# Patient Record
Sex: MALE | Race: WHITE | Employment: OTHER | URBAN - METROPOLITAN AREA
[De-identification: names, ages, dates, MRNs, and addresses within clinical notes are randomized per-mention and may not be internally consistent; named-entity substitution may affect disease eponyms.]

---

## 2017-01-06 RX ORDER — SUMATRIPTAN 100 MG/1
TABLET, FILM COATED ORAL
Qty: 12 TAB | Refills: 1 | Status: SHIPPED | OUTPATIENT
Start: 2017-01-06 | End: 2017-03-05 | Stop reason: SDUPTHER

## 2017-01-09 RX ORDER — FLUTICASONE PROPIONATE 50 MCG
SPRAY, SUSPENSION (ML) NASAL
Qty: 1 BOTTLE | Refills: 3 | Status: SHIPPED | OUTPATIENT
Start: 2017-01-09 | End: 2017-06-27 | Stop reason: ALTCHOICE

## 2017-01-09 RX ORDER — TAMSULOSIN HYDROCHLORIDE 0.4 MG/1
CAPSULE ORAL
Qty: 90 CAP | Refills: 2 | Status: SHIPPED | OUTPATIENT
Start: 2017-01-09 | End: 2017-10-10 | Stop reason: SDUPTHER

## 2017-01-12 NOTE — TELEPHONE ENCOUNTER
From: Mehdi Guy  To: Xiao Grady MD  Sent: 1/11/2017 7:26 PM EST  Subject: Medication Renewal Request    Original authorizing provider: MD Mehdi Beckman would like a refill of the following medications:  fentaNYL ([address]) 25 mcg/hr PATCH Xiao Grady MD]    Preferred [facility]    Comment:

## 2017-01-13 RX ORDER — FENTANYL 25 UG/1
1 PATCH TRANSDERMAL
Qty: 10 PATCH | Refills: 0 | Status: SHIPPED | OUTPATIENT
Start: 2017-01-15 | End: 2017-02-09 | Stop reason: SDUPTHER

## 2017-01-27 RX ORDER — HYDROCODONE BITARTRATE AND ACETAMINOPHEN 7.5; 325 MG/1; MG/1
1 TABLET ORAL
Qty: 150 TAB | Refills: 0 | Status: SHIPPED | OUTPATIENT
Start: 2017-01-27 | End: 2017-02-23 | Stop reason: SDUPTHER

## 2017-01-27 NOTE — TELEPHONE ENCOUNTER
From: Maryann Guillen  To: Jamie Pickard MD  Sent: 1/26/2017 6:55 PM EST  Subject: Medication Renewal Request    Original authorizing provider: MD Maryann Pickering would like a refill of the following medications:  HYDROcodone-acetaminophen (NORCO) 7.5-325 mg per tablet Jamie Pickard MD]    [facility]    Comment:

## 2017-02-10 RX ORDER — FENTANYL 25 UG/1
1 PATCH TRANSDERMAL
Qty: 10 PATCH | Refills: 0 | Status: SHIPPED | OUTPATIENT
Start: 2017-02-10 | End: 2017-03-09 | Stop reason: SDUPTHER

## 2017-02-10 RX ORDER — FENTANYL 25 UG/1
1 PATCH TRANSDERMAL
Qty: 10 PATCH | Refills: 0 | Status: SHIPPED | OUTPATIENT
Start: 2017-02-10 | End: 2017-02-10 | Stop reason: SDUPTHER

## 2017-02-10 NOTE — TELEPHONE ENCOUNTER
From: Emily Christian  To: Ashia Ferrari MD  Sent: 2/9/2017 7:03 PM EST  Subject: Medication Renewal Request    Original authorizing provider: MD Emily Hernandez would like a refill of the following medications:  fentaNYL (Liane Walter) 25 mcg/hr PATCH Ashia Ferrari MD]    [facility]    Comment:

## 2017-02-11 RX ORDER — PANTOPRAZOLE SODIUM 40 MG/1
TABLET, DELAYED RELEASE ORAL
Qty: 90 TAB | Refills: 0 | Status: SHIPPED | OUTPATIENT
Start: 2017-02-11 | End: 2017-06-29 | Stop reason: ALTCHOICE

## 2017-02-22 RX ORDER — CEFUROXIME AXETIL 500 MG/1
500 TABLET ORAL 2 TIMES DAILY
Qty: 10 TAB | Refills: 0 | Status: SHIPPED | OUTPATIENT
Start: 2017-02-22 | End: 2017-02-27

## 2017-02-24 RX ORDER — HYDROCODONE BITARTRATE AND ACETAMINOPHEN 7.5; 325 MG/1; MG/1
1 TABLET ORAL
Qty: 150 TAB | Refills: 0 | Status: SHIPPED | OUTPATIENT
Start: 2017-02-24 | End: 2017-03-26 | Stop reason: SDUPTHER

## 2017-02-24 NOTE — TELEPHONE ENCOUNTER
From: Prince Lockwood  To: Maxx Ochoa MD  Sent: 2/23/2017 7:57 PM EST  Subject: Medication Renewal Request    Original authorizing provider: MD Prince Guerrero would like a refill of the following medications:  HYDROcodone-acetaminophen (NORCO) 7.5-325 mg per tablet Maxx Ochoa MD]    [facility]    Comment:

## 2017-03-06 RX ORDER — SUMATRIPTAN 100 MG/1
TABLET, FILM COATED ORAL
Qty: 12 TAB | Refills: 1 | Status: SHIPPED | OUTPATIENT
Start: 2017-03-06 | End: 2017-05-05 | Stop reason: SDUPTHER

## 2017-03-09 RX ORDER — FENTANYL 25 UG/1
1 PATCH TRANSDERMAL
Qty: 10 PATCH | Refills: 0 | Status: SHIPPED | OUTPATIENT
Start: 2017-03-09 | End: 2017-04-07 | Stop reason: SDUPTHER

## 2017-03-09 NOTE — TELEPHONE ENCOUNTER
From: Gregorio Cherry  To: Erika Harvey MD  Sent: 3/9/2017 5:29 AM EST  Subject: Medication Renewal Request    Original authorizing provider: MD Gregorio Gloria would like a refill of the following medications:  fentaNYL ([address]) 25 mcg/hr PATCH Erika Harvey MD]    [facility]    Comment:

## 2017-03-10 ENCOUNTER — TELEPHONE (OUTPATIENT)
Dept: INTERNAL MEDICINE CLINIC | Age: 67
End: 2017-03-10

## 2017-03-10 NOTE — TELEPHONE ENCOUNTER
Prior Auth request received from Pharmacy for Imitrex. Paperwork completed and faxed to insurance. Approval received. Patient contacted and pharmacy faxed insurance decision.

## 2017-03-14 RX ORDER — GUANFACINE HYDROCHLORIDE 1 MG/1
TABLET ORAL
Qty: 30 TAB | Refills: 5 | Status: SHIPPED | OUTPATIENT
Start: 2017-03-14 | End: 2017-09-29 | Stop reason: SDUPTHER

## 2017-03-27 RX ORDER — HYDROCODONE BITARTRATE AND ACETAMINOPHEN 7.5; 325 MG/1; MG/1
1 TABLET ORAL
Qty: 150 TAB | Refills: 0 | Status: SHIPPED | OUTPATIENT
Start: 2017-03-27 | End: 2017-04-20 | Stop reason: SDUPTHER

## 2017-03-27 NOTE — TELEPHONE ENCOUNTER
From: Mary Cordero  To: Munir Brown MD  Sent: 3/26/2017 9:30 PM EDT  Subject: Medication Renewal Request    Original authorizing provider: MD Mary Cardenas would like a refill of the following medications:  HYDROcodone-acetaminophen (NORCO) 7.5-325 mg per tablet Munir Brown MD]    Preferred pharmacy: [facility]    Comment:

## 2017-03-30 RX ORDER — HYDROCHLOROTHIAZIDE 25 MG/1
TABLET ORAL
Qty: 30 TAB | Refills: 3 | Status: SHIPPED | OUTPATIENT
Start: 2017-03-30 | End: 2017-06-27 | Stop reason: ALTCHOICE

## 2017-04-07 ENCOUNTER — HOSPITAL ENCOUNTER (OUTPATIENT)
Dept: LAB | Age: 67
Discharge: HOME | End: 2017-04-07
Payer: MEDICARE

## 2017-04-07 ENCOUNTER — OFFICE VISIT (OUTPATIENT)
Dept: INTERNAL MEDICINE CLINIC | Age: 67
End: 2017-04-07

## 2017-04-07 VITALS
SYSTOLIC BLOOD PRESSURE: 130 MMHG | HEART RATE: 79 BPM | HEIGHT: 76 IN | WEIGHT: 245 LBS | OXYGEN SATURATION: 98 % | TEMPERATURE: 98.4 F | RESPIRATION RATE: 16 BRPM | BODY MASS INDEX: 29.83 KG/M2 | DIASTOLIC BLOOD PRESSURE: 78 MMHG

## 2017-04-07 DIAGNOSIS — I10 ESSENTIAL HYPERTENSION: ICD-10-CM

## 2017-04-07 DIAGNOSIS — C92.11 CML IN REMISSION (HCC): ICD-10-CM

## 2017-04-07 DIAGNOSIS — Z12.5 PROSTATE CANCER SCREENING: ICD-10-CM

## 2017-04-07 DIAGNOSIS — M54.2 CERVICALGIA: ICD-10-CM

## 2017-04-07 DIAGNOSIS — I10 ESSENTIAL HYPERTENSION: Primary | ICD-10-CM

## 2017-04-07 LAB
ALBUMIN SERPL BCP-MCNC: 4.2 G/DL (ref 3.4–5)
ALBUMIN/GLOB SERPL: 1.2 {RATIO} (ref 0.8–1.7)
ALP SERPL-CCNC: 78 U/L (ref 45–117)
ALT SERPL-CCNC: 19 U/L (ref 16–61)
ANION GAP BLD CALC-SCNC: 6 MMOL/L (ref 3–18)
AST SERPL W P-5'-P-CCNC: 12 U/L (ref 15–37)
BILIRUB SERPL-MCNC: 0.9 MG/DL (ref 0.2–1)
BUN SERPL-MCNC: 17 MG/DL (ref 7–18)
BUN/CREAT SERPL: 15 (ref 12–20)
CALCIUM SERPL-MCNC: 8.7 MG/DL (ref 8.5–10.1)
CHLORIDE SERPL-SCNC: 109 MMOL/L (ref 100–108)
CO2 SERPL-SCNC: 25 MMOL/L (ref 21–32)
CREAT SERPL-MCNC: 1.12 MG/DL (ref 0.6–1.3)
GLOBULIN SER CALC-MCNC: 3.4 G/DL (ref 2–4)
GLUCOSE SERPL-MCNC: 81 MG/DL (ref 74–99)
POTASSIUM SERPL-SCNC: 4.3 MMOL/L (ref 3.5–5.5)
PROT SERPL-MCNC: 7.6 G/DL (ref 6.4–8.2)
PSA SERPL-MCNC: 2.8 NG/ML (ref 0–4)
SODIUM SERPL-SCNC: 140 MMOL/L (ref 136–145)

## 2017-04-07 PROCEDURE — 84153 ASSAY OF PSA TOTAL: CPT | Performed by: INTERNAL MEDICINE

## 2017-04-07 PROCEDURE — 80053 COMPREHEN METABOLIC PANEL: CPT | Performed by: INTERNAL MEDICINE

## 2017-04-07 RX ORDER — FENTANYL 25 UG/1
1 PATCH TRANSDERMAL
Qty: 10 PATCH | Refills: 0 | Status: SHIPPED | OUTPATIENT
Start: 2017-04-07 | End: 2017-05-08 | Stop reason: SDUPTHER

## 2017-04-07 NOTE — PROGRESS NOTES
1. Have you been to the ER, urgent care clinic since your last visit? Hospitalized since your last visit? No    2. Have you seen or consulted any other health care providers outside of the [address] [place] [address] since your last visit? Include any pap smears or colon screening.  No

## 2017-04-07 NOTE — PROGRESS NOTES
The patient presents to the office today with the chief complaint of hypetension    HPI    The patient remains on medications for hypertension. He is tolerating the medications well. The patient persists with chronic pain in his neck. The pain is in fair control on medications. 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 Review of Systems   Respiratory: Negative for shortness of breath. Cardiovascular: Negative for chest pain and leg swelling. Musculoskeletal: Positive for neck pain. Allergies   Allergen Reactions    Levofloxacin Nausea Only     Severe nausea and dizziness    Sulfa (Sulfonamide Antibiotics) Unknown (comments)     Nausea, aching all over       Current Outpatient Prescriptions   Medication Sig Dispense Refill    tiZANidine (ZANAFLEX) 4 mg capsule TAKE 1 CAPSULE BY MOUTH 3 TIMES DAILY 90 Cap 5    fentaNYL (DURAGESIC) 25 mcg/hr PATCH 1 Patch by TransDERmal route every seventy-two (72) hours. Max Daily Amount: 1 Patch. 10 Patch 0    hydroCHLOROthiazide (HYDRODIURIL) 25 mg tablet TAKE 1 TABLET BY MOUTH ONCE DAILY 30 Tab 3    HYDROcodone-acetaminophen (NORCO) 7.5-325 mg per tablet Take 1 Tab by mouth every four (4) hours as needed for Pain. Max Daily Amount: 6 Tabs. 150 Tab 0    guanFACINE (TENEX) 1 mg tablet TAKE 1 TABLET BY MOUTH EVERY DAY 30 Tab 5    SUMAtriptan (IMITREX) 100 mg tablet TAKE 1 TABLET BY MOUTH AT ONSET OF HEADACHE, MAY REPEAT 2 HOURS LATER. (MAX 2 PILLS IN 24 HOURS) 12 Tab 1    pantoprazole (PROTONIX) 40 mg tablet TAKE 1 TABLET BY MOUTH EVERY DAY 90 Tab 0    fluticasone (FLONASE) 50 mcg/actuation nasal spray SQUIRT 2 SPRAYS UPWARDS INTO EACH NOSTRIL DAILY 1 Bottle 3    tamsulosin (FLOMAX) 0.4 mg capsule TAKE 1 CAPSULE BY MOUTH ONCE DAILY 90 Cap 2    amLODIPine-Olmesartan 5-40 mg tab TAKE 1 TABLET BY MOUTH ONCE DAILY.  30 Tab 3    DULoxetine (CYMBALTA) 30 mg capsule TAKE 1 CAPSULE BY MOUTH DAILY WITH 60 MG CAPSULE FOR A TOTAL OF 90 MG DAILY 30 Cap 6    labetalol (NORMODYNE) 300 mg tablet TAKE 1 TABLET BY MOUTH 2 TIMES DAILY 60 Tab 3    topiramate (TOPAMAX) 50 mg tablet TAKE 1 TABLET BY MOUTH TWICE A DAY TO PREVENT MIGRAINE 60 Tab 5    DULoxetine (CYMBALTA) 60 mg capsule TAKE ONE CAPSULE BY MOUTH EVERY DAY 90 Cap 3    carisoprodol (SOMA) 350 mg tablet take 1 tablet by mouth three times a day  0    meclizine (ANTIVERT) 25 mg tablet TAKE 1 TABLET BY MOUTH 3 TIMES DAILY AS NEEDED FOR DIZZINESS 60 Tab 0    carvedilol (COREG) 12.5 mg tablet 1 TABLET TWICE PER  Tab 3    promethazine (PHENERGAN) 25 mg tablet Take 25 mg by mouth every six (6) hours as needed for Nausea.  montelukast (SINGULAIR) 10 mg tablet TAKE 1 TABLET BY MOUTH DAILY 90 Tab 3    Nilotinib (TASIGNA) 150 mg cap Take 300 mg by mouth two (2) times a day. Indications: Leukemia         Past Medical History:   Diagnosis Date    Abdominal pain, unspecified site     Acute reaction to stress     Allergic rhinitis due to pollen     Arthritis     Back injury     BPH (benign prostatic hypertrophy)     Calculus of ureter     Cancer (HCC)     history of Leukemia, in remission    Carotid duplex 06/17/2015    Mild < 50% bilateral ICA stenosis.  Dizziness and giddiness     Esophageal reflux     Essential hypertension     GERD (gastroesophageal reflux disease)     Headache     History of echocardiogram 08/13/2015    EF 55-60%. No WMA. Mild-mod LVH. Gr 1 DDfx. No evidence of intracardiac shunt.   Mild AI.      Hx: UTI (urinary tract infection)     Hypertension     Hypogonadism in male     Kidney stones     Migraine     Neck injury     Polycythemia ([address]     Polycythemia, secondary     Sciatica     Unspecified retinal detachment     Unspecified sleep apnea     resolved since gastric bypass    Vision decreased     Weight loss        Past Surgical History:   Procedure Laterality Date    ABDOMEN SURGERY PROC UNLISTED  2012    Hernia    HX CATARACT REMOVAL Left     HX CERVICAL FUSION  4/4/2016    Cervical Spine Fusion    HX CHOLECYSTECTOMY      HX GASTRIC BYPASS  2006    HX KNEE ARTHROSCOPY      both knees (right knee twice, left once)    HX ORTHOPAEDIC  1981, 1995    lumbar laminectomy    HX OTHER SURGICAL      Two back surgeries    HX RETINAL DETACHMENT REPAIR Left        Social History     Social History    Marital status:      Spouse name: N/A    Number of children: N/A    Years of education: N/A     Occupational History    Not on file. Social History Main Topics    Smoking status: Former Smoker     Quit date: 2/22/1981    Smokeless tobacco: Never Used    Alcohol use No    Drug use: No    Sexual activity: Yes     Partners: Female     Other Topics Concern    Not on file     Social History Narrative       Patient does not have an advanced directive on file    Visit Vitals    /78 (BP 1 Location: Right arm, BP Patient Position: Sitting)    Pulse 79    Temp 98.4 °F (36.9 °C) (Tympanic)    Resp 16    Ht 6' 4\" (1.93 m)    Wt 245 lb (111.1 kg)    SpO2 98%    BMI 29.82 kg/m2       Physical Exam   No Cervical Lymphadenopathy  No Supraclavicular Lymphadenopathy  Thyroid is Normal  Lungs are clear to ausculation and percussion  Heart:  S1 S2 are normal, No gallops, No mummers  No Carotid Bruits  Abdomen:  Normal Bowel Sounds. No tenderness. No masses. No Hepatomegaly or Splenomegly  LE:  Strong Pedal Pulses. No Edema  Neck with limited range of motion    BMI:  I have reviewed/discussed the above normal BMI with the patient. I have recommended the following interventions: dietary management education, guidance, and counseling . [facility] Outpatient Visit on 04/07/2017   Component Date Value Ref Range Status    Prostate Specific Ag 04/07/2017 2.8  0.0 - 4.0 ng/mL Final    Sodium 04/07/2017 140  136 - 145 mmol/L Final    Potassium 04/07/2017 4.3  3.5 - 5.5 mmol/L Final    Chloride 04/07/2017 109* 100 - 108 mmol/L Final    CO2 04/07/2017 25  21 - 32 mmol/L Final    Anion gap 04/07/2017 6  3.0 - 18 mmol/L Final    Glucose 04/07/2017 81  74 - 99 mg/dL Final    BUN 04/07/2017 17  7.0 - 18 MG/DL Final    Creatinine 04/07/2017 1.12  0.6 - 1.3 MG/DL Final    BUN/Creatinine ratio 04/07/2017 15  12 - 20   Final    GFR est AA 04/07/2017 >60  >60 ml/min/1.73m2 Final    GFR est non-AA 04/07/2017 >60  >60 ml/min/1.73m2 Final    Comment: (NOTE)  Estimated GFR is calculated using the Modification of Diet in Renal   Disease (MDRD) Study equation, reported for both  Americans   (GFRAA) and non- Americans (GFRNA), and normalized to 1.73m2   body surface area. The physician must decide which value applies to   the patient. The MDRD study equation should only be used in   individuals age 25 or older. It has not been validated for the   following: pregnant women, patients with serious comorbid conditions,   or on certain medications, or persons with extremes of body size,   muscle mass, or nutritional status.  Calcium 04/07/2017 8.7  8.5 - 10.1 MG/DL Final    Bilirubin, total 04/07/2017 0.9  0.2 - 1.0 MG/DL Final    ALT (SGPT) 04/07/2017 19  16 - 61 U/L Final    AST (SGOT) 04/07/2017 12* 15 - 37 U/L Final    Alk.  phosphatase 04/07/2017 78  45 - 117 U/L Final    Protein, total 04/07/2017 7.6  6.4 - 8.2 g/dL Final    Albumin 04/07/2017 4.2  3.4 - 5.0 g/dL Final    Globulin 04/07/2017 3.4  2.0 - 4.0 g/dL Final    A-G Ratio 04/07/2017 1.2  0.8 - 1.7   Final       .  Results for orders placed or performed during the hospital encounter of 04/07/17   PSA SCREENING (SCREENING)   Result Value Ref Range    Prostate Specific Ag 2.8 0.0 - 4.0 ng/mL   METABOLIC PANEL, COMPREHENSIVE   Result Value Ref Range    Sodium 140 136 - 145 mmol/L    Potassium 4.3 3.5 - 5.5 mmol/L    Chloride 109 (H) 100 - 108 mmol/L    CO2 25 21 - 32 mmol/L    Anion gap 6 3.0 - 18 mmol/L    Glucose 81 74 - 99 mg/dL    BUN 17 7.0 - 18 MG/DL    Creatinine 1.12 0.6 - 1.3 MG/DL    BUN/Creatinine ratio 15 12 - 20      GFR est AA >60 >60 ml/min/1.73m2    GFR est non-AA >60 >60 ml/min/1.73m2    Calcium 8.7 8.5 - 10.1 MG/DL    Bilirubin, total 0.9 0.2 - 1.0 MG/DL    ALT (SGPT) 19 16 - 61 U/L    AST (SGOT) 12 (L) 15 - 37 U/L    Alk. phosphatase 78 45 - 117 U/L    Protein, total 7.6 6.4 - 8.2 g/dL    Albumin 4.2 3.4 - 5.0 g/dL    Globulin 3.4 2.0 - 4.0 g/dL    A-G Ratio 1.2 0.8 - 1.7         Assessment / Plan      ICD-10-CM ICD-9-CM    1. Essential hypertension I10 401.9 PSA SCREENING (SCREENING)      METABOLIC PANEL, COMPREHENSIVE      MS COLLECTION VENOUS BLOOD,VENIPUNCTURE   2. Prostate cancer screening Z12.5 V76.44 PSA SCREENING (SCREENING)      METABOLIC PANEL, COMPREHENSIVE      MS COLLECTION VENOUS BLOOD,VENIPUNCTURE   3. CML in remission ([facility] [address] C92.11 205.11    4. Cervicalgia M54.2 723.1       Labs  he was advised to continue his maintenance medications    The Prescription Monitoring Program registry was checked by me prior to the issuing of this prescription for a controlled substance      Follow-up Disposition:  Return in about 5 months (around 9/7/2017). I asked Markos Diamond if he has any questions and I answered the questions.   Markos Diamond states that he understands the treatment plan and agrees with the treatment plan

## 2017-04-07 NOTE — PATIENT INSTRUCTIONS
Health Maintenance Due   Topic Date Due    Hepatitis C Test  1950    Stool testing for trace blood  06/22/1999    Glaucoma Screening   06/22/2014    Pneumococcal Vaccine (2 of 2 - PPSV23) 06/18/2016

## 2017-04-13 RX ORDER — TIZANIDINE HYDROCHLORIDE 4 MG/1
CAPSULE, GELATIN COATED ORAL
Qty: 90 CAP | Refills: 5 | Status: SHIPPED | OUTPATIENT
Start: 2017-04-13 | End: 2017-06-27 | Stop reason: ALTCHOICE

## 2017-04-21 RX ORDER — HYDROCODONE BITARTRATE AND ACETAMINOPHEN 7.5; 325 MG/1; MG/1
TABLET ORAL
Qty: 150 TAB | Refills: 0 | Status: SHIPPED | OUTPATIENT
Start: 2017-04-21 | End: 2017-05-23 | Stop reason: SDUPTHER

## 2017-04-21 NOTE — TELEPHONE ENCOUNTER
From: Sharmin Ferrer  To: Abby Montemayor MD  Sent: 4/20/2017 7:30 PM EDT  Subject: Medication Renewal Request    Original authorizing provider: MD Sharmin Dawson would like a refill of the following medications:  HYDROcodone-acetaminophen (NORCO) 7.5-325 mg per tablet Abby Montemayor MD]    Preferred pharmacy: [facility]    Comment:

## 2017-05-05 RX ORDER — SUMATRIPTAN 100 MG/1
TABLET, FILM COATED ORAL
Qty: 12 TAB | Refills: 1 | Status: SHIPPED | OUTPATIENT
Start: 2017-05-05 | End: 2017-07-03 | Stop reason: SDUPTHER

## 2017-05-08 RX ORDER — FENTANYL 25 UG/1
1 PATCH TRANSDERMAL
Qty: 10 PATCH | Refills: 0 | Status: SHIPPED | OUTPATIENT
Start: 2017-05-08 | End: 2017-06-07 | Stop reason: SDUPTHER

## 2017-05-23 RX ORDER — HYDROCODONE BITARTRATE AND ACETAMINOPHEN 7.5; 325 MG/1; MG/1
TABLET ORAL
Qty: 150 TAB | Refills: 0 | Status: SHIPPED | OUTPATIENT
Start: 2017-05-25 | End: 2017-06-22 | Stop reason: SDUPTHER

## 2017-05-24 ENCOUNTER — HOSPITAL ENCOUNTER (OUTPATIENT)
Dept: LAB | Age: 67
Discharge: HOME | End: 2017-05-24
Payer: MEDICARE

## 2017-05-24 DIAGNOSIS — Z02.83 ENCOUNTER FOR DRUG SCREENING: ICD-10-CM

## 2017-05-24 PROCEDURE — 80307 DRUG TEST PRSMV CHEM ANLYZR: CPT | Performed by: INTERNAL MEDICINE

## 2017-06-01 LAB
DRUGS UR: NORMAL
PDF, 451356: NORMAL

## 2017-06-08 RX ORDER — FENTANYL 25 UG/1
1 PATCH TRANSDERMAL
Qty: 10 PATCH | Refills: 0 | Status: SHIPPED | OUTPATIENT
Start: 2017-06-08 | End: 2017-07-07 | Stop reason: SDUPTHER

## 2017-06-14 RX ORDER — QUININE SULFATE 324 MG/1
324 CAPSULE ORAL
Qty: 30 CAP | Refills: 0 | Status: CANCELLED | OUTPATIENT
Start: 2017-06-14 | End: 2017-07-14

## 2017-06-14 NOTE — TELEPHONE ENCOUNTER
Patient called stating that his muscle cramps in his legs are getting worse and are not only at night now.  Patient requests the keke Abarca had suggested

## 2017-06-15 RX ORDER — MONTELUKAST SODIUM 10 MG/1
TABLET ORAL
Qty: 90 TAB | Refills: 3 | Status: SHIPPED | OUTPATIENT
Start: 2017-06-15 | End: 2018-06-28 | Stop reason: SDUPTHER

## 2017-06-22 RX ORDER — HYDROCODONE BITARTRATE AND ACETAMINOPHEN 7.5; 325 MG/1; MG/1
TABLET ORAL
Qty: 150 TAB | Refills: 0 | Status: SHIPPED | OUTPATIENT
Start: 2017-06-22 | End: 2017-07-20 | Stop reason: SDUPTHER

## 2017-06-22 NOTE — TELEPHONE ENCOUNTER
From: Norma Juares  To: Amena Day MD  Sent: 6/22/2017 7:20 AM EDT  Subject: Medication Renewal Request    Original authorizing provider: MD Norma Perdomo would like a refill of the following medications:  HYDROcodone-acetaminophen (NORCO) 7.5-325 mg per tablet Amena Day MD]    Preferred pharmacy: [facility]    Comment:

## 2017-06-27 ENCOUNTER — HOSPITAL ENCOUNTER (OUTPATIENT)
Dept: LAB | Age: 67
Discharge: HOME | End: 2017-06-27
Payer: MEDICARE

## 2017-06-27 ENCOUNTER — OFFICE VISIT (OUTPATIENT)
Dept: INTERNAL MEDICINE CLINIC | Age: 67
End: 2017-06-27

## 2017-06-27 VITALS
SYSTOLIC BLOOD PRESSURE: 110 MMHG | HEIGHT: 76 IN | TEMPERATURE: 98.3 F | HEART RATE: 74 BPM | WEIGHT: 245 LBS | DIASTOLIC BLOOD PRESSURE: 74 MMHG | BODY MASS INDEX: 29.83 KG/M2 | OXYGEN SATURATION: 98 %

## 2017-06-27 DIAGNOSIS — C92.11 CML IN REMISSION (HCC): Primary | ICD-10-CM

## 2017-06-27 DIAGNOSIS — Z00.00 ROUTINE GENERAL MEDICAL EXAMINATION AT A HEALTH CARE FACILITY: ICD-10-CM

## 2017-06-27 DIAGNOSIS — Z13.31 SCREENING FOR DEPRESSION: ICD-10-CM

## 2017-06-27 DIAGNOSIS — M48.9 CERVICAL SPINE DISEASE: ICD-10-CM

## 2017-06-27 DIAGNOSIS — R25.2 CRAMPS, MUSCLE, GENERAL: ICD-10-CM

## 2017-06-27 DIAGNOSIS — E29.1 HYPOGONADISM IN MALE: ICD-10-CM

## 2017-06-27 DIAGNOSIS — C92.11 CML IN REMISSION (HCC): ICD-10-CM

## 2017-06-27 DIAGNOSIS — Z13.39 SCREENING FOR ALCOHOLISM: ICD-10-CM

## 2017-06-27 DIAGNOSIS — Z23 ENCOUNTER FOR IMMUNIZATION: ICD-10-CM

## 2017-06-27 LAB
ALBUMIN SERPL BCP-MCNC: 3.7 G/DL (ref 3.4–5)
ALBUMIN/GLOB SERPL: 1.2 {RATIO} (ref 0.8–1.7)
ALP SERPL-CCNC: 90 U/L (ref 45–117)
ALT SERPL-CCNC: 17 U/L (ref 16–61)
ANION GAP BLD CALC-SCNC: 7 MMOL/L (ref 3–18)
AST SERPL W P-5'-P-CCNC: 12 U/L (ref 15–37)
BASOPHILS # BLD AUTO: 0.1 K/UL (ref 0–0.06)
BASOPHILS # BLD: 1 % (ref 0–2)
BILIRUB SERPL-MCNC: 0.4 MG/DL (ref 0.2–1)
BUN SERPL-MCNC: 29 MG/DL (ref 7–18)
BUN/CREAT SERPL: 24 (ref 12–20)
CALCIUM SERPL-MCNC: 8.4 MG/DL (ref 8.5–10.1)
CHLORIDE SERPL-SCNC: 110 MMOL/L (ref 100–108)
CO2 SERPL-SCNC: 22 MMOL/L (ref 21–32)
CREAT SERPL-MCNC: 1.22 MG/DL (ref 0.6–1.3)
DIFFERENTIAL METHOD BLD: ABNORMAL
EOSINOPHIL # BLD: 0.1 K/UL (ref 0–0.4)
EOSINOPHIL NFR BLD: 2 % (ref 0–5)
ERYTHROCYTE [DISTWIDTH] IN BLOOD BY AUTOMATED COUNT: 16.8 % (ref 11.6–14.5)
GLOBULIN SER CALC-MCNC: 3.1 G/DL (ref 2–4)
GLUCOSE SERPL-MCNC: 89 MG/DL (ref 74–99)
HCT VFR BLD AUTO: 39 % (ref 36–48)
HGB BLD-MCNC: 13 G/DL (ref 13–16)
LYMPHOCYTES # BLD AUTO: 46 % (ref 21–52)
LYMPHOCYTES # BLD: 3.5 K/UL (ref 0.9–3.6)
MCH RBC QN AUTO: 27.1 PG (ref 24–34)
MCHC RBC AUTO-ENTMCNC: 33.3 G/DL (ref 31–37)
MCV RBC AUTO: 81.4 FL (ref 74–97)
MONOCYTES # BLD: 0.5 K/UL (ref 0.05–1.2)
MONOCYTES NFR BLD AUTO: 7 % (ref 3–10)
NEUTS SEG # BLD: 3.3 K/UL (ref 1.8–8)
NEUTS SEG NFR BLD AUTO: 44 % (ref 40–73)
PLATELET # BLD AUTO: 340 K/UL (ref 135–420)
PMV BLD AUTO: 11.4 FL (ref 9.2–11.8)
POTASSIUM SERPL-SCNC: 4.7 MMOL/L (ref 3.5–5.5)
PROT SERPL-MCNC: 6.8 G/DL (ref 6.4–8.2)
RBC # BLD AUTO: 4.79 M/UL (ref 4.7–5.5)
SODIUM SERPL-SCNC: 139 MMOL/L (ref 136–145)
WBC # BLD AUTO: 7.5 K/UL (ref 4.6–13.2)

## 2017-06-27 PROCEDURE — 84403 ASSAY OF TOTAL TESTOSTERONE: CPT | Performed by: INTERNAL MEDICINE

## 2017-06-27 PROCEDURE — 80053 COMPREHEN METABOLIC PANEL: CPT | Performed by: INTERNAL MEDICINE

## 2017-06-27 PROCEDURE — 85025 COMPLETE CBC W/AUTO DIFF WBC: CPT | Performed by: INTERNAL MEDICINE

## 2017-06-27 NOTE — MR AVS SNAPSHOT
Visit Information Date & Time Provider Department Dept. Phone Encounter #  
 6/27/2017  9:30 AM Andrzej Hines MD [facility] [place] [phone] [id number] Your Appointments 9/26/2017  8:15 AM  
Follow Up with Andrzej Hines MD  
[facility]) Appt Note: 3 month [address], [address] [place] [facility] [address]  
  
   
 [address], [address] [place] [address] Upcoming Health Maintenance Date Due Hepatitis C Screening 1950 FOBT Q 1 YEAR AGE 50-75 6/22/1999 GLAUCOMA SCREENING Q2Y 6/22/2014 INFLUENZA AGE 9 TO ADULT 8/1/2017 MEDICARE YEARLY EXAM 6/28/2018 DTaP/Tdap/Td series (2 - Td) 6/3/2024 Allergies as of 6/27/2017  Review Complete On: 6/27/2017 By: Andrzej Hines MD  
  
 Severity Noted Reaction Type Reactions Levofloxacin High   Nausea Only Severe nausea and dizziness Sulfa (Sulfonamide Antibiotics)  03/26/2014    Unknown (comments) Nausea, aching all over Current Immunizations  Reviewed on 6/26/2017 Name Date Influenza High Dose Vaccine PF 10/18/2016  9:42 AM  
 Influenza Vaccine (Quad) PF 10/1/2015 Pneumococcal Conjugate (PCV-13) 10/1/2015 Pneumococcal Polysaccharide (PPSV-23) 6/27/2017 11:34 AM  
 Pneumococcal Vaccine (Unspecified Type) 6/18/2011 Td 6/2/2006 Tdap 6/3/2014 Zoster Vaccine, Live 12/9/2013 Not reviewed this visit You Were Diagnosed With   
  
 Codes Comments CML in remission ([facility] [address]    -  Primary ICD-10-CM: C92.11 ICD-9-CM: 205.11 Cervical spine disease     ICD-10-CM: M48.9 ICD-9-CM: 724.9 Cramps, muscle, general     ICD-10-CM: R25.2 ICD-9-CM: 729.82 Hypogonadism in male     ICD-10-CM: E29.1 ICD-9-CM: 257.2 Routine general medical examination at a health care facility     ICD-10-CM: Z00.00 ICD-9-CM: V70.0  Screening for alcoholism     ICD-10-CM: Z13.89 
 ICD-9-CM: V79.1 Screening for depression     ICD-10-CM: Z13.89 ICD-9-CM: V79.0 Encounter for immunization     ICD-10-CM: F87 ICD-9-CM: V03.89 Vitals BP Pulse Temp Height(growth percentile) 110/74 (BP 1 Location: Left arm, BP Patient Position: Sitting) 74 98.3 °F (36.8 °C) (Tympanic) 6' 4\" (1.93 m) Weight(growth percentile) SpO2 BMI Smoking Status 245 lb (111.1 kg) 98% 29.82 kg/m2 Former Smoker BMI and BSA Data Body Mass Index Body Surface Area  
 29.82 kg/m 2 2.44 m 2 Preferred Pharmacy Pharmacy Name Phone [id number] [place] [id number] [facility] [place] [facility] [phone] Your Updated Medication List  
  
   
This list is accurate as of: 6/27/17 11:34 AM.  Always use your most recent med list. amLODIPine-Olmesartan 5-40 mg Tab TAKE 1 TABLET BY MOUTH ONCE DAILY. carvedilol 12.5 mg tablet Commonly known as:  COREG  
1 TABLET TWICE PER DAY * DULoxetine 60 mg capsule Commonly known as:  CYMBALTA TAKE ONE CAPSULE BY MOUTH EVERY DAY  
  
 * DULoxetine 30 mg capsule Commonly known as:  CYMBALTA TAKE 1 CAPSULE BY MOUTH DAILY WITH 60 MG CAPSULE FOR A TOTAL OF 90 MG DAILY  
  
 fentaNYL 25 mcg/hr PATCH Commonly known as:  DURAGESIC  
1 Patch by TransDERmal route every seventy-two (72) hours. Max Daily Amount: 1 Patch.  
  
 guanFACINE IR 1 mg IR tablet Commonly known as:  TENEX  
TAKE 1 TABLET BY MOUTH EVERY DAY  
  
 HYDROcodone-acetaminophen 7.5-325 mg per tablet Commonly known as:  Flori Cruise Take 1 tablet by mouth every four hours as needed for pain. Max daily amount 5 tabs. montelukast 10 mg tablet Commonly known as:  SINGULAIR  
TAKE 1 TABLET BY MOUTH EVERY DAY  
  
 pantoprazole 40 mg tablet Commonly known as:  PROTONIX  
TAKE 1 TABLET BY MOUTH EVERY DAY  
  
 SUMAtriptan 100 mg tablet Commonly known as:  IMITREX  
TAKE 1 TABLET BY MOUTH AT ONSET OF HEADACHE, MAY REPEAT 2 HOURS LATER. (MAX 2 PILLS IN 24 HOURS)  
  
 tamsulosin 0.4 mg capsule Commonly known as:  FLOMAX TAKE 1 CAPSULE BY MOUTH ONCE DAILY TASIGNA 150 mg Cap Generic drug:  Nilotinib Take 300 mg by mouth two (2) times a day. Indications: Leukemia  
  
 topiramate 50 mg tablet Commonly known as:  TOPAMAX TAKE 1 TABLET BY MOUTH TWICE A DAY TO PREVENT MIGRAINE  
  
 * Notice: This list has 2 medication(s) that are the same as other medications prescribed for you. Read the directions carefully, and ask your doctor or other care provider to review them with you. We Performed the Following ADMIN PNEUMOCOCCAL VACCINE [ HCP] [facility] [[id number] [facility]] PNEUMOCOCCAL POLYSACCHARIDE VACCINE, 23-VALENT, ADULT OR IMMUNOSUPPRESSED PT DOSE, [31881 CPT(R)] Patient Instructions Medicare Wellness Visit, Male The best way to improve and maintain good health is to have a healthy lifestyle by eating a well-balanced diet, exercising regularly, limiting alcohol and stopping smoking. Regular visits with your physician or non-physician health care provider also support your good health. Preventive screening tests can find health problems before they become diseases or illnesses. Preventive services such as immunizations prevent serious infections. All people over age 72 should have a Pneumovax and a Prevnar-13 shot to prevent potentially life threatening infections with the pneumococcus bacteria, a common cause of pneumonia. These are once in a lifetime unless you and your provider decide differently. Next due: Pneumovax All people over 65 should have a yearly influenza vaccine or \"flu\" shot. This does not prevent infection with cold viruses but has been proven to prevent hospitalization and death from influenza. Next due: Fall Although Medicare part B \"regular Medicare\" currently only covers tetanus vaccination in the context of an injury, a tetanus vaccine (Tdap or Td) is recommended every 10 years. Tdap is generally given once in a lifetime for older adults. Next due: Td in 2024 A shingles vaccine is recommended once in a lifetime after age 61. The Shingles vaccine is also not covered by Medicare part B. Next due: Completed Note, however, that both the Shingles vaccine and Tdap/Td are generally covered by secondary carriers. Please check your coverage and out of pocket expenses. Your pharmacy benefits may cover these vaccines so please check with your pharmacist.  Also consider contacting your local health department because it may stock these vaccines for a reasonable charge. We currently have documentation of the following immunization history for you: 
Immunization History Administered Date(s) Administered  Influenza High Dose Vaccine PF 10/18/2016  Influenza Vaccine (Quad) PF 10/01/2015  Pneumococcal Conjugate (PCV-13) 10/01/2015  Pneumococcal Vaccine (Unspecified Type) 06/18/2011  Td 06/02/2006  Tdap 06/03/2014  Zoster Vaccine, Live 12/09/2013 Screening for infection with Hepatitis C is recommended for anyone born between [birth year] through [address]. The table at the bottom of this document indicates the status of this and other screening services. Screening for diabetes mellitus with a blood sugar test (glucose) should be done at least every 3 years until age 79. You and your health care provider may decide whether to continue screening after age 79. If you have diabetes, this monitoring will be done more frequently (usually every 3-6 months) and will include A1C testing. The most recent blood glucose we have on file for you is:  
Lab Results Component Value Date/Time Glucose 81 04/07/2017 02:52 PM  
 Glucose (POC) 124 06/13/2016 03:39 PM  
 
 
Glaucoma is a disease of the eye due to increased ocular pressure that can lead to blindness.  People with risk factors for glaucoma ( race, diabetes, family history) should be screened at least every 2 years by an eye professional. This may be covered annually if indicated as determined by you and your doctor. Last done: 2016  Next due: 2017 per Dr. Kathryn Story Cardiovascular screening tests that check for elevated lipids or cholesterol (fatty part of blood) which can lead to heart disease and strokes should be done every 4-6 years through age 79. You and your health care provider may decide whether to continue screening after age 79. The most recent lipid panel we have on file for you is:  
Lab Results Component Value Date/Time Cholesterol, total 98 06/17/2011 03:20 AM  
 HDL Cholesterol 21 06/17/2011 03:20 AM  
 LDL, calculated 44.6 06/17/2011 03:20 AM  
 VLDL, calculated 32.4 06/17/2011 03:20 AM  
 Triglyceride 162 06/17/2011 03:20 AM  
 CHOL/HDL Ratio 4.7 06/17/2011 03:20 AM  
 
Next due: per Dr. Nader Gómez Colorectal cancer screening that evaluates for blood or polyps in your colon for people with average risk should be done yearly as a stool test, every five years as a flexible sigmoidoscope or every 10 years as a colonoscopy up to age 76. You and your health care provider may decide whether to continue screening after age 76. Last done: done 2011 per patient (every 5 years) Next due: please schedule Men up to age 76 may elect to screen for prostate cancer with a blood test called a PSA at certain intervals, depending on their personal and family history. This decision is between the patient and his provider. The most recent PSA values we have on file for you are: 
Lab Results Component Value Date/Time  
 Prostate Specific Ag 2.8 04/07/2017 02:52 PM  
 
 
People who are between age 54and [de-identified]years of age and have smoked the equivalent of 1 pack per day for 30 years or more may benefit from screening for lung cancer with a yearly low dose CT scan until they have been non smokers for 15 years. If you have questions regarding hugo please ask your health care provider Your Medicare Wellness Exam is recommended annually. Introducing [facility]! Dear Maria Ines Polanco: Thank you for requesting a [employer] account. Our records indicate that you already have an active Bo[employer] account. You can access your account anytime at [email or web address] Did you know that you can access your hospital and ER discharge instructions at any time in Bo[employer]ah? You can also review all of your test results from your hospital stay or ER visit. Additional Information If you have questions, please visit the Frequently Asked Questions section of the [employer] website at [email or web address] Remember, Bo[employer]ah is NOT to be used for urgent needs. For medical emergencies, dial 911. Now available from your iPhone and Android! Please provide this summary of care documentation to your next provider. Your primary care clinician is listed as Nuria Milan. If you have any questions after today's visit, please call [phone].

## 2017-06-27 NOTE — PROGRESS NOTES
1. Have you been to the ER, urgent care clinic since your last visit? Hospitalized since your last visit? No    2. Have you seen or consulted any other health care providers outside of the [address] [place] [address] since your last visit? Include any pap smears or colon screening.  No

## 2017-06-27 NOTE — PROGRESS NOTES
The patient presents to the office today with the chief complaint of leg cramps    HPI    Veronika Brandon RTC today to discuss his {general pain dx:092531} that is affecting his {body parts:06795}. Significant changes since last visit: none, muscle cramps. He is  able to do his normal daily activities. He reports the following adverse side effects: none. Least pain over the last week has been 2/10. Worst pain over the last week has been 8/10. Aberrant behaviors: None. Urine Drug Screen: {URINE DRUG SCREEN:99021}. Pain agreement on file: {PAIN MANAGEMENT CONTRACT:09845}.  reviewed: {yes/ no default yes:19425::\"yes\"}. Pill count is consistent with his prescription: {yes/ no default yes:19425::\"yes\"}  Concomitant use of a benzodiazepine: {yes/ no default no:24030::\"no\"}    Opioid Risk Tool Reviewed{plan; sports med:20286::\"yes\":1}     {JUSTIFICATION FOR HIGH DOSE:76354}    {NALOXONE PRESCRIPTION REQUIRED:79528::\"Naloxone prescription is warranted. It has been provided or is already on file. \"}         ROS    Allergies   Allergen Reactions    Levofloxacin Nausea Only     Severe nausea and dizziness    Sulfa (Sulfonamide Antibiotics) Unknown (comments)     Nausea, aching all over       Current Outpatient Prescriptions   Medication Sig Dispense Refill    HYDROcodone-acetaminophen (NORCO) 7.5-325 mg per tablet Take 1 tablet by mouth every four hours as needed for pain. Max daily amount 5 tabs. 150 Tab 0    montelukast (SINGULAIR) 10 mg tablet TAKE 1 TABLET BY MOUTH EVERY DAY 90 Tab 3    fentaNYL (DURAGESIC) 25 mcg/hr PATCH 1 Patch by TransDERmal route every seventy-two (72) hours. Max Daily Amount: 1 Patch. 10 Patch 0    SUMAtriptan (IMITREX) 100 mg tablet TAKE 1 TABLET BY MOUTH AT ONSET OF HEADACHE, MAY REPEAT 2 HOURS LATER. (MAX 2 PILLS IN 24 HOURS) 12 Tab 1    tiZANidine (ZANAFLEX) 4 mg capsule TAKE 1 CAPSULE BY MOUTH 3 TIMES DAILY 90 Cap 5    hydroCHLOROthiazide (HYDRODIURIL) 25 mg tablet TAKE 1 TABLET BY MOUTH ONCE DAILY 30 Tab 3    guanFACINE (TENEX) 1 mg tablet TAKE 1 TABLET BY MOUTH EVERY DAY 30 Tab 5    pantoprazole (PROTONIX) 40 mg tablet TAKE 1 TABLET BY MOUTH EVERY DAY 90 Tab 0    fluticasone (FLONASE) 50 mcg/actuation nasal spray SQUIRT 2 SPRAYS UPWARDS INTO EACH NOSTRIL DAILY 1 Bottle 3    tamsulosin (FLOMAX) 0.4 mg capsule TAKE 1 CAPSULE BY MOUTH ONCE DAILY 90 Cap 2    amLODIPine-Olmesartan 5-40 mg tab TAKE 1 TABLET BY MOUTH ONCE DAILY. 30 Tab 3    DULoxetine (CYMBALTA) 30 mg capsule TAKE 1 CAPSULE BY MOUTH DAILY WITH 60 MG CAPSULE FOR A TOTAL OF 90 MG DAILY 30 Cap 6    labetalol (NORMODYNE) 300 mg tablet TAKE 1 TABLET BY MOUTH 2 TIMES DAILY 60 Tab 3    topiramate (TOPAMAX) 50 mg tablet TAKE 1 TABLET BY MOUTH TWICE A DAY TO PREVENT MIGRAINE 60 Tab 5    DULoxetine (CYMBALTA) 60 mg capsule TAKE ONE CAPSULE BY MOUTH EVERY DAY 90 Cap 3    carisoprodol (SOMA) 350 mg tablet take 1 tablet by mouth three times a day  0    meclizine (ANTIVERT) 25 mg tablet TAKE 1 TABLET BY MOUTH 3 TIMES DAILY AS NEEDED FOR DIZZINESS 60 Tab 0    carvedilol (COREG) 12.5 mg tablet 1 TABLET TWICE PER  Tab 3    promethazine (PHENERGAN) 25 mg tablet Take 25 mg by mouth every six (6) hours as needed for Nausea.  Nilotinib (TASIGNA) 150 mg cap Take 300 mg by mouth two (2) times a day. Indications: Leukemia         Past Medical History:   Diagnosis Date    Abdominal pain, unspecified site     Acute reaction to stress     Allergic rhinitis due to pollen     Arthritis     Back injury     BPH (benign prostatic hypertrophy)     Calculus of ureter     Cancer (HCC)     history of Leukemia, in remission    Carotid duplex 06/17/2015    Mild < 50% bilateral ICA stenosis.  Dizziness and giddiness     Esophageal reflux     Essential hypertension     GERD (gastroesophageal reflux disease)     Headache     History of echocardiogram 08/13/2015    EF 55-60%. No WMA. Mild-mod LVH.   Gr 1 DDfx. No evidence of intracardiac shunt. Mild AI.      Hx: UTI (urinary tract infection)     Hypertension     Hypogonadism in male     Kidney stones     Migraine     Neck injury     Polycythemia ([address]     Polycythemia, secondary     Sciatica     Unspecified retinal detachment     Unspecified sleep apnea     resolved since gastric bypass    Vision decreased     Weight loss        Past Surgical History:   Procedure Laterality Date    ABDOMEN SURGERY PROC UNLISTED  2012    Hernia    HX CATARACT REMOVAL Left     HX CERVICAL FUSION  4/4/2016    Cervical Spine Fusion    HX CHOLECYSTECTOMY      HX GASTRIC BYPASS  2006    HX KNEE ARTHROSCOPY      both knees (right knee twice, left once)    HX ORTHOPAEDIC  1981, 1995    lumbar laminectomy    HX OTHER SURGICAL      Two back surgeries    HX RETINAL DETACHMENT REPAIR Left        Social History     Social History    Marital status:      Spouse name: N/A    Number of children: N/A    Years of education: N/A     Occupational History    Not on file. Social History Main Topics    Smoking status: Former Smoker     Quit date: 2/22/1981    Smokeless tobacco: Never Used    Alcohol use No    Drug use: No    Sexual activity: Yes     Partners: Female     Other Topics Concern    Not on file     Social History Narrative       Patient {DOES/DOES NOT/WILD CARD (D):80742} have an advanced directive on file    There were no vitals taken for this visit.     Physical Exam    BMI:  [facility] Outpatient Visit on 05/24/2017   Component Date Value Ref Range Status    Summary 05/24/2017 FINAL   Final    Comment: (NOTE)  ====================================================================  TO[facility] COMP DRUG ANALYSIS,UR  ====================================================================  Test                             Result       Flag       Units  Drug Present   Oxazepam                       65                      ng/mg creat    Oxazepam may be administered as a scheduled prescription    medication; it is also an expected metabolite of other    benzodiazepine drugs, including diazepam, chlordiazepoxide,    prazepam, clorazepate, halazepam, and temazepam.   Hydrocodone                    847                     ng/mg creat   Dihydrocodeine                 35                      ng/mg creat   Norhydrocodone                 754                     ng/mg creat    Sources of hydrocodone include scheduled prescription    medications. Dihydrocodeine and norhydrocodone are expected    metabolites of hydrocodone. Dihydrocodeine is also available as a    scheduled prescription medication. Topi                           ramate                     PRESENT   Cyclobenzaprine                PRESENT   Tizanidine                     PRESENT   Duloxetine                     PRESENT   Acetaminophen                  PRESENT   Diphenhydramine                PRESENT   Promethazine                   PRESENT  ====================================================================  Test                      Result    Flag   Units      Ref Range   Creatinine              167              mg/dL      >=20  ====================================================================  Declared Medications:  Medication list was not provided.  ====================================================================  For clinical consultation, please call [phone].  ====================================================================      Note 05/24/2017 .    Final    Comment: (NOTE)  Performed At: [facility]  [address] [place], [place] [id number]  Royce NU:[id number]     Hospital Outpatient Visit on 04/07/2017   Component Date Value Ref Range Status    Prostate Specific Ag 04/07/2017 2.8  0.0 - 4.0 ng/mL Final    Sodium 04/07/2017 140  136 - 145 mmol/L Final    Potassium 04/07/2017 4.3  3.5 - 5.5 mmol/L Final    Chloride 04/07/2017 109* 100 - 108 mmol/L Final    CO2 04/07/2017 25  21 - 32 mmol/L Final    Anion gap 04/07/2017 6  3.0 - 18 mmol/L Final    Glucose 04/07/2017 81  74 - 99 mg/dL Final    BUN 04/07/2017 17  7.0 - 18 MG/DL Final    Creatinine 04/07/2017 1.12  0.6 - 1.3 MG/DL Final    BUN/Creatinine ratio 04/07/2017 15  12 - 20   Final    GFR est AA 04/07/2017 >60  >60 ml/min/1.73m2 Final    GFR est non-AA 04/07/2017 >60  >60 ml/min/1.73m2 Final    Comment: (NOTE)  Estimated GFR is calculated using the Modification of Diet in Renal   Disease (MDRD) Study equation, reported for both  Americans   (GFRAA) and non- Americans (GFRNA), and normalized to 1.73m2   body surface area. The physician must decide which value applies to   the patient. The MDRD study equation should only be used in   individuals age 25 or older. It has not been validated for the   following: pregnant women, patients with serious comorbid conditions,   or on certain medications, or persons with extremes of body size,   muscle mass, or nutritional status.  Calcium 04/07/2017 8.7  8.5 - 10.1 MG/DL Final    Bilirubin, total 04/07/2017 0.9  0.2 - 1.0 MG/DL Final    ALT (SGPT) 04/07/2017 19  16 - 61 U/L Final    AST (SGOT) 04/07/2017 12* 15 - 37 U/L Final    Alk. phosphatase 04/07/2017 78  45 - 117 U/L Final    Protein, total 04/07/2017 7.6  6.4 - 8.2 g/dL Final    Albumin 04/07/2017 4.2  3.4 - 5.0 g/dL Final    Globulin 04/07/2017 3.4  2.0 - 4.0 g/dL Final    A-G Ratio 04/07/2017 1.2  0.8 - 1.7   Final       .No results found for any visits on 06/27/17.     Assessment / Plan    {Assessment and Plan Chronic Disease:03499}    Follow-up Disposition: Not on File    ***

## 2017-06-27 NOTE — PATIENT INSTRUCTIONS
Medicare Wellness Visit, Male    The best way to improve and maintain good health is to have a healthy lifestyle by eating a well-balanced diet, exercising regularly, limiting alcohol and stopping smoking. Regular visits with your physician or non-physician health care provider also support your good health. Preventive screening tests can find health problems before they become diseases or illnesses. Preventive services such as immunizations prevent serious infections. All people over age 72 should have a Pneumovax and a Prevnar-13 shot to prevent potentially life threatening infections with the pneumococcus bacteria, a common cause of pneumonia. These are once in a lifetime unless you and your provider decide differently. Next due: Pneumovax    All people over 65 should have a yearly influenza vaccine or \"flu\" shot. This does not prevent infection with cold viruses but has been proven to prevent hospitalization and death from influenza. Next due: Fall    Although Medicare part B \"regular Medicare\" currently only covers tetanus vaccination in the context of an injury, a tetanus vaccine (Tdap or Td) is recommended every 10 years. Tdap is generally given once in a lifetime for older adults. Next due: Td in 2024    A shingles vaccine is recommended once in a lifetime after age 61. The Shingles vaccine is also not covered by Medicare part B. Next due: Completed    Note, however, that both the Shingles vaccine and Tdap/Td are generally covered by secondary carriers. Please check your coverage and out of pocket expenses. Your pharmacy benefits may cover these vaccines so please check with your pharmacist.  Also consider contacting your local health department because it may stock these vaccines for a reasonable charge.     We currently have documentation of the following immunization history for you:  Immunization History   Administered Date(s) Administered    Influenza High Dose Vaccine  10/18/2016    Influenza Vaccine (Quad) PF 10/01/2015    Pneumococcal Conjugate (PCV-13) 10/01/2015    Pneumococcal Vaccine (Unspecified Type) 06/18/2011    Td 06/02/2006    Tdap 06/03/2014    Zoster Vaccine, Live 12/09/2013       Screening for infection with Hepatitis C is recommended for anyone born between [birth year] through [address]. The table at the bottom of this document indicates the status of this and other screening services. Screening for diabetes mellitus with a blood sugar test (glucose) should be done at least every 3 years until age 79. You and your health care provider may decide whether to continue screening after age 79. If you have diabetes, this monitoring will be done more frequently (usually every 3-6 months) and will include A1C testing. The most recent blood glucose we have on file for you is:   Lab Results   Component Value Date/Time    Glucose 81 04/07/2017 02:52 PM    Glucose (POC) 124 06/13/2016 03:39 PM       Glaucoma is a disease of the eye due to increased ocular pressure that can lead to blindness. People with risk factors for glaucoma ( race, diabetes, family history) should be screened at least every 2 years by an eye professional. This may be covered annually if indicated as determined by you and your doctor. Last done: 2016  Next due: 2017 per Dr. Matt Valentine    Cardiovascular screening tests that check for elevated lipids or cholesterol (fatty part of blood) which can lead to heart disease and strokes should be done every 4-6 years through age 79. You and your health care provider may decide whether to continue screening after age 79.  The most recent lipid panel we have on file for you is:   Lab Results   Component Value Date/Time    Cholesterol, total 98 06/17/2011 03:20 AM    HDL Cholesterol 21 06/17/2011 03:20 AM    LDL, calculated 44.6 06/17/2011 03:20 AM    VLDL, calculated 32.4 06/17/2011 03:20 AM    Triglyceride 162 06/17/2011 03:20 AM    CHOL/HDL Ratio 4.7 06/17/2011 03:20 AM     Next due: per Dr. Robles Favorite    Colorectal cancer screening that evaluates for blood or polyps in your colon for people with average risk should be done yearly as a stool test, every five years as a flexible sigmoidoscope or every 10 years as a colonoscopy up to age 76. You and your health care provider may decide whether to continue screening after age 76. Last done: done 2011 per patient (every 5 years)   Next due: please schedule    Men up to age 76 may elect to screen for prostate cancer with a blood test called a PSA at certain intervals, depending on their personal and family history. This decision is between the patient and his provider. The most recent PSA values we have on file for you are:  Lab Results   Component Value Date/Time    Prostate Specific Ag 2.8 04/07/2017 02:52 PM       People who are between age 54and [de-identified]years of age and have smoked the equivalent of 1 pack per day for 30 years or more may benefit from screening for lung cancer with a yearly low dose CT scan until they have been non smokers for 15 years. If you have questions regarding hugo please ask your health care provider    Your Medicare Wellness Exam is recommended annually.

## 2017-06-27 NOTE — PROGRESS NOTES
Dr. Ethel Hall referred Tanvi Alonso , 1950, a 75 y.o. male for a Medicare Annual Wellness Visit (AWV), . He is accompanied by his wife. This is a Subsequent Medicare Annual Wellness Visit providing Personalized Prevention Plan Services (PPPS) (Performed 12 months after initial AWV and PPPS )    I have reviewed the patient's medical history in detail and updated the computerized patient record. History     Past Medical History:   Diagnosis Date    Abdominal pain, unspecified site     Acute reaction to stress     Allergic rhinitis due to pollen     Arthritis     Back injury     BPH (benign prostatic hypertrophy)     Calculus of ureter     Cancer (HCC)     history of Leukemia, in remission    Carotid duplex 06/17/2015    Mild < 50% bilateral ICA stenosis.  Dizziness and giddiness     Esophageal reflux     Essential hypertension     GERD (gastroesophageal reflux disease)     Headache     History of echocardiogram 08/13/2015    EF 55-60%. No WMA. Mild-mod LVH. Gr 1 DDfx. No evidence of intracardiac shunt.   Mild AI.      Hx: UTI (urinary tract infection)     Hypertension     Hypogonadism in male     Kidney stones     Migraine     Neck injury     Polycythemia ([address]     Polycythemia, secondary     Sciatica     Unspecified retinal detachment     Unspecified sleep apnea     resolved since gastric bypass    Vision decreased     Weight loss       Past Surgical History:   Procedure Laterality Date    ABDOMEN SURGERY PROC UNLISTED  2012    Hernia    HX CATARACT REMOVAL Left     HX CERVICAL FUSION  4/4/2016    Cervical Spine Fusion    HX CHOLECYSTECTOMY      HX GASTRIC BYPASS  2006    HX KNEE ARTHROSCOPY      both knees (right knee twice, left once)    HX ORTHOPAEDIC  1981, 1995    lumbar laminectomy    HX OTHER SURGICAL      Two back surgeries    HX RETINAL DETACHMENT REPAIR Left      Current Outpatient Prescriptions   Medication Sig Dispense Refill    HYDROcodone-acetaminophen (NORCO) 7.5-325 mg per tablet Take 1 tablet by mouth every four hours as needed for pain. Max daily amount 5 tabs. 150 Tab 0    montelukast (SINGULAIR) 10 mg tablet TAKE 1 TABLET BY MOUTH EVERY DAY 90 Tab 3    fentaNYL (DURAGESIC) 25 mcg/hr PATCH 1 Patch by TransDERmal route every seventy-two (72) hours. Max Daily Amount: 1 Patch. 10 Patch 0    SUMAtriptan (IMITREX) 100 mg tablet TAKE 1 TABLET BY MOUTH AT ONSET OF HEADACHE, MAY REPEAT 2 HOURS LATER. (MAX 2 PILLS IN 24 HOURS) 12 Tab 1    guanFACINE (TENEX) 1 mg tablet TAKE 1 TABLET BY MOUTH EVERY DAY 30 Tab 5    tamsulosin (FLOMAX) 0.4 mg capsule TAKE 1 CAPSULE BY MOUTH ONCE DAILY 90 Cap 2    amLODIPine-Olmesartan 5-40 mg tab TAKE 1 TABLET BY MOUTH ONCE DAILY. 30 Tab 3    DULoxetine (CYMBALTA) 30 mg capsule TAKE 1 CAPSULE BY MOUTH DAILY WITH 60 MG CAPSULE FOR A TOTAL OF 90 MG DAILY 30 Cap 6    topiramate (TOPAMAX) 50 mg tablet TAKE 1 TABLET BY MOUTH TWICE A DAY TO PREVENT MIGRAINE 60 Tab 5    DULoxetine (CYMBALTA) 60 mg capsule TAKE ONE CAPSULE BY MOUTH EVERY DAY 90 Cap 3    carvedilol (COREG) 12.5 mg tablet 1 TABLET TWICE PER  Tab 3    Nilotinib (TASIGNA) 150 mg cap Take 300 mg by mouth two (2) times a day.  Indications: Leukemia      pantoprazole (PROTONIX) 40 mg tablet TAKE 1 TABLET BY MOUTH EVERY DAY 90 Tab 0     Medications Discontinued During This Encounter   Medication Reason    fluticasone (FLONASE) 50 mcg/actuation nasal spray Therapy Completed    hydroCHLOROthiazide (HYDRODIURIL) 25 mg tablet Therapy Completed    labetalol (NORMODYNE) 300 mg tablet Therapy Completed    meclizine (ANTIVERT) 25 mg tablet Therapy Completed    tiZANidine (ZANAFLEX) 4 mg capsule Therapy Completed    carisoprodol (SOMA) 350 mg tablet Therapy Completed    promethazine (PHENERGAN) 25 mg tablet Therapy Completed       Allergies   Allergen Reactions    Levofloxacin Nausea Only     Severe nausea and dizziness    Sulfa (Sulfonamide Antibiotics) Unknown (comments)     Nausea, aching all over     Family History   Problem Relation Age of Onset    Hypertension Father     Diabetes Father     Heart Attack Mother     Headache Sister     Diabetes Son      Social History   Substance Use Topics    Smoking status: Former Smoker     Quit date: 2/22/1981    Smokeless tobacco: Never Used    Alcohol use No     Patient Active Problem List   Diagnosis Code    Kidney stone N20.0    Calculus of ureter N20.1    BPH (benign prostatic hypertrophy) with urinary obstruction N40.1, N13.8    Headache R51    Migraine, unspecified, with intractable migraine, so stated, without mention of status migrainosus G43.919    Cervicalgia M54.2    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    PVCs (premature ventricular contractions) I49.3    Unspecified retinal detachment H33.20    Sciatica M54.30    Esophageal reflux K21.9    Hypogonadism in male E29.1    Polycythemia, secondary D75.1    Dizziness and giddiness R42    Acute reaction to stress F43.0    Unspecified sleep apnea G47.30    Allergic rhinitis due to pollen J30.1    Cervical stenosis of spine M48.02    Essential hypertension I10    Cervical spine disease M48.9    CML in remission ([facility] [address] C92.11    Advance directive discussed with patient Z71.89    Cervical kyphosis M40.202    Left foot drop M21.372    S/P ORIF (open reduction internal fixation) fracture Z96.7, Z87.81    Chronic renal insufficiency N18.9    Cramps, muscle, general R25.2       Depression Risk Factor Screening:     PHQ over the last two weeks 6/27/2017   Little interest or pleasure in doing things Several days   Feeling down, depressed or hopeless Not at all   Total Score PHQ 2 1     Alcohol Risk Factor Screening: On any occasion during the past 3 months, have you had more than 4 drinks containing alcohol? No    Do you average more than 14 drinks per week?   No      Functional Ability and Level of Safety:     Hearing Loss none    Activities of Daily Living   Self-care. Requires assistance with: no ADLs  ADL Assessment 6/27/2017   Feeding yourself No Help Needed   Getting from bed to chair No Help Needed   Getting dressed No Help Needed   Bathing or showering No Help Needed   Walk across the room (includes cane/walker) No Help Needed   Using the telphone No Help Needed   Taking your medications No Help Needed   Preparing meals No Help Needed   Managing money (expenses/bills) No Help Needed   Moderately strenuous housework (laundry) No Help Needed   Shopping for personal items (toiletries/medicines) No Help Needed   Shopping for groceries No Help Needed   Driving No Help Needed   Climbing a flight of stairs No Help Needed   Getting to places beyond walking distances No Help Needed       Fall Risk   Fall Risk Assessment, last 12 mths 6/27/2017   Able to walk? Yes   Fall in past 12 months? Yes   Fall with injury? Yes   Number of falls in past 12 months 1   Fall Risk Score 2     Patient did fall once - tripped outside after catching the sole of a slip on shoe on an uneven surface. No other falls/stability issues identified and patient reports being more careful about not wearing slip on shoes outside. Abuse Screen   Patient is not abused    Review of Systems   Not required    Physical Examination     Evaluation of Cognitive Function:  Mood/affect:  Generally in good spirits although uncomfortable today due to muscle spasms, plans to discuss with Dr. Meliza Butcher  Appearance: age appropriate, casually dressed and within normal Limits  Family member/caregiver input: wife present but patient able to provide most healthcare information    No exam performed by pharmacist today, not required for Medicare Annual Wellness Visit. Patient to be seen by Dr. Meliza Butcher separately.   Visit Vitals    /74 (BP 1 Location: Left arm, BP Patient Position: Sitting)    Pulse 74    Temp 98.3 °F (36.8 °C) (Tympanic)    Ht 6' 4\" (1.93 m)    Wt 245 lb (111.1 kg)    SpO2 98%    BMI 29.82 kg/m2       Patient Care Team:  Nicci Frances MD as PCP - General (Internal Medicine)  Nicci Frances MD as PCP - INTERNAL MEDICINE (Internal Medicine)  Jani Enamorado MD as Physician (Neurosurgery)  Martin Kessler MD as Physician (Hematology and Oncology)  Maria C Fletcher MD as Physician (Ophthalmology)    Advice/Referrals/Counseling   Education and counseling provided:  Are appropriate based on today's review and evaluation  Pneumococcal Vaccine  Prostate cancer screening tests (PSA, covered annually)  Colorectal cancer screening tests  Cardiovascular screening blood test  Screening for glaucoma  Diabetes screening test      Assessment/Plan       ICD-10-CM ICD-9-CM    1. CML in remission ([facility] [address] C92.11 205.11 CBC WITH AUTOMATED DIFF      METABOLIC PANEL, COMPREHENSIVE      TESTOSTERONE, FREE & TOTAL   2. Cervical spine disease M48.9 724.9 CBC WITH AUTOMATED DIFF      METABOLIC PANEL, COMPREHENSIVE      TESTOSTERONE, FREE & TOTAL   3. Cramps, muscle, general R25.2 729.82 CBC WITH AUTOMATED DIFF      METABOLIC PANEL, COMPREHENSIVE      TESTOSTERONE, FREE & TOTAL   4. Hypogonadism in male E29.1 257.2 CBC WITH AUTOMATED DIFF      METABOLIC PANEL, COMPREHENSIVE      TESTOSTERONE, FREE & TOTAL   5. Routine general medical examination at a health care facility Z00.00 V70.0 [address]   6. Screening for alcoholism Z13.89 V79.1    7. Screening for depression Z13.89 V79.0 DEPRESSION SCREEN ANNUAL   8. Encounter for immunization Z23 V03.89 ADMIN PNEUMOCOCCAL VACCINE      PNEUMOCOCCAL POLYSACCHARIDE VACCINE, 23-VALENT, ADULT OR IMMUNOSUPPRESSED PT DOSE,   9. Body mass index 29.0-29.9, adult Z68.29 V85.25      10. Current treatment plan is effective, no change in therapy unless otherwise recommended by Dr. Dianelys Isaac (patient plans to discuss muscle spasms/pain with MD separately). Lab results and schedule of future lab studies reviewed with patient. Cardiovascular risk and specific lipid/LDL goals reviewed. 11.  Reviewed medications and side effects in detail. Patient did not bring his medications to the visit but Dr. Sofi Dickerson updated his medication list.  During the patient interview,  the following was noted:    Medications Discontinued During This Encounter   Medication Reason    fluticasone (FLONASE) 50 mcg/actuation nasal spray Therapy Completed    hydroCHLOROthiazide (HYDRODIURIL) 25 mg tablet Therapy Completed    labetalol (NORMODYNE) 300 mg tablet Therapy Completed    meclizine (ANTIVERT) 25 mg tablet Therapy Completed    tiZANidine (ZANAFLEX) 4 mg capsule Therapy Completed    carisoprodol (SOMA) 350 mg tablet Therapy Completed    promethazine (PHENERGAN) 25 mg tablet Therapy Completed     Patient also currently isn't taking pantoprazole due to potential interaction with Tasigna (decreased absorption). Patient reports that Dr. Farideh Collins was okay with patient taking a PPI if indicated but that per Dr. Sofi Dickerson - patient is trying a PPI free interval.  Patient reports occasional reflux symptoms at night. Discussed how Tums or short-acting antacids pose less of a risk with Tasigna and if needed, even once daily H2RA like ranitidine/famotidine at bedtime might be an option (Tasigna is taken around 6 AM/6 PM). Advised patient to f/u with Dr. Sofi Dickerson if reflux becomes a recurring problem while off PPI.     12. Screenings and Immunizations (see patient instructions for chart/information):  PSA: Up to date April 2017, due for repeat 2018/per Dr. Sofi Dickerson  Colonoscopy: last done 2011 per patient (supposed to be every 5 years), due for repeat - patient to schedule  Glaucoma screening/Eye exam: Up to date 2016 per patient, due for repeat 2017 per Dr. Carrizales Parada panel: last done 2011 (total cholesterol 98), due for repeat with next fasting labs  Diabetes: Up to date April 2017, due for repeat per Dr. Sofi Dickerson (at least every 3 years)    Immunizations:  Pneumococcal:  Recommended patient receive PPSV23 today to complete series (prior dose in 2011 was before age 71 yrs, Prevnar done 2015). Given. Influenza:  Recommended that patient receive here or at his pharmacy in Fall. Zoster:  Completed  Tdap:  Completed 2014. 13. Advanced Care Planning:  Patient currently has advanced directives on file. Patient verbalized understanding of information presented. Answered all of the patient's questions. AVS information was reviewed with patient and will be printed on checkout.     Patty Guerrero, PharmD, BCACP

## 2017-06-29 LAB
COMMENT, TESC2: ABNORMAL
TESTOST FREE SERPL-MCNC: 6 PG/ML (ref 6.6–18.1)
TESTOST SERPL-MCNC: 156 NG/DL (ref 348–1197)

## 2017-06-29 RX ORDER — NALOXONE HYDROCHLORIDE 4 MG/.1ML
SPRAY NASAL
Qty: 1 PACKAGE | Refills: 1 | Status: SHIPPED | OUTPATIENT
Start: 2017-06-29 | End: 2018-01-15 | Stop reason: SDUPTHER

## 2017-06-29 RX ORDER — TESTOSTERONE CYPIONATE 100 MG/ML
1 INJECTION, SOLUTION INTRAMUSCULAR
Qty: 1 VIAL | Refills: 3 | Status: SHIPPED | OUTPATIENT
Start: 2017-06-29 | End: 2018-01-15 | Stop reason: ALTCHOICE

## 2017-06-29 NOTE — PROGRESS NOTES
The patient presents to the office today with the chief complaint of muscle cramps    HPI    The patient persists with cervical and back pain. This is stable on the pain medications. The patient now bothersome muscle cramps - both legs and abdominal muscles. A problem for the past 3 months. The patient notes this both in the day and at night. The patient has been on a muscle relaxer which ended a month ago. The patient remain on medications for CML. He is doing on well on the medication. He is in remission      Review of Systems   Respiratory: Negative for shortness of breath. Cardiovascular: Negative for chest pain and leg swelling. Musculoskeletal: Positive for back pain, myalgias and neck pain. Allergies   Allergen Reactions    Levofloxacin Nausea Only     Severe nausea and dizziness    Sulfa (Sulfonamide Antibiotics) Unknown (comments)     Nausea, aching all over       Current Outpatient Prescriptions   Medication Sig Dispense Refill    HYDROcodone-acetaminophen (NORCO) 7.5-325 mg per tablet Take 1 tablet by mouth every four hours as needed for pain. Max daily amount 5 tabs. 150 Tab 0    montelukast (SINGULAIR) 10 mg tablet TAKE 1 TABLET BY MOUTH EVERY DAY 90 Tab 3    fentaNYL (DURAGESIC) 25 mcg/hr PATCH 1 Patch by TransDERmal route every seventy-two (72) hours. Max Daily Amount: 1 Patch. 10 Patch 0    SUMAtriptan (IMITREX) 100 mg tablet TAKE 1 TABLET BY MOUTH AT ONSET OF HEADACHE, MAY REPEAT 2 HOURS LATER. (MAX 2 PILLS IN 24 HOURS) 12 Tab 1    guanFACINE (TENEX) 1 mg tablet TAKE 1 TABLET BY MOUTH EVERY DAY 30 Tab 5    tamsulosin (FLOMAX) 0.4 mg capsule TAKE 1 CAPSULE BY MOUTH ONCE DAILY 90 Cap 2    amLODIPine-Olmesartan 5-40 mg tab TAKE 1 TABLET BY MOUTH ONCE DAILY.  30 Tab 3    DULoxetine (CYMBALTA) 30 mg capsule TAKE 1 CAPSULE BY MOUTH DAILY WITH 60 MG CAPSULE FOR A TOTAL OF 90 MG DAILY 30 Cap 6    topiramate (TOPAMAX) 50 mg tablet TAKE 1 TABLET BY MOUTH TWICE A DAY TO PREVENT MIGRAINE 60 Tab 5    DULoxetine (CYMBALTA) 60 mg capsule TAKE ONE CAPSULE BY MOUTH EVERY DAY 90 Cap 3    carvedilol (COREG) 12.5 mg tablet 1 TABLET TWICE PER  Tab 3    Nilotinib (TASIGNA) 150 mg cap Take 300 mg by mouth two (2) times a day. Indications: Leukemia         Past Medical History:   Diagnosis Date    Abdominal pain, unspecified site     Acute reaction to stress     Allergic rhinitis due to pollen     Arthritis     Back injury     BPH (benign prostatic hypertrophy)     Calculus of ureter     Cancer (HCC)     history of Leukemia, in remission    Carotid duplex 06/17/2015    Mild < 50% bilateral ICA stenosis.  Dizziness and giddiness     Esophageal reflux     Essential hypertension     GERD (gastroesophageal reflux disease)     Headache     History of echocardiogram 08/13/2015    EF 55-60%. No WMA. Mild-mod LVH. Gr 1 DDfx. No evidence of intracardiac shunt. Mild AI.      Hx: UTI (urinary tract infection)     Hypertension     Hypogonadism in male     Kidney stones     Migraine     Neck injury     Polycythemia ([address]     Polycythemia, secondary     Sciatica     Unspecified retinal detachment     Unspecified sleep apnea     resolved since gastric bypass    Vision decreased     Weight loss        Past Surgical History:   Procedure Laterality Date    ABDOMEN SURGERY PROC UNLISTED  2012    Hernia    HX CATARACT REMOVAL Left     HX CERVICAL FUSION  4/4/2016    Cervical Spine Fusion    HX CHOLECYSTECTOMY      HX GASTRIC BYPASS  2006    HX KNEE ARTHROSCOPY      both knees (right knee twice, left once)    HX ORTHOPAEDIC  1981, 1995    lumbar laminectomy    HX OTHER SURGICAL      Two back surgeries    HX RETINAL DETACHMENT REPAIR Left        Social History     Social History    Marital status:      Spouse name: N/A    Number of children: N/A    Years of education: N/A     Occupational History    Not on file.      Social History Main Topics    Smoking status: Former Smoker     Quit date: 2/22/1981    Smokeless tobacco: Never Used    Alcohol use No    Drug use: No    Sexual activity: Yes     Partners: Female     Other Topics Concern    Not on file     Social History Narrative       Patient does not have an advanced directive on file    Visit Vitals    /74 (BP 1 Location: Left arm, BP Patient Position: Sitting)    Pulse 74    Temp 98.3 °F (36.8 °C) (Tympanic)    Ht 6' 4\" (1.93 m)    Wt 245 lb (111.1 kg)    SpO2 98%    BMI 29.82 kg/m2       Physical Exam   Patient is alert  No Cervical Lymphadenopathy  No Supraclavicular Lymphadenopathy  Thyroid is Normal  Lungs are clear to ausculation and percussion  Heart:  S1 S2 are normal, No gallops, No mummers  No Carotid Bruits  Abdomen:  Normal Bowel Sounds. No tenderness. No masses. No Hepatomegaly or Splenomegly  LE:  Strong Pedal Pulses. No Edema  No muscle spasms or fasciculations noted    BMI:  [facility] Outpatient Visit on 06/27/2017   Component Date Value Ref Range Status    WBC 06/27/2017 7.5  4.6 - 13.2 K/uL Final    RBC 06/27/2017 4.79  4.70 - 5.50 M/uL Final    HGB 06/27/2017 13.0  13.0 - 16.0 g/dL Final    HCT 06/27/2017 39.0  36.0 - 48.0 % Final    MCV 06/27/2017 81.4  74.0 - 97.0 FL Final    MCH 06/27/2017 27.1  24.0 - 34.0 PG Final    MCHC 06/27/2017 33.3  31.0 - 37.0 g/dL Final    RDW 06/27/2017 16.8* 11.6 - 14.5 % Final    PLATELET 34/49/0293 697  135 - 420 K/uL Final    MPV 06/27/2017 11.4  9.2 - 11.8 FL Final    NEUTROPHILS 06/27/2017 44  40 - 73 % Final    LYMPHOCYTES 06/27/2017 46  21 - 52 % Final    MONOCYTES 06/27/2017 7  3 - 10 % Final    EOSINOPHILS 06/27/2017 2  0 - 5 % Final    BASOPHILS 06/27/2017 1  0 - 2 % Final    ABS. NEUTROPHILS 06/27/2017 3.3  1.8 - 8.0 K/UL Final    ABS. LYMPHOCYTES 06/27/2017 3.5  0.9 - 3.6 K/UL Final    ABS. MONOCYTES 06/27/2017 0.5  0.05 - 1.2 K/UL Final    ABS. EOSINOPHILS 06/27/2017 0.1  0.0 - 0.4 K/UL Final    ABS. BASOPHILS 06/27/2017 0.1* 0.0 - 0.06 K/UL Final    DF 06/27/2017 AUTOMATED    Final    Sodium 06/27/2017 139  136 - 145 mmol/L Final    Potassium 06/27/2017 4.7  3.5 - 5.5 mmol/L Final    Chloride 06/27/2017 110* 100 - 108 mmol/L Final    CO2 06/27/2017 22  21 - 32 mmol/L Final    Anion gap 06/27/2017 7  3.0 - 18 mmol/L Final    Glucose 06/27/2017 89  74 - 99 mg/dL Final    BUN 06/27/2017 29* 7.0 - 18 MG/DL Final    Creatinine 06/27/2017 1.22  0.6 - 1.3 MG/DL Final    BUN/Creatinine ratio 06/27/2017 24* 12 - 20   Final    GFR est AA 06/27/2017 >60  >60 ml/min/1.73m2 Final    GFR est non-AA 06/27/2017 59* >60 ml/min/1.73m2 Final    Comment: (NOTE)  Estimated GFR is calculated using the Modification of Diet in Renal   Disease (MDRD) Study equation, reported for both  Americans   (GFRAA) and non- Americans (GFRNA), and normalized to 1.73m2   body surface area. The physician must decide which value applies to   the patient. The MDRD study equation should only be used in   individuals age 25 or older. It has not been validated for the   following: pregnant women, patients with serious comorbid conditions,   or on certain medications, or persons with extremes of body size,   muscle mass, or nutritional status.  Calcium 06/27/2017 8.4* 8.5 - 10.1 MG/DL Final    Bilirubin, total 06/27/2017 0.4  0.2 - 1.0 MG/DL Final    ALT (SGPT) 06/27/2017 17  16 - 61 U/L Final    AST (SGOT) 06/27/2017 12* 15 - 37 U/L Final    Alk.  phosphatase 06/27/2017 90  45 - 117 U/L Final    Protein, total 06/27/2017 6.8  6.4 - 8.2 g/dL Final    Albumin 06/27/2017 3.7  3.4 - 5.0 g/dL Final    Globulin 06/27/2017 3.1  2.0 - 4.0 g/dL Final    A-G Ratio 06/27/2017 1.2  0.8 - 1.7   Final    Testosterone 06/27/2017 156* 348 - 1197 ng/dL Final    Comment: (NOTE)               **Please note reference interval change**      Free testosterone (Direct) 06/27/2017 6.0* 6.6 - 18.1 pg/mL Final    Comment: (NOTE)  Performed At: [address] [id number]  Eve Spurling MD PY:[id number]      Comment 06/27/2017 Comment    Final    Comment: (NOTE)  Adult male reference interval is based on a population of lean males  up to 36years old. Performed At: [facility]  [email or web address] [address] [id number]  [address] GO:[id number]     Hospital Outpatient Visit on 05/24/2017   Component Date Value Ref Range Status    Summary 05/24/2017 FINAL   Final    Comment: (NOTE)  ====================================================================  [facility] COMP DRUG ANALYSIS,UR  ====================================================================  Test                             Result       Flag       Units  Drug Present   Oxazepam                       65                      ng/mg creat    Oxazepam may be administered as a scheduled prescription    medication; it is also an expected metabolite of other    benzodiazepine drugs, including diazepam, chlordiazepoxide,    prazepam, clorazepate, halazepam, and temazepam.   Hydrocodone                    847                     ng/mg creat   Dihydrocodeine                 35                      ng/mg creat   Norhydrocodone                 754                     ng/mg creat    Sources of hydrocodone include scheduled prescription    medications. Dihydrocodeine and norhydrocodone are expected    metabolites of hydrocodone. Dihydrocodeine is also available as a    scheduled prescription medication.    Topi                           ramate                     PRESENT   Cyclobenzaprine                PRESENT   Tizanidine                     PRESENT   Duloxetine                     PRESENT   Acetaminophen                  PRESENT   Diphenhydramine                PRESENT   Promethazine                   PRESENT  ====================================================================  Test                      Result    Flag   Units      Ref Range Creatinine              167              mg/dL      >=20  ====================================================================  Declared Medications:  Medication list was not provided.  ====================================================================  For clinical consultation, please call [phone].  ====================================================================      Note 05/24/2017 . Final    Comment: (NOTE)  Performed At: [facility]  [address] [place], [place] [id number]  [facility]:[id number]     Hospital Outpatient Visit on 04/07/2017   Component Date Value Ref Range Status    Prostate Specific Ag 04/07/2017 2.8  0.0 - 4.0 ng/mL Final    Sodium 04/07/2017 140  136 - 145 mmol/L Final    Potassium 04/07/2017 4.3  3.5 - 5.5 mmol/L Final    Chloride 04/07/2017 109* 100 - 108 mmol/L Final    CO2 04/07/2017 25  21 - 32 mmol/L Final    Anion gap 04/07/2017 6  3.0 - 18 mmol/L Final    Glucose 04/07/2017 81  74 - 99 mg/dL Final    BUN 04/07/2017 17  7.0 - 18 MG/DL Final    Creatinine 04/07/2017 1.12  0.6 - 1.3 MG/DL Final    BUN/Creatinine ratio 04/07/2017 15  12 - 20   Final    GFR est AA 04/07/2017 >60  >60 ml/min/1.73m2 Final    GFR est non-AA 04/07/2017 >60  >60 ml/min/1.73m2 Final    Comment: (NOTE)  Estimated GFR is calculated using the Modification of Diet in Renal   Disease (MDRD) Study equation, reported for both  Americans   (GFRAA) and non- Americans (GFRNA), and normalized to 1.73m2   body surface area. The physician must decide which value applies to   the patient. The MDRD study equation should only be used in   individuals age 25 or older. It has not been validated for the   following: pregnant women, patients with serious comorbid conditions,   or on certain medications, or persons with extremes of body size,   muscle mass, or nutritional status.       Calcium 04/07/2017 8.7  8.5 - 10.1 MG/DL Final    Bilirubin, total 04/07/2017 0.9  0.2 - 1.0 MG/DL Final    ALT (SGPT) 04/07/2017 19  16 - 61 U/L Final    AST (SGOT) 04/07/2017 12* 15 - 37 U/L Final    Alk. phosphatase 04/07/2017 78  45 - 117 U/L Final    Protein, total 04/07/2017 7.6  6.4 - 8.2 g/dL Final    Albumin 04/07/2017 4.2  3.4 - 5.0 g/dL Final    Globulin 04/07/2017 3.4  2.0 - 4.0 g/dL Final    A-G Ratio 04/07/2017 1.2  0.8 - 1.7   Final       .  Results for orders placed or performed during the hospital encounter of 06/27/17   CBC WITH AUTOMATED DIFF   Result Value Ref Range    WBC 7.5 4.6 - 13.2 K/uL    RBC 4.79 4.70 - 5.50 M/uL    HGB 13.0 13.0 - 16.0 g/dL    HCT 39.0 36.0 - 48.0 %    MCV 81.4 74.0 - 97.0 FL    MCH 27.1 24.0 - 34.0 PG    MCHC 33.3 31.0 - 37.0 g/dL    RDW 16.8 (H) 11.6 - 14.5 %    PLATELET 408 149 - 984 K/uL    MPV 11.4 9.2 - 11.8 FL    NEUTROPHILS 44 40 - 73 %    LYMPHOCYTES 46 21 - 52 %    MONOCYTES 7 3 - 10 %    EOSINOPHILS 2 0 - 5 %    BASOPHILS 1 0 - 2 %    ABS. NEUTROPHILS 3.3 1.8 - 8.0 K/UL    ABS. LYMPHOCYTES 3.5 0.9 - 3.6 K/UL    ABS. MONOCYTES 0.5 0.05 - 1.2 K/UL    ABS. EOSINOPHILS 0.1 0.0 - 0.4 K/UL    ABS. BASOPHILS 0.1 (H) 0.0 - 0.06 K/UL    DF AUTOMATED     METABOLIC PANEL, COMPREHENSIVE   Result Value Ref Range    Sodium 139 136 - 145 mmol/L    Potassium 4.7 3.5 - 5.5 mmol/L    Chloride 110 (H) 100 - 108 mmol/L    CO2 22 21 - 32 mmol/L    Anion gap 7 3.0 - 18 mmol/L    Glucose 89 74 - 99 mg/dL    BUN 29 (H) 7.0 - 18 MG/DL    Creatinine 1.22 0.6 - 1.3 MG/DL    BUN/Creatinine ratio 24 (H) 12 - 20      GFR est AA >60 >60 ml/min/1.73m2    GFR est non-AA 59 (L) >60 ml/min/1.73m2    Calcium 8.4 (L) 8.5 - 10.1 MG/DL    Bilirubin, total 0.4 0.2 - 1.0 MG/DL    ALT (SGPT) 17 16 - 61 U/L    AST (SGOT) 12 (L) 15 - 37 U/L    Alk.  phosphatase 90 45 - 117 U/L    Protein, total 6.8 6.4 - 8.2 g/dL    Albumin 3.7 3.4 - 5.0 g/dL    Globulin 3.1 2.0 - 4.0 g/dL    A-G Ratio 1.2 0.8 - 1.7     TESTOSTERONE, FREE & TOTAL   Result Value Ref Range    Testosterone 156 (L) 348 - 1197 ng/dL    Free testosterone (Direct) 6.0 (L) 6.6 - 18.1 pg/mL    Comment Comment         Assessment / Plan      ICD-10-CM ICD-9-CM    1. CML in remission ([facility] [address] C92.11 205.11 CBC WITH AUTOMATED DIFF      METABOLIC PANEL, COMPREHENSIVE      TESTOSTERONE, FREE & TOTAL      REFERRAL FOR COLONOSCOPY      [place] COLLECTION VENOUS BLOOD,VENIPUNCTURE   2. Cervical spine disease M48.9 724.9 CBC WITH AUTOMATED DIFF      METABOLIC PANEL, COMPREHENSIVE      TESTOSTERONE, FREE & TOTAL      REFERRAL FOR COLONOSCOPY      [place] COLLECTION VENOUS BLOOD,VENIPUNCTURE   3. Cramps, muscle, general R25.2 729.82 CBC WITH AUTOMATED DIFF      METABOLIC PANEL, COMPREHENSIVE      TESTOSTERONE, FREE & TOTAL      REFERRAL FOR COLONOSCOPY      [place] COLLECTION VENOUS BLOOD,VENIPUNCTURE   4. Hypogonadism in male E29.1 257.2 CBC WITH AUTOMATED DIFF      METABOLIC PANEL, COMPREHENSIVE      TESTOSTERONE, FREE & TOTAL      REFERRAL FOR COLONOSCOPY      [place] COLLECTION VENOUS BLOOD,VENIPUNCTURE   5. Routine general medical examination at a health care facility Z00.00 V70.0 [facility] COLLECTION VENOUS BLOOD,VENIPUNCTURE   6. Screening for alcoholism Z13.89 V79.1 REFERRAL FOR COLONOSCOPY      [place] COLLECTION VENOUS BLOOD,VENIPUNCTURE   7. Screening for depression Z13.89 V79.0 DEPRESSION SCREEN ANNUAL      REFERRAL FOR COLONOSCOPY      [place] COLLECTION VENOUS BLOOD,VENIPUNCTURE   8. Encounter for immunization Z23 V03.89 ADMIN PNEUMOCOCCAL VACCINE      PNEUMOCOCCAL POLYSACCHARIDE VACCINE, 23-VALENT, ADULT OR IMMUNOSUPPRESSED PT DOSE,      REFERRAL FOR COLONOSCOPY      [place] COLLECTION VENOUS BLOOD,VENIPUNCTURE   9. Body mass index 29.0-29.9, adult Z68.29 V85.25 REFERRAL FOR COLONOSCOPY      [place] COLLECTION VENOUS BLOOD,VENIPUNCTURE     Labs  he was advised to continue his maintenance medications  Discussed stretching exercises    Follow-up Disposition:  Return in about 4 months (around 10/27/2017).     I asked Gregory Delgado if he has any questions and I answered the questions.   Gregory Delgado states that he understands the treatment plan and agrees with the treatment plan

## 2017-07-01 RX ORDER — ROPINIROLE 0.5 MG/1
TABLET, FILM COATED ORAL
Qty: 90 TAB | Refills: 0 | Status: SHIPPED | OUTPATIENT
Start: 2017-07-01 | End: 2017-08-21 | Stop reason: SDUPTHER

## 2017-07-03 RX ORDER — SUMATRIPTAN 100 MG/1
TABLET, FILM COATED ORAL
Qty: 12 TAB | Refills: 1 | Status: SHIPPED | OUTPATIENT
Start: 2017-07-03 | End: 2017-08-28 | Stop reason: SDUPTHER

## 2017-07-07 ENCOUNTER — LAB ONLY (OUTPATIENT)
Dept: INTERNAL MEDICINE CLINIC | Age: 67
End: 2017-07-07

## 2017-07-07 ENCOUNTER — HOSPITAL ENCOUNTER (OUTPATIENT)
Dept: LAB | Age: 67
Discharge: HOME | End: 2017-07-07
Payer: MEDICARE

## 2017-07-07 DIAGNOSIS — E29.1 HYPOGONADISM IN MALE: ICD-10-CM

## 2017-07-07 DIAGNOSIS — E29.1 HYPOGONADISM IN MALE: Primary | ICD-10-CM

## 2017-07-07 PROCEDURE — 84403 ASSAY OF TOTAL TESTOSTERONE: CPT | Performed by: INTERNAL MEDICINE

## 2017-07-07 NOTE — TELEPHONE ENCOUNTER
From: Shruthi Sow  To: Eugenia To MD  Sent: 7/7/2017 6:08 AM EDT  Subject: Medication Renewal Request    Original authorizing provider: MD Shruthi Munguia would like a refill of the following medications:  fentaNYL (Babara Marko) 25 mcg/hr PATCH Eugenia To MD]    Preferred pharmacy[facility]    Comment:

## 2017-07-09 LAB
COMMENT, TESC2: NORMAL
TESTOST FREE SERPL-MCNC: 7.6 PG/ML (ref 6.6–18.1)
TESTOST SERPL-MCNC: 368 NG/DL (ref 348–1197)

## 2017-07-10 RX ORDER — FENTANYL 25 UG/1
1 PATCH TRANSDERMAL
Qty: 10 PATCH | Refills: 0 | Status: SHIPPED | OUTPATIENT
Start: 2017-07-10 | End: 2017-08-10 | Stop reason: SDUPTHER

## 2017-07-11 ENCOUNTER — TELEPHONE (OUTPATIENT)
Dept: INTERNAL MEDICINE CLINIC | Age: 67
End: 2017-07-11

## 2017-07-11 NOTE — TELEPHONE ENCOUNTER
Prior Auth request received from Pharmacy for Testosterone inj. PA initiated on [email or web address]. Approval recieved received from insurance. Patient contacted and pharmacy faxed insurance decision.

## 2017-07-13 RX ORDER — PROMETHAZINE HYDROCHLORIDE 25 MG/1
TABLET ORAL
Qty: 30 TAB | Refills: 0 | Status: SHIPPED | OUTPATIENT
Start: 2017-07-13 | End: 2018-01-22

## 2017-07-17 RX ORDER — TESTOSTERONE CYPIONATE 200 MG/ML
200 INJECTION INTRAMUSCULAR
Qty: 1 VIAL | Refills: 5 | Status: SHIPPED | OUTPATIENT
Start: 2017-07-17 | End: 2018-02-12 | Stop reason: SDUPTHER

## 2017-07-19 ENCOUNTER — TELEPHONE (OUTPATIENT)
Dept: INTERNAL MEDICINE CLINIC | Age: 67
End: 2017-07-19

## 2017-07-19 NOTE — TELEPHONE ENCOUNTER
Prior Auth request received from Pharmacy for testosterone 200 mg/ml. PA initiated on [email or web address]. Approval received from insurance. Patient contacted and pharmacy faxed insurance decision.

## 2017-07-20 ENCOUNTER — CLINICAL SUPPORT (OUTPATIENT)
Dept: INTERNAL MEDICINE CLINIC | Age: 67
End: 2017-07-20

## 2017-07-20 DIAGNOSIS — E29.1 TESTICULAR HYPOFUNCTION: Primary | ICD-10-CM

## 2017-07-20 RX ORDER — TESTOSTERONE CYPIONATE 200 MG/ML
200 INJECTION INTRAMUSCULAR ONCE
Qty: 1 ML | Refills: 0 | Status: SHIPPED | COMMUNITY
Start: 2017-07-20 | End: 2017-07-20

## 2017-07-20 RX ORDER — HYDROCODONE BITARTRATE AND ACETAMINOPHEN 7.5; 325 MG/1; MG/1
TABLET ORAL
Qty: 150 TAB | Refills: 0 | Status: SHIPPED | OUTPATIENT
Start: 2017-07-22 | End: 2017-08-16 | Stop reason: SDUPTHER

## 2017-07-20 NOTE — PROGRESS NOTES
Patient presented to office for testosterone shot. Allergies noted. Patient well and consenting to injection. Vaccine given intramuscular in left gluteal area. Patient observed for 5 minutes and tolerated injection well. Patient left office ambulatory.

## 2017-07-20 NOTE — TELEPHONE ENCOUNTER
From: Daryl Foy  To: Quinn Davies MD  Sent: 7/20/2017 8:55 AM EDT  Subject: Medication Renewal Request    Original authorizing provider: MD Daryl Wilder would like a refill of the following medications:  HYDROcodone-acetaminophen (NORCO) 7.5-325 mg per tablet Quinn Davies MD]    Preferred pharmacy: [place] [id number]:

## 2017-08-06 RX ORDER — AMLODIPINE AND OLMESARTAN MEDOXOMIL 5; 40 MG/1; MG/1
TABLET ORAL
Qty: 30 TAB | Refills: 0 | Status: SHIPPED | OUTPATIENT
Start: 2017-08-06 | End: 2017-09-07 | Stop reason: SDUPTHER

## 2017-08-10 RX ORDER — FENTANYL 25 UG/1
1 PATCH TRANSDERMAL
Qty: 10 PATCH | Refills: 0 | Status: SHIPPED | OUTPATIENT
Start: 2017-08-10 | End: 2017-09-07 | Stop reason: SDUPTHER

## 2017-08-10 NOTE — TELEPHONE ENCOUNTER
From: Sharon Cool  To: Miguelito Lindsey MD  Sent: 8/10/2017 6:49 AM EDT  Subject: Medication Renewal Request    Original authorizing provider: MD Sharon Iverson would like a refill of the following medications:  fentaNYL (DURAGESIC) 25 mcg/hr PATCH Miguelito Lindsey MD]    [facility] [address], [address] [facility]    Comment:

## 2017-08-13 RX ORDER — DULOXETIN HYDROCHLORIDE 30 MG/1
CAPSULE, DELAYED RELEASE ORAL
Qty: 30 CAP | Refills: 0 | Status: SHIPPED | OUTPATIENT
Start: 2017-08-13 | End: 2017-10-05 | Stop reason: SDUPTHER

## 2017-08-17 RX ORDER — HYDROCODONE BITARTRATE AND ACETAMINOPHEN 7.5; 325 MG/1; MG/1
TABLET ORAL
Qty: 150 TAB | Refills: 0 | Status: SHIPPED | OUTPATIENT
Start: 2017-08-21 | End: 2017-09-19 | Stop reason: SDUPTHER

## 2017-08-17 NOTE — TELEPHONE ENCOUNTER
From: Kim Bryan  To: Chester Baker MD  Sent: 8/16/2017 6:06 PM EDT  Subject: Medication Renewal Request    Original authorizing provider: MD Kim Valencia would like a refill of the following medications:  HYDROcodone-acetaminophen (NORCO) 7.5-325 mg per tablet Chester Baker MD]    Preferred pharmacy: [place] [id number]:

## 2017-08-17 NOTE — TELEPHONE ENCOUNTER
system checked--last filled 7/22--rx date changed    Patient has completed contract and drug screen

## 2017-08-21 RX ORDER — ROPINIROLE 0.5 MG/1
TABLET, FILM COATED ORAL
Qty: 90 TAB | Refills: 0 | Status: SHIPPED | OUTPATIENT
Start: 2017-08-21 | End: 2017-09-27 | Stop reason: SDUPTHER

## 2017-08-28 RX ORDER — SUMATRIPTAN 100 MG/1
TABLET, FILM COATED ORAL
Qty: 12 TAB | Refills: 1 | Status: SHIPPED | OUTPATIENT
Start: 2017-08-28 | End: 2017-10-28 | Stop reason: SDUPTHER

## 2017-08-28 RX ORDER — PANTOPRAZOLE SODIUM 40 MG/1
40 TABLET, DELAYED RELEASE ORAL DAILY
Qty: 90 TAB | Refills: 3 | Status: SHIPPED | OUTPATIENT
Start: 2017-08-28 | End: 2018-08-18 | Stop reason: SDUPTHER

## 2017-08-29 RX ORDER — TOPIRAMATE 50 MG/1
TABLET, FILM COATED ORAL
Qty: 60 TAB | Refills: 5 | Status: SHIPPED | OUTPATIENT
Start: 2017-08-29 | End: 2018-04-10 | Stop reason: SDUPTHER

## 2017-09-01 ENCOUNTER — CLINICAL SUPPORT (OUTPATIENT)
Dept: INTERNAL MEDICINE CLINIC | Age: 67
End: 2017-09-01

## 2017-09-01 DIAGNOSIS — E29.1 TESTICULAR HYPOFUNCTION: Primary | ICD-10-CM

## 2017-09-01 RX ORDER — TESTOSTERONE CYPIONATE 200 MG/ML
200 INJECTION INTRAMUSCULAR ONCE
Qty: 1 ML | Refills: 0 | Status: SHIPPED | COMMUNITY
Start: 2017-09-01 | End: 2017-09-01

## 2017-09-01 NOTE — PROGRESS NOTES
Caren Kat presents today for testosterone injection. Allergies reviewed. Injection given in left buttocks per Dr Jose De Jesus Bran order Testosterone 200mg/ml inj 1 every 4 weeks. Patient tolerated injection well and left office ambulatory.

## 2017-09-07 RX ORDER — AMLODIPINE AND OLMESARTAN MEDOXOMIL 5; 40 MG/1; MG/1
TABLET ORAL
Qty: 30 TAB | Refills: 0 | Status: SHIPPED | OUTPATIENT
Start: 2017-09-07 | End: 2017-10-30 | Stop reason: SDUPTHER

## 2017-09-07 RX ORDER — FENTANYL 25 UG/1
1 PATCH TRANSDERMAL
Qty: 10 PATCH | Refills: 0 | Status: SHIPPED | OUTPATIENT
Start: 2017-09-10 | End: 2017-10-09 | Stop reason: SDUPTHER

## 2017-09-07 NOTE — TELEPHONE ENCOUNTER
From: Darian Cruz  To: Guillermo Black MD  Sent: 9/7/2017 11:49 AM EDT  Subject: Medication Renewal Request    Original authorizing provider: MD Darian Perea would like a refill of the following medications:  fentaNYL (DURAGESIC) 25 mcg/hr PATCH Guillermo Black MD]    [facility] [address], [address] [facility]    Comment:

## 2017-09-13 ENCOUNTER — TELEPHONE (OUTPATIENT)
Dept: INTERNAL MEDICINE CLINIC | Age: 67
End: 2017-09-13

## 2017-09-13 NOTE — TELEPHONE ENCOUNTER
Late Entry 08/30/2017: Prior Auth request received from Pharmacy for Protonix. PA initiated on [email or web address]. Awaiting response. Approval received from insurance. Patient contacted pharmacy faxed decision.

## 2017-09-19 NOTE — TELEPHONE ENCOUNTER
From: Terrence Figueroa  To: Loyd Archuleta MD  Sent: 9/19/2017 11:40 AM EDT  Subject: Medication Renewal Request    Original authorizing provider: MD Terrence Ferreira would like a refill of the following medications:  HYDROcodone-acetaminophen (NORCO) 7.5-325 mg per tablet Loyd Archuleta MD]    Preferred pharmacy: [place] [id number]15:

## 2017-09-20 RX ORDER — HYDROCODONE BITARTRATE AND ACETAMINOPHEN 7.5; 325 MG/1; MG/1
TABLET ORAL
Qty: 150 TAB | Refills: 0 | Status: SHIPPED | OUTPATIENT
Start: 2017-09-20 | End: 2017-10-18 | Stop reason: SDUPTHER

## 2017-09-27 RX ORDER — ROPINIROLE 0.5 MG/1
TABLET, FILM COATED ORAL
Qty: 90 TAB | Refills: 0 | Status: SHIPPED | OUTPATIENT
Start: 2017-09-27 | End: 2017-11-25 | Stop reason: SDUPTHER

## 2017-09-29 RX ORDER — LABETALOL 300 MG/1
TABLET, FILM COATED ORAL
Qty: 60 TAB | Refills: 2 | Status: SHIPPED | OUTPATIENT
Start: 2017-09-29 | End: 2017-12-22 | Stop reason: SDUPTHER

## 2017-09-29 RX ORDER — GUANFACINE HYDROCHLORIDE 1 MG/1
TABLET ORAL
Qty: 30 TAB | Refills: 2 | Status: SHIPPED | OUTPATIENT
Start: 2017-09-29 | End: 2017-12-22 | Stop reason: SDUPTHER

## 2017-10-04 ENCOUNTER — TELEPHONE (OUTPATIENT)
Dept: INTERNAL MEDICINE CLINIC | Age: 67
End: 2017-10-04

## 2017-10-04 NOTE — TELEPHONE ENCOUNTER
Prior Auth request received from Pharmacy for imatrex. PA initiated on phone. Approval received from insurance. Patient contacted and pharmacy faxed insurance decision.

## 2017-10-05 ENCOUNTER — TELEPHONE (OUTPATIENT)
Dept: INTERNAL MEDICINE CLINIC | Age: 67
End: 2017-10-05

## 2017-10-06 RX ORDER — AZITHROMYCIN 250 MG/1
TABLET, FILM COATED ORAL
Qty: 6 TAB | Refills: 0 | OUTPATIENT
Start: 2017-10-06 | End: 2017-10-11

## 2017-10-06 RX ORDER — DULOXETIN HYDROCHLORIDE 30 MG/1
CAPSULE, DELAYED RELEASE ORAL
Qty: 30 CAP | Refills: 0 | Status: SHIPPED | OUTPATIENT
Start: 2017-10-06 | End: 2017-11-11 | Stop reason: SDUPTHER

## 2017-10-06 NOTE — TELEPHONE ENCOUNTER
Patient is experiencing symptoms of an upper respiratory infection. It started as sinuses and is now going down into his chest.  He would like an antibiotic.

## 2017-10-10 RX ORDER — FENTANYL 25 UG/1
1 PATCH TRANSDERMAL
Qty: 10 PATCH | Refills: 0 | Status: SHIPPED | OUTPATIENT
Start: 2017-10-10 | End: 2017-11-09 | Stop reason: SDUPTHER

## 2017-10-10 RX ORDER — TAMSULOSIN HYDROCHLORIDE 0.4 MG/1
CAPSULE ORAL
Qty: 90 CAP | Refills: 0 | Status: SHIPPED | OUTPATIENT
Start: 2017-10-10 | End: 2018-01-11 | Stop reason: SDUPTHER

## 2017-10-10 NOTE — TELEPHONE ENCOUNTER
From: Carla Fregoso  To: Felipe Guillen MD  Sent: 10/9/2017 4:31 PM EDT  Subject: Medication Renewal Request    Original authorizing provider: MD Carla Min would like a refill of the following medications:  fentaNYL (DURAGESIC) 25 mcg/hr PATCH Felipe Guillen MD]    Preferred pharmacy: [facility] [address], 9 [facility]    Comment:

## 2017-10-11 RX ORDER — TIZANIDINE HYDROCHLORIDE 4 MG/1
4 CAPSULE, GELATIN COATED ORAL 3 TIMES DAILY
Refills: 5 | COMMUNITY
Start: 2017-09-07 | End: 2017-10-11 | Stop reason: SDUPTHER

## 2017-10-11 NOTE — TELEPHONE ENCOUNTER
Patient called and stated that he needs Zanaflex called into his [facility]. I will route to the provider

## 2017-10-12 RX ORDER — TIZANIDINE HYDROCHLORIDE 4 MG/1
4 CAPSULE, GELATIN COATED ORAL 3 TIMES DAILY
Qty: 90 CAP | Refills: 5 | Status: SHIPPED | OUTPATIENT
Start: 2017-10-12 | End: 2018-01-22

## 2017-10-17 ENCOUNTER — CLINICAL SUPPORT (OUTPATIENT)
Dept: INTERNAL MEDICINE CLINIC | Age: 67
End: 2017-10-17

## 2017-10-17 DIAGNOSIS — E29.1 TESTICULAR HYPOFUNCTION: Primary | ICD-10-CM

## 2017-10-17 RX ORDER — TESTOSTERONE CYPIONATE 200 MG/ML
200 INJECTION INTRAMUSCULAR ONCE
Qty: 1 ML | Refills: 0 | Status: SHIPPED | COMMUNITY
Start: 2017-10-17 | End: 2017-10-17

## 2017-10-17 NOTE — PROGRESS NOTES
Carla Fregoso presents today for testosterone injection. Allergies reviewed. Injection given in left buttocks per Dr Gannon Litter order testosterone 200mcg/ml inj 1 every 3-4 weeks. Patient tolerated injection well and left office ambulatory.

## 2017-10-18 NOTE — TELEPHONE ENCOUNTER
From: Qian Delgado  To: Cassie Green MD  Sent: 10/18/2017 9:17 AM EDT  Subject: Medication Renewal Request    Original authorizing provider: MD Qian Abad would like a refill of the following medications:  HYDROcodone-acetaminophen (NORCO) 7.5-325 mg per tablet Cassie Green MD]    Preferred pharmacy: [place] [id number] 1815:

## 2017-10-19 RX ORDER — HYDROCODONE BITARTRATE AND ACETAMINOPHEN 7.5; 325 MG/1; MG/1
TABLET ORAL
Qty: 150 TAB | Refills: 0 | Status: SHIPPED | OUTPATIENT
Start: 2017-10-19 | End: 2017-11-15 | Stop reason: SDUPTHER

## 2017-10-23 ENCOUNTER — OFFICE VISIT (OUTPATIENT)
Dept: INTERNAL MEDICINE CLINIC | Age: 67
End: 2017-10-23

## 2017-10-23 ENCOUNTER — HOSPITAL ENCOUNTER (OUTPATIENT)
Dept: LAB | Age: 67
Discharge: HOME | End: 2017-10-23
Payer: MEDICARE

## 2017-10-23 VITALS
OXYGEN SATURATION: 98 % | DIASTOLIC BLOOD PRESSURE: 80 MMHG | HEART RATE: 79 BPM | SYSTOLIC BLOOD PRESSURE: 124 MMHG | RESPIRATION RATE: 16 BRPM | TEMPERATURE: 97.7 F | WEIGHT: 245 LBS | BODY MASS INDEX: 29.83 KG/M2 | HEIGHT: 76 IN

## 2017-10-23 DIAGNOSIS — M54.2 CERVICALGIA: ICD-10-CM

## 2017-10-23 DIAGNOSIS — I10 ESSENTIAL HYPERTENSION: ICD-10-CM

## 2017-10-23 DIAGNOSIS — C92.11 CML IN REMISSION (HCC): ICD-10-CM

## 2017-10-23 DIAGNOSIS — C92.11 CML IN REMISSION (HCC): Primary | ICD-10-CM

## 2017-10-23 DIAGNOSIS — G62.9 NEUROPATHY: ICD-10-CM

## 2017-10-23 LAB
ALBUMIN SERPL-MCNC: 3.8 G/DL (ref 3.4–5)
ALBUMIN/GLOB SERPL: 1.1 {RATIO} (ref 0.8–1.7)
ALP SERPL-CCNC: 89 U/L (ref 45–117)
ALT SERPL-CCNC: 19 U/L (ref 16–61)
ANION GAP SERPL CALC-SCNC: 7 MMOL/L (ref 3–18)
AST SERPL-CCNC: 11 U/L (ref 15–37)
BASOPHILS # BLD: 0.1 K/UL (ref 0–0.06)
BASOPHILS NFR BLD: 1 % (ref 0–2)
BILIRUB SERPL-MCNC: 0.6 MG/DL (ref 0.2–1)
BUN SERPL-MCNC: 24 MG/DL (ref 7–18)
BUN/CREAT SERPL: 21 (ref 12–20)
CALCIUM SERPL-MCNC: 9.2 MG/DL (ref 8.5–10.1)
CHLORIDE SERPL-SCNC: 111 MMOL/L (ref 100–108)
CO2 SERPL-SCNC: 25 MMOL/L (ref 21–32)
CREAT SERPL-MCNC: 1.13 MG/DL (ref 0.6–1.3)
DIFFERENTIAL METHOD BLD: ABNORMAL
EOSINOPHIL # BLD: 0.2 K/UL (ref 0–0.4)
EOSINOPHIL NFR BLD: 2 % (ref 0–5)
ERYTHROCYTE [DISTWIDTH] IN BLOOD BY AUTOMATED COUNT: 19.1 % (ref 11.6–14.5)
GLOBULIN SER CALC-MCNC: 3.4 G/DL (ref 2–4)
GLUCOSE SERPL-MCNC: 82 MG/DL (ref 74–99)
HCT VFR BLD AUTO: 39.5 % (ref 36–48)
HGB BLD-MCNC: 12.8 G/DL (ref 13–16)
LYMPHOCYTES # BLD: 4.8 K/UL (ref 0.9–3.6)
LYMPHOCYTES NFR BLD: 42 % (ref 21–52)
MCH RBC QN AUTO: 27 PG (ref 24–34)
MCHC RBC AUTO-ENTMCNC: 32.4 G/DL (ref 31–37)
MCV RBC AUTO: 83.3 FL (ref 74–97)
MONOCYTES # BLD: 0.6 K/UL (ref 0.05–1.2)
MONOCYTES NFR BLD: 5 % (ref 3–10)
NEUTS SEG # BLD: 5.7 K/UL (ref 1.8–8)
NEUTS SEG NFR BLD: 50 % (ref 40–73)
PLATELET # BLD AUTO: 365 K/UL (ref 135–420)
PMV BLD AUTO: 10.9 FL (ref 9.2–11.8)
POTASSIUM SERPL-SCNC: 4.7 MMOL/L (ref 3.5–5.5)
PROT SERPL-MCNC: 7.2 G/DL (ref 6.4–8.2)
RBC # BLD AUTO: 4.74 M/UL (ref 4.7–5.5)
SODIUM SERPL-SCNC: 143 MMOL/L (ref 136–145)
WBC # BLD AUTO: 11.4 K/UL (ref 4.6–13.2)

## 2017-10-23 PROCEDURE — 85025 COMPLETE CBC W/AUTO DIFF WBC: CPT | Performed by: INTERNAL MEDICINE

## 2017-10-23 PROCEDURE — 80053 COMPREHEN METABOLIC PANEL: CPT | Performed by: INTERNAL MEDICINE

## 2017-10-23 PROCEDURE — 83921 ORGANIC ACID SINGLE QUANT: CPT | Performed by: INTERNAL MEDICINE

## 2017-10-23 NOTE — PROGRESS NOTES
1. Have you been to the ER, urgent care clinic since your last visit? Hospitalized since your last visit? No    2. Have you seen or consulted any other health care providers outside of the [address] [place] [address] since your last visit? Include any pap smears or colon screening.  No

## 2017-10-23 NOTE — PATIENT INSTRUCTIONS
Health Maintenance Due   Topic Date Due    Hepatitis C Test  1950    Glaucoma Screening   06/22/2014    Flu Vaccine  08/01/2017

## 2017-10-23 NOTE — MR AVS SNAPSHOT
Visit Information Date & Time Provider Department Dept. Phone Encounter #  
 10/23/2017  8:15 AM Nadine Colvin MD [facility] [place] [phone] [id number] Your Appointments 1/15/2018  8:00 AM  
Follow Up with Nadine Colvin MD  
[address] [facility]) Appt Note: 3 month [address], [address]  
  
   
 [address], [address] [facility] [address] Upcoming Health Maintenance Date Due Hepatitis C Screening 1950 GLAUCOMA SCREENING Q2Y 6/22/2014 INFLUENZA AGE 9 TO ADULT 8/1/2017 MEDICARE YEARLY EXAM 6/28/2018 COLONOSCOPY 7/13/2021 DTaP/Tdap/Td series (2 - Td) 6/3/2024 Allergies as of 10/23/2017  Review Complete On: 10/23/2017 By: Jessa Ro LPN Severity Noted Reaction Type Reactions Levofloxacin High   Nausea Only Severe nausea and dizziness Sulfa (Sulfonamide Antibiotics)  03/26/2014    Unknown (comments) Nausea, aching all over Current Immunizations  Reviewed on 10/19/2017 Name Date Influenza High Dose Vaccine PF 10/18/2016  9:42 AM  
 Influenza Vaccine (Quad) PF 10/1/2015 Pneumococcal Conjugate (PCV-13) 10/1/2015 Pneumococcal Polysaccharide (PPSV-23) 6/27/2017 11:34 AM  
 Pneumococcal Vaccine (Unspecified Type) 6/18/2011 Td 6/2/2006 Tdap 6/3/2014 Zoster Vaccine, Live 12/9/2013 Not reviewed this visit You Were Diagnosed With   
  
 Codes Comments CML in remission ([facility] [address]    -  Primary ICD-10-CM: C92.11 ICD-9-CM: 205.11 Essential hypertension     ICD-10-CM: I10 
ICD-9-CM: 401.9 Cervicalgia     ICD-10-CM: M54.2 ICD-9-CM: 723.1 Neuropathy     ICD-10-CM: G62.9 ICD-9-CM: 183. 9 Vitals BP Pulse Temp Resp Height(growth percentile) 124/80 (BP 1 Location: Left arm, BP Patient Position: Sitting) 79 97.7 °F (36.5 °C) (Tympanic) 16 6' 4\" (1.93 m) Weight(growth percentile) SpO2 BMI Smoking Status 245 lb (111.1 kg) 98% 29.82 kg/m2 Former Smoker Vitals History BMI and BSA Data Body Mass Index Body Surface Area  
 29.82 kg/m 2 2.44 m 2 Preferred Pharmacy Pharmacy Name Phone [facility] [address] [phone] Your Updated Medication List  
  
   
This list is accurate as of: 10/23/17 11:48 AM.  Always use your most recent med list. amLODIPine-Olmesartan 5-40 mg Tab  
take 1 tablet by mouth daily  
  
 carvedilol 12.5 mg tablet Commonly known as:  COREG  
1 TABLET TWICE PER DAY * DULoxetine 60 mg capsule Commonly known as:  CYMBALTA TAKE ONE CAPSULE BY MOUTH EVERY DAY  
  
 * DULoxetine 30 mg capsule Commonly known as:  CYMBALTA  
take 1 capsule by mouth once daily with 60 MG CAPSULE  
  
 fentaNYL 25 mcg/hr PATCH Commonly known as:  DURAGESIC  
1 Patch by TransDERmal route every seventy-two (72) hours. Max Daily Amount: 1 Patch.  
  
 guanFACINE IR 1 mg IR tablet Commonly known as:  TENEX  
take 1 tablet by mouth once daily HYDROcodone-acetaminophen 7.5-325 mg per tablet Commonly known as:  Benetta Pock Take 1 tablet by mouth every four hours as needed for pain. Max daily amount 5 tabs. labetalol 300 mg tablet Commonly known as:  Celine Monet  
take 1 tablet by mouth twice a day  
  
 montelukast 10 mg tablet Commonly known as:  SINGULAIR  
TAKE 1 TABLET BY MOUTH EVERY DAY  
  
 naloxone 4 mg/actuation nasal spray Commonly known as:  [employer] Use 1 spray intranasally into 1 nostril if needed. May repeat in 4 minutes if needed. pantoprazole 40 mg tablet Commonly known as:  PROTONIX Take 1 Tab by mouth daily. promethazine 25 mg tablet Commonly known as:  PHENERGAN  
take 1 tablet by mouth every 6 hours if needed for nausea  
  
 rOPINIRole 0.5 mg tablet Commonly known as:  Justin Cabezas  
 TAKE 1 TABLET BY MOUTH TWICE DAILY  
  
 SUMAtriptan 100 mg tablet Commonly known as:  IMITREX  
TAKE 1 TABLET BY MOUTH AT ONSET OF HEADACHE, MAY REPEAT 2 HOURS LATER. (MAX 2 PILLS IN 24 HOURS)  
  
 tamsulosin 0.4 mg capsule Commonly known as:  FLOMAX  
take 1 capsule by mouth daily TASIGNA 150 mg Cap Generic drug:  Nilotinib Take 300 mg by mouth two (2) times a day. Indications: Leukemia * testosterone cypionate 100 mg/mL injection 1 mL by IntraMUSCular route every thirty (30) days. Max Daily Amount: 1 mL. * testosterone cypionate 200 mg/mL injection Commonly known as:  DEPOTESTOTERONE CYPIONATE 1 mL by IntraMUSCular route every thirty (30) days. Max Daily Amount: 200 mg.  
  
 tiZANidine 4 mg capsule Commonly known as:  Selinda Lovings Take 1 Cap by mouth three (3) times daily. topiramate 50 mg tablet Commonly known as:  TOPAMAX TAKE 1 TABLET BY MOUTH TWICE A DAY TO PREVENT MIGRAINE  
  
 * Notice: This list has 4 medication(s) that are the same as other medications prescribed for you. Read the directions carefully, and ask your doctor or other care provider to review them with you. To-Do List   
 10/23/2017 Lab:  METABOLIC PANEL, COMPREHENSIVE   
  
 10/23/2017 Lab:  METHYLMALONIC ACID   
  
 02/22/2018 Lab:  CBC WITH AUTOMATED DIFF Patient Instructions Health Maintenance Due Topic Date Due  
 Hepatitis C Test  1950  Glaucoma Screening   06/22/2014  Flu Vaccine  08/01/2017 [facility]! Dear Francisco Later: Thank you for requesting a AbilTo account. Our records indicate that you already have an active AbilTo account. You can access your account anytime at [email or web address]/Solar Site [employer] Did you know that you can access your hospital and ER discharge instructions at any time in AbilTo? You can also review all of your test results from your hospital stay or ER visit. Additional Information If you have questions, please visit the Frequently Asked Questions section of the [employer] website at [email or web address] Remember, First To File is NOT to be used for urgent needs. For medical emergencies, dial 911. Now available from your iPhone and Android! Please provide this summary of care documentation to your next provider. Your primary care clinician is listed as Byron Pak. If you have any questions after today's visit, please call [phone].

## 2017-10-24 NOTE — PROGRESS NOTES
The patient presents to the office today with the chief complaint of neck pain    HPI    The patient persists with neck pain and stiffness. The pain persists unchanged. The patient persists with burning in his feet. The patient remains on medications for hypertension. He is tolerating the medications well. This is a chronic problem that is not changed. Per review of available records and patients , there are not sign of overuse, misuse, diversion, or concerning side effects. Today we reviewed: the risks and benefits of continuing with a narcotic based pain regimen  The following changes were made to the patients current treatment plan: nothing, medications refilled. Review of Systems   Respiratory: Negative for shortness of breath. Cardiovascular: Negative for chest pain and leg swelling. Musculoskeletal: Positive for neck pain. Allergies   Allergen Reactions    Levofloxacin Nausea Only     Severe nausea and dizziness    Sulfa (Sulfonamide Antibiotics) Unknown (comments)     Nausea, aching all over       Current Outpatient Prescriptions   Medication Sig Dispense Refill    HYDROcodone-acetaminophen (NORCO) 7.5-325 mg per tablet Take 1 tablet by mouth every four hours as needed for pain. Max daily amount 5 tabs. 150 Tab 0    tiZANidine (ZANAFLEX) 4 mg capsule Take 1 Cap by mouth three (3) times daily. 90 Cap 5    fentaNYL (DURAGESIC) 25 mcg/hr PATCH 1 Patch by TransDERmal route every seventy-two (72) hours. Max Daily Amount: 1 Patch.  10 Patch 0    tamsulosin (FLOMAX) 0.4 mg capsule take 1 capsule by mouth daily 90 Cap 0    DULoxetine (CYMBALTA) 30 mg capsule take 1 capsule by mouth once daily with 60 MG CAPSULE 30 Cap 0    guanFACINE IR (TENEX) 1 mg IR tablet take 1 tablet by mouth once daily 30 Tab 2    labetalol (NORMODYNE) 300 mg tablet take 1 tablet by mouth twice a day 60 Tab 2    rOPINIRole (REQUIP) 0.5 mg tablet TAKE 1 TABLET BY MOUTH TWICE DAILY 90 Tab 0    amLODIPine-Olmesartan 5-40 mg tab take 1 tablet by mouth daily 30 Tab 0    topiramate (TOPAMAX) 50 mg tablet TAKE 1 TABLET BY MOUTH TWICE A DAY TO PREVENT MIGRAINE 60 Tab 5    SUMAtriptan (IMITREX) 100 mg tablet TAKE 1 TABLET BY MOUTH AT ONSET OF HEADACHE, MAY REPEAT 2 HOURS LATER. (MAX 2 PILLS IN 24 HOURS) 12 Tab 1    pantoprazole (PROTONIX) 40 mg tablet Take 1 Tab by mouth daily. 90 Tab 3    testosterone cypionate (DEPOTESTOTERONE CYPIONATE) 200 mg/mL injection 1 mL by IntraMUSCular route every thirty (30) days. Max Daily Amount: 200 mg. 1 Vial 5    promethazine (PHENERGAN) 25 mg tablet take 1 tablet by mouth every 6 hours if needed for nausea 30 Tab 0    testosterone cypionate 100 mg/mL injection 1 mL by IntraMUSCular route every thirty (30) days. Max Daily Amount: 1 mL. 1 Vial 3    naloxone (NARCAN) 4 mg/actuation spry Use 1 spray intranasally into 1 nostril if needed. May repeat in 4 minutes if needed. 1 Package 1    montelukast (SINGULAIR) 10 mg tablet TAKE 1 TABLET BY MOUTH EVERY DAY 90 Tab 3    DULoxetine (CYMBALTA) 60 mg capsule TAKE ONE CAPSULE BY MOUTH EVERY DAY 90 Cap 3    carvedilol (COREG) 12.5 mg tablet 1 TABLET TWICE PER  Tab 3    Nilotinib (TASIGNA) 150 mg cap Take 300 mg by mouth two (2) times a day. Indications: Leukemia         Past Medical History:   Diagnosis Date    Abdominal pain, unspecified site     Acute reaction to stress     Allergic rhinitis due to pollen     Arthritis     Back injury     BPH (benign prostatic hypertrophy)     Calculus of ureter     Cancer (HCC)     history of Leukemia, in remission    Carotid duplex 06/17/2015    Mild < 50% bilateral ICA stenosis.  Dizziness and giddiness     Esophageal reflux     Essential hypertension     GERD (gastroesophageal reflux disease)     Headache(784.0)     History of echocardiogram 08/13/2015    EF 55-60%. No WMA. Mild-mod LVH. Gr 1 DDfx. No evidence of intracardiac shunt.   Mild AI.      Hx: UTI (urinary tract infection)     Hypertension     Hypogonadism in male     Kidney stones     Migraine     Neck injury     Polycythemia     Polycythemia, secondary     Sciatica     Unspecified retinal detachment     Unspecified sleep apnea     resolved since gastric bypass    Vision decreased     Weight loss        Past Surgical History:   Procedure Laterality Date    ABDOMEN SURGERY PROC UNLISTED  2012    Hernia    HX CATARACT REMOVAL Left     HX CERVICAL FUSION  4/4/2016    Cervical Spine Fusion    HX CHOLECYSTECTOMY      HX GASTRIC BYPASS  2006    HX KNEE ARTHROSCOPY      both knees (right knee twice, left once)    HX ORTHOPAEDIC  1981, 1995    lumbar laminectomy    HX OTHER SURGICAL      Two back surgeries    HX RETINAL DETACHMENT REPAIR Left        Social History     Social History    Marital status:      Spouse name: N/A    Number of children: N/A    Years of education: N/A     Occupational History    Not on file. Social History Main Topics    Smoking status: Former Smoker     Quit date: 2/22/1981    Smokeless tobacco: Never Used    Alcohol use No    Drug use: No    Sexual activity: Yes     Partners: Female     Other Topics Concern    Not on file     Social History Narrative       Patient does have an advanced directive on file    Visit Vitals    /80 (BP 1 Location: Left arm, BP Patient Position: Sitting)    Pulse 79    Temp 97.7 °F (36.5 °C) (Tympanic)    Resp 16    Ht 6' 4\" (1.93 m)    Wt 245 lb (111.1 kg)    SpO2 98%    BMI 29.82 kg/m2       Physical Exam   Neck with nearly frozen movement  No Cervical Lymphadenopathy  No Supraclavicular Lymphadenopathy  Thyroid is Normal  Lungs are clear to ausculation and percussion  Heart:  S1 S2 are normal, No gallops, No mummers  No Carotid Bruits  Abdomen:  Normal Bowel Sounds. No tenderness. No masses. No Hepatomegaly or Splenomegly  LE:  Strong Pedal Pulses.   No Edema      BMI:  I have reviewed/discussed the above normal BMI with the patient. I have recommended the following interventions: dietary management education, guidance, and counseling . Espinoza Blandon No visits with results within 3 Month(s) from this visit. Latest known visit with results is:    Hospital Outpatient Visit on 07/07/2017   Component Date Value Ref Range Status    Testosterone 07/07/2017 368  348 - 1197 ng/dL Final    Comment: (NOTE)  **Effective July 17, 2017 the reference interval for**   Testosterone, Serum Males >25years old will be   changing to: Adult Males:      264 - 916      Free testosterone (Direct) 07/07/2017 7.6  6.6 - 18.1 pg/mL Final    Comment: (NOTE)  Performed At: [facility]  [facility] [employer] [address] [id number]  Joo Guerrero MD :[id number]      Comment 07/07/2017 Comment    Final    Comment: (NOTE)  Adult male reference interval is based on a population of lean males  up to 36years old. Performed At: [facility]  [facility] [address] [id number]  Joo Guerrero MD HF:[id number]         . No results found for any visits on 10/23/17. Assessment / Plan      ICD-10-CM ICD-9-CM    1. CML in remission ([address] C92.11 205.11 CBC WITH AUTOMATED DIFF      METABOLIC PANEL, COMPREHENSIVE      METHYLMALONIC ACID   2. Essential hypertension I10 401.9 CBC WITH AUTOMATED DIFF      METABOLIC PANEL, COMPREHENSIVE      METHYLMALONIC ACID   3. Cervicalgia M54.2 723.1 CBC WITH AUTOMATED DIFF      METABOLIC PANEL, COMPREHENSIVE      METHYLMALONIC ACID   4. Neuropathy G62.9 355.9 CBC WITH AUTOMATED DIFF      METABOLIC PANEL, COMPREHENSIVE      METHYLMALONIC ACID       Labs  he was advised to continue his maintenance medications  The Prescription Monitoring Program registry was checked today for  controlled substance records      Follow-up Disposition:  Return in about 3 months (around 1/23/2018). I asked Nu Baker if he has any questions and I answered the questions.   Leoncio Sloan Susan Poole states that he understands the treatment plan and agrees with the treatment plan

## 2017-10-25 ENCOUNTER — TELEPHONE (OUTPATIENT)
Dept: PAIN MANAGEMENT | Age: 67
End: 2017-10-25

## 2017-10-25 NOTE — TELEPHONE ENCOUNTER
Patient called concerning his zanaflex 4 mg I called the [facility] pharmacy and it is there. The patient wants it to be sent to [facility] on [address]. I will call him and inform him that he has to call the [facility] and have them call the [facility] and get it sent over to [facility]. There was no answer I left a message for him to call.

## 2017-10-26 LAB — METHYLMALONATE SERPL-SCNC: 173 NMOL/L (ref 0–378)

## 2017-10-28 RX ORDER — SUMATRIPTAN 100 MG/1
TABLET, FILM COATED ORAL
Qty: 12 TAB | Refills: 1 | Status: SHIPPED | OUTPATIENT
Start: 2017-10-28 | End: 2017-12-23 | Stop reason: SDUPTHER

## 2017-10-30 RX ORDER — AMLODIPINE AND OLMESARTAN MEDOXOMIL 5; 40 MG/1; MG/1
TABLET ORAL
Qty: 30 TAB | Refills: 0 | Status: SHIPPED | OUTPATIENT
Start: 2017-10-30 | End: 2017-12-05 | Stop reason: SDUPTHER

## 2017-10-31 RX ORDER — HYDROCHLOROTHIAZIDE 25 MG/1
TABLET ORAL
Qty: 30 TAB | Refills: 1 | Status: SHIPPED | OUTPATIENT
Start: 2017-10-31 | End: 2017-12-25 | Stop reason: SDUPTHER

## 2017-11-02 ENCOUNTER — OFFICE VISIT (OUTPATIENT)
Dept: UROLOGY | Age: 67
End: 2017-11-02

## 2017-11-02 VITALS
OXYGEN SATURATION: 98 % | BODY MASS INDEX: 28.25 KG/M2 | TEMPERATURE: 97.5 F | HEIGHT: 76 IN | HEART RATE: 69 BPM | DIASTOLIC BLOOD PRESSURE: 88 MMHG | WEIGHT: 232 LBS | SYSTOLIC BLOOD PRESSURE: 131 MMHG

## 2017-11-02 DIAGNOSIS — M79.18 MUSCULOSKELETAL PAIN: ICD-10-CM

## 2017-11-02 DIAGNOSIS — N20.0 KIDNEY STONE: Primary | ICD-10-CM

## 2017-11-02 LAB
BILIRUB UR QL STRIP: NEGATIVE
GLUCOSE UR-MCNC: NEGATIVE MG/DL
KETONES P FAST UR STRIP-MCNC: NEGATIVE MG/DL
PH UR STRIP: 5 [PH] (ref 4.6–8)
PROT UR QL STRIP: NEGATIVE MG/DL
SP GR UR STRIP: 1.01 (ref 1–1.03)
UA UROBILINOGEN AMB POC: NORMAL (ref 0.2–1)
URINALYSIS CLARITY POC: CLEAR
URINALYSIS COLOR POC: YELLOW
URINE BLOOD POC: NEGATIVE
URINE LEUKOCYTES POC: NEGATIVE
URINE NITRITES POC: NEGATIVE

## 2017-11-02 NOTE — PATIENT INSTRUCTIONS
Learning About Diet for Kidney Stone Prevention  What are kidney stones? Kidney stones are made of salts and minerals in the urine that form small \"sidney. \" Stones can form in the kidneys and the ureters (the tubes that lead from the kidneys to the bladder). They can also form in the bladder. Stones may not cause a problem as long as they stay in the kidneys. But they can cause sudden, severe pain. Pain is most likely when the stones travel from the kidneys to the bladder. Kidney stones can cause bloody urine. Kidney stones often run in families. You are more likely to get them if you don't drink enough fluids, mainly water. Certain foods and drinks and some dietary supplements may also increase your risk for kidney stones if you consume too much of them. What can you do to prevent kidney stones? Changing what you eat may not prevent all types of kidney stones. But for people who have a history of certain kinds of kidney stones, some changes in diet may help. A dietitian can help you set up a meal plan that includes healthy, low-oxalate choices. Here are some general guidelines to get you started. Plan your meals and snacks around foods that are low in oxalate. These foods include:  · Corn, kale, parsnips, and squash,. · Beef, chicken, pork, turkey, and fish. · Milk, butter, cheese, and yogurt. You can eat certain foods that are medium-high in oxalate, but eat them only once in a while. These foods include:  · Bread. · Brown rice. · English muffins. · Figs. · Popcorn. · String beans. · Tomatoes. Limit very high-oxalate foods, including:  · Black tea. · Coffee. · Chocolate. · Dark green vegetables. · Nuts. Here are some other things you can do to help prevent kidney stones. · Drink plenty of fluids. If you have kidney, heart, or liver disease and have to limit fluids, talk with your doctor before you increase the amount of fluids you drink.   · Do not take more than the recommended daily dose of vitamins C and D.  · Limit the salt in your diet. · Eat a balanced diet that is not too high in protein. Follow-up care is a key part of your treatment and safety. Be sure to make and go to all appointments, and call your doctor if you are having problems. It's also a good idea to know your test results and keep a list of the medicines you take. Where can you learn more? Go to http://sahmar-keegan.info/. Enter C138 in the search box to learn more about \"Learning About Diet for Kidney Stone Prevention. \"  Current as of: May 12, 2017  Content Version: 11.4  © 2677-5111 [employer]. Care instructions adapted under license by [employer] (which disclaims liability or warranty for this information). If you have questions about a medical condition or this instruction, always ask your healthcare professional. [address] any warranty or liability for your use of this information.

## 2017-11-02 NOTE — PROGRESS NOTES
Eliot Mckeon    Chief Complaint   Patient presents with   Carlton Luis Kidney Stone       History and Physical    Patient is a 58-year-old male  who is known to this office with a last visit here about 3-1/2 years ago. The patient has a history of kidney stones on the left side. The patient has had a 48 hour history of right-sided pain. It was not preceded by any unusual exertion or by an accident. The patient began to note the pain in the right flank and it hurts to bend moved to twist or walk area patient has not had this pain before. There is no some associated nausea and no hematuria. The patient does state that he has passed 2 stones recently but he only notices them when they come out with urination and with no preceding flank pain on either side    Past Medical History:   Diagnosis Date    Abdominal pain, unspecified site     Acute reaction to stress     Allergic rhinitis due to pollen     Arthritis     Back injury     BPH (benign prostatic hypertrophy)     Calculus of ureter     Cancer ([facility] [address]     history of Leukemia, in remission    Carotid duplex 06/17/2015    Mild < 50% bilateral ICA stenosis.  Dizziness and giddiness     Esophageal reflux     Essential hypertension     GERD (gastroesophageal reflux disease)     Headache(784.0)     History of echocardiogram 08/13/2015    EF 55-60%. No WMA. Mild-mod LVH. Gr 1 DDfx. No evidence of intracardiac shunt.   Mild AI.      Hx: UTI (urinary tract infection)     Hypertension     Hypogonadism in male     Kidney stones     Migraine     Neck injury     Polycythemia     Polycythemia, secondary     Sciatica     Unspecified retinal detachment     Unspecified sleep apnea     resolved since gastric bypass    Vision decreased     Weight loss      Patient Active Problem List   Diagnosis Code    Kidney stone N20.0    Calculus of ureter N20.1    BPH (benign prostatic hypertrophy) with urinary obstruction N40.1, N13.8    Headache(784.0) R51    Migraine, unspecified, with intractable migraine, so stated, without mention of status migrainosus G43.919    Cervicalgia M54.2    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    PVCs (premature ventricular contractions) I49.3    Unspecified retinal detachment H33.20    Sciatica M54.30    Esophageal reflux K21.9    Hypogonadism in male E29.1    Polycythemia, secondary D75.1    Dizziness and giddiness R42    Acute reaction to stress F43.0    Unspecified sleep apnea G47.30    Allergic rhinitis due to pollen J30.1    Cervical stenosis of spine M48.02    Essential hypertension I10    Cervical spine disease M48.9    CML in remission ([facility] [address] C92.11    Advance directive discussed with patient Z71.89    Cervical kyphosis M40.202    Left foot drop M21.372    S/P ORIF (open reduction internal fixation) fracture Z96.7, Z87.81    Chronic renal insufficiency N18.9    Cramps, muscle, general R25.2    Neuropathy G62.9     Past Surgical History:   Procedure Laterality Date    ABDOMEN SURGERY PROC UNLISTED  2012    Hernia    HX CATARACT REMOVAL Left     HX CERVICAL FUSION  4/4/2016    Cervical Spine Fusion    HX CHOLECYSTECTOMY      HX GASTRIC BYPASS  2006    HX KNEE ARTHROSCOPY      both knees (right knee twice, left once)    HX ORTHOPAEDIC  1981, 1995    lumbar laminectomy    HX OTHER SURGICAL      Two back surgeries    HX RETINAL DETACHMENT REPAIR Left      Current Outpatient Prescriptions   Medication Sig Dispense Refill    hydroCHLOROthiazide (HYDRODIURIL) 25 mg tablet take 1 tablet by mouth once daily 30 Tab 1    amLODIPine-Olmesartan 5-40 mg tab take 1 tablet by mouth daily 30 Tab 0    SUMAtriptan (IMITREX) 100 mg tablet TAKE 1 TABLET BY MOUTH AT ONSET OF HEADACHE, MAY REPEAT 2 HOURS LATER. (MAX 2 PILLS IN 24 HOURS) 12 Tab 1    HYDROcodone-acetaminophen (NORCO) 7.5-325 mg per tablet Take 1 tablet by mouth every four hours as needed for pain. Max daily amount 5 tabs.  [facility] Tab 0    tiZANidine (ZANAFLEX) 4 mg capsule Take 1 Cap by mouth three (3) times daily. 90 Cap 5    fentaNYL (DURAGESIC) 25 mcg/hr PATCH 1 Patch by TransDERmal route every seventy-two (72) hours. Max Daily Amount: 1 Patch. 10 Patch 0    tamsulosin (FLOMAX) 0.4 mg capsule take 1 capsule by mouth daily 90 Cap 0    DULoxetine (CYMBALTA) 30 mg capsule take 1 capsule by mouth once daily with 60 MG CAPSULE 30 Cap 0    guanFACINE IR (TENEX) 1 mg IR tablet take 1 tablet by mouth once daily 30 Tab 2    labetalol (NORMODYNE) 300 mg tablet take 1 tablet by mouth twice a day 60 Tab 2    rOPINIRole (REQUIP) 0.5 mg tablet TAKE 1 TABLET BY MOUTH TWICE DAILY 90 Tab 0    topiramate (TOPAMAX) 50 mg tablet TAKE 1 TABLET BY MOUTH TWICE A DAY TO PREVENT MIGRAINE 60 Tab 5    pantoprazole (PROTONIX) 40 mg tablet Take 1 Tab by mouth daily. 90 Tab 3    promethazine (PHENERGAN) 25 mg tablet take 1 tablet by mouth every 6 hours if needed for nausea 30 Tab 0    testosterone cypionate 100 mg/mL injection 1 mL by IntraMUSCular route every thirty (30) days. Max Daily Amount: 1 mL. 1 Vial 3    naloxone (NARCAN) 4 mg/actuation spry Use 1 spray intranasally into 1 nostril if needed. May repeat in 4 minutes if needed. 1 Package 1    montelukast (SINGULAIR) 10 mg tablet TAKE 1 TABLET BY MOUTH EVERY DAY 90 Tab 3    DULoxetine (CYMBALTA) 60 mg capsule TAKE ONE CAPSULE BY MOUTH EVERY DAY 90 Cap 3    carvedilol (COREG) 12.5 mg tablet 1 TABLET TWICE PER  Tab 3    Nilotinib (TASIGNA) 150 mg cap Take 300 mg by mouth two (2) times a day. Indications: Leukemia      testosterone cypionate (DEPOTESTOTERONE CYPIONATE) 200 mg/mL injection 1 mL by IntraMUSCular route every thirty (30) days.  Max Daily Amount: 200 mg. 1 Vial 5     Allergies   Allergen Reactions    Levofloxacin Nausea Only     Severe nausea and dizziness    Sulfa (Sulfonamide Antibiotics) Unknown (comments)     Nausea, aching all over     Social History     Social History    Marital status:      Spouse name: N/A    Number of children: N/A    Years of education: N/A     Occupational History    Not on file. Social History Main Topics    Smoking status: Former Smoker     Quit date: 2/22/1981    Smokeless tobacco: Never Used    Alcohol use No    Drug use: No    Sexual activity: Yes     Partners: Female     Other Topics Concern    Not on file     Social History Narrative      Family History   Problem Relation Age of Onset    Hypertension Father     Diabetes Father     Heart Attack Mother     Headache Sister     Diabetes Son              Visit Vitals    /88 (BP 1 Location: Left arm, BP Patient Position: Sitting)    Pulse 69    Temp 97.5 °F (36.4 °C) (Oral)    Ht 6' 4\" (1.93 m)    Wt 232 lb (105.2 kg)    SpO2 98%    BMI 28.24 kg/m2     Physical        Gen: WDWN adult NAD  Head  : normocephalic,  Normal ROM; eyes without normal pupils, EOMs, no masses;  conjunctiva normal  Neck: normal movement,  no evident mass,  No evident adenopathy, trachea midline,  Lungs clear to auscultation with no rales or ronchi or rubs  Cardiac NSR with no murmur, rub, extra sounds  Abd :bowel sounds normal, no masses, tenderness, organomegaly  Flanks  mildly tender to punch percussion but it causes pain in the low right flank. Outpatient of the insertion of the lateral right sacrospinalis muscle onto the pelvic rim reproduces the patient's pain precisely  -    Extremities- no edema, arthritis, deformity, swelling  Psych- oriented, no evident anxiety, no cognitive impairment evident    Urine is negative                  Impression/ PLAN  Right musculoskeletal flank pain in a patient with a previous history of kidney stones    Plan:  Patient is reassured. The patient will use nonsteroidal anti-inflammatories and local heat. The patient will also attempt to identify precipitating causes for the pain. The patient will return as needed we will consider renal ultrasound. However, in view of the physical exam and the negative urine, I do not believe that ultrasound is necessary at this time            This visit exceeded 30 minutes and >50% was counselling  The patient understands the discussion and plan    PLEASE NOTE:      This document has been produced using voice recognition software.   Unrecognized errors in transcription may be present    Kanwal Hemphill MD

## 2017-11-02 NOTE — PROGRESS NOTES
Mr. Cristina Kemp has a reminder for a \"due or due soon\" health maintenance. I have asked that he contact his primary care provider for follow-up on this health maintenance.

## 2017-11-02 NOTE — MR AVS SNAPSHOT
Visit Information Date & Time Provider Department Dept. Phone Encounter #  
 11/2/2017  1:00 PM Lyudmila Collins, [address] [facility] [phone] Your Appointments 1/15/2018  8:00 AM  
Follow Up with Danny Ruiz MD  
[address]) Appt Note: 3 month [address], [address] [place] [address]  
  
   
 [address], [address] [place] [address] Upcoming Health Maintenance Date Due Hepatitis C Screening 1950 GLAUCOMA SCREENING Q2Y 6/22/2014 INFLUENZA AGE 9 TO ADULT 8/1/2017 MEDICARE YEARLY EXAM 6/28/2018 COLONOSCOPY 7/13/2021 DTaP/Tdap/Td series (2 - Td) 6/3/2024 Allergies as of 11/2/2017  Review Complete On: 11/2/2017 By: Lyudmila Collins MD  
  
 Severity Noted Reaction Type Reactions Levofloxacin High   Nausea Only Severe nausea and dizziness Sulfa (Sulfonamide Antibiotics)  03/26/2014    Unknown (comments) Nausea, aching all over Current Immunizations  Reviewed on 10/19/2017 Name Date Influenza High Dose Vaccine PF 10/18/2016  9:42 AM  
 Influenza Vaccine (Quad) PF 10/1/2015 Pneumococcal Conjugate (PCV-13) 10/1/2015 Pneumococcal Polysaccharide (PPSV-23) 6/27/2017 11:34 AM  
 Pneumococcal Vaccine (Unspecified Type) 6/18/2011 Td 6/2/2006 Tdap 6/3/2014 Zoster Vaccine, Live 12/9/2013 Not reviewed this visit You Were Diagnosed With   
  
 Codes Comments Kidney stone    -  Primary ICD-10-CM: N20.0 ICD-9-CM: 592.0 Vitals BP Pulse Temp Height(growth percentile) Weight(growth percentile) SpO2  
 131/88 (BP 1 Location: Left arm, BP Patient Position: Sitting) 69 97.5 °F (36.4 °C) (Oral) 6' 4\" (1.93 m) 232 lb (105.2 kg) 98% BMI Smoking Status 28.24 kg/m2 Former Smoker Vitals History BMI and BSA Data Body Mass Index Body Surface Area 28.24 kg/m 2 2.38 m 2 Preferred Pharmacy Pharmacy Name Phone [facility] [address], [address] [facility] [phone] Your Updated Medication List  
  
   
This list is accurate as of: 11/2/17  2:31 PM.  Always use your most recent med list. amLODIPine-Olmesartan 5-40 mg Tab  
take 1 tablet by mouth daily  
  
 carvedilol 12.5 mg tablet Commonly known as:  COREG  
1 TABLET TWICE PER DAY * DULoxetine 60 mg capsule Commonly known as:  CYMBALTA TAKE ONE CAPSULE BY MOUTH EVERY DAY  
  
 * DULoxetine 30 mg capsule Commonly known as:  CYMBALTA  
take 1 capsule by mouth once daily with 60 MG CAPSULE  
  
 fentaNYL 25 mcg/hr PATCH Commonly known as:  DURAGESIC  
1 Patch by TransDERmal route every seventy-two (72) hours. Max Daily Amount: 1 Patch.  
  
 guanFACINE IR 1 mg IR tablet Commonly known as:  TENEX  
take 1 tablet by mouth once daily  
  
 hydroCHLOROthiazide 25 mg tablet Commonly known as:  HYDRODIURIL  
take 1 tablet by mouth once daily HYDROcodone-acetaminophen 7.5-325 mg per tablet Commonly known as:  Alana Arts Take 1 tablet by mouth every four hours as needed for pain. Max daily amount 5 tabs. labetalol 300 mg tablet Commonly known as:  Drusilla Starring  
take 1 tablet by mouth twice a day  
  
 montelukast 10 mg tablet Commonly known as:  SINGULAIR  
TAKE 1 TABLET BY MOUTH EVERY DAY  
  
 naloxone 4 mg/actuation nasal spray Commonly known as:  [employer] Use 1 spray intranasally into 1 nostril if needed. May repeat in 4 minutes if needed. pantoprazole 40 mg tablet Commonly known as:  PROTONIX Take 1 Tab by mouth daily. promethazine 25 mg tablet Commonly known as:  PHENERGAN  
take 1 tablet by mouth every 6 hours if needed for nausea  
  
 rOPINIRole 0.5 mg tablet Commonly known as:  REQUIP  
TAKE 1 TABLET BY MOUTH TWICE DAILY  
  
 SUMAtriptan 100 mg tablet Commonly known as:  IMITREX TAKE 1 TABLET BY MOUTH AT ONSET OF HEADACHE, MAY REPEAT 2 HOURS LATER. (MAX 2 PILLS IN 24 HOURS)  
  
 tamsulosin 0.4 mg capsule Commonly known as:  FLOMAX  
take 1 capsule by mouth daily TASIGNA 150 mg Cap Generic drug:  Nilotinib Take 300 mg by mouth two (2) times a day. Indications: Leukemia * testosterone cypionate 100 mg/mL injection 1 mL by IntraMUSCular route every thirty (30) days. Max Daily Amount: 1 mL. * testosterone cypionate 200 mg/mL injection Commonly known as:  DEPOTESTOTERONE CYPIONATE 1 mL by IntraMUSCular route every thirty (30) days. Max Daily Amount: 200 mg.  
  
 tiZANidine 4 mg capsule Commonly known as:  Dale Mins Take 1 Cap by mouth three (3) times daily. topiramate 50 mg tablet Commonly known as:  TOPAMAX TAKE 1 TABLET BY MOUTH TWICE A DAY TO PREVENT MIGRAINE  
  
 * Notice: This list has 4 medication(s) that are the same as other medications prescribed for you. Read the directions carefully, and ask your doctor or other care provider to review them with you. We Performed the Following AMB POC URINALYSIS DIP STICK AUTO W/O MICRO [90089 CPT(R)] Patient Instructions Learning About Diet for Kidney Stone Prevention What are kidney stones? Kidney stones are made of salts and minerals in the urine that form small \"sidney. \" Stones can form in the kidneys and the ureters (the tubes that lead from the kidneys to the bladder). They can also form in the bladder. Stones may not cause a problem as long as they stay in the kidneys. But they can cause sudden, severe pain. Pain is most likely when the stones travel from the kidneys to the bladder. Kidney stones can cause bloody urine. Kidney stones often run in families. You are more likely to get them if you don't drink enough fluids, mainly water.  Certain foods and drinks and some dietary supplements may also increase your risk for kidney stones if you consume too much of them. What can you do to prevent kidney stones? Changing what you eat may not prevent all types of kidney stones. But for people who have a history of certain kinds of kidney stones, some changes in diet may help. A dietitian can help you set up a meal plan that includes healthy, low-oxalate choices. Here are some general guidelines to get you started. Plan your meals and snacks around foods that are low in oxalate. These foods include: · Corn, kale, parsnips, and squash,. · Beef, chicken, pork, turkey, and fish. · Milk, butter, cheese, and yogurt. You can eat certain foods that are medium-high in oxalate, but eat them only once in a while. These foods include: · Bread. · Brown rice. · English muffins. · Figs. · Popcorn. · String beans. · Tomatoes. Limit very high-oxalate foods, including: · Black tea. · Coffee. · Chocolate. · Dark green vegetables. · Nuts. Here are some other things you can do to help prevent kidney stones. · Drink plenty of fluids. If you have kidney, heart, or liver disease and have to limit fluids, talk with your doctor before you increase the amount of fluids you drink. · Do not take more than the recommended daily dose of vitamins C and D. 
· Limit the salt in your diet. · Eat a balanced diet that is not too high in protein. Follow-up care is a key part of your treatment and safety. Be sure to make and go to all appointments, and call your doctor if you are having problems. It's also a good idea to know your test results and keep a list of the medicines you take. Where can you learn more? Go to http://shamar-keegan.info/. Enter C138 in the search box to learn more about \"Learning About Diet for Kidney Stone Prevention. \" Current as of: May 12, 2017 Content Version: 11.4 © 2707-2060 Healthwise, [employer].  Care instructions adapted under license by [employer] (which disclaims liability or warranty for this information). If you have questions about a medical condition or this instruction, always ask your healthcare professional. [address] any warranty or liability for your use of this information. Introducing [facility] SERVICES! Dear Francisco Later: Thank you for requesting a Launch[employer]ots account. Our records indicate that you already have an active Launchp[employer]ots account. You can access your account anytime at [email or web address] Did you know that you can access your hospital and ER discharge instructions at any time in Launchp[employer]ots? You can also review all of your test results from your hospital stay or ER visit. Additional Information If you have questions, please visit the Frequently Asked Questions section of the Launch[employer] website at [email or web address] Remember, Launch[employer]ots is NOT to be used for urgent needs. For medical emergencies, dial 911. Now available from your iPhone and Android! Please provide this summary of care documentation to your next provider. Your primary care clinician is listed as Tamika Julio. If you have any questions after today's visit, please call [phone].

## 2017-11-09 RX ORDER — FENTANYL 25 UG/1
1 PATCH TRANSDERMAL
Qty: 10 PATCH | Refills: 0 | Status: SHIPPED | OUTPATIENT
Start: 2017-11-09 | End: 2017-12-04 | Stop reason: SDUPTHER

## 2017-11-11 RX ORDER — DULOXETIN HYDROCHLORIDE 30 MG/1
CAPSULE, DELAYED RELEASE ORAL
Qty: 30 CAP | Refills: 0 | Status: SHIPPED | OUTPATIENT
Start: 2017-11-11 | End: 2017-12-21 | Stop reason: SDUPTHER

## 2017-11-14 ENCOUNTER — OFFICE VISIT (OUTPATIENT)
Dept: INTERNAL MEDICINE CLINIC | Age: 67
End: 2017-11-14

## 2017-11-14 DIAGNOSIS — E29.1 TESTICULAR HYPOFUNCTION: Primary | ICD-10-CM

## 2017-11-15 RX ORDER — HYDROCODONE BITARTRATE AND ACETAMINOPHEN 7.5; 325 MG/1; MG/1
TABLET ORAL
Qty: 150 TAB | Refills: 0 | Status: SHIPPED | OUTPATIENT
Start: 2017-11-18 | End: 2017-12-15 | Stop reason: SDUPTHER

## 2017-11-15 NOTE — TELEPHONE ENCOUNTER
From: Joel Levy  To: Cherie Chen MD  Sent: 11/15/2017 7:47 AM EST  Subject: Medication Renewal Request    Original authorizing provider: MD Joel Dallas would like a refill of the following medications:  HYDROcodone-acetaminophen (NORCO) 7.5-325 mg per tablet Cherie Chen MD]    Preferred pharmacy: [place] [id number]:

## 2017-11-16 RX ORDER — TESTOSTERONE CYPIONATE 200 MG/ML
200 INJECTION INTRAMUSCULAR ONCE
Qty: 1 ML | Refills: 0 | Status: SHIPPED | COMMUNITY
Start: 2017-11-16 | End: 2017-11-16

## 2017-11-16 NOTE — PROGRESS NOTES
Patient came into the office for a testosterone injection. After consenting to injection 1 ml of testosterone was injected into left gluteal region. Patient tolerated well and left office ambulatory.

## 2017-11-26 RX ORDER — ROPINIROLE 0.5 MG/1
TABLET, FILM COATED ORAL
Qty: 30 TAB | Refills: 0 | Status: SHIPPED | OUTPATIENT
Start: 2017-11-26 | End: 2017-12-14 | Stop reason: SDUPTHER

## 2017-11-27 NOTE — TELEPHONE ENCOUNTER
Late Entry 10/05/2017: Prior Auth request received from Pharmacy for Imitrex. PA initiated on [email or web address]. Approval received from insurance. Patient contacted and pharmacy faxed insurance decision.

## 2017-12-04 RX ORDER — FENTANYL 25 UG/1
1 PATCH TRANSDERMAL
Qty: 10 PATCH | Refills: 0 | Status: SHIPPED | OUTPATIENT
Start: 2017-12-10 | End: 2018-01-08 | Stop reason: SDUPTHER

## 2017-12-04 NOTE — TELEPHONE ENCOUNTER
system checked--last filled 11/10--rx date changed    Request placed early as provider will be out of town 12/7-12/10

## 2017-12-05 RX ORDER — AMLODIPINE AND OLMESARTAN MEDOXOMIL 5; 40 MG/1; MG/1
TABLET ORAL
Qty: 30 TAB | Refills: 0 | Status: SHIPPED | OUTPATIENT
Start: 2017-12-05 | End: 2018-01-16 | Stop reason: SDUPTHER

## 2017-12-15 ENCOUNTER — CLINICAL SUPPORT (OUTPATIENT)
Dept: INTERNAL MEDICINE CLINIC | Age: 67
End: 2017-12-15

## 2017-12-15 DIAGNOSIS — E29.1 TESTICULAR HYPOFUNCTION: Primary | ICD-10-CM

## 2017-12-15 RX ORDER — TESTOSTERONE CYPIONATE 100 MG/ML
100 INJECTION, SOLUTION INTRAMUSCULAR ONCE
Qty: 1 ML | Refills: 0 | Status: SHIPPED | COMMUNITY
Start: 2017-12-15 | End: 2017-12-15

## 2017-12-15 RX ORDER — HYDROCODONE BITARTRATE AND ACETAMINOPHEN 7.5; 325 MG/1; MG/1
TABLET ORAL
Qty: 150 TAB | Refills: 0 | Status: SHIPPED | OUTPATIENT
Start: 2017-12-17 | End: 2018-01-16 | Stop reason: SDUPTHER

## 2017-12-15 RX ORDER — ROPINIROLE 0.5 MG/1
TABLET, FILM COATED ORAL
Qty: 30 TAB | Refills: 0 | Status: SHIPPED | OUTPATIENT
Start: 2017-12-15 | End: 2018-01-02 | Stop reason: SDUPTHER

## 2017-12-15 NOTE — TELEPHONE ENCOUNTER
From: Ajit Ramires  To: Nikita Aguiar MD  Sent: 12/15/2017 9:08 AM EST  Subject: Medication Renewal Request    Original authorizing provider: MD Ajit Reeves would like a refill of the following medications:  HYDROcodone-acetaminophen (NORCO) 7.5-325 mg per tablet Nikita Aguiar MD]    Preferred pharmacy: [place] [id number]:

## 2017-12-15 NOTE — PROGRESS NOTES
Patient came in for a patient supplied injection of testosterone. Allergies noted, injection given in left hip. Patient tolerated well, and left office ambulatory.

## 2017-12-21 RX ORDER — DULOXETIN HYDROCHLORIDE 30 MG/1
CAPSULE, DELAYED RELEASE ORAL
Qty: 30 CAP | Refills: 0 | Status: SHIPPED | OUTPATIENT
Start: 2017-12-21 | End: 2018-01-17 | Stop reason: SDUPTHER

## 2017-12-22 RX ORDER — LABETALOL 300 MG/1
TABLET, FILM COATED ORAL
Qty: 60 TAB | Refills: 2 | Status: SHIPPED | OUTPATIENT
Start: 2017-12-22 | End: 2018-01-22 | Stop reason: DRUGHIGH

## 2017-12-22 RX ORDER — GUANFACINE HYDROCHLORIDE 1 MG/1
TABLET ORAL
Qty: 30 TAB | Refills: 2 | Status: SHIPPED | OUTPATIENT
Start: 2017-12-22 | End: 2018-03-16 | Stop reason: SDUPTHER

## 2017-12-26 RX ORDER — SUMATRIPTAN 100 MG/1
TABLET, FILM COATED ORAL
Qty: 12 TAB | Refills: 1 | Status: SHIPPED | OUTPATIENT
Start: 2017-12-26 | End: 2018-02-15 | Stop reason: SDUPTHER

## 2017-12-26 RX ORDER — HYDROCHLOROTHIAZIDE 25 MG/1
TABLET ORAL
Qty: 30 TAB | Refills: 1 | Status: SHIPPED | OUTPATIENT
Start: 2017-12-26 | End: 2018-03-02 | Stop reason: SDUPTHER

## 2018-01-02 RX ORDER — ROPINIROLE 0.5 MG/1
TABLET, FILM COATED ORAL
Qty: 30 TAB | Refills: 0 | Status: SHIPPED | OUTPATIENT
Start: 2018-01-02 | End: 2018-01-24 | Stop reason: SDUPTHER

## 2018-01-08 DIAGNOSIS — M40.292 OTHER KYPHOSIS OF CERVICAL REGION: Chronic | ICD-10-CM

## 2018-01-08 DIAGNOSIS — G62.9 NEUROPATHY: Primary | ICD-10-CM

## 2018-01-09 RX ORDER — FENTANYL 25 UG/1
1 PATCH TRANSDERMAL
Qty: 10 PATCH | Refills: 0 | Status: SHIPPED | OUTPATIENT
Start: 2018-01-09 | End: 2018-01-22

## 2018-01-09 NOTE — TELEPHONE ENCOUNTER
From: Maile Quinonez  To: Eugenia Velarde MD  Sent: 1/8/2018 11:31 AM EST  Subject: Medication Renewal Request    Original authorizing provider: MD Maile Buckner would like a refill of the following medications:  fentaNYL (DURAGESIC) 25 mcg/hr PATCH Eugenia Velarde MD]    Preferred pharmacy: [facility] [place] [address], [address] [facility]    Comment:

## 2018-01-11 RX ORDER — TAMSULOSIN HYDROCHLORIDE 0.4 MG/1
CAPSULE ORAL
Qty: 90 CAP | Refills: 0 | Status: SHIPPED | OUTPATIENT
Start: 2018-01-11 | End: 2018-04-10 | Stop reason: SDUPTHER

## 2018-01-12 ENCOUNTER — CLINICAL SUPPORT (OUTPATIENT)
Dept: INTERNAL MEDICINE CLINIC | Age: 68
End: 2018-01-12

## 2018-01-12 DIAGNOSIS — E29.1 HYPOGONADISM IN MALE: Primary | ICD-10-CM

## 2018-01-15 ENCOUNTER — OFFICE VISIT (OUTPATIENT)
Dept: INTERNAL MEDICINE CLINIC | Age: 68
End: 2018-01-15

## 2018-01-15 ENCOUNTER — APPOINTMENT (OUTPATIENT)
Dept: CT IMAGING | Age: 68
End: 2018-01-15
Attending: EMERGENCY MEDICINE
Payer: MEDICARE

## 2018-01-15 ENCOUNTER — HOSPITAL ENCOUNTER (EMERGENCY)
Age: 68
Discharge: HOME | End: 2018-01-15
Attending: EMERGENCY MEDICINE
Payer: MEDICARE

## 2018-01-15 VITALS
WEIGHT: 235 LBS | BODY MASS INDEX: 28.62 KG/M2 | OXYGEN SATURATION: 99 % | HEIGHT: 76 IN | SYSTOLIC BLOOD PRESSURE: 105 MMHG | TEMPERATURE: 97.9 F | DIASTOLIC BLOOD PRESSURE: 63 MMHG | RESPIRATION RATE: 22 BRPM | HEART RATE: 68 BPM

## 2018-01-15 VITALS
WEIGHT: 247.5 LBS | SYSTOLIC BLOOD PRESSURE: 126 MMHG | BODY MASS INDEX: 29.22 KG/M2 | HEIGHT: 77 IN | HEART RATE: 78 BPM | DIASTOLIC BLOOD PRESSURE: 76 MMHG | OXYGEN SATURATION: 97 %

## 2018-01-15 DIAGNOSIS — M48.9 CERVICAL SPINE DISEASE: ICD-10-CM

## 2018-01-15 DIAGNOSIS — G62.9 NEUROPATHY: Primary | ICD-10-CM

## 2018-01-15 DIAGNOSIS — C92.11 CML IN REMISSION (HCC): ICD-10-CM

## 2018-01-15 DIAGNOSIS — I10 ESSENTIAL HYPERTENSION: ICD-10-CM

## 2018-01-15 DIAGNOSIS — T50.901A ACCIDENTAL OVERDOSE, INITIAL ENCOUNTER: Primary | ICD-10-CM

## 2018-01-15 LAB
AMPHET UR QL SCN: NEGATIVE
ANION GAP SERPL CALC-SCNC: 7 MMOL/L (ref 3–18)
APPEARANCE UR: CLEAR
BACTERIA URNS QL MICRO: ABNORMAL /HPF
BARBITURATES UR QL SCN: NEGATIVE
BASOPHILS # BLD: 0.1 K/UL (ref 0–0.06)
BASOPHILS NFR BLD: 1 % (ref 0–3)
BENZODIAZ UR QL: NEGATIVE
BILIRUB UR QL: NEGATIVE
BUN SERPL-MCNC: 37 MG/DL (ref 7–18)
BUN/CREAT SERPL: 19 (ref 12–20)
CALCIUM SERPL-MCNC: 7.8 MG/DL (ref 8.5–10.1)
CANNABINOIDS UR QL SCN: NEGATIVE
CHLORIDE SERPL-SCNC: 115 MMOL/L (ref 100–108)
CO2 SERPL-SCNC: 23 MMOL/L (ref 21–32)
COCAINE UR QL SCN: NEGATIVE
COLOR UR: YELLOW
CREAT SERPL-MCNC: 1.92 MG/DL (ref 0.6–1.3)
DIFFERENTIAL METHOD BLD: ABNORMAL
EOSINOPHIL # BLD: 0.2 K/UL (ref 0–0.4)
EOSINOPHIL NFR BLD: 2 % (ref 0–5)
EPITH CASTS URNS QL MICRO: ABNORMAL /LPF (ref 0–5)
ERYTHROCYTE [DISTWIDTH] IN BLOOD BY AUTOMATED COUNT: 17.4 % (ref 11.6–14.5)
ETHANOL SERPL-MCNC: <3 MG/DL (ref 0–3)
GLUCOSE SERPL-MCNC: 92 MG/DL (ref 74–99)
GLUCOSE UR STRIP.AUTO-MCNC: NEGATIVE MG/DL
HCT VFR BLD AUTO: 36.4 % (ref 36–48)
HDSCOM,HDSCOM: NORMAL
HGB BLD-MCNC: 11.9 G/DL (ref 13–16)
HGB UR QL STRIP: NEGATIVE
INR PPP: 1.1 (ref 0.8–1.2)
KETONES UR QL STRIP.AUTO: NEGATIVE MG/DL
LEUKOCYTE ESTERASE UR QL STRIP.AUTO: ABNORMAL
LYMPHOCYTES # BLD: 4.1 K/UL (ref 0.8–3.5)
LYMPHOCYTES NFR BLD: 33 % (ref 20–51)
MCH RBC QN AUTO: 27.1 PG (ref 24–34)
MCHC RBC AUTO-ENTMCNC: 32.7 G/DL (ref 31–37)
MCV RBC AUTO: 82.9 FL (ref 74–97)
METHADONE UR QL: NEGATIVE
MONOCYTES # BLD: 0.7 K/UL (ref 0–1)
MONOCYTES NFR BLD: 6 % (ref 2–9)
NEUTS SEG # BLD: 6.8 K/UL (ref 1.8–8)
NEUTS SEG NFR BLD: 55 % (ref 42–75)
NITRITE UR QL STRIP.AUTO: NEGATIVE
OPIATES UR QL: NEGATIVE
OTHER CELLS NFR BLD MANUAL: 3 %
PCP UR QL: NEGATIVE
PH UR STRIP: 5 [PH] (ref 5–8)
PLATELET # BLD AUTO: 289 K/UL (ref 135–420)
PLATELET COMMENTS,PCOM: ABNORMAL
PMV BLD AUTO: 11.8 FL (ref 9.2–11.8)
POTASSIUM SERPL-SCNC: 4.8 MMOL/L (ref 3.5–5.5)
PROT UR STRIP-MCNC: NEGATIVE MG/DL
PROTHROMBIN TIME: 13.2 SEC (ref 11.5–15.2)
RBC # BLD AUTO: 4.39 M/UL (ref 4.7–5.5)
RBC #/AREA URNS HPF: ABNORMAL /HPF (ref 0–5)
RBC MORPH BLD: ABNORMAL
SODIUM SERPL-SCNC: 145 MMOL/L (ref 136–145)
SP GR UR REFRACTOMETRY: 1.02 (ref 1–1.03)
UROBILINOGEN UR QL STRIP.AUTO: 1 EU/DL (ref 0.2–1)
WBC # BLD AUTO: 12.4 K/UL (ref 4.6–13.2)
WBC URNS QL MICRO: ABNORMAL /HPF (ref 0–4)

## 2018-01-15 PROCEDURE — 96374 THER/PROPH/DIAG INJ IV PUSH: CPT

## 2018-01-15 PROCEDURE — 85610 PROTHROMBIN TIME: CPT | Performed by: EMERGENCY MEDICINE

## 2018-01-15 PROCEDURE — 96361 HYDRATE IV INFUSION ADD-ON: CPT

## 2018-01-15 PROCEDURE — 87086 URINE CULTURE/COLONY COUNT: CPT | Performed by: EMERGENCY MEDICINE

## 2018-01-15 PROCEDURE — 85025 COMPLETE CBC W/AUTO DIFF WBC: CPT | Performed by: EMERGENCY MEDICINE

## 2018-01-15 PROCEDURE — 80048 BASIC METABOLIC PNL TOTAL CA: CPT | Performed by: EMERGENCY MEDICINE

## 2018-01-15 PROCEDURE — 81001 URINALYSIS AUTO W/SCOPE: CPT | Performed by: EMERGENCY MEDICINE

## 2018-01-15 PROCEDURE — 74011250636 HC RX REV CODE- 250/636: Performed by: EMERGENCY MEDICINE

## 2018-01-15 PROCEDURE — 70450 CT HEAD/BRAIN W/O DYE: CPT

## 2018-01-15 PROCEDURE — 80307 DRUG TEST PRSMV CHEM ANLYZR: CPT | Performed by: EMERGENCY MEDICINE

## 2018-01-15 PROCEDURE — 99285 EMERGENCY DEPT VISIT HI MDM: CPT

## 2018-01-15 RX ORDER — NALOXONE HYDROCHLORIDE 0.4 MG/ML
0.4 INJECTION, SOLUTION INTRAMUSCULAR; INTRAVENOUS; SUBCUTANEOUS
Status: COMPLETED | OUTPATIENT
Start: 2018-01-15 | End: 2018-01-15

## 2018-01-15 RX ORDER — NALOXONE HYDROCHLORIDE 4 MG/.1ML
SPRAY NASAL
Qty: 1 EACH | Refills: 1 | Status: SHIPPED | OUTPATIENT
Start: 2018-01-15 | End: 2018-08-27 | Stop reason: ALTCHOICE

## 2018-01-15 RX ADMIN — NALOXONE HYDROCHLORIDE 0.4 MG: 0.4 INJECTION, SOLUTION INTRAMUSCULAR; INTRAVENOUS; SUBCUTANEOUS at 14:17

## 2018-01-15 RX ADMIN — SODIUM CHLORIDE 1000 ML: 900 INJECTION, SOLUTION INTRAVENOUS at 14:30

## 2018-01-15 NOTE — ED TRIAGE NOTES
The patient presents with complaint of oversedation. Per the patient's wife, Eliud Mcpherson was fine this morning. We went to the doctors and the grocery store this morning. He was fine, but then became very tired. \"  The patient is responsive to verbal commands.

## 2018-01-15 NOTE — PROGRESS NOTES
The patient presents to the office today with the chief complaint of numbness in legs    HPI    The patient remains on medications for hypertension. he is tolerating the medications well. The patient has continued complaints of chronic pain both from cervical spine disease. This remains in fair control with pain medications. This is a chronic problem that is stable. Per review of available records and patients , there are not sign of overuse, misuse, diversion, or concerning side effects. Today we reviewed: Possible side effects such as sedation and poor coordination on the medications  The following changes were made to the patients current treatment plan: nothing, medications refilled. The patient complains of progressive numbness in both legs - the left worse than the right. The patient can no longer fell his toes on the left. The remains on medications for CML which remains in remission      Review of Systems   Respiratory: Negative for shortness of breath. Cardiovascular: Negative for chest pain and leg swelling. Musculoskeletal: Positive for neck pain. Allergies   Allergen Reactions    Levofloxacin Nausea Only     Severe nausea and dizziness    Sulfa (Sulfonamide Antibiotics) Unknown (comments)     Nausea, aching all over       Current Outpatient Prescriptions   Medication Sig Dispense Refill    naloxone (NARCAN) 4 mg/actuation nasal spray Use 1 spray intranasally into 1 nostril if needed. May repeat in 4 minutes if needed. 1 Each 1    tamsulosin (FLOMAX) 0.4 mg capsule take 1 capsule by mouth once daily 90 Cap 0    fentaNYL (DURAGESIC) 25 mcg/hr PATCH 1 Patch by TransDERmal route every seventy-two (72) hours. Max Daily Amount: 1 Patch. 10 Patch 0    rOPINIRole (REQUIP) 0.5 mg tablet take 1 tablet by mouth twice a day 30 Tab 0    SUMAtriptan (IMITREX) 100 mg tablet TAKE 1 TABLET BY MOUTH AT ONSET OF HEADACHE, MAY REPEAT 2 HOURS LATER. (MAX 2 PILLS IN 24 HOURS) 12 Tab 1    hydroCHLOROthiazide (HYDRODIURIL) 25 mg tablet take 1 tablet by mouth once daily 30 Tab 1    guanFACINE IR (TENEX) 1 mg IR tablet take 1 tablet by mouth once daily 30 Tab 2    labetalol (NORMODYNE) 300 mg tablet take 1 tablet by mouth twice a day 60 Tab 2    DULoxetine (CYMBALTA) 30 mg capsule take 1 capsule by mouth once daily WITH 60 MG CAPSULE 30 Cap 0    HYDROcodone-acetaminophen (NORCO) 7.5-325 mg per tablet Take 1 tablet by mouth every four hours as needed for pain. Max daily amount 5 tabs. 150 Tab 0    amLODIPine-Olmesartan 5-40 mg tab take 1 tablet by mouth daily 30 Tab 0    tiZANidine (ZANAFLEX) 4 mg capsule Take 1 Cap by mouth three (3) times daily. 90 Cap 5    topiramate (TOPAMAX) 50 mg tablet TAKE 1 TABLET BY MOUTH TWICE A DAY TO PREVENT MIGRAINE 60 Tab 5    pantoprazole (PROTONIX) 40 mg tablet Take 1 Tab by mouth daily. 90 Tab 3    testosterone cypionate (DEPOTESTOTERONE CYPIONATE) 200 mg/mL injection 1 mL by IntraMUSCular route every thirty (30) days. Max Daily Amount: 200 mg. 1 Vial 5    promethazine (PHENERGAN) 25 mg tablet take 1 tablet by mouth every 6 hours if needed for nausea 30 Tab 0    montelukast (SINGULAIR) 10 mg tablet TAKE 1 TABLET BY MOUTH EVERY DAY 90 Tab 3    DULoxetine (CYMBALTA) 60 mg capsule TAKE ONE CAPSULE BY MOUTH EVERY DAY 90 Cap 3    carvedilol (COREG) 12.5 mg tablet 1 TABLET TWICE PER  Tab 3    Nilotinib (TASIGNA) 150 mg cap Take 300 mg by mouth two (2) times a day. Indications: Leukemia         Past Medical History:   Diagnosis Date    Abdominal pain, unspecified site     Acute reaction to stress     Allergic rhinitis due to pollen     Arthritis     Back injury     BPH (benign prostatic hypertrophy)     Calculus of ureter     Cancer (HCC)     history of Leukemia, in remission    Carotid duplex 06/17/2015    Mild < 50% bilateral ICA stenosis.       Dizziness and giddiness     Esophageal reflux     Essential hypertension     GERD (gastroesophageal reflux disease)     Headache(784.0)     History of echocardiogram 08/13/2015    EF 55-60%. No WMA. Mild-mod LVH. Gr 1 DDfx. No evidence of intracardiac shunt. Mild AI.      Hx: UTI (urinary tract infection)     Hypertension     Hypogonadism in male     Kidney stones     Migraine     Neck injury     Polycythemia     Polycythemia, secondary     Sciatica     Unspecified retinal detachment     Unspecified sleep apnea     resolved since gastric bypass    Vision decreased     Weight loss        Past Surgical History:   Procedure Laterality Date    ABDOMEN SURGERY PROC UNLISTED  2012    Hernia    HX CATARACT REMOVAL Left     HX CERVICAL FUSION  4/4/2016    Cervical Spine Fusion    HX CHOLECYSTECTOMY      HX GASTRIC BYPASS  2006    HX KNEE ARTHROSCOPY      both knees (right knee twice, left once)    HX ORTHOPAEDIC  1981, 1995    lumbar laminectomy    HX OTHER SURGICAL      Two back surgeries    HX RETINAL DETACHMENT REPAIR Left        Social History     Social History    Marital status:      Spouse name: N/A    Number of children: N/A    Years of education: N/A     Occupational History    Not on file. Social History Main Topics    Smoking status: Former Smoker     Quit date: 2/22/1981    Smokeless tobacco: Never Used    Alcohol use No    Drug use: No    Sexual activity: Yes     Partners: Female     Other Topics Concern    Not on file     Social History Narrative       Patient does have an advanced directive on file    Visit Vitals    /76 (BP 1 Location: Right arm, BP Patient Position: Sitting)    Pulse 78    Ht 6' 5\" (1.956 m)    Wt 247 lb 8 oz (112.3 kg)    SpO2 97%    BMI 29.35 kg/m2       Physical Exam   Neck with marked restriction of range of motion  No Cervical Lymphadenopathy  No Supraclavicular Lymphadenopathy  Thyroid is Normal  Lungs are normal to percussion.   Clear to auscultation   Heart:  S1 S2 are normal, No gallops, No mummers  No Carotid Bruits  Abdomen:  Normal Bowel Sounds. No tenderness. No masses. No Hepatomegaly or Splenomegly  LE:  Strong Pedal Pulses. No Edema      BMI:  The patient was advised to limit fat to 20% of the calories - approximately 60 grams      Admission on 01/15/2018, Discharged on 01/15/2018   Component Date Value Ref Range Status    WBC 01/15/2018 12.4  4.6 - 13.2 K/uL Final    RBC 01/15/2018 4.39* 4.70 - 5.50 M/uL Final    HGB 01/15/2018 11.9* 13.0 - 16.0 g/dL Final    HCT 01/15/2018 36.4  36.0 - 48.0 % Final    MCV 01/15/2018 82.9  74.0 - 97.0 FL Final    MCH 01/15/2018 27.1  24.0 - 34.0 PG Final    MCHC 01/15/2018 32.7  31.0 - 37.0 g/dL Final    RDW 01/15/2018 17.4* 11.6 - 14.5 % Final    PLATELET 07/04/2928 953  135 - 420 K/uL Final    MPV 01/15/2018 11.8  9.2 - 11.8 FL Final    NEUTROPHILS 01/15/2018 55  42 - 75 % Final    LYMPHOCYTES 01/15/2018 33  20 - 51 % Final    MONOCYTES 01/15/2018 6  2 - 9 % Final    EOSINOPHILS 01/15/2018 2  0 - 5 % Final    BASOPHILS 01/15/2018 1  0 - 3 % Final    OTHER CELL 01/15/2018 3* 0   Final    ATYPICAL LYMPHOCYTES PRESENT    ABS. NEUTROPHILS 01/15/2018 6.8  1.8 - 8.0 K/UL Final    ABS. LYMPHOCYTES 01/15/2018 4.1* 0.8 - 3.5 K/UL Final    ABS. MONOCYTES 01/15/2018 0.7  0 - 1.0 K/UL Final    ABS. EOSINOPHILS 01/15/2018 0.2  0.0 - 0.4 K/UL Final    ABS.  BASOPHILS 01/15/2018 0.1* 0.0 - 0.06 K/UL Final    DF 01/15/2018 MANUAL    Final    PLATELET COMMENTS 77/01/8389 ADEQUATE PLATELETS    Final    RBC COMMENTS 01/15/2018     Final                    Value:ANISOCYTOSIS  1+      Sodium 01/15/2018 145  136 - 145 mmol/L Final    Potassium 01/15/2018 4.8  3.5 - 5.5 mmol/L Final    Chloride 01/15/2018 115* 100 - 108 mmol/L Final    CO2 01/15/2018 23  21 - 32 mmol/L Final    Anion gap 01/15/2018 7  3.0 - 18 mmol/L Final    Glucose 01/15/2018 92  74 - 99 mg/dL Final    BUN 01/15/2018 37* 7.0 - 18 MG/DL Final    Creatinine 01/15/2018 1.92* 0.6 - 1.3 MG/DL Final    BUN/Creatinine ratio 01/15/2018 19  12 - 20   Final    GFR est AA 01/15/2018 42* >60 ml/min/1.73m2 Final    GFR est non-AA 01/15/2018 35* >60 ml/min/1.73m2 Final    Comment: (NOTE)  Estimated GFR is calculated using the Modification of Diet in Renal   Disease (MDRD) Study equation, reported for both  Americans   (GFRAA) and non- Americans (GFRNA), and normalized to 1.73m2   body surface area. The physician must decide which value applies to   the patient. The MDRD study equation should only be used in   individuals age 25 or older. It has not been validated for the   following: pregnant women, patients with serious comorbid conditions,   or on certain medications, or persons with extremes of body size,   muscle mass, or nutritional status.       Calcium 01/15/2018 7.8* 8.5 - 10.1 MG/DL Final    Prothrombin time 01/15/2018 13.2  11.5 - 15.2 sec Final    INR 01/15/2018 1.1  0.8 - 1.2   Final    Comment:            INR Therapeutic Ranges         (on stable oral anticoagulant):     INDICATION                INR  DVT/PE/Atrial Fib          2.0-3.0  MI/Mechanical Heart Valve  2.5-3.5      ALCOHOL(ETHYL),SERUM 01/15/2018 <3  0 - 3 MG/DL Final    Color 01/15/2018 YELLOW    Final    Appearance 01/15/2018 CLEAR    Final    Specific gravity 01/15/2018 1.018  1.005 - 1.030   Final    pH (UA) 01/15/2018 5.0  5.0 - 8.0   Final    Protein 01/15/2018 NEGATIVE   NEG mg/dL Final    Glucose 01/15/2018 NEGATIVE   NEG mg/dL Final    Ketone 01/15/2018 NEGATIVE   NEG mg/dL Final    Bilirubin 01/15/2018 NEGATIVE   NEG   Final    Blood 01/15/2018 NEGATIVE   NEG   Final    Urobilinogen 01/15/2018 1.0  0.2 - 1.0 EU/dL Final    Nitrites 01/15/2018 NEGATIVE   NEG   Final    Leukocyte Esterase 01/15/2018 SMALL* NEG   Final    BENZODIAZEPINES 01/15/2018 NEGATIVE   NEG   Final    BARBITURATES 01/15/2018 NEGATIVE   NEG   Final    THC (TH-CANNABINOL) 01/15/2018 NEGATIVE   NEG   Final    OPIATES 01/15/2018 NEGATIVE   NEG   Final    PCP(PHENCYCLIDINE) 01/15/2018 NEGATIVE   NEG   Final    COCAINE 01/15/2018 NEGATIVE   NEG   Final    AMPHETAMINES 01/15/2018 NEGATIVE   NEG   Final    METHADONE 01/15/2018 NEGATIVE   NEG   Final    HDSCOM 01/15/2018 (NOTE)   Final    Comment: Specimen analysis was performed without chain of custody handling. These results should be used for medical purposes only and not for   legal or employment purposes. Unconfirmed screening results must not   be used for non-medical purposes.     The cut-off concentration for positive results are as follows:    AMPH     1000 ng/mL  QUENTIN      200 ng/mL  AKHIL      200 ng/mL  VINH       300 ng/mL  METH      300 ng/mL  OPI       300 ng/mL  PCP        25 ng/mL  THC        50 ng/mL        WBC 01/15/2018 5 to 9  0 - 4 /hpf Final    RBC 01/15/2018 0 to 2  0 - 5 /hpf Final    Epithelial cells 01/15/2018 FEW  0 - 5 /lpf Final    Bacteria 01/15/2018 1+* NEG /hpf Final   Office Visit on 11/02/2017   Component Date Value Ref Range Status    Color (UA POC) 11/02/2017 Yellow   Final    Clarity (UA POC) 11/02/2017 Clear   Final    Glucose (UA POC) 11/02/2017 Negative  Negative Final    Bilirubin (UA POC) 11/02/2017 Negative  Negative Final    Ketones (UA POC) 11/02/2017 Negative  Negative Final    Specific gravity (UA POC) 11/02/2017 1.010  1.001 - 1.035 Final    Blood (UA POC) 11/02/2017 Negative  Negative Final    pH (UA POC) 11/02/2017 5.0  4.6 - 8.0 Final    Protein (UA POC) 11/02/2017 Negative  Negative mg/dL Final    Urobilinogen (UA POC) 11/02/2017 0.2 mg/dL  0.2 - 1 Final    Nitrites (UA POC) 11/02/2017 Negative  Negative Final    Leukocyte esterase (UA POC) 11/02/2017 Negative  Negative Final   Hospital Outpatient Visit on 10/23/2017   Component Date Value Ref Range Status    WBC 10/23/2017 11.4  4.6 - 13.2 K/uL Final    RBC 10/23/2017 4.74  4.70 - 5.50 M/uL Final    HGB 10/23/2017 12.8* 13.0 - 16.0 g/dL Final    HCT 10/23/2017 39.5 36.0 - 48.0 % Final    MCV 10/23/2017 83.3  74.0 - 97.0 FL Final    MCH 10/23/2017 27.0  24.0 - 34.0 PG Final    MCHC 10/23/2017 32.4  31.0 - 37.0 g/dL Final    RDW 10/23/2017 19.1* 11.6 - 14.5 % Final    PLATELET 14/64/3271 716  135 - 420 K/uL Final    MPV 10/23/2017 10.9  9.2 - 11.8 FL Final    NEUTROPHILS 10/23/2017 50  40 - 73 % Final    LYMPHOCYTES 10/23/2017 42  21 - 52 % Final    MONOCYTES 10/23/2017 5  3 - 10 % Final    EOSINOPHILS 10/23/2017 2  0 - 5 % Final    BASOPHILS 10/23/2017 1  0 - 2 % Final    ABS. NEUTROPHILS 10/23/2017 5.7  1.8 - 8.0 K/UL Final    ABS. LYMPHOCYTES 10/23/2017 4.8* 0.9 - 3.6 K/UL Final    ABS. MONOCYTES 10/23/2017 0.6  0.05 - 1.2 K/UL Final    ABS. EOSINOPHILS 10/23/2017 0.2  0.0 - 0.4 K/UL Final    ABS. BASOPHILS 10/23/2017 0.1* 0.0 - 0.06 K/UL Final    DF 10/23/2017 AUTOMATED    Final    Sodium 10/23/2017 143  136 - 145 mmol/L Final    Potassium 10/23/2017 4.7  3.5 - 5.5 mmol/L Final    Chloride 10/23/2017 111* 100 - 108 mmol/L Final    CO2 10/23/2017 25  21 - 32 mmol/L Final    Anion gap 10/23/2017 7  3.0 - 18 mmol/L Final    Glucose 10/23/2017 82  74 - 99 mg/dL Final    BUN 10/23/2017 24* 7.0 - 18 MG/DL Final    Creatinine 10/23/2017 1.13  0.6 - 1.3 MG/DL Final    BUN/Creatinine ratio 10/23/2017 21* 12 - 20   Final    GFR est AA 10/23/2017 >60  >60 ml/min/1.73m2 Final    GFR est non-AA 10/23/2017 >60  >60 ml/min/1.73m2 Final    Comment: (NOTE)  Estimated GFR is calculated using the Modification of Diet in Renal   Disease (MDRD) Study equation, reported for both  Americans   (GFRAA) and non- Americans (GFRNA), and normalized to 1.73m2   body surface area. The physician must decide which value applies to   the patient. The MDRD study equation should only be used in   individuals age 25 or older.  It has not been validated for the   following: pregnant women, patients with serious comorbid conditions,   or on certain medications, or persons with extremes of body size,   muscle mass, or nutritional status.  Calcium 10/23/2017 9.2  8.5 - 10.1 MG/DL Final    Bilirubin, total 10/23/2017 0.6  0.2 - 1.0 MG/DL Final    ALT (SGPT) 10/23/2017 19  16 - 61 U/L Final    AST (SGOT) 10/23/2017 11* 15 - 37 U/L Final    Alk. phosphatase 10/23/2017 89  45 - 117 U/L Final    Protein, total 10/23/2017 7.2  6.4 - 8.2 g/dL Final    Albumin 10/23/2017 3.8  3.4 - 5.0 g/dL Final    Globulin 10/23/2017 3.4  2.0 - 4.0 g/dL Final    A-G Ratio 10/23/2017 1.1  0.8 - 1.7   Final    Methylmalonic acid 10/23/2017 173  0 - 378 nmol/L Final    Comment: (NOTE)  Performed At: [address] [id number]  Surendra Noel MD [id number]         . Results for orders placed or performed during the hospital encounter of 01/15/18   CT HEAD WO CONT    Narrative    CT HEAD WO CONT    Indication: Confusion/delirium, altered LOC, unexplained    Technique: Unenhanced 5 mm collimation axial images obtained from the skull base  through the vertex. Dose reduction techniques used: Automated exposure control, adjustment of the  mAs and/or kVp according to patient size, standardized low-dose protocol, and/or  iterative reconstruction technique. Comparison: Head CT from 3/20/2015    Findings: No intracranial hemorrhage. No mass effect or midline shift. Mild cortical atrophy is present with compensatory ventricular dilatation. Periventricular and subcortical white matter hypodensities are likely from  chronic small vessel ischemic disease. Intracranial vascular calcifications are  noted. The gray-white matter differentiation is normal.  No extra-axial fluid  collections. The ventricles are symmetric and midline in position. Basilar  cisterns are patent. The casimiro and cerebellum are normal.    Sinuses: Partial opacification the ethmoid air cells. The remainder the  paranasal sinuses and mastoid air cells are clear.   Orbits: Normal.  Calvarium:Normal.      Impression    Impression:    1. No acute intracranial pathology. 2.  Ethmoid sinusitis. CBC WITH AUTOMATED DIFF   Result Value Ref Range    WBC 12.4 4.6 - 13.2 K/uL    RBC 4.39 (L) 4.70 - 5.50 M/uL    HGB 11.9 (L) 13.0 - 16.0 g/dL    HCT 36.4 36.0 - 48.0 %    MCV 82.9 74.0 - 97.0 FL    MCH 27.1 24.0 - 34.0 PG    MCHC 32.7 31.0 - 37.0 g/dL    RDW 17.4 (H) 11.6 - 14.5 %    PLATELET 442 725 - 518 K/uL    MPV 11.8 9.2 - 11.8 FL    NEUTROPHILS 55 42 - 75 %    LYMPHOCYTES 33 20 - 51 %    MONOCYTES 6 2 - 9 %    EOSINOPHILS 2 0 - 5 %    BASOPHILS 1 0 - 3 %    OTHER CELL 3 (H) 0      ABS. NEUTROPHILS 6.8 1.8 - 8.0 K/UL    ABS. LYMPHOCYTES 4.1 (H) 0.8 - 3.5 K/UL    ABS. MONOCYTES 0.7 0 - 1.0 K/UL    ABS. EOSINOPHILS 0.2 0.0 - 0.4 K/UL    ABS.  BASOPHILS 0.1 (H) 0.0 - 0.06 K/UL    DF MANUAL      PLATELET COMMENTS ADEQUATE PLATELETS      RBC COMMENTS ANISOCYTOSIS  1+       METABOLIC PANEL, BASIC   Result Value Ref Range    Sodium 145 136 - 145 mmol/L    Potassium 4.8 3.5 - 5.5 mmol/L    Chloride 115 (H) 100 - 108 mmol/L    CO2 23 21 - 32 mmol/L    Anion gap 7 3.0 - 18 mmol/L    Glucose 92 74 - 99 mg/dL    BUN 37 (H) 7.0 - 18 MG/DL    Creatinine 1.92 (H) 0.6 - 1.3 MG/DL    BUN/Creatinine ratio 19 12 - 20      GFR est AA 42 (L) >60 ml/min/1.73m2    GFR est non-AA 35 (L) >60 ml/min/1.73m2    Calcium 7.8 (L) 8.5 - 10.1 MG/DL   PROTHROMBIN TIME + INR   Result Value Ref Range    Prothrombin time 13.2 11.5 - 15.2 sec    INR 1.1 0.8 - 1.2     ETHYL ALCOHOL   Result Value Ref Range    ALCOHOL(ETHYL),SERUM <3 0 - 3 MG/DL   URINALYSIS W/ RFLX MICROSCOPIC   Result Value Ref Range    Color YELLOW      Appearance CLEAR      Specific gravity 1.018 1.005 - 1.030      pH (UA) 5.0 5.0 - 8.0      Protein NEGATIVE  NEG mg/dL    Glucose NEGATIVE  NEG mg/dL    Ketone NEGATIVE  NEG mg/dL    Bilirubin NEGATIVE  NEG      Blood NEGATIVE  NEG      Urobilinogen 1.0 0.2 - 1.0 EU/dL    Nitrites NEGATIVE  NEG Leukocyte Esterase SMALL (A) NEG     DRUG SCREEN, URINE   Result Value Ref Range    BENZODIAZEPINES NEGATIVE  NEG      BARBITURATES NEGATIVE  NEG      THC (TH-CANNABINOL) NEGATIVE  NEG      OPIATES NEGATIVE  NEG      PCP(PHENCYCLIDINE) NEGATIVE  NEG      COCAINE NEGATIVE  NEG      AMPHETAMINES NEGATIVE  NEG      METHADONE NEGATIVE  NEG      HDSCOM (NOTE)    URINE MICROSCOPIC ONLY   Result Value Ref Range    WBC 5 to 9 0 - 4 /hpf    RBC 0 to 2 0 - 5 /hpf    Epithelial cells FEW 0 - 5 /lpf    Bacteria 1+ (A) NEG /hpf       Assessment / Plan      ICD-10-CM ICD-9-CM    1. Neuropathy G62.9 355.9 naloxone (NARCAN) 4 mg/actuation nasal spray      REFERRAL TO NEUROLOGY   2. CML in remission (HCC) C92.11 205.11 naloxone (NARCAN) 4 mg/actuation nasal spray      REFERRAL TO NEUROLOGY   3. Essential hypertension I10 401.9 naloxone (NARCAN) 4 mg/actuation nasal spray      REFERRAL TO NEUROLOGY   4. Cervical spine disease M48.9 724.9 naloxone (NARCAN) 4 mg/actuation nasal spray      REFERRAL TO NEUROLOGY     Refer to neurology  he was advised to continue his maintenance medications  The Prescription Monitoring Program registry was checked today      Follow-up Disposition:  Return in about 3 months (around 4/15/2018). I asked Avril Chakraborty if he has any questions and I answered the questions.   Avril Chakraborty states that he understands the treatment plan and agrees with the treatment plan

## 2018-01-15 NOTE — MR AVS SNAPSHOT
Visit Information Date & Time Provider Department Dept. Phone Encounter #  
 1/15/2018  8:00 AM Herve Murray MD [facility] INTERNAL MEDICINE OF Jayleen Day [phone] [id number] Follow-up Instructions Return in about 3 months (around 4/15/2018). Your Appointments 4/16/2018  8:15 AM  
Follow Up with Herve Murray MD  
[facility] Appt Note: 3 month [address], [address] [place] [address]  
  
   
 [address], [address] [place] [address] Upcoming Health Maintenance Date Due Hepatitis C Screening 1950 GLAUCOMA SCREENING Q2Y 6/22/2014 Influenza Age 5 to Adult 8/1/2017 MEDICARE YEARLY EXAM 6/28/2018 COLONOSCOPY 11/2/2020 DTaP/Tdap/Td series (2 - Td) 6/3/2024 Allergies as of 1/15/2018  Review Complete On: 1/15/2018 By: Herve Murray MD  
  
 Severity Noted Reaction Type Reactions Levofloxacin High   Nausea Only Severe nausea and dizziness Sulfa (Sulfonamide Antibiotics)  03/26/2014    Unknown (comments) Nausea, aching all over Current Immunizations  Reviewed on 1/12/2018 Name Date Influenza High Dose Vaccine PF 10/18/2016  9:42 AM  
 Influenza Vaccine (Quad) PF 10/1/2015 Pneumococcal Conjugate (PCV-13) 10/1/2015 Pneumococcal Polysaccharide (PPSV-23) 6/27/2017 11:34 AM  
 Pneumococcal Vaccine (Unspecified Type) 6/18/2011 Td 6/2/2006 Tdap 6/3/2014 Zoster Vaccine, Live 12/9/2013 Not reviewed this visit You Were Diagnosed With   
  
 Codes Comments CML in remission ([facility] [address]    -  Primary ICD-10-CM: C92.11 ICD-9-CM: 205.11 Neuropathy     ICD-10-CM: G62.9 ICD-9-CM: 355.9 Essential hypertension     ICD-10-CM: I10 
ICD-9-CM: 401.9 Cervical spine disease     ICD-10-CM: M48.9 ICD-9-CM: 724.9 Vitals BP Pulse Height(growth percentile) Weight(growth percentile) SpO2 BMI 126/76 (BP 1 Location: Right arm, BP Patient Position: Sitting) 78 6' 5\" (1.956 m) 247 lb 8 oz (112.3 kg) 97% 29.35 kg/m2 Smoking Status Former Smoker Vitals History BMI and BSA Data Body Mass Index Body Surface Area  
 29.35 kg/m 2 2.47 m 2 Preferred Pharmacy Pharmacy Name Phone [facility] [address], [address] [facility] [place] [phone] Your Updated Medication List  
  
   
This list is accurate as of: 1/15/18  9:16 AM.  Always use your most recent med list. amLODIPine-Olmesartan 5-40 mg Tab  
take 1 tablet by mouth daily  
  
 carvedilol 12.5 mg tablet Commonly known as:  COREG  
1 TABLET TWICE PER DAY * DULoxetine 60 mg capsule Commonly known as:  CYMBALTA TAKE ONE CAPSULE BY MOUTH EVERY DAY  
  
 * DULoxetine 30 mg capsule Commonly known as:  CYMBALTA  
take 1 capsule by mouth once daily WITH 60 MG CAPSULE  
  
 fentaNYL 25 mcg/hr PATCH Commonly known as:  DURAGESIC  
1 Patch by TransDERmal route every seventy-two (72) hours. Max Daily Amount: 1 Patch.  
  
 guanFACINE IR 1 mg IR tablet Commonly known as:  TENEX  
take 1 tablet by mouth once daily  
  
 hydroCHLOROthiazide 25 mg tablet Commonly known as:  HYDRODIURIL  
take 1 tablet by mouth once daily HYDROcodone-acetaminophen 7.5-325 mg per tablet Commonly known as:  [employer] Take 1 tablet by mouth every four hours as needed for pain. Max daily amount 5 tabs. labetalol 300 mg tablet Commonly known as:  Drinda Riana  
take 1 tablet by mouth twice a day  
  
 montelukast 10 mg tablet Commonly known as:  SINGULAIR  
TAKE 1 TABLET BY MOUTH EVERY DAY  
  
 naloxone 4 mg/actuation nasal spray Commonly known as:  [employer] Use 1 spray intranasally into 1 nostril if needed. May repeat in 4 minutes if needed. pantoprazole 40 mg tablet Commonly known as:  PROTONIX Take 1 Tab by mouth daily. promethazine 25 mg tablet Commonly known as:  PHENERGAN  
take 1 tablet by mouth every 6 hours if needed for nausea  
  
 rOPINIRole 0.5 mg tablet Commonly known as:  REQUIP  
take 1 tablet by mouth twice a day SUMAtriptan 100 mg tablet Commonly known as:  IMITREX  
TAKE 1 TABLET BY MOUTH AT ONSET OF HEADACHE, MAY REPEAT 2 HOURS LATER. (MAX 2 PILLS IN 24 HOURS)  
  
 tamsulosin 0.4 mg capsule Commonly known as:  FLOMAX  
take 1 capsule by mouth once daily TASIGNA 150 mg Cap Generic drug:  Nilotinib Take 300 mg by mouth two (2) times a day. Indications: Leukemia  
  
 testosterone cypionate 200 mg/mL injection Commonly known as:  DEPOTESTOTERONE CYPIONATE 1 mL by IntraMUSCular route every thirty (30) days. Max Daily Amount: 200 mg.  
  
 tiZANidine 4 mg capsule Commonly known as:  Loetta Joel Take 1 Cap by mouth three (3) times daily. topiramate 50 mg tablet Commonly known as:  TOPAMAX TAKE 1 TABLET BY MOUTH TWICE A DAY TO PREVENT MIGRAINE  
  
 * Notice: This list has 2 medication(s) that are the same as other medications prescribed for you. Read the directions carefully, and ask your doctor or other care provider to review them with you. Prescriptions Sent to Pharmacy Refills  
 naloxone (NARCAN) 4 mg/actuation nasal spray 1 Sig: Use 1 spray intranasally into 1 nostril if needed. May repeat in 4 minutes if needed. Class: Normal  
 Pharmacy: [id number] [address] [place] [address] #: [phone] We Performed the Following REFERRAL TO NEUROLOGY [[facility] Custom] Comments:  
 Peripheral neuropathy Follow-up Instructions Return in about 3 months (around 4/15/2018). Referral Information Referral ID Referred By Referred To  
  
 [id number] Fritz Todd MD   
   [address] [facility] [address] [facility], [place] [address] Phone: [phone] Fax: [phone] Visits Status Start Date End Date 1 New Request 1/15/18 1/15/19 If your referral has a status of pending review or denied, additional information will be sent to support the outcome of this decision. Introducing [facility] & HEALTH SERVICES! Dear Deni Rasmussen: Thank you for requesting a [employer] account. Our records indicate that you already have an active [employer] account. You can access your account anytime at [email or web address] Did you know that you can access your hospital and ER discharge instructions at any time in [employer]? You can also review all of your test results from your hospital stay or ER visit. Additional Information If you have questions, please visit the Frequently Asked Questions section of the [employer] website at [email or web address] Remember, [employer] is NOT to be used for urgent needs. For medical emergencies, dial 911. Now available from your iPhone and Android! Please provide this summary of care documentation to your next provider. Your primary care clinician is listed as Oswald Abbott. If you have any questions after today's visit, please call [phone].

## 2018-01-15 NOTE — ED NOTES
Assumed care for discharge instructions, patient is awake/alert x 4 with quiet unlabored respirations. IV de-accessed, catheter intact. Home care instructions reviewed with patient and family member who verbalized understanding. Patient declined assistance getting to exit.

## 2018-01-15 NOTE — ED PROVIDER NOTES
EMERGENCY DEPARTMENT HISTORY AND PHYSICAL EXAM    1:49 PM      Date: 1/15/2018  Patient Name: Pollo Velasquez    History of Presenting Illness           History Provided By: Patient and Patient's Wife    Chief Complaint: lethargy  Duration:  Hours  Timing:  Acute  Location: n/a  Quality: n/a  Severity: N/A  Modifying Factors: Denies any modifying factors. Associated Symptoms: confusion      Additional History (Context): Pollo Velasquez is a 75 y.o. male with a Hx of neck injury, back injury, HTN, HA, sciatica, cancer, hypogonadism, who presents to the ED with his wife for the evaluation of acute lethargy onset this afternoon. Per the pt's wife the pt went down to the basement to change a light bulb, and when he didn't return after a few minutes she went to check on him. She states that she found him standing in the basement acting confused. Associated Sx include confusion. Per the pt's wife the pt was normal this morning, and the lethargy began suddenly. The pt denies taking any medications today, and EMS was unable to find a Fentanyl patch on the pt. The pt's wife states that the pt normally never misses a dose of his pain medications. She is unsure if he took any medications today. Per the pt's wife a similar episode occurred 3 years ago, at which time his medications were changed. Denies any modifying factors. Denies any fever, or chills. No further complaints at this time. PCP: Geoffrey Hernandez MD    Current Outpatient Prescriptions   Medication Sig Dispense Refill    naloxone [facility]) 4 mg/actuation nasal spray Use 1 spray intranasally into 1 nostril if needed. May repeat in 4 minutes if needed. 1 Each 1    tamsulosin (FLOMAX) 0.4 mg capsule take 1 capsule by mouth once daily 90 Cap 0    fentaNYL (DURAGESIC) 25 mcg/hr PATCH 1 Patch by TransDERmal route every seventy-two (72) hours. Max Daily Amount: 1 Patch.  10 Patch 0    rOPINIRole (REQUIP) 0.5 mg tablet take 1 tablet by mouth twice a day 30 Tab 0  SUMAtriptan (IMITREX) 100 mg tablet TAKE 1 TABLET BY MOUTH AT ONSET OF HEADACHE, MAY REPEAT 2 HOURS LATER. (MAX 2 PILLS IN 24 HOURS) 12 Tab 1    hydroCHLOROthiazide (HYDRODIURIL) 25 mg tablet take 1 tablet by mouth once daily 30 Tab 1    guanFACINE IR (TENEX) 1 mg IR tablet take 1 tablet by mouth once daily 30 Tab 2    labetalol (NORMODYNE) 300 mg tablet take 1 tablet by mouth twice a day 60 Tab 2    DULoxetine (CYMBALTA) 30 mg capsule take 1 capsule by mouth once daily WITH 60 MG CAPSULE 30 Cap 0    HYDROcodone-acetaminophen (NORCO) 7.5-325 mg per tablet Take 1 tablet by mouth every four hours as needed for pain. Max daily amount 5 tabs. 150 Tab 0    amLODIPine-Olmesartan 5-40 mg tab take 1 tablet by mouth daily 30 Tab 0    tiZANidine (ZANAFLEX) 4 mg capsule Take 1 Cap by mouth three (3) times daily. 90 Cap 5    topiramate (TOPAMAX) 50 mg tablet TAKE 1 TABLET BY MOUTH TWICE A DAY TO PREVENT MIGRAINE 60 Tab 5    pantoprazole (PROTONIX) 40 mg tablet Take 1 Tab by mouth daily. 90 Tab 3    testosterone cypionate (DEPOTESTOTERONE CYPIONATE) 200 mg/mL injection 1 mL by IntraMUSCular route every thirty (30) days. Max Daily Amount: 200 mg. 1 Vial 5    promethazine (PHENERGAN) 25 mg tablet take 1 tablet by mouth every 6 hours if needed for nausea 30 Tab 0    montelukast (SINGULAIR) 10 mg tablet TAKE 1 TABLET BY MOUTH EVERY DAY 90 Tab 3    DULoxetine (CYMBALTA) 60 mg capsule TAKE ONE CAPSULE BY MOUTH EVERY DAY 90 Cap 3    carvedilol (COREG) 12.5 mg tablet 1 TABLET TWICE PER  Tab 3    Nilotinib (TASIGNA) 150 mg cap Take 300 mg by mouth two (2) times a day.  Indications: Leukemia         Past History     Past Medical History:  Past Medical History:   Diagnosis Date    Abdominal pain, unspecified site     Acute reaction to stress     Allergic rhinitis due to pollen     Arthritis     Back injury     BPH (benign prostatic hypertrophy)     Calculus of ureter     Cancer (HCC)     history of Leukemia, in remission    Carotid duplex 06/17/2015    Mild < 50% bilateral ICA stenosis.  Dizziness and giddiness     Esophageal reflux     Essential hypertension     GERD (gastroesophageal reflux disease)     Headache(784.0)     History of echocardiogram 08/13/2015    EF 55-60%. No WMA. Mild-mod LVH. Gr 1 DDfx. No evidence of intracardiac shunt. Mild AI.      Hx: UTI (urinary tract infection)     Hypertension     Hypogonadism in male     Kidney stones     Migraine     Neck injury     Polycythemia     Polycythemia, secondary     Sciatica     Unspecified retinal detachment     Unspecified sleep apnea     resolved since gastric bypass    Vision decreased     Weight loss        Past Surgical History:  Past Surgical History:   Procedure Laterality Date    ABDOMEN SURGERY PROC UNLISTED  2012    [place]ernia    HX CATARACT REMOVAL Left     HX CERVICAL FUSION  4/4/2016    Cervical Spine Fusion    HX CHOLECYSTECTOMY      HX GASTRIC BYPASS  2006    HX KNEE ARTHROSCOPY      both knees (right knee twice, left once)    HX ORTHOPAEDIC  1981, 1995    lumbar laminectomy    HX OTHER SURGICAL      Two back surgeries    HX RETINAL DETACHMENT REPAIR Left        Family History:  Family History   Problem Relation Age of Onset    Hypertension Father     Diabetes Father     Heart Attack Mother     Headache Sister     Diabetes Son        Social History:  Social History   Substance Use Topics    Smoking status: Former Smoker     Quit date: 2/22/1981    Smokeless tobacco: Never Used    Alcohol use No       Allergies: Allergies   Allergen Reactions    Levofloxacin Nausea Only     Severe nausea and dizziness    Sulfa (Sulfonamide Antibiotics) Unknown (comments)     Nausea, aching all over         Review of Systems       Review of Systems   Constitutional: Negative for chills and fever. HENT: Negative for rhinorrhea. Respiratory: Negative for shortness of breath. Cardiovascular: Negative for chest pain. Gastrointestinal: Negative for abdominal pain, nausea and vomiting. Endocrine: Negative for polyuria. Genitourinary: Negative for dysuria. Musculoskeletal: Negative for back pain. Skin: Negative for rash. Neurological: Negative for headaches. Lethargy   Psychiatric/Behavioral: Positive for confusion. Physical Exam     Patient Vitals for the past 12 hrs:   Temp Pulse Resp BP SpO2   01/15/18 1729 - - - - 99 %   01/15/18 1715 - - - 105/63 97 %   01/15/18 1645 - - - 103/69 94 %   01/15/18 1615 - - - 108/64 97 %   01/15/18 1601 - - - - 98 %   01/15/18 1600 - - - 110/68 98 %   01/15/18 1558 - - - - 96 %   01/15/18 1555 - - - - 98 %   01/15/18 1553 - - - - 98 %   01/15/18 1551 - - - 112/65 -   01/15/18 1425 - 68 22 - 99 %   01/15/18 1415 - 61 15 (!) 73/52 92 %   01/15/18 1402 97.9 °F (36.6 °C) 63 14 (!) 135/100 97 %   01/15/18 1400 - 64 13 (!) 79/53 93 %         Physical Exam   Constitutional: He appears well-developed and well-nourished. HENT:   Head: Normocephalic and atraumatic. Eyes: Conjunctivae and EOM are normal. Pupils are equal, round, and reactive to light. 2-3 mm reactive   Neck: Normal range of motion. Neck supple. Cardiovascular: Normal rate and regular rhythm. Pulmonary/Chest: Effort normal and breath sounds normal.   Decreased respiratory rate. Abdominal: Soft. Bowel sounds are normal. He exhibits no distension. There is no tenderness. There is no rebound. Musculoskeletal: Normal range of motion. Neurological: He is alert. He has normal strength. No cranial nerve deficit. Coordination normal. GCS eye subscore is 4. GCS verbal subscore is 5. GCS motor subscore is 6. Normal upper and lower extremity strength and sensation. Moving all extremities, arouses to voice, but quickly falls asleep again. Skin: Skin is warm and dry. No signs of fentanyl patch     Psychiatric: He has a normal mood and affect.  Thought content normal.   Nursing note and vitals reviewed. Diagnostic Study Results     Labs -  Recent Results (from the past 12 hour(s))   CBC WITH AUTOMATED DIFF    Collection Time: 01/15/18  2:20 PM   Result Value Ref Range    WBC 12.4 4.6 - 13.2 K/uL    RBC 4.39 (L) 4.70 - 5.50 M/uL    HGB 11.9 (L) 13.0 - 16.0 g/dL    HCT 36.4 36.0 - 48.0 %    MCV 82.9 74.0 - 97.0 FL    MCH 27.1 24.0 - 34.0 PG    MCHC 32.7 31.0 - 37.0 g/dL    RDW 17.4 (H) 11.6 - 14.5 %    PLATELET 001 993 - 189 K/uL    MPV 11.8 9.2 - 11.8 FL    NEUTROPHILS 55 42 - 75 %    LYMPHOCYTES 33 20 - 51 %    MONOCYTES 6 2 - 9 %    EOSINOPHILS 2 0 - 5 %    BASOPHILS 1 0 - 3 %    OTHER CELL 3 (H) 0      ABS. NEUTROPHILS 6.8 1.8 - 8.0 K/UL    ABS. LYMPHOCYTES 4.1 (H) 0.8 - 3.5 K/UL    ABS. MONOCYTES 0.7 0 - 1.0 K/UL    ABS. EOSINOPHILS 0.2 0.0 - 0.4 K/UL    ABS.  BASOPHILS 0.1 (H) 0.0 - 0.06 K/UL    DF MANUAL      PLATELET COMMENTS ADEQUATE PLATELETS      RBC COMMENTS ANISOCYTOSIS  1+       METABOLIC PANEL, BASIC    Collection Time: 01/15/18  2:20 PM   Result Value Ref Range    Sodium 145 136 - 145 mmol/L    Potassium 4.8 3.5 - 5.5 mmol/L    Chloride 115 (H) 100 - 108 mmol/L    CO2 23 21 - 32 mmol/L    Anion gap 7 3.0 - 18 mmol/L    Glucose 92 74 - 99 mg/dL    BUN 37 (H) 7.0 - 18 MG/DL    Creatinine 1.92 (H) 0.6 - 1.3 MG/DL    BUN/Creatinine ratio 19 12 - 20      GFR est AA 42 (L) >60 ml/min/1.73m2    GFR est non-AA 35 (L) >60 ml/min/1.73m2    Calcium 7.8 (L) 8.5 - 10.1 MG/DL   PROTHROMBIN TIME + INR    Collection Time: 01/15/18  2:20 PM   Result Value Ref Range    Prothrombin time 13.2 11.5 - 15.2 sec    INR 1.1 0.8 - 1.2     ETHYL ALCOHOL    Collection Time: 01/15/18  2:20 PM   Result Value Ref Range    ALCOHOL(ETHYL),SERUM <3 0 - 3 MG/DL   URINALYSIS W/ RFLX MICROSCOPIC    Collection Time: 01/15/18  3:35 PM   Result Value Ref Range    Color YELLOW      Appearance CLEAR      Specific gravity 1.018 1.005 - 1.030      pH (UA) 5.0 5.0 - 8.0      Protein NEGATIVE  NEG mg/dL    Glucose NEGATIVE  NEG mg/dL    Ketone NEGATIVE  NEG mg/dL    Bilirubin NEGATIVE  NEG      Blood NEGATIVE  NEG      Urobilinogen 1.0 0.2 - 1.0 EU/dL    Nitrites NEGATIVE  NEG      Leukocyte Esterase SMALL (A) NEG     DRUG SCREEN, URINE    Collection Time: 01/15/18  3:35 PM   Result Value Ref Range    BENZODIAZEPINES NEGATIVE  NEG      BARBITURATES NEGATIVE  NEG      THC (TH-CANNABINOL) NEGATIVE  NEG      OPIATES NEGATIVE  NEG      PCP(PHENCYCLIDINE) NEGATIVE  NEG      COCAINE NEGATIVE  NEG      AMPHETAMINES NEGATIVE  NEG      METHADONE NEGATIVE  NEG      HDSCOM (NOTE)    URINE MICROSCOPIC ONLY    Collection Time: 01/15/18  3:35 PM   Result Value Ref Range    WBC 5 to 9 0 - 4 /hpf    RBC 0 to 2 0 - 5 /hpf    Epithelial cells FEW 0 - 5 /lpf    Bacteria 1+ (A) NEG /hpf       Radiologic Studies -   Ct Head Wo Cont    Result Date: 1/15/2018  CT HEAD WO CONT Indication: Confusion/delirium, altered LOC, unexplained Technique: Unenhanced 5 mm collimation axial images obtained from the skull base through the vertex. Dose reduction techniques used: Automated exposure control, adjustment of the mAs and/or kVp according to patient size, standardized low-dose protocol, and/or iterative reconstruction technique. Comparison: Head CT from 3/20/2015 Findings: No intracranial hemorrhage. No mass effect or midline shift. Mild cortical atrophy is present with compensatory ventricular dilatation. Periventricular and subcortical white matter hypodensities are likely from chronic small vessel ischemic disease. Intracranial vascular calcifications are noted. The gray-white matter differentiation is normal.  No extra-axial fluid collections. The ventricles are symmetric and midline in position. Basilar cisterns are patent. The casimiro and cerebellum are normal. Sinuses: Partial opacification the ethmoid air cells. The remainder the paranasal sinuses and mastoid air cells are clear. Orbits: Normal. Calvarium:Normal.     Impression: 1.   No acute intracranial pathology. 2.  Ethmoid sinusitis. Medical Decision Making   I am the first provider for this patient. I reviewed the vital signs, available nursing notes, past medical history, past surgical history, family history and social history. Vital Signs-Reviewed the patient's vital signs. Records Reviewed: Nursing Notes and Old Medical Records (Time of Review: 1:49 PM)    Provider Notes (Medical Decision Making):     ED Course: Progress Notes, Reevaluation, and Consults:    Consult:  Discussed care with Dr. Shaan Barrett, PCP. Standard discussion; including history of patients chief complaint, available diagnostic results, and treatment course. Reports that the pt had a similar episode 3 years ago, which was suspected to be caused by poly medication interaction. 2:15 PM, 1/15/2018      3:05 PM reevaluated the pt. Pt has awakened, and is alert after administering a dose of Narcan.    5:20 PM reevaluated the pt. Pt continues to be back to normal. Wife is ok with taking him home. Repeat exam shows patient awake, alert and oriented x3. Gait is normal. Patient back to normal. Wife says he is acting his normal self. Patient urinated into urinal, has no urinary symptoms. Possible contaminant for some WBCs. Told to follow-up with PCP if develops any urinary symptoms. Episodes of hypotension I believe were erroneous as readjusting cuff, quickly shows normotension. Diagnosis     Clinical Impression:   1. Accidental overdose, initial encounter        Disposition: discharged    Follow-up Information     Follow up With Details Comments Contact Info    Daniel Flores MD In 1 day  [place] [id number]  [phone]             Discharge Medication List as of 1/15/2018  5:24 PM      CONTINUE these medications which have NOT CHANGED    Details   naloxone (NARCAN) 4 mg/actuation nasal spray Use 1 spray intranasally into 1 nostril if needed.  May repeat in 4 minutes if needed., Normal, Disp-1 Each, R-1      tamsulosin (FLOMAX) 0.4 mg capsule take 1 capsule by mouth once daily, Normal, Disp-90 Cap, R-0      fentaNYL (DURAGESIC) 25 mcg/hr PATCH 1 Patch by TransDERmal route every seventy-two (72) hours. Max Daily Amount: 1 Patch., Print, Disp-10 Patch, R-0      rOPINIRole (REQUIP) 0.5 mg tablet take 1 tablet by mouth twice a day, Normal, Disp-30 Tab, R-0      SUMAtriptan (IMITREX) 100 mg tablet TAKE 1 TABLET BY MOUTH AT ONSET OF HEADACHE, MAY REPEAT 2 HOURS LATER. (MAX 2 PILLS IN 24 HOURS), Normal, Disp-12 Tab, R-1      hydroCHLOROthiazide (HYDRODIURIL) 25 mg tablet take 1 tablet by mouth once daily, Normal, Disp-30 Tab, R-1      guanFACINE IR (TENEX) 1 mg IR tablet take 1 tablet by mouth once daily, Normal, Disp-30 Tab, R-2      labetalol (NORMODYNE) 300 mg tablet take 1 tablet by mouth twice a day, Normal, Disp-60 Tab, R-2      !! DULoxetine (CYMBALTA) 30 mg capsule take 1 capsule by mouth once daily WITH 60 MG CAPSULE, Normal, Disp-30 Cap, R-0      HYDROcodone-acetaminophen (NORCO) 7.5-325 mg per tablet Take 1 tablet by mouth every four hours as needed for pain. Max daily amount 5 tabs. , Print, Disp-150 Tab, R-0      amLODIPine-Olmesartan 5-40 mg tab take 1 tablet by mouth daily, Normal, Disp-30 Tab, R-0      tiZANidine (ZANAFLEX) 4 mg capsule Take 1 Cap by mouth three (3) times daily. , Normal, Disp-90 Cap, R-5      topiramate (TOPAMAX) 50 mg tablet TAKE 1 TABLET BY MOUTH TWICE A DAY TO PREVENT MIGRAINE, NormalTHE RX HAS  AND NEEDS NEW REFILL AUTHORIZATIONDisp-60 Tab, R-5      pantoprazole (PROTONIX) 40 mg tablet Take 1 Tab by mouth daily. , Normal, Disp-90 Tab, R-3      testosterone cypionate (DEPOTESTOTERONE CYPIONATE) 200 mg/mL injection 1 mL by IntraMUSCular route every thirty (30) days.  Max Daily Amount: 200 mg., Print, Disp-1 Vial, R-5      promethazine (PHENERGAN) 25 mg tablet take 1 tablet by mouth every 6 hours if needed for nausea, Normal, Disp-30 Tab, R-0      montelukast (SINGULAIR) 10 mg tablet TAKE 1 TABLET BY MOUTH EVERY DAY, Normal, Disp-90 Tab, R-3      !! DULoxetine (CYMBALTA) 60 mg capsule TAKE ONE CAPSULE BY MOUTH EVERY DAY, Normal, Disp-90 Cap, R-3      carvedilol (COREG) 12.5 mg tablet 1 TABLET TWICE PER DAY, Normal, Disp-180 Tab, R-3      Nilotinib (TASIGNA) 150 mg cap Take 300 mg by mouth two (2) times a day. Indications: Leukemia, Historical Med       !! - Potential duplicate medications found. Please discuss with provider.        _______________________________    Attestations:  James acting as a scribe for and in the presence of Analilia Hedrick MD      January 15, 2018 at 1:49 PM       Provider Attestation:      I personally performed the services described in the documentation, reviewed the documentation, as recorded by the scribe in my presence, and it accurately and completely records my words and actions.  January 15, 2018 at 1:49 PM - Analilia Hedrick MD    _______________________________

## 2018-01-15 NOTE — DISCHARGE INSTRUCTIONS
Accidental Overdose of Medicine: Care Instructions  Your Care Instructions    Almost any medicine can cause harm if you take too much of it. You have had treatment to help your body get rid of an overdose of a medicine. This may have been an over-the-counter medicine. Or it might be one that a doctor prescribed. It may even have been a vitamin or a supplement. During treatment, the doctor may have given you fluids and medicine. You also may have had lab tests. Then the doctor made sure that you were well enough to go home. The doctor has checked you carefully, but problems can develop later. If you notice any problems or new symptoms, get medical treatment right away. Follow-up care is a key part of your treatment and safety. Be sure to make and go to all appointments, and call your doctor if you are having problems. It's also a good idea to know your test results and keep a list of the medicines you take. How can you care for yourself at home? Home care  · Drink plenty of fluids. If you have kidney, heart, or liver disease and have to limit fluids, talk with your doctor before you increase the amount of fluids you drink. · If you normally take medicines, ask your doctor when you can start taking them again. · Read the information that comes with any medicine. If you have questions, ask your doctor or pharmacist.  Prevention  · Be safe with medicines. Take your medicines exactly as prescribed or as the label directs. Call your doctor if you think you are having a problem with your medicine. · Keep your medicines in the containers they came in. Keep them with the original labels. · Find out what to do if you miss a dose of your medicine. When should you call for help? Call 911 anytime you think you may need emergency care. For example, call if:  ? · You passed out (lost consciousness). ? · You have trouble breathing. ? · You are sleepy or hard to wake up.    ?Call your doctor now or seek immediate medical care if:  ? · You are vomiting. ? · You have a new or worse headache. ? · You are dizzy or lightheaded or feel like you may faint. ? Watch closely for changes in your health, and be sure to contact your doctor if:  ? · You do not get better as expected. Where can you learn more? Go to http://shamar-keegan.info/. Enter C165 in the search box to learn more about \"Accidental Overdose of Medicine: Care Instructions. \"  Current as of: August 14, 2016  Content Version: 11.4  © 8035-5437 [employer]. Care instructions adapted under license by [employer] (which disclaims liability or warranty for this information). If you have questions about a medical condition or this instruction, always ask your healthcare professional. [address] any warranty or liability for your use of this information.

## 2018-01-16 ENCOUNTER — OFFICE VISIT (OUTPATIENT)
Dept: INTERNAL MEDICINE CLINIC | Age: 68
End: 2018-01-16

## 2018-01-16 VITALS
BODY MASS INDEX: 28.62 KG/M2 | SYSTOLIC BLOOD PRESSURE: 110 MMHG | HEART RATE: 80 BPM | RESPIRATION RATE: 16 BRPM | WEIGHT: 235 LBS | DIASTOLIC BLOOD PRESSURE: 80 MMHG | TEMPERATURE: 99.3 F | HEIGHT: 76 IN | OXYGEN SATURATION: 97 %

## 2018-01-16 DIAGNOSIS — M54.2 CERVICALGIA: Primary | ICD-10-CM

## 2018-01-16 DIAGNOSIS — G89.29 CHRONIC BILATERAL LOW BACK PAIN WITHOUT SCIATICA: ICD-10-CM

## 2018-01-16 DIAGNOSIS — R55 SYNCOPE, UNSPECIFIED SYNCOPE TYPE: ICD-10-CM

## 2018-01-16 DIAGNOSIS — M54.50 CHRONIC BILATERAL LOW BACK PAIN WITHOUT SCIATICA: ICD-10-CM

## 2018-01-16 RX ORDER — AMLODIPINE AND OLMESARTAN MEDOXOMIL 5; 40 MG/1; MG/1
TABLET ORAL
Qty: 30 TAB | Refills: 0 | Status: SHIPPED | OUTPATIENT
Start: 2018-01-16 | End: 2018-02-14 | Stop reason: SDUPTHER

## 2018-01-16 RX ORDER — HYDROCODONE BITARTRATE AND ACETAMINOPHEN 7.5; 325 MG/1; MG/1
TABLET ORAL
Qty: 150 TAB | Refills: 0 | Status: SHIPPED | OUTPATIENT
Start: 2018-01-16 | End: 2018-02-13 | Stop reason: SDUPTHER

## 2018-01-17 LAB
BACTERIA SPEC CULT: NORMAL
SERVICE CMNT-IMP: NORMAL

## 2018-01-17 RX ORDER — PREDNISONE 20 MG/1
TABLET ORAL
Qty: 8 TAB | Refills: 0 | Status: SHIPPED | OUTPATIENT
Start: 2018-01-17 | End: 2018-03-23 | Stop reason: ALTCHOICE

## 2018-01-17 RX ORDER — TESTOSTERONE CYPIONATE 200 MG/ML
200 INJECTION INTRAMUSCULAR ONCE
Qty: 1 ML | Refills: 0 | Status: SHIPPED | COMMUNITY
Start: 2018-01-17 | End: 2018-01-17

## 2018-01-17 RX ORDER — DULOXETIN HYDROCHLORIDE 30 MG/1
CAPSULE, DELAYED RELEASE ORAL
Qty: 30 CAP | Refills: 0 | Status: SHIPPED | OUTPATIENT
Start: 2018-01-17 | End: 2018-03-03 | Stop reason: SDUPTHER

## 2018-01-17 RX ORDER — CEFUROXIME AXETIL 500 MG/1
TABLET ORAL
Qty: 14 TAB | Refills: 0 | Status: SHIPPED | OUTPATIENT
Start: 2018-01-17 | End: 2018-01-24 | Stop reason: SDUPTHER

## 2018-01-17 NOTE — PROGRESS NOTES
The patient presents to the office today with the chief complaint of syncope    HPI    The patient was alert and fine during the appointment yesterday morning. The patient was due to change his Fentanyl patch yesterday AM.  He stated that he changed the patch placing the new patch on his left lower back. He is adamant that he removed his old patch prior to placing the new one. The patient's last dose of Norco was Sunday night. The patient states that he had run out and had no Norco to take since Jose night. The patient left home - he stopped for groceries and then went home. He placed the groceries away at home and went into the garage to replace several light bulbs high near the ceiling. This is the last thing that he remembers. His wife found him standing but leaning over - confused and barely responsive. She aided him into the house and called 911. He has no recollection of this. His next recollection is of waking up in the ER minutes after receiving Narcan. There was no abnormalities found on the head CT or test results. Today the patient feels back to baseline. He has not replaced the Fentanyl patch as of yet. He has pain but it is tolerable. When asked about his head position prior to the event - he was working above his head but he states that he could not look up due to his head being fixed. The patient has several episodes of confusion after awakening from a nap. Review of Systems   Respiratory: Negative for shortness of breath. Cardiovascular: Negative for chest pain and leg swelling. Musculoskeletal: Positive for back pain and neck pain.        Allergies   Allergen Reactions    Levofloxacin Nausea Only     Severe nausea and dizziness    Sulfa (Sulfonamide Antibiotics) Unknown (comments)     Nausea, aching all over       Current Outpatient Prescriptions   Medication Sig Dispense Refill    amLODIPine-Olmesartan 5-40 mg tab take 1 tablet by mouth daily 30 Tab 0    HYDROcodone-acetaminophen (NORCO) 7.5-325 mg per tablet Take 1 tablet by mouth every four hours as needed for pain. Max daily amount 5 tabs. 150 Tab 0    naloxone (NARCAN) 4 mg/actuation nasal spray Use 1 spray intranasally into 1 nostril if needed. May repeat in 4 minutes if needed. 1 Each 1    tamsulosin (FLOMAX) 0.4 mg capsule take 1 capsule by mouth once daily 90 Cap 0    fentaNYL (DURAGESIC) 25 mcg/hr PATCH 1 Patch by TransDERmal route every seventy-two (72) hours. Max Daily Amount: 1 Patch. 10 Patch 0    rOPINIRole (REQUIP) 0.5 mg tablet take 1 tablet by mouth twice a day 30 Tab 0    SUMAtriptan (IMITREX) 100 mg tablet TAKE 1 TABLET BY MOUTH AT ONSET OF HEADACHE, MAY REPEAT 2 HOURS LATER. (MAX 2 PILLS IN 24 HOURS) 12 Tab 1    hydroCHLOROthiazide (HYDRODIURIL) 25 mg tablet take 1 tablet by mouth once daily 30 Tab 1    guanFACINE IR (TENEX) 1 mg IR tablet take 1 tablet by mouth once daily 30 Tab 2    labetalol (NORMODYNE) 300 mg tablet take 1 tablet by mouth twice a day 60 Tab 2    DULoxetine (CYMBALTA) 30 mg capsule take 1 capsule by mouth once daily WITH 60 MG CAPSULE 30 Cap 0    tiZANidine (ZANAFLEX) 4 mg capsule Take 1 Cap by mouth three (3) times daily. 90 Cap 5    topiramate (TOPAMAX) 50 mg tablet TAKE 1 TABLET BY MOUTH TWICE A DAY TO PREVENT MIGRAINE 60 Tab 5    pantoprazole (PROTONIX) 40 mg tablet Take 1 Tab by mouth daily. 90 Tab 3    testosterone cypionate (DEPOTESTOTERONE CYPIONATE) 200 mg/mL injection 1 mL by IntraMUSCular route every thirty (30) days.  Max Daily Amount: 200 mg. 1 Vial 5    promethazine (PHENERGAN) 25 mg tablet take 1 tablet by mouth every 6 hours if needed for nausea 30 Tab 0    montelukast (SINGULAIR) 10 mg tablet TAKE 1 TABLET BY MOUTH EVERY DAY 90 Tab 3    DULoxetine (CYMBALTA) 60 mg capsule TAKE ONE CAPSULE BY MOUTH EVERY DAY 90 Cap 3    carvedilol (COREG) 12.5 mg tablet 1 TABLET TWICE PER  Tab 3    Nilotinib (TASIGNA) 150 mg cap Take 300 mg by mouth two (2) times a day. Indications: Leukemia         Past Medical History:   Diagnosis Date    Abdominal pain, unspecified site     Acute reaction to stress     Allergic rhinitis due to pollen     Arthritis     Back injury     BPH (benign prostatic hypertrophy)     Calculus of ureter     Cancer (HCC)     history of Leukemia, in remission    Carotid duplex 06/17/2015    Mild < 50% bilateral ICA stenosis.  Dizziness and giddiness     Esophageal reflux     Essential hypertension     GERD (gastroesophageal reflux disease)     Headache(784.0)     History of echocardiogram 08/13/2015    EF 55-60%. No WMA. Mild-mod LVH. Gr 1 DDfx. No evidence of intracardiac shunt. Mild AI.      Hx: UTI (urinary tract infection)     Hypertension     Hypogonadism in male     Kidney stones     Migraine     Neck injury     Polycythemia     Polycythemia, secondary     Sciatica     Unspecified retinal detachment     Unspecified sleep apnea     resolved since gastric bypass    Vision decreased     Weight loss        Past Surgical History:   Procedure Laterality Date    ABDOMEN SURGERY PROC UNLISTED  2012    Hernia    HX CATARACT REMOVAL Left     HX CERVICAL FUSION  4/4/2016    Cervical Spine Fusion    HX CHOLECYSTECTOMY      HX GASTRIC BYPASS  2006    HX KNEE ARTHROSCOPY      both knees (right knee twice, left once)    HX ORTHOPAEDIC  1981, 1995    lumbar laminectomy    HX OTHER SURGICAL      Two back surgeries    HX RETINAL DETACHMENT REPAIR Left        Social History     Social History    Marital status:      Spouse name: N/A    Number of children: N/A    Years of education: N/A     Occupational History    Not on file.      Social History Main Topics    Smoking status: Former Smoker     Quit date: 2/22/1981    Smokeless tobacco: Never Used    Alcohol use No    Drug use: No    Sexual activity: Yes     Partners: Female     Other Topics Concern    Not on file     Social History Narrative Patient does have an advanced directive on file    Visit Vitals    /80 (BP 1 Location: Left arm, BP Patient Position: Sitting)    Pulse 80    Temp 99.3 °F (37.4 °C) (Tympanic)    Resp 16    Ht 6' 4\" (1.93 m)    Wt 235 lb (106.6 kg)    SpO2 97%    BMI 28.61 kg/m2       Physical Exam   Constitutional:   Patient is alert   Cardiovascular: Normal rate and regular rhythm. Exam reveals no gallop. No murmur heard. Pulmonary/Chest: He has no wheezes. He has no rales. Abdominal: Soft. He exhibits no distension. There is no tenderness. Musculoskeletal: He exhibits no edema. BMI:  OK    Admission on 01/15/2018, Discharged on 01/15/2018   Component Date Value Ref Range Status    WBC 01/15/2018 12.4  4.6 - 13.2 K/uL Final    RBC 01/15/2018 4.39* 4.70 - 5.50 M/uL Final    HGB 01/15/2018 11.9* 13.0 - 16.0 g/dL Final    HCT 01/15/2018 36.4  36.0 - 48.0 % Final    MCV 01/15/2018 82.9  74.0 - 97.0 FL Final    MCH 01/15/2018 27.1  24.0 - 34.0 PG Final    MCHC 01/15/2018 32.7  31.0 - 37.0 g/dL Final    RDW 01/15/2018 17.4* 11.6 - 14.5 % Final    PLATELET 26/90/5678 029  135 - 420 K/uL Final    MPV 01/15/2018 11.8  9.2 - 11.8 FL Final    NEUTROPHILS 01/15/2018 55  42 - 75 % Final    LYMPHOCYTES 01/15/2018 33  20 - 51 % Final    MONOCYTES 01/15/2018 6  2 - 9 % Final    EOSINOPHILS 01/15/2018 2  0 - 5 % Final    BASOPHILS 01/15/2018 1  0 - 3 % Final    OTHER CELL 01/15/2018 3* 0   Final    ATYPICAL LYMPHOCYTES PRESENT    ABS. NEUTROPHILS 01/15/2018 6.8  1.8 - 8.0 K/UL Final    ABS. LYMPHOCYTES 01/15/2018 4.1* 0.8 - 3.5 K/UL Final    ABS. MONOCYTES 01/15/2018 0.7  0 - 1.0 K/UL Final    ABS. EOSINOPHILS 01/15/2018 0.2  0.0 - 0.4 K/UL Final    ABS.  BASOPHILS 01/15/2018 0.1* 0.0 - 0.06 K/UL Final    DF 01/15/2018 MANUAL    Final    PLATELET COMMENTS 17/39/5059 ADEQUATE PLATELETS    Final    RBC COMMENTS 01/15/2018     Final                    Value:ANISOCYTOSIS  1+      Sodium 01/15/2018 145  136 - 145 mmol/L Final    Potassium 01/15/2018 4.8  3.5 - 5.5 mmol/L Final    Chloride 01/15/2018 115* 100 - 108 mmol/L Final    CO2 01/15/2018 23  21 - 32 mmol/L Final    Anion gap 01/15/2018 7  3.0 - 18 mmol/L Final    Glucose 01/15/2018 92  74 - 99 mg/dL Final    BUN 01/15/2018 37* 7.0 - 18 MG/DL Final    Creatinine 01/15/2018 1.92* 0.6 - 1.3 MG/DL Final    BUN/Creatinine ratio 01/15/2018 19  12 - 20   Final    GFR est AA 01/15/2018 42* >60 ml/min/1.73m2 Final    GFR est non-AA 01/15/2018 35* >60 ml/min/1.73m2 Final    Comment: (NOTE)  Estimated GFR is calculated using the Modification of Diet in Renal   Disease (MDRD) Study equation, reported for both  Americans   (GFRAA) and non- Americans (GFRNA), and normalized to 1.73m2   body surface area. The physician must decide which value applies to   the patient. The MDRD study equation should only be used in   individuals age 25 or older. It has not been validated for the   following: pregnant women, patients with serious comorbid conditions,   or on certain medications, or persons with extremes of body size,   muscle mass, or nutritional status.       Calcium 01/15/2018 7.8* 8.5 - 10.1 MG/DL Final    Prothrombin time 01/15/2018 13.2  11.5 - 15.2 sec Final    INR 01/15/2018 1.1  0.8 - 1.2   Final    Comment:            INR Therapeutic Ranges         (on stable oral anticoagulant):     INDICATION                INR  DVT/PE/Atrial Fib          2.0-3.0  MI/Mechanical Heart Valve  2.5-3.5      ALCOHOL(ETHYL),SERUM 01/15/2018 <3  0 - 3 MG/DL Final    Color 01/15/2018 YELLOW    Final    Appearance 01/15/2018 CLEAR    Final    Specific gravity 01/15/2018 1.018  1.005 - 1.030   Final    pH (UA) 01/15/2018 5.0  5.0 - 8.0   Final    Protein 01/15/2018 NEGATIVE   NEG mg/dL Final    Glucose 01/15/2018 NEGATIVE   NEG mg/dL Final    Ketone 01/15/2018 NEGATIVE   NEG mg/dL Final    Bilirubin 01/15/2018 NEGATIVE   NEG   Final    Blood 01/15/2018 NEGATIVE   NEG   Final    Urobilinogen 01/15/2018 1.0  0.2 - 1.0 EU/dL Final    Nitrites 01/15/2018 NEGATIVE   NEG   Final    Leukocyte Esterase 01/15/2018 SMALL* NEG   Final    BENZODIAZEPINES 01/15/2018 NEGATIVE   NEG   Final    BARBITURATES 01/15/2018 NEGATIVE   NEG   Final    THC (TH-CANNABINOL) 01/15/2018 NEGATIVE   NEG   Final    OPIATES 01/15/2018 NEGATIVE   NEG   Final    PCP(PHENCYCLIDINE) 01/15/2018 NEGATIVE   NEG   Final    COCAINE 01/15/2018 NEGATIVE   NEG   Final    AMPHETAMINES 01/15/2018 NEGATIVE   NEG   Final    METHADONE 01/15/2018 NEGATIVE   NEG   Final    HDSCOM 01/15/2018 (NOTE)   Final    Comment: Specimen analysis was performed without chain of custody handling. These results should be used for medical purposes only and not for   legal or employment purposes. Unconfirmed screening results must not   be used for non-medical purposes.     The cut-off concentration for positive results are as follows:    AMPH     1000 ng/mL  QUENTIN      200 ng/mL  AKHIL      200 ng/mL  VINH       300 ng/mL  METH      300 ng/mL  OPI       300 ng/mL  PCP        25 ng/mL  THC        50 ng/mL        WBC 01/15/2018 5 to 9  0 - 4 /hpf Final    RBC 01/15/2018 0 to 2  0 - 5 /hpf Final    Epithelial cells 01/15/2018 FEW  0 - 5 /lpf Final    Bacteria 01/15/2018 1+* NEG /hpf Final    Special Requests: 01/15/2018 NO SPECIAL REQUESTS    Preliminary    Culture result: 01/15/2018 NO GROWTH AFTER 13 HOURS    Preliminary   Office Visit on 11/02/2017   Component Date Value Ref Range Status    Color (UA POC) 11/02/2017 Yellow   Final    Clarity (UA POC) 11/02/2017 Clear   Final    Glucose (UA POC) 11/02/2017 Negative  Negative Final    Bilirubin (UA POC) 11/02/2017 Negative  Negative Final    Ketones (UA POC) 11/02/2017 Negative  Negative Final    Specific gravity (UA POC) 11/02/2017 1.010  1.001 - 1.035 Final    Blood (UA POC) 11/02/2017 Negative  Negative Final    pH (UA POC) 11/02/2017 5.0  4.6 - 8.0 Final    Protein (UA POC) 11/02/2017 Negative  Negative mg/dL Final    Urobilinogen (UA POC) 11/02/2017 0.2 mg/dL  0.2 - 1 Final    Nitrites (UA POC) 11/02/2017 Negative  Negative Final    Leukocyte esterase (UA POC) 11/02/2017 Negative  Negative Final   Hospital Outpatient Visit on 10/23/2017   Component Date Value Ref Range Status    WBC 10/23/2017 11.4  4.6 - 13.2 K/uL Final    RBC 10/23/2017 4.74  4.70 - 5.50 M/uL Final    HGB 10/23/2017 12.8* 13.0 - 16.0 g/dL Final    HCT 10/23/2017 39.5  36.0 - 48.0 % Final    MCV 10/23/2017 83.3  74.0 - 97.0 FL Final    MCH 10/23/2017 27.0  24.0 - 34.0 PG Final    MCHC 10/23/2017 32.4  31.0 - 37.0 g/dL Final    RDW 10/23/2017 19.1* 11.6 - 14.5 % Final    PLATELET 90/32/0685 441  135 - 420 K/uL Final    MPV 10/23/2017 10.9  9.2 - 11.8 FL Final    NEUTROPHILS 10/23/2017 50  40 - 73 % Final    LYMPHOCYTES 10/23/2017 42  21 - 52 % Final    MONOCYTES 10/23/2017 5  3 - 10 % Final    EOSINOPHILS 10/23/2017 2  0 - 5 % Final    BASOPHILS 10/23/2017 1  0 - 2 % Final    ABS. NEUTROPHILS 10/23/2017 5.7  1.8 - 8.0 K/UL Final    ABS. LYMPHOCYTES 10/23/2017 4.8* 0.9 - 3.6 K/UL Final    ABS. MONOCYTES 10/23/2017 0.6  0.05 - 1.2 K/UL Final    ABS. EOSINOPHILS 10/23/2017 0.2  0.0 - 0.4 K/UL Final    ABS.  BASOPHILS 10/23/2017 0.1* 0.0 - 0.06 K/UL Final    DF 10/23/2017 AUTOMATED    Final    Sodium 10/23/2017 143  136 - 145 mmol/L Final    Potassium 10/23/2017 4.7  3.5 - 5.5 mmol/L Final    Chloride 10/23/2017 111* 100 - 108 mmol/L Final    CO2 10/23/2017 25  21 - 32 mmol/L Final    Anion gap 10/23/2017 7  3.0 - 18 mmol/L Final    Glucose 10/23/2017 82  74 - 99 mg/dL Final    BUN 10/23/2017 24* 7.0 - 18 MG/DL Final    Creatinine 10/23/2017 1.13  0.6 - 1.3 MG/DL Final    BUN/Creatinine ratio 10/23/2017 21* 12 - 20   Final    GFR est AA 10/23/2017 >60  >60 ml/min/1.73m2 Final    GFR est non-AA 10/23/2017 >60  >60 ml/min/1.73m2 Final    Comment: (NOTE)  Estimated GFR is calculated using the Modification of Diet in Renal   Disease (MDRD) Study equation, reported for both  Americans   (GFRAA) and non- Americans (GFRNA), and normalized to 1.73m2   body surface area. The physician must decide which value applies to   the patient. The MDRD study equation should only be used in   individuals age 25 or older. It has not been validated for the   following: pregnant women, patients with serious comorbid conditions,   or on certain medications, or persons with extremes of body size,   muscle mass, or nutritional status.  Calcium 10/23/2017 9.2  8.5 - 10.1 MG/DL Final    Bilirubin, total 10/23/2017 0.6  0.2 - 1.0 MG/DL Final    ALT (SGPT) 10/23/2017 19  16 - 61 U/L Final    AST (SGOT) 10/23/2017 11* 15 - 37 U/L Final    Alk. phosphatase 10/23/2017 89  45 - 117 U/L Final    Protein, total 10/23/2017 7.2  6.4 - 8.2 g/dL Final    Albumin 10/23/2017 3.8  3.4 - 5.0 g/dL Final    Globulin 10/23/2017 3.4  2.0 - 4.0 g/dL Final    A-G Ratio 10/23/2017 1.1  0.8 - 1.7   Final    Methylmalonic acid 10/23/2017 173  0 - 378 nmol/L Final    Comment: (NOTE)  Performed At: [address] [id number]  Gabriel Pittman MD MO:[id number]         . No results found for any visits on 01/16/18. Assessment / Plan      ICD-10-CM ICD-9-CM    1. Cervicalgia M54.2 723.1 HYDROcodone-acetaminophen (NORCO) 7.5-325 mg per tablet   2. Chronic bilateral low back pain without sciatica M54.5 724.2 HYDROcodone-acetaminophen (NORCO) 7.5-325 mg per tablet    G89.29 338.29    3. Syncope, unspecified syncope type R55 780.2 EEG SLEEP DEPRIVED       Will continue pain management but with only Norco and hold the Fentanyl patch. Norco refilled - The Prescription Monitoring Program registry was checked prior to the issuing of this prescription for a controlled substance.   EEG - sleep deprived  Consider evaluation of posterior cerebral circulation  he was advised to continue his maintenance medications      Follow-up Disposition:  Return in about 1 week (around 1/23/2018). I asked the patient and his wife for any questions and I answered their questions.   They state that they understand the treatment plan and agree with the treatment plan

## 2018-01-17 NOTE — PROGRESS NOTES
Patient presented to office for Testosterone injection  Allergies noted. Patient well and consenting to injection. Injection given intramuscular in left upper outer buttocks. Patient tolerated injection well and left office ambulatory.

## 2018-01-22 ENCOUNTER — OFFICE VISIT (OUTPATIENT)
Dept: INTERNAL MEDICINE CLINIC | Age: 68
End: 2018-01-22

## 2018-01-22 DIAGNOSIS — Z71.89 ENCOUNTER FOR MEDICATION REVIEW AND COUNSELING: ICD-10-CM

## 2018-01-22 DIAGNOSIS — R07.81 RIB PAIN ON LEFT SIDE: Primary | ICD-10-CM

## 2018-01-22 RX ORDER — LABETALOL 300 MG/1
150 TABLET, FILM COATED ORAL 2 TIMES DAILY
Status: ON HOLD | COMMUNITY
End: 2018-06-04 | Stop reason: DRUGHIGH

## 2018-01-22 RX ORDER — FLUTICASONE PROPIONATE 50 MCG
2 SPRAY, SUSPENSION (ML) NASAL 2 TIMES DAILY
COMMUNITY
End: 2018-08-27 | Stop reason: ALTCHOICE

## 2018-01-22 NOTE — PROGRESS NOTES
Pharmacy Note - Medication Review   01/22/18         Subjective / Objective      Louie Gudino is a 75 y.o. male who was seen today for a medication review at the request of Dr. Judy Watts. Patient is accompanied by his wife and they brought most of his medications. YOB: 1950    Referred by: Dr. Martita Palma for medication reconciliation following possible accidental overdose/ED visit last week. Past Medical History:   Diagnosis Date    Abdominal pain, unspecified site     Acute reaction to stress     Allergic rhinitis due to pollen     Arthritis     Back injury     BPH (benign prostatic hypertrophy)     Calculus of ureter     Cancer (HCC)     history of Leukemia, in remission    Carotid duplex 06/17/2015    Mild < 50% bilateral ICA stenosis.  Dizziness and giddiness     Esophageal reflux     Essential hypertension     GERD (gastroesophageal reflux disease)     Headache(784.0)     History of echocardiogram 08/13/2015    EF 55-60%. No WMA. Mild-mod LVH. Gr 1 DDfx. No evidence of intracardiac shunt.   Mild AI.      Hx: UTI (urinary tract infection)     Hypertension     Hypogonadism in male     Kidney stones     Migraine     Neck injury     Polycythemia     Polycythemia, secondary     Sciatica     Unspecified retinal detachment     Unspecified sleep apnea     resolved since gastric bypass    Vision decreased     Weight loss        Past Surgical History:   Procedure Laterality Date    ABDOMEN SURGERY PROC UNLISTED  2012    Hernia    HX CATARACT REMOVAL Left     HX CERVICAL FUSION  4/4/2016    Cervical Spine Fusion    HX CHOLECYSTECTOMY      HX GASTRIC BYPASS  2006    HX KNEE ARTHROSCOPY      both knees (right knee twice, left once)    HX ORTHOPAEDIC  1981, 1995    lumbar laminectomy    HX OTHER SURGICAL      Two back surgeries    HX RETINAL DETACHMENT REPAIR Left        Family History   Problem Relation Age of Onset    Hypertension Father    Nisreen Hamiltno Diabetes Father     Heart Attack Mother     Headache Sister     Diabetes Son        Social History     Social History    Marital status:      Spouse name: N/A    Number of children: N/A    Years of education: N/A     Social History Main Topics    Smoking status: Former Smoker     Quit date: 2/22/1981    Smokeless tobacco: Never Used    Alcohol use No    Drug use: No    Sexual activity: Yes     Partners: Female     Other Topics Concern    Not on file     Social History Narrative       Vitals  There were no vitals taken for this visit. Data reviewed:  Lab Results   Component Value Date/Time    Sodium 145 01/15/2018 02:20 PM    Potassium 4.8 01/15/2018 02:20 PM    Chloride 115 01/15/2018 02:20 PM    CO2 23 01/15/2018 02:20 PM    Anion gap 7 01/15/2018 02:20 PM    Glucose 92 01/15/2018 02:20 PM    BUN 37 01/15/2018 02:20 PM    Creatinine 1.92 01/15/2018 02:20 PM    BUN/Creatinine ratio 19 01/15/2018 02:20 PM    GFR est AA 42 01/15/2018 02:20 PM    GFR est non-AA 35 01/15/2018 02:20 PM    Calcium 7.8 01/15/2018 02:20 PM     Lab Results   Component Value Date/Time    Sodium 145 01/15/2018 02:20 PM    Potassium 4.8 01/15/2018 02:20 PM    Chloride 115 01/15/2018 02:20 PM    CO2 23 01/15/2018 02:20 PM    Anion gap 7 01/15/2018 02:20 PM    Glucose 92 01/15/2018 02:20 PM    BUN 37 01/15/2018 02:20 PM    Creatinine 1.92 01/15/2018 02:20 PM    BUN/Creatinine ratio 19 01/15/2018 02:20 PM    GFR est AA 42 01/15/2018 02:20 PM    GFR est non-AA 35 01/15/2018 02:20 PM    Calcium 7.8 01/15/2018 02:20 PM    Bilirubin, total 0.6 10/23/2017 01:31 PM    AST (SGOT) 11 10/23/2017 01:31 PM    Alk.  phosphatase 89 10/23/2017 01:31 PM    Protein, total 7.2 10/23/2017 01:31 PM    Albumin 3.8 10/23/2017 01:31 PM    Globulin 3.4 10/23/2017 01:31 PM    A-G Ratio 1.1 10/23/2017 01:31 PM    ALT (SGPT) 19 10/23/2017 01:31 PM     No results found for: HBA1C, HGBE8, ZKC3MYWU, AUB8EYDI  Lab Results   Component Value Date/Time Cholesterol, total 98 06/17/2011 03:20 AM    HDL Cholesterol 21 06/17/2011 03:20 AM    LDL, calculated 44.6 06/17/2011 03:20 AM    VLDL, calculated 32.4 06/17/2011 03:20 AM    Triglyceride 162 06/17/2011 03:20 AM    CHOL/HDL Ratio 4.7 06/17/2011 03:20 AM     Lab Results   Component Value Date/Time    ALT (SGPT) 19 10/23/2017 01:31 PM    AST (SGOT) 11 10/23/2017 01:31 PM    Alk.  phosphatase 89 10/23/2017 01:31 PM    Bilirubin, total 0.6 10/23/2017 01:31 PM     Lab Results   Component Value Date/Time    Creatinine, POC 1.0 03/20/2015 03:20 PM    Creatinine 1.92 01/15/2018 02:20 PM       Allergies   Allergen Reactions    Levofloxacin Nausea Only     Severe nausea and dizziness    Sulfa (Sulfonamide Antibiotics) Unknown (comments)     Nausea, aching all over       Medication List Prior to Visit:   Medications Prior to Visit   Medication    cefUROXime (CEFTIN) 500 mg tablet    predniSONE (DELTASONE) 20 mg tablet    DULoxetine (CYMBALTA) 30 mg capsule    amLODIPine-Olmesartan 5-40 mg tab    HYDROcodone-acetaminophen (NORCO) 7.5-325 mg per tablet    naloxone (NARCAN) 4 mg/actuation nasal spray    tamsulosin (FLOMAX) 0.4 mg capsule    rOPINIRole (REQUIP) 0.5 mg tablet    SUMAtriptan (IMITREX) 100 mg tablet    hydroCHLOROthiazide (HYDRODIURIL) 25 mg tablet    guanFACINE IR (TENEX) 1 mg IR tablet    topiramate (TOPAMAX) 50 mg tablet    pantoprazole (PROTONIX) 40 mg tablet    testosterone cypionate (DEPOTESTOTERONE CYPIONATE) 200 mg/mL injection    montelukast (SINGULAIR) 10 mg tablet    DULoxetine (CYMBALTA) 60 mg capsule    Nilotinib (TASIGNA) 150 mg cap    fentaNYL (DURAGESIC) 25 mcg/hr PATCH    labetalol (NORMODYNE) 300 mg tablet    tiZANidine (ZANAFLEX) 4 mg capsule    promethazine (PHENERGAN) 25 mg tablet    carvedilol (COREG) 12.5 mg tablet       Medications Discontinued / Updated During Visit:   Medications Discontinued During This Encounter   Medication Reason    carvedilol (COREG) 12.5 mg tablet Not A Current Medication    labetalol (NORMODYNE) 300 mg tablet Dose Adjustment    fentaNYL (DURAGESIC) 25 mcg/hr PATCH Not A Current Medication    tiZANidine (ZANAFLEX) 4 mg capsule Not A Current Medication    promethazine (PHENERGAN) 25 mg tablet Not A Current Medication       Medication Adherence/Cost:    [id number] [place], [place] - [address]  [address] [facility] [address] [facility] [address]  Phone: [phone] Fax: [phone] [facility], [address]  [place] [address] [place] [id number]  Phone: [phone] Fax: [phone]    Prior to ED visit one week ago, patient reports changing fentanyl patch as usual (Q3 days) although he did place it a little higher on his back than usual.  He is unsure if patch adhered to skin as usual (ED was unable to find patch on patient later the same day). He ran out of his Norco the day before but has since filled it. Wife reports finding patient confused and not responding appropriately at home and EMS transported patient to ED where he responded to naloxone. Per patient/wife report (confirmed by Dr. Chinyere Flowers), patient experienced a similar episode about 2-3 years ago. Dr. Chinyere Flowers stopped fentanyl patches at f/u visit later last week (still on med list) and patient has since disposed of unused patches via flushing down toilet. Reports having naloxone nasal spray at home and reports securing pain medications (and other medications) at home. Patient reports using Norco 5 times/day as prescribed and reports pain at best is a 2-3/10 (neck, back and knee pain). He does not want to use fentanyl patches ever again. He does sometimes take an extra Tylenol (but is careful not to exceed 3 gm/day of total APAP from all sources) and avoids NSAIDs like Aleve/Aspirin due to history of gastric bypass although he notes that Aleve really did work for his knee pain when he was able to take it.   Patient also reported some rib pain on left side since the ED visit last week - Dr. Shelia Michele to assess during today's visit. Updated Medication List   Current Outpatient Prescriptions   Medication Sig    labetalol (NORMODYNE) 300 mg tablet Take 150 mg by mouth two (2) times a day.  fluticasone (FLONASE) 50 mcg/actuation nasal spray 2 Sprays by Both Nostrils route two (2) times a day.  ACETAMINOPHEN/DIPHENHYDRAMINE (TYLENOL PM PO) Take 2 Tabs by mouth nightly.  cefUROXime (CEFTIN) 500 mg tablet 1 tab twice per day    predniSONE (DELTASONE) 20 mg tablet 2 tabs daily x 2 days, 1 tab daily x 2 days, 1/2 tab daily x 4 days    DULoxetine (CYMBALTA) 30 mg capsule take 1 capsule by mouth once daily WITH 60 MG CAPSULE    amLODIPine-Olmesartan 5-40 mg tab take 1 tablet by mouth daily    HYDROcodone-acetaminophen (NORCO) 7.5-325 mg per tablet Take 1 tablet by mouth every four hours as needed for pain. Max daily amount 5 tabs.  naloxone (NARCAN) 4 mg/actuation nasal spray Use 1 spray intranasally into 1 nostril if needed. May repeat in 4 minutes if needed.  tamsulosin (FLOMAX) 0.4 mg capsule take 1 capsule by mouth once daily    rOPINIRole (REQUIP) 0.5 mg tablet take 1 tablet by mouth twice a day    SUMAtriptan (IMITREX) 100 mg tablet TAKE 1 TABLET BY MOUTH AT ONSET OF HEADACHE, MAY REPEAT 2 HOURS LATER. (MAX 2 PILLS IN 24 HOURS)    hydroCHLOROthiazide (HYDRODIURIL) 25 mg tablet take 1 tablet by mouth once daily    guanFACINE IR (TENEX) 1 mg IR tablet take 1 tablet by mouth once daily    topiramate (TOPAMAX) 50 mg tablet TAKE 1 TABLET BY MOUTH TWICE A DAY TO PREVENT MIGRAINE    pantoprazole (PROTONIX) 40 mg tablet Take 1 Tab by mouth daily.  testosterone cypionate (DEPOTESTOTERONE CYPIONATE) 200 mg/mL injection 1 mL by IntraMUSCular route every thirty (30) days.  Max Daily Amount: 200 mg.    montelukast (SINGULAIR) 10 mg tablet TAKE 1 TABLET BY MOUTH EVERY DAY    DULoxetine (CYMBALTA) 60 mg capsule TAKE ONE CAPSULE BY MOUTH EVERY DAY    Nilotinib (TASIGNA) 150 mg cap Take 300 mg by mouth two (2) times a day. Indications: Leukemia     No current facility-administered medications for this visit. Assessment / Plan        A/P:   Medication Reconciliation: Completed during the visit and list updated. Flonase is prescribed by ENT. Labetalol is only 1/2 tab (150 mg) BID. Patient also uses Tylenol PM for sleep (denies any dry mouth, blurred vision, residual sedation/groggy feeling next day). Patient is on last day of prednisone and is almost finished with antibiotics for a sinus infection. He no longer takes promethazine or tizanidine. Patient did not have naloxone with him but both wife/patient report having at home and are aware of administration instructions. Unsure of what happened last week (possible integrity of fentanyl patch was breached or it tore/got caught in clothing as it was not found on his body in the ED). If the patch was damaged, it's possible that the medication released too quickly leading to an unintentional overdose. Agree with Dr. Judy Watts and patient on discontinuing patches due to a total of 2 of these episodes in past 2-3 years and that patient states pain level is a 2-3/10 while using Norco immediate release. Reinforced importance of proper storage of pain medications to limit unintentional exposure to pets/grandchildren (wife and patient well aware). Patient to f/u with PCP as planned. Patient verbalized understanding of the information presented and all of the patients/wife's questions were answered. AVS information was reviewed with patient/wife and will be printed on checkout. Patient was advised to call the office with any additional questions or concerns. Follow-up: Findings discussed with Dr. Judy Watts today and notification of recommendations will be sent to Dr. Martita Palma MD for review.     Thank you for the consult,  Cosmo Mcdonald, PHARMD, BCACP

## 2018-01-22 NOTE — PATIENT INSTRUCTIONS
Make sure keep naloxone on hand in case the confusion, sedation, unresponsiveness occurs again. If you need to use this, you still need to call EMS to get medical support/treatment as naloxone only works short-term.

## 2018-01-23 ENCOUNTER — HOSPITAL ENCOUNTER (OUTPATIENT)
Dept: NEUROLOGY | Age: 68
Discharge: HOME | End: 2018-01-23
Attending: INTERNAL MEDICINE
Payer: MEDICARE

## 2018-01-23 DIAGNOSIS — R55 SYNCOPE, UNSPECIFIED SYNCOPE TYPE: ICD-10-CM

## 2018-01-23 PROCEDURE — 95819 EEG AWAKE AND ASLEEP: CPT

## 2018-01-24 RX ORDER — ROPINIROLE 0.5 MG/1
TABLET, FILM COATED ORAL
Qty: 30 TAB | Refills: 0 | Status: SHIPPED | OUTPATIENT
Start: 2018-01-24 | End: 2018-02-05 | Stop reason: SDUPTHER

## 2018-01-24 RX ORDER — CEFUROXIME AXETIL 500 MG/1
TABLET ORAL
Qty: 14 TAB | Refills: 0 | Status: SHIPPED | OUTPATIENT
Start: 2018-01-24 | End: 2018-03-23 | Stop reason: ALTCHOICE

## 2018-01-29 NOTE — PROCEDURES
[address] [place] [address] Dr MISHRA    Michael Mckinney  MR#: [id number]  : 1950  ACCOUNT #: [de-identified]   DATE OF SERVICE: 2018    REFERRING PHYSICIAN:  Alayna Trivedi MD    READING PHYSICIAN:  Teena Bautista MD    HISTORY OF PRESENT ILLNESS:  This is a 69-year-old male who presents for electrocortical evaluation due to a period of unresponsiveness. Patient does have a history of narcotic use. CURRENT MEDICATIONS:  Now include Cymbalta, Singulair, Protonix, Topamax, Xanax, hydrochlorothiazide, Imitrex Narcan, Norco, Deltasone, and Flonase. EEG examination is performed as an outpatient utilizing both referential and differential montages as well as  International 10/20 electrode placement system. It demonstrates a low voltage mixed frequency background activity. She was sleep deprived prior to the study. At times, up to an 8 Hz alpha rhythm is elicited, but hyperventilation and intermittent photic stimulation were performed and were performed without elicitation of abnormal activity. There were no focal lateralized or abnormal paroxysmal discharges noted. The patient did enter into light stages of sleep, but not in deeper stage of sleep. On single channel EKG monitoring, no cardiac ectopy was noted. EEG INTERPRETATION:  Normal awake and light sleep recording for age.       MD LORIN Leonard / [place]  D: 2018 14:57     T: 2018 16:15  JOB #: [id number]

## 2018-01-31 ENCOUNTER — OFFICE VISIT (OUTPATIENT)
Dept: INTERNAL MEDICINE CLINIC | Age: 68
End: 2018-01-31

## 2018-01-31 VITALS
RESPIRATION RATE: 16 BRPM | SYSTOLIC BLOOD PRESSURE: 120 MMHG | WEIGHT: 238 LBS | HEART RATE: 78 BPM | TEMPERATURE: 97.5 F | OXYGEN SATURATION: 98 % | DIASTOLIC BLOOD PRESSURE: 80 MMHG | BODY MASS INDEX: 28.98 KG/M2 | HEIGHT: 76 IN

## 2018-01-31 DIAGNOSIS — G62.9 NEUROPATHY: Primary | ICD-10-CM

## 2018-01-31 DIAGNOSIS — C92.11 CML IN REMISSION (HCC): ICD-10-CM

## 2018-01-31 DIAGNOSIS — I10 ESSENTIAL HYPERTENSION: ICD-10-CM

## 2018-01-31 RX ORDER — PREDNISONE 10 MG/1
TABLET ORAL
Refills: 0 | COMMUNITY
Start: 2018-01-30 | End: 2018-03-23 | Stop reason: ALTCHOICE

## 2018-01-31 RX ORDER — TIZANIDINE HYDROCHLORIDE 4 MG/1
CAPSULE, GELATIN COATED ORAL
Refills: 0 | Status: ON HOLD | COMMUNITY
Start: 2018-01-21 | End: 2018-06-04

## 2018-01-31 NOTE — PROGRESS NOTES
The patient presents to the office today with the chief complaint of chronic neuropathic pain    HPI    The patient complains of continued pain -- neuropathy and from spinal stenosis. The patient has mental status changes on Duragesic patch. The patient is now off the patch. On Percocet the patient has far pain control. Review of Systems   Respiratory: Negative for shortness of breath. Cardiovascular: Negative for chest pain and leg swelling. Allergies   Allergen Reactions    Levofloxacin Nausea Only     Severe nausea and dizziness    Sulfa (Sulfonamide Antibiotics) Unknown (comments)     Nausea, aching all over       Current Outpatient Prescriptions   Medication Sig Dispense Refill    predniSONE (DELTASONE) 10 mg tablet take 6 tablets by mouth once daily for 6 days then take 4 tablets. ..  (REFER TO PRESCRIPTION NOTES). 0    tiZANidine (ZANAFLEX) 4 mg capsule   0    cefUROXime (CEFTIN) 500 mg tablet take 1 tablet by mouth twice a day 14 Tab 0    rOPINIRole (REQUIP) 0.5 mg tablet take 1 tablet by mouth twice a day 30 Tab 0    labetalol (NORMODYNE) 300 mg tablet Take 150 mg by mouth two (2) times a day.  fluticasone (FLONASE) 50 mcg/actuation nasal spray 2 Sprays by Both Nostrils route two (2) times a day.  ACETAMINOPHEN/DIPHENHYDRAMINE (TYLENOL PM PO) Take 2 Tabs by mouth nightly.  DULoxetine (CYMBALTA) 30 mg capsule take 1 capsule by mouth once daily WITH 60 MG CAPSULE 30 Cap 0    amLODIPine-Olmesartan 5-40 mg tab take 1 tablet by mouth daily 30 Tab 0    HYDROcodone-acetaminophen (NORCO) 7.5-325 mg per tablet Take 1 tablet by mouth every four hours as needed for pain. Max daily amount 5 tabs.  150 Tab 0    tamsulosin (FLOMAX) 0.4 mg capsule take 1 capsule by mouth once daily 90 Cap 0    hydroCHLOROthiazide (HYDRODIURIL) 25 mg tablet take 1 tablet by mouth once daily 30 Tab 1    guanFACINE IR (TENEX) 1 mg IR tablet take 1 tablet by mouth once daily 30 Tab 2    topiramate (TOPAMAX) 50 mg tablet TAKE 1 TABLET BY MOUTH TWICE A DAY TO PREVENT MIGRAINE 60 Tab 5    pantoprazole (PROTONIX) 40 mg tablet Take 1 Tab by mouth daily. 90 Tab 3    testosterone cypionate (DEPOTESTOTERONE CYPIONATE) 200 mg/mL injection 1 mL by IntraMUSCular route every thirty (30) days. Max Daily Amount: 200 mg. 1 Vial 5    montelukast (SINGULAIR) 10 mg tablet TAKE 1 TABLET BY MOUTH EVERY DAY 90 Tab 3    DULoxetine (CYMBALTA) 60 mg capsule TAKE ONE CAPSULE BY MOUTH EVERY DAY 90 Cap 3    Nilotinib (TASIGNA) 150 mg cap Take 300 mg by mouth two (2) times a day. Indications: Leukemia      predniSONE (DELTASONE) 20 mg tablet 2 tabs daily x 2 days, 1 tab daily x 2 days, 1/2 tab daily x 4 days (Patient not taking: Reported on 1/31/2018) 8 Tab 0    naloxone (NARCAN) 4 mg/actuation nasal spray Use 1 spray intranasally into 1 nostril if needed. May repeat in 4 minutes if needed. 1 Each 1    SUMAtriptan (IMITREX) 100 mg tablet TAKE 1 TABLET BY MOUTH AT ONSET OF HEADACHE, MAY REPEAT 2 HOURS LATER. (MAX 2 PILLS IN 24 HOURS) 12 Tab 1       Past Medical History:   Diagnosis Date    Abdominal pain, unspecified site     Acute reaction to stress     Allergic rhinitis due to pollen     Arthritis     Back injury     BPH (benign prostatic hypertrophy)     Calculus of ureter     Cancer (HCC)     history of Leukemia, in remission    Carotid duplex 06/17/2015    Mild < 50% bilateral ICA stenosis.  Dizziness and giddiness     Esophageal reflux     Essential hypertension     GERD (gastroesophageal reflux disease)     Headache(784.0)     History of echocardiogram 08/13/2015    EF 55-60%. No WMA. Mild-mod LVH. Gr 1 DDfx. No evidence of intracardiac shunt.   Mild AI.      Hx: UTI (urinary tract infection)     Hypertension     Hypogonadism in male     Kidney stones     Migraine     Neck injury     Polycythemia     Polycythemia, secondary     Sciatica     Unspecified retinal detachment     Unspecified sleep apnea     resolved since gastric bypass    Vision decreased     Weight loss        Past Surgical History:   Procedure Laterality Date    ABDOMEN SURGERY PROC UNLISTED  2012    Hernia    HX CATARACT REMOVAL Left     HX CERVICAL FUSION  4/4/2016    Cervical Spine Fusion    HX CHOLECYSTECTOMY      HX GASTRIC BYPASS  2006    HX KNEE ARTHROSCOPY      both knees (right knee twice, left once)    HX ORTHOPAEDIC  1981, 1995    lumbar laminectomy    HX OTHER SURGICAL      Two back surgeries    HX RETINAL DETACHMENT REPAIR Left        Social History     Social History    Marital status:      Spouse name: N/A    Number of children: N/A    Years of education: N/A     Occupational History    Not on file. Social History Main Topics    Smoking status: Former Smoker     Quit date: 2/22/1981    Smokeless tobacco: Never Used    Alcohol use No    Drug use: No    Sexual activity: Yes     Partners: Female     Other Topics Concern    Not on file     Social History Narrative       Patient does not have an advanced directive on file    Visit Vitals    /80 (BP 1 Location: Left arm, BP Patient Position: Sitting)    Pulse 78    Temp 97.5 °F (36.4 °C) (Tympanic)    Resp 16    Ht 6' 4\" (1.93 m)    Wt 238 lb (108 kg)    SpO2 98%    BMI 28.97 kg/m2       Physical Exam   Neck: No thyromegaly present. Cardiovascular: Normal rate and regular rhythm. No murmur heard. Pulmonary/Chest: He has no wheezes. He has no rales. Abdominal: Soft. He exhibits no distension. There is no tenderness. Musculoskeletal:   Neck is nearly frozen.        BMI:  OK    Admission on 01/15/2018, Discharged on 01/15/2018   Component Date Value Ref Range Status    WBC 01/15/2018 12.4  4.6 - 13.2 K/uL Final    RBC 01/15/2018 4.39* 4.70 - 5.50 M/uL Final    HGB 01/15/2018 11.9* 13.0 - 16.0 g/dL Final    HCT 01/15/2018 36.4  36.0 - 48.0 % Final    MCV 01/15/2018 82.9  74.0 - 97.0 FL Final    MCH 01/15/2018 27.1 24.0 - 34.0 PG Final    MCHC 01/15/2018 32.7  31.0 - 37.0 g/dL Final    RDW 01/15/2018 17.4* 11.6 - 14.5 % Final    PLATELET 69/12/1768 266  135 - 420 K/uL Final    MPV 01/15/2018 11.8  9.2 - 11.8 FL Final    NEUTROPHILS 01/15/2018 55  42 - 75 % Final    LYMPHOCYTES 01/15/2018 33  20 - 51 % Final    MONOCYTES 01/15/2018 6  2 - 9 % Final    EOSINOPHILS 01/15/2018 2  0 - 5 % Final    BASOPHILS 01/15/2018 1  0 - 3 % Final    OTHER CELL 01/15/2018 3* 0   Final    ATYPICAL LYMPHOCYTES PRESENT    ABS. NEUTROPHILS 01/15/2018 6.8  1.8 - 8.0 K/UL Final    ABS. LYMPHOCYTES 01/15/2018 4.1* 0.8 - 3.5 K/UL Final    ABS. MONOCYTES 01/15/2018 0.7  0 - 1.0 K/UL Final    ABS. EOSINOPHILS 01/15/2018 0.2  0.0 - 0.4 K/UL Final    ABS. BASOPHILS 01/15/2018 0.1* 0.0 - 0.06 K/UL Final    DF 01/15/2018 MANUAL    Final    PLATELET COMMENTS 15/42/3856 ADEQUATE PLATELETS    Final    RBC COMMENTS 01/15/2018     Final                    Value:ANISOCYTOSIS  1+      Sodium 01/15/2018 145  136 - 145 mmol/L Final    Potassium 01/15/2018 4.8  3.5 - 5.5 mmol/L Final    Chloride 01/15/2018 115* 100 - 108 mmol/L Final    CO2 01/15/2018 23  21 - 32 mmol/L Final    Anion gap 01/15/2018 7  3.0 - 18 mmol/L Final    Glucose 01/15/2018 92  74 - 99 mg/dL Final    BUN 01/15/2018 37* 7.0 - 18 MG/DL Final    Creatinine 01/15/2018 1.92* 0.6 - 1.3 MG/DL Final    BUN/Creatinine ratio 01/15/2018 19  12 - 20   Final    GFR est AA 01/15/2018 42* >60 ml/min/1.73m2 Final    GFR est non-AA 01/15/2018 35* >60 ml/min/1.73m2 Final    Comment: (NOTE)  Estimated GFR is calculated using the Modification of Diet in Renal   Disease (MDRD) Study equation, reported for both  Americans   (GFRAA) and non- Americans (GFRNA), and normalized to 1.73m2   body surface area. The physician must decide which value applies to   the patient. The MDRD study equation should only be used in   individuals age 25 or older.  It has not been validated for the   following: pregnant women, patients with serious comorbid conditions,   or on certain medications, or persons with extremes of body size,   muscle mass, or nutritional status.  Calcium 01/15/2018 7.8* 8.5 - 10.1 MG/DL Final    Prothrombin time 01/15/2018 13.2  11.5 - 15.2 sec Final    INR 01/15/2018 1.1  0.8 - 1.2   Final    Comment:            INR Therapeutic Ranges         (on stable oral anticoagulant):     INDICATION                INR  DVT/PE/Atrial Fib          2.0-3.0  MI/Mechanical Heart Valve  2.5-3.5      ALCOHOL(ETHYL),SERUM 01/15/2018 <3  0 - 3 MG/DL Final    Color 01/15/2018 YELLOW    Final    Appearance 01/15/2018 CLEAR    Final    Specific gravity 01/15/2018 1.018  1.005 - 1.030   Final    pH (UA) 01/15/2018 5.0  5.0 - 8.0   Final    Protein 01/15/2018 NEGATIVE   NEG mg/dL Final    Glucose 01/15/2018 NEGATIVE   NEG mg/dL Final    Ketone 01/15/2018 NEGATIVE   NEG mg/dL Final    Bilirubin 01/15/2018 NEGATIVE   NEG   Final    Blood 01/15/2018 NEGATIVE   NEG   Final    Urobilinogen 01/15/2018 1.0  0.2 - 1.0 EU/dL Final    Nitrites 01/15/2018 NEGATIVE   NEG   Final    Leukocyte Esterase 01/15/2018 SMALL* NEG   Final    BENZODIAZEPINES 01/15/2018 NEGATIVE   NEG   Final    BARBITURATES 01/15/2018 NEGATIVE   NEG   Final    THC (TH-CANNABINOL) 01/15/2018 NEGATIVE   NEG   Final    OPIATES 01/15/2018 NEGATIVE   NEG   Final    PCP(PHENCYCLIDINE) 01/15/2018 NEGATIVE   NEG   Final    COCAINE 01/15/2018 NEGATIVE   NEG   Final    AMPHETAMINES 01/15/2018 NEGATIVE   NEG   Final    METHADONE 01/15/2018 NEGATIVE   NEG   Final    HDSCOM 01/15/2018 (NOTE)   Final    Comment: Specimen analysis was performed without chain of custody handling. These results should be used for medical purposes only and not for   legal or employment purposes. Unconfirmed screening results must not   be used for non-medical purposes.     The cut-off concentration for positive results are as follows:    AMPH 1000 ng/mL  QUENTIN      200 ng/mL  AKHIL      200 ng/mL  VINH       300 ng/mL  METH      300 ng/mL  OPI       300 ng/mL  PCP        25 ng/mL  THC        50 ng/mL        WBC 01/15/2018 5 to 9  0 - 4 /hpf Final    RBC 01/15/2018 0 to 2  0 - 5 /hpf Final    Epithelial cells 01/15/2018 FEW  0 - 5 /lpf Final    Bacteria 01/15/2018 1+* NEG /hpf Final    Special Requests: 01/15/2018 NO SPECIAL REQUESTS    Final    Culture result: 01/15/2018 NO GROWTH 2 DAYS    Final   Office Visit on 11/02/2017   Component Date Value Ref Range Status    Color (UA POC) 11/02/2017 Yellow   Final    Clarity (UA POC) 11/02/2017 Clear   Final    Glucose (UA POC) 11/02/2017 Negative  Negative Final    Bilirubin (UA POC) 11/02/2017 Negative  Negative Final    Ketones (UA POC) 11/02/2017 Negative  Negative Final    Specific gravity (UA POC) 11/02/2017 1.010  1.001 - 1.035 Final    Blood (UA POC) 11/02/2017 Negative  Negative Final    pH (UA POC) 11/02/2017 5.0  4.6 - 8.0 Final    Protein (UA POC) 11/02/2017 Negative  Negative mg/dL Final    Urobilinogen (UA POC) 11/02/2017 0.2 mg/dL  0.2 - 1 Final    Nitrites (UA POC) 11/02/2017 Negative  Negative Final    Leukocyte esterase (UA POC) 11/02/2017 Negative  Negative Final       .No results found for any visits on 01/31/18. Assessment / Plan      ICD-10-CM ICD-9-CM    1. Neuropathy G62.9 355.9    2. Essential hypertension I10 401.9    3. CML in remission ([address] 75.) C92.11 205.11        he was advised to continue his maintenance medications  The patient was advised that he can resume driving      Follow-up Disposition:  Return in about 3 months (around 4/30/2018). I asked Terence Mitchell if he has any questions and I answered the questions.   Terence Mitchell states that he understands the treatment plan and agrees with the treatment plan

## 2018-01-31 NOTE — MR AVS SNAPSHOT
[facility] 
 
 
 [address], [address] [place] [address] 
[phone] Patient: Ly Kennedy MRN: [id number] IID:[id number] Visit Information Date & Time Provider Department Dept. Phone Encounter #  
 1/31/2018 10:30 AM Daniel Flores MD [facility] INTERNAL MEDICINE OF [address] [place] [address] Drive [phone] [id number] Your Appointments 4/16/2018  8:15 AM  
Follow Up with Daniel Flores MD  
[address]) Appt Note: 3 month [address], [address] [place] [address]  
  
   
 [address], [address] [place] [address] Upcoming Health Maintenance Date Due Hepatitis C Screening 1950 GLAUCOMA SCREENING Q2Y 6/22/2014 MEDICARE YEARLY EXAM 6/28/2018 COLONOSCOPY 11/2/2020 DTaP/Tdap/Td series (2 - Td) 6/3/2024 Allergies as of 1/31/2018  Review Complete On: 1/31/2018 By: Jimenez Palm LPN Severity Noted Reaction Type Reactions Levofloxacin High   Nausea Only Severe nausea and dizziness Sulfa (Sulfonamide Antibiotics)  03/26/2014    Unknown (comments) Nausea, aching all over Current Immunizations  Reviewed on 1/12/2018 Name Date Influenza High Dose Vaccine PF 9/15/2017, 10/18/2016  9:42 AM  
 Influenza Vaccine (Quad) PF 10/1/2015 Pneumococcal Conjugate (PCV-13) 10/1/2015 Pneumococcal Polysaccharide (PPSV-23) 6/27/2017 11:34 AM  
 Pneumococcal Vaccine (Unspecified Type) 6/18/2011 Td 6/2/2006 Tdap 6/3/2014 Zoster Vaccine, Live 12/9/2013 Not reviewed this visit Vitals BP Pulse Temp Resp Height(growth percentile) 120/80 (BP 1 Location: Left arm, BP Patient Position: Sitting) 78 97.5 °F (36.4 °C) (Tympanic) 16 6' 4\" (1.93 m) Weight(growth percentile) SpO2 BMI Smoking Status 238 lb (108 kg) 98% 28.97 kg/m2 Former Smoker Vitals History BMI and BSA Data Body Mass Index Body Surface Area  
 28.97 kg/m 2 2.41 m 2 Preferred Pharmacy Pharmacy Name Phone [facility] [address] [facility] [phone] Your Updated Medication List  
  
   
This list is accurate as of: 1/31/18 11:32 AM.  Always use your most recent med list. amLODIPine-Olmesartan 5-40 mg Tab  
take 1 tablet by mouth daily  
  
 cefUROXime 500 mg tablet Commonly known as:  CEFTIN  
take 1 tablet by mouth twice a day * DULoxetine 60 mg capsule Commonly known as:  CYMBALTA TAKE ONE CAPSULE BY MOUTH EVERY DAY  
  
 * DULoxetine 30 mg capsule Commonly known as:  CYMBALTA  
take 1 capsule by mouth once daily WITH 60 MG CAPSULE  
  
 fluticasone 50 mcg/actuation nasal spray Commonly known as:  Katalina Manners 2 Sprays by Both Nostrils route two (2) times a day.  
  
 guanFACINE IR 1 mg IR tablet Commonly known as:  TENEX  
take 1 tablet by mouth once daily  
  
 hydroCHLOROthiazide 25 mg tablet Commonly known as:  HYDRODIURIL  
take 1 tablet by mouth once daily HYDROcodone-acetaminophen 7.5-325 mg per tablet Commonly known as:  Jearl Connors Take 1 tablet by mouth every four hours as needed for pain. Max daily amount 5 tabs. labetalol 300 mg tablet Commonly known as:  Dannis Jessica Take 150 mg by mouth two (2) times a day. montelukast 10 mg tablet Commonly known as:  SINGULAIR  
TAKE 1 TABLET BY MOUTH EVERY DAY  
  
 naloxone 4 mg/actuation nasal spray Commonly known as:  [employer] Use 1 spray intranasally into 1 nostril if needed. May repeat in 4 minutes if needed. pantoprazole 40 mg tablet Commonly known as:  PROTONIX Take 1 Tab by mouth daily. * predniSONE 20 mg tablet Commonly known as:  DELTASONE  
2 tabs daily x 2 days, 1 tab daily x 2 days, 1/2 tab daily x 4 days * predniSONE 10 mg tablet Commonly known as:  Irma Harkins  
 take 6 tablets by mouth once daily for 6 days then take 4 tablets. ..  (REFER TO PRESCRIPTION NOTES). rOPINIRole 0.5 mg tablet Commonly known as:  REQUIP  
take 1 tablet by mouth twice a day SUMAtriptan 100 mg tablet Commonly known as:  IMITREX  
TAKE 1 TABLET BY MOUTH AT ONSET OF HEADACHE, MAY REPEAT 2 HOURS LATER. (MAX 2 PILLS IN 24 HOURS)  
  
 tamsulosin 0.4 mg capsule Commonly known as:  FLOMAX  
take 1 capsule by mouth once daily TASIGNA 150 mg Cap Generic drug:  Nilotinib Take 300 mg by mouth two (2) times a day. Indications: Leukemia  
  
 testosterone cypionate 200 mg/mL injection Commonly known as:  DEPOTESTOTERONE CYPIONATE 1 mL by IntraMUSCular route every thirty (30) days. Max Daily Amount: 200 mg.  
  
 tiZANidine 4 mg capsule Commonly known as:  ZANAFLEX  
  
 topiramate 50 mg tablet Commonly known as:  TOPAMAX TAKE 1 TABLET BY MOUTH TWICE A DAY TO PREVENT MIGRAINE  
  
 TYLENOL PM PO Take 2 Tabs by mouth nightly. * Notice: This list has 4 medication(s) that are the same as other medications prescribed for you. Read the directions carefully, and ask your doctor or other care provider to review them with you. Introducing [facility]! Dear Lester Shoemaker: Thank you for requesting a [employer] account. Our records indicate that you already have an active [employer] account. You can access your account anytime at [email or web address] Did you know that you can access your hospital and ER discharge instructions at any time in [employer]? You can also review all of your test results from your hospital stay or ER visit. Additional Information If you have questions, please visit the Frequently Asked Questions section of the [employer] website at [email or web address] Remember, [employer] is NOT to be used for urgent needs. For medical emergencies, dial 911. Now available from your iPhone and Android! Please provide this summary of care documentation to your next provider. Your primary care clinician is listed as Wilbert Tony. If you have any questions after today's visit, please call [phone].

## 2018-01-31 NOTE — PROGRESS NOTES
Identified pt with two pt identifiers(name and ). Reviewed record in preparation for visit and have obtained necessary documentation. Chief Complaint   Patient presents with    Results     discuss recent testing results, EEG done 18        Health Maintenance Due   Topic    Hepatitis C Screening     GLAUCOMA SCREENING Q2Y        Coordination of Care Questionnaire:  :   1) Have you been to an emergency room, urgent care clinic since your last visit? no   Hospitalized since your last visit? no             2. Have seen or consulted any other health care provider since your last visit? YES  If yes, where when, and reason for visit? 18: EEG with neurology  18: ENT    3) Do you have an Advanced Directive/ Living Will in place? YES  If yes, do we have a copy on file YES    Patient is accompanied by spouse I have received verbal consent from Liam Quintanilla to discuss any/all medical information while they are present in the room.

## 2018-02-05 RX ORDER — ROPINIROLE 0.5 MG/1
TABLET, FILM COATED ORAL
Qty: 60 TAB | Refills: 0 | Status: SHIPPED | OUTPATIENT
Start: 2018-02-05 | End: 2018-03-14 | Stop reason: SDUPTHER

## 2018-02-05 NOTE — TELEPHONE ENCOUNTER
The last refill sent on 1/24/17 was only for quantity of 30, and since directions are for bid, it should be for #60. Patient will run out in a couple days. Please send new rx for quantity of 60 with some refills.

## 2018-02-12 DIAGNOSIS — R37 SEXUAL DYSFUNCTION: Primary | ICD-10-CM

## 2018-02-13 DIAGNOSIS — M54.50 CHRONIC BILATERAL LOW BACK PAIN WITHOUT SCIATICA: ICD-10-CM

## 2018-02-13 DIAGNOSIS — M54.2 CERVICALGIA: ICD-10-CM

## 2018-02-13 DIAGNOSIS — G89.29 CHRONIC BILATERAL LOW BACK PAIN WITHOUT SCIATICA: ICD-10-CM

## 2018-02-13 RX ORDER — DULOXETIN HYDROCHLORIDE 60 MG/1
CAPSULE, DELAYED RELEASE ORAL
Qty: 90 CAP | Refills: 1 | Status: SHIPPED | OUTPATIENT
Start: 2018-02-13 | End: 2018-08-15 | Stop reason: SDUPTHER

## 2018-02-13 RX ORDER — TESTOSTERONE CYPIONATE 200 MG/ML
INJECTION INTRAMUSCULAR
Qty: 1 ML | Refills: 0 | Status: SHIPPED | OUTPATIENT
Start: 2018-02-13 | End: 2018-03-14 | Stop reason: SDUPTHER

## 2018-02-14 ENCOUNTER — TELEPHONE (OUTPATIENT)
Dept: INTERNAL MEDICINE CLINIC | Age: 68
End: 2018-02-14

## 2018-02-14 RX ORDER — AMLODIPINE AND OLMESARTAN MEDOXOMIL 5; 40 MG/1; MG/1
TABLET ORAL
Qty: 30 TAB | Refills: 0 | Status: SHIPPED | OUTPATIENT
Start: 2018-02-14 | End: 2018-03-12 | Stop reason: SDUPTHER

## 2018-02-14 RX ORDER — HYDROCODONE BITARTRATE AND ACETAMINOPHEN 7.5; 325 MG/1; MG/1
TABLET ORAL
Qty: 150 TAB | Refills: 0 | Status: SHIPPED | OUTPATIENT
Start: 2018-02-14 | End: 2018-03-13 | Stop reason: SDUPTHER

## 2018-02-14 NOTE — TELEPHONE ENCOUNTER
Prior Auth request received from Pharmacy for Testosterone. PA initiated on [email or web address]. Approval received from insurance.

## 2018-02-14 NOTE — TELEPHONE ENCOUNTER
From: Kevin Parada  To: Rafa Sultana MD  Sent: 2/13/2018 8:25 PM EST  Subject: Medication Renewal Request    Original authorizing provider: MD Kevin Singleton would like a refill of the following medications:  HYDROcodone-acetaminophen (NORCO) 7.5-325 mg per tablet Rafa Sultana MD]    Preferred pharmacy: [place] [phone]:

## 2018-02-15 ENCOUNTER — CLINICAL SUPPORT (OUTPATIENT)
Dept: INTERNAL MEDICINE CLINIC | Age: 68
End: 2018-02-15

## 2018-02-15 DIAGNOSIS — E29.1 TESTICULAR HYPOFUNCTION: Primary | ICD-10-CM

## 2018-02-16 RX ORDER — SUMATRIPTAN 100 MG/1
TABLET, FILM COATED ORAL
Qty: 12 TAB | Refills: 1 | Status: SHIPPED | OUTPATIENT
Start: 2018-02-16 | End: 2018-04-04 | Stop reason: SDUPTHER

## 2018-02-19 ENCOUNTER — HOSPITAL ENCOUNTER (OUTPATIENT)
Age: 68
Discharge: HOME | End: 2018-02-19
Attending: PSYCHIATRY & NEUROLOGY
Payer: MEDICARE

## 2018-02-19 DIAGNOSIS — M47.26 OTHER SPONDYLOSIS WITH RADICULOPATHY, LUMBAR REGION: ICD-10-CM

## 2018-02-19 LAB — CREAT UR-MCNC: 1.6 MG/DL (ref 0.6–1.3)

## 2018-02-19 PROCEDURE — 74011250636 HC RX REV CODE- 250/636: Performed by: PSYCHIATRY & NEUROLOGY

## 2018-02-19 PROCEDURE — 82565 ASSAY OF CREATININE: CPT

## 2018-02-19 PROCEDURE — 72158 MRI LUMBAR SPINE W/O & W/DYE: CPT

## 2018-02-19 PROCEDURE — A9585 GADOBUTROL INJECTION: HCPCS | Performed by: PSYCHIATRY & NEUROLOGY

## 2018-02-19 RX ADMIN — GADOBUTROL 11 ML: 604.72 INJECTION INTRAVENOUS at 10:00

## 2018-02-22 RX ORDER — TESTOSTERONE CYPIONATE 200 MG/ML
200 INJECTION INTRAMUSCULAR ONCE
Qty: 1 ML | Refills: 0 | Status: SHIPPED | COMMUNITY
Start: 2018-02-22 | End: 2018-02-22

## 2018-02-22 NOTE — PROGRESS NOTES
Soumya Wright presents today for testosterone injection. Allergies reviewed. Injection given in left gluteus per Dr Gris Orellana order Testosterone 200mcg/ml inj 1 every 4 weeks. Patient tolerated injection well and left office ambulatory.

## 2018-02-22 NOTE — PATIENT INSTRUCTIONS
Testosterone (By injection)   Testosterone (darren-TOS-ter-one)  Treats low testosterone levels. Also treats breast cancer in women and delayed puberty in male teenagers. Brand Name(s): Aveed, Delatestryl, Depo-Testosterone, Depo-Testosterone Novaplus, PremierPro RX Depo-Testosterone, Testone CIK   There may be other brand names for this medicine. When This Medicine Should Not Be Used: This medicine is not right for everyone. You should not receive it if you had an allergic reaction to testosterone, benzyl benzoate, or refined castor oil. A man should not receive this medicine if he has breast cancer or prostate cancer. A woman should not receive this medicine if she is pregnant or breastfeeding. How to Use This Medicine:   Injectable  · Your doctor will prescribe your exact dose and tell you how often it should be given. This medicine is given as a shot into one of your muscles. It is usually given in the buttocks. · A nurse or other health provider will give you this medicine. · This medicine should come with a Medication Guide. Ask your pharmacist for a copy if you do not have one. · Missed dose: Call your doctor or pharmacist for instructions. Drugs and Foods to Avoid:   Ask your doctor or pharmacist before using any other medicine, including over-the-counter medicines, vitamins, and herbal products. · Some medicines can affect how testosterone works. Tell your doctor if you are using any of the following:   ¨ Oxyphenbutazone  ¨ Blood thinner (including warfarin)  ¨ Insulin or diabetes medicine that you take by mouth  ¨ Steroid medicine (including dexamethasone, hydrocortisone, methylprednisolone, prednisolone, prednisone)  Warnings While Using This Medicine:   · It is not safe to take this medicine during pregnancy. It could harm an unborn baby. Tell your doctor right away if you become pregnant.   · Tell your doctor if you have kidney disease, liver disease, diabetes, an enlarged prostate, heart disease, high cholesterol, lung disease, sleep apnea, or a history of heart attack or stroke. · This medicine may cause the following problems:  ¨ Serious lung reaction called pulmonary oil embolism (may be life-threatening)  ¨ Increased risk of prostate cancer  ¨ Increased numbers of red blood cells  ¨ Blood clot in your leg or lung  ¨ Slow growth in children  ¨ Increased risk of heart attack or stroke  ¨ Lower sperm count (with large doses)  · This medicine can be habit-forming. Do not use more than your prescribed dose. Call your doctor if you think your medicine is not working. · Your doctor will do lab tests at regular visits to check on the effects of this medicine. Keep all appointments. Possible Side Effects While Using This Medicine:   Call your doctor right away if you notice any of these side effects:  · Allergic reaction: Itching or hives, swelling in your face or hands, swelling or tingling in your mouth or throat, chest tightness, trouble breathing  · Change in how much or how often you urinate, trouble urinating  · Chest pain, cough, trouble breathing, dizziness, tightening of your throat, unusual sweating  · Dark urine or pale stools, nausea, vomiting, loss of appetite, stomach pain, yellow skin or eyes  · Pain, redness, or swelling in your arm or leg  · Swelling in your hands, ankles, or feet  · Unusual bleeding, bruising, or weakness  If you notice these less serious side effects, talk with your doctor:   · Acne, hoarse voice, facial hair growth (women)  · Changes in menstrual periods  · More erections than usual or erections that last a long time  · Pain or redness where the shot was given  · Swollen breasts (men)  If you notice other side effects that you think are caused by this medicine, tell your doctor. Call your doctor for medical advice about side effects.  You may report side effects to FDA at 8-669-FDA-3247  © 2017 [address] [employer] Information is for End User's use only and may not be sold, redistributed or otherwise used for commercial purposes. The above information is an  only. It is not intended as medical advice for individual conditions or treatments. Talk to your doctor, nurse or pharmacist before following any medical regimen to see if it is safe and effective for you.

## 2018-03-02 RX ORDER — HYDROCHLOROTHIAZIDE 25 MG/1
TABLET ORAL
Qty: 30 TAB | Refills: 1 | Status: SHIPPED | OUTPATIENT
Start: 2018-03-02 | End: 2018-04-29 | Stop reason: SDUPTHER

## 2018-03-04 RX ORDER — DULOXETIN HYDROCHLORIDE 30 MG/1
CAPSULE, DELAYED RELEASE ORAL
Qty: 30 CAP | Refills: 0 | Status: SHIPPED | OUTPATIENT
Start: 2018-03-04 | End: 2018-04-03 | Stop reason: SDUPTHER

## 2018-03-13 DIAGNOSIS — M54.2 CERVICALGIA: ICD-10-CM

## 2018-03-13 DIAGNOSIS — M54.50 CHRONIC BILATERAL LOW BACK PAIN WITHOUT SCIATICA: ICD-10-CM

## 2018-03-13 DIAGNOSIS — G89.29 CHRONIC BILATERAL LOW BACK PAIN WITHOUT SCIATICA: ICD-10-CM

## 2018-03-13 RX ORDER — AMLODIPINE AND OLMESARTAN MEDOXOMIL 5; 40 MG/1; MG/1
TABLET ORAL
Qty: 30 TAB | Refills: 0 | Status: SHIPPED | OUTPATIENT
Start: 2018-03-13 | End: 2018-04-26 | Stop reason: SDUPTHER

## 2018-03-14 DIAGNOSIS — R37 SEXUAL DYSFUNCTION: ICD-10-CM

## 2018-03-14 RX ORDER — HYDROCODONE BITARTRATE AND ACETAMINOPHEN 7.5; 325 MG/1; MG/1
TABLET ORAL
Qty: 150 TAB | Refills: 0 | Status: SHIPPED | OUTPATIENT
Start: 2018-03-14 | End: 2018-04-11 | Stop reason: SDUPTHER

## 2018-03-14 RX ORDER — ROPINIROLE 0.5 MG/1
TABLET, FILM COATED ORAL
Qty: 60 TAB | Refills: 0 | Status: SHIPPED | OUTPATIENT
Start: 2018-03-14 | End: 2018-04-17 | Stop reason: SDUPTHER

## 2018-03-14 RX ORDER — TESTOSTERONE CYPIONATE 200 MG/ML
INJECTION INTRAMUSCULAR
Qty: 1 ML | Refills: 0 | Status: SHIPPED | OUTPATIENT
Start: 2018-03-14 | End: 2018-04-12 | Stop reason: SDUPTHER

## 2018-03-14 NOTE — TELEPHONE ENCOUNTER
From: Tahir Campos  To: Samanta Powell MD  Sent: 3/13/2018 8:26 PM EDT  Subject: Medication Renewal Request    Original authorizing provider: MD Tahir Gutierrez would like a refill of the following medications:  HYDROcodone-acetaminophen (NORCO) 7.5-325 mg per tablet Samanta Powell MD]    Preferred pharmacy: [place] [phone]:

## 2018-03-15 ENCOUNTER — TELEPHONE (OUTPATIENT)
Dept: INTERNAL MEDICINE CLINIC | Age: 68
End: 2018-03-15

## 2018-03-16 ENCOUNTER — CLINICAL SUPPORT (OUTPATIENT)
Dept: INTERNAL MEDICINE CLINIC | Age: 68
End: 2018-03-16

## 2018-03-16 DIAGNOSIS — E29.1 TESTICULAR HYPOFUNCTION: Primary | ICD-10-CM

## 2018-03-16 RX ORDER — TESTOSTERONE CYPIONATE 200 MG/ML
200 INJECTION INTRAMUSCULAR ONCE
Qty: 1 ML | Refills: 0 | Status: SHIPPED | COMMUNITY
Start: 2018-03-16 | End: 2018-03-16

## 2018-03-16 NOTE — PROGRESS NOTES
Patient presented to office for testosterone injection. Allergies noted. Patient well and consenting to injection. Injection given intramuscular in right gluteal muscle. . Patient tolerated injection well and left office ambulatory.

## 2018-03-17 RX ORDER — LABETALOL 300 MG/1
TABLET, FILM COATED ORAL
Qty: 60 TAB | Refills: 2 | Status: SHIPPED | OUTPATIENT
Start: 2018-03-17 | End: 2018-06-24 | Stop reason: SDUPTHER

## 2018-03-17 RX ORDER — GUANFACINE HYDROCHLORIDE 1 MG/1
TABLET ORAL
Qty: 30 TAB | Refills: 2 | Status: ON HOLD | OUTPATIENT
Start: 2018-03-17 | End: 2018-06-04

## 2018-03-23 ENCOUNTER — OFFICE VISIT (OUTPATIENT)
Dept: INTERNAL MEDICINE CLINIC | Age: 68
End: 2018-03-23

## 2018-03-23 ENCOUNTER — HOSPITAL ENCOUNTER (OUTPATIENT)
Dept: LAB | Age: 68
Discharge: HOME | End: 2018-03-23
Payer: MEDICARE

## 2018-03-23 VITALS
OXYGEN SATURATION: 96 % | RESPIRATION RATE: 18 BRPM | SYSTOLIC BLOOD PRESSURE: 132 MMHG | DIASTOLIC BLOOD PRESSURE: 60 MMHG | TEMPERATURE: 97.6 F | WEIGHT: 239 LBS | BODY MASS INDEX: 29.1 KG/M2 | HEIGHT: 76 IN | HEART RATE: 92 BPM

## 2018-03-23 DIAGNOSIS — I10 ESSENTIAL HYPERTENSION: ICD-10-CM

## 2018-03-23 DIAGNOSIS — I48.91 ATRIAL FIBRILLATION, UNSPECIFIED TYPE (HCC): ICD-10-CM

## 2018-03-23 DIAGNOSIS — M54.30 SCIATICA, UNSPECIFIED LATERALITY: ICD-10-CM

## 2018-03-23 DIAGNOSIS — D47.1 CHRONIC MYELOPROLIFERATIVE DISEASE (HCC): ICD-10-CM

## 2018-03-23 DIAGNOSIS — Z01.818 PRE-OP EVALUATION: Primary | ICD-10-CM

## 2018-03-23 DIAGNOSIS — Z01.818 PRE-OP EVALUATION: ICD-10-CM

## 2018-03-23 PROBLEM — R23.3 BRUISES EASILY: Status: ACTIVE | Noted: 2018-03-23

## 2018-03-23 LAB
ALBUMIN SERPL-MCNC: 3.6 G/DL (ref 3.4–5)
ALBUMIN/GLOB SERPL: 1.1 {RATIO} (ref 0.8–1.7)
ALP SERPL-CCNC: 70 U/L (ref 45–117)
ALT SERPL-CCNC: 23 U/L (ref 16–61)
ANION GAP SERPL CALC-SCNC: 9 MMOL/L (ref 3–18)
AST SERPL-CCNC: 23 U/L (ref 15–37)
BASOPHILS # BLD: 0.1 K/UL (ref 0–0.1)
BASOPHILS NFR BLD: 1 % (ref 0–3)
BILIRUB SERPL-MCNC: 0.5 MG/DL (ref 0.2–1)
BUN SERPL-MCNC: 30 MG/DL (ref 7–18)
BUN/CREAT SERPL: 14 (ref 12–20)
CALCIUM SERPL-MCNC: 8.2 MG/DL (ref 8.5–10.1)
CHLORIDE SERPL-SCNC: 110 MMOL/L (ref 100–108)
CO2 SERPL-SCNC: 23 MMOL/L (ref 21–32)
CREAT SERPL-MCNC: 2.1 MG/DL (ref 0.6–1.3)
DIFFERENTIAL METHOD BLD: ABNORMAL
EOSINOPHIL # BLD: 0.3 K/UL (ref 0–0.4)
EOSINOPHIL NFR BLD: 2 % (ref 0–5)
ERYTHROCYTE [DISTWIDTH] IN BLOOD BY AUTOMATED COUNT: 17.8 % (ref 11.6–14.5)
GLOBULIN SER CALC-MCNC: 3.2 G/DL (ref 2–4)
GLUCOSE SERPL-MCNC: 101 MG/DL (ref 74–99)
HCT VFR BLD AUTO: 41.3 % (ref 36–48)
HGB BLD-MCNC: 13.8 G/DL (ref 13–16)
LYMPHOCYTES # BLD: 6.5 K/UL (ref 0.8–3.5)
LYMPHOCYTES NFR BLD: 50 % (ref 20–51)
MCH RBC QN AUTO: 27.9 PG (ref 24–34)
MCHC RBC AUTO-ENTMCNC: 33.4 G/DL (ref 31–37)
MCV RBC AUTO: 83.4 FL (ref 74–97)
MONOCYTES # BLD: 0.9 K/UL (ref 0–1)
MONOCYTES NFR BLD: 7 % (ref 2–9)
NEUTS SEG # BLD: 5.2 K/UL (ref 1.8–8)
NEUTS SEG NFR BLD: 40 % (ref 42–75)
PLATELET # BLD AUTO: 369 K/UL (ref 135–420)
PMV BLD AUTO: 11.8 FL (ref 9.2–11.8)
POTASSIUM SERPL-SCNC: 3.9 MMOL/L (ref 3.5–5.5)
PROT SERPL-MCNC: 6.8 G/DL (ref 6.4–8.2)
RBC # BLD AUTO: 4.95 M/UL (ref 4.7–5.5)
RBC MORPH BLD: ABNORMAL
SODIUM SERPL-SCNC: 142 MMOL/L (ref 136–145)
TSH SERPL DL<=0.05 MIU/L-ACNC: 2.02 UIU/ML (ref 0.36–3.74)
WBC # BLD AUTO: 13 K/UL (ref 4.6–13.2)

## 2018-03-23 PROCEDURE — 85025 COMPLETE CBC W/AUTO DIFF WBC: CPT | Performed by: INTERNAL MEDICINE

## 2018-03-23 PROCEDURE — 80053 COMPREHEN METABOLIC PANEL: CPT | Performed by: INTERNAL MEDICINE

## 2018-03-23 PROCEDURE — 84443 ASSAY THYROID STIM HORMONE: CPT | Performed by: INTERNAL MEDICINE

## 2018-03-23 RX ORDER — PROMETHAZINE HYDROCHLORIDE 25 MG/1
TABLET ORAL
Qty: 30 TAB | Refills: 0 | Status: SHIPPED | OUTPATIENT
Start: 2018-03-23 | End: 2018-08-23

## 2018-03-23 NOTE — MR AVS SNAPSHOT
[address] [place] [address], [address] [facility] [address] 
[phone] Patient: Lien Zuluaga MRN: [id number] EC Visit Information Date & Time Provider Department Dept. Phone Encounter #  
 3/23/2018  9:45 AM Russ Garcia MD [facility] [phone] [id number] Your Appointments 3/29/2018  2:00 PM  
Follow Up with Astrid Elliott MD  
[facility] ([facility]) Appt Note: Collette from Dr Arvind Baker office called to schedule patient to been seen for Afib [place] [id number] [address] [address] [phone] [facility] [address] Upcoming Health Maintenance Date Due Hepatitis C Screening 1950 GLAUCOMA SCREENING Q2Y 2014 MEDICARE YEARLY EXAM 2018 COLONOSCOPY 2020 DTaP/Tdap/Td series (2 - Td) 6/3/2024 Allergies as of 3/23/2018  Review Complete On: 3/23/2018 By: Erin Cabezas LPN Severity Noted Reaction Type Reactions Levofloxacin High   Nausea Only Severe nausea and dizziness Sulfa (Sulfonamide Antibiotics)  2014    Unknown (comments) Nausea, aching all over Current Immunizations  Reviewed on 2018 Name Date Influenza High Dose Vaccine PF 9/15/2017, 10/18/2016  9:42 AM  
 Influenza Vaccine (Quad) PF 10/1/2015 Pneumococcal Conjugate (PCV-13) 10/1/2015 Pneumococcal Polysaccharide (PPSV-23) 2017 11:34 AM  
 Pneumococcal Vaccine (Unspecified Type) 2011 Td 2006 Tdap 6/3/2014 Zoster Vaccine, Live 2013 Not reviewed this visit You Were Diagnosed With   
  
 Codes Comments Pre-op evaluation    -  Primary ICD-10-CM: U89.025 ICD-9-CM: V72.84 Sciatica, unspecified laterality     ICD-10-CM: M54.30 ICD-9-CM: 724.3 Essential hypertension     ICD-10-CM: I10 
ICD-9-CM: 401.9 Chronic myeloproliferative disease ([facility]     ICD-10-CM: D47.1 ICD-9-CM: 238.79 Atrial fibrillation, unspecified type ([facility].)     ICD-10-CM: I48.91 
ICD-9-CM: 427.31 Vitals BP Pulse Temp Resp Height(growth percentile) 132/60 (BP 1 Location: Left arm, BP Patient Position: Sitting) 92 97.6 °F (36.4 °C) (Tympanic) 18 6' 4\" (1.93 m) Weight(growth percentile) SpO2 BMI Smoking Status 239 lb (108.4 kg) 96% 29.09 kg/m2 Former Smoker BMI and BSA Data Body Mass Index Body Surface Area  
 29.09 kg/m 2 2.41 m 2 Preferred Pharmacy Pharmacy Name Phone [facility] [address] [facility], [facility] [phone] Your Updated Medication List  
  
   
This list is accurate as of 3/23/18 11:53 AM.  Always use your most recent med list. amLODIPine-Olmesartan 5-40 mg Tab  
take 1 tablet by mouth once daily  
  
 apixaban 5 mg tablet Commonly known as:  Maryellen [place] Take 1 Tab by mouth two (2) times a day. cefUROXime 500 mg tablet Commonly known as:  CEFTIN  
take 1 tablet by mouth twice a day * DULoxetine 60 mg capsule Commonly known as:  CYMBALTA  
take 1 capsule by mouth once daily * DULoxetine 30 mg capsule Commonly known as:  CYMBALTA  
take 1 capsule by mouth once daily WITH 60 MG CAPSULE  
  
 fluticasone 50 mcg/actuation nasal spray Commonly known as:  Jeppie [place] 2 Sprays by Both Nostrils route two (2) times a day.  
  
 guanFACINE IR 1 mg IR tablet Commonly known as:  TENEX  
take 1 tablet by mouth once daily  
  
 hydroCHLOROthiazide 25 mg tablet Commonly known as:  HYDRODIURIL  
take 1 tablet by mouth once daily HYDROcodone-acetaminophen 7.5-325 mg per tablet Commonly known as:  Holly Feliciano Take 1 tablet by mouth every four hours as needed for pain. Max daily amount 5 tabs. * labetalol 300 mg tablet Commonly known as:  Yanique Guardadoman Take 150 mg by mouth two (2) times a day. * labetalol 300 mg tablet Commonly known as:  Michelle Yanez  
take 1 tablet by mouth twice a day  
  
 montelukast 10 mg tablet Commonly known as:  SINGULAIR  
TAKE 1 TABLET BY MOUTH EVERY DAY  
  
 naloxone 4 mg/actuation nasal spray Commonly known as:  [employer] Use 1 spray intranasally into 1 nostril if needed. May repeat in 4 minutes if needed. pantoprazole 40 mg tablet Commonly known as:  PROTONIX Take 1 Tab by mouth daily. * predniSONE 20 mg tablet Commonly known as:  DELTASONE  
2 tabs daily x 2 days, 1 tab daily x 2 days, 1/2 tab daily x 4 days * predniSONE 10 mg tablet Commonly known as:  DELTASONE  
take 6 tablets by mouth once daily for 6 days then take 4 tablets. ..  (REFER TO PRESCRIPTION NOTES). promethazine 25 mg tablet Commonly known as:  PHENERGAN  
take 1 tablet by mouth every 6 hours if needed for nausea  
  
 rOPINIRole 0.5 mg tablet Commonly known as:  REQUIP  
take 1 tablet by mouth twice a day SUMAtriptan 100 mg tablet Commonly known as:  IMITREX  
TAKE 1 TABLET BY MOUTH AT ONSET OF HEADACHE, MAY REPEAT 2 HOURS LATER. (MAX 2 PILLS IN 24 HOURS)  
  
 tamsulosin 0.4 mg capsule Commonly known as:  FLOMAX  
take 1 capsule by mouth once daily TASIGNA 150 mg Cap Generic drug:  Nilotinib Take 300 mg by mouth two (2) times a day. Indications: Leukemia  
  
 testosterone cypionate 200 mg/mL injection Commonly known as:  DEPOTESTOTERONE CYPIONATE  
inject 1 milliliter intramuscularly EVERY 30 DAYS as directed  
  
 tiZANidine 4 mg capsule Commonly known as:  ZANAFLEX  
  
 topiramate 50 mg tablet Commonly known as:  TOPAMAX TAKE 1 TABLET BY MOUTH TWICE A DAY TO PREVENT MIGRAINE  
  
 TYLENOL PM PO Take 2 Tabs by mouth nightly. * Notice: This list has 6 medication(s) that are the same as other medications prescribed for you. Read the directions carefully, and ask your doctor or other care provider to review them with you. Prescriptions Sent to Pharmacy Refills  
 apixaban (ELIQUIS) 5 mg tablet 1 Sig: Take 1 Tab by mouth two (2) times a day. Class: Normal  
 Pharmacy: [id number] [address], [address] [facility] #: [phone] Route: Oral  
  
We Performed the Following AMB POC EKG ROUTINE W/ 12 LEADS, INTER & REP [30171 CPT(R)] To-Do List   
 03/23/2018 ECHO:  2D ECHO COMPLETE ADULT (TTE) W OR WO CONTR   
  
 03/28/2018 10:00 AM  
  Appointment with [facility] at [facility] NON-INVASIVE CARD ([phone]) Age Limit for ALL Heart procedures @ all [facility] facilities: 18 yrs and older only. Under the age of 25, refer to [facility] ([phone]). This study requires patient to bring a written physician's order or MD office may fax the order to Central Scheduling at [phone]. Patient needs to bring a current list of all medications. No preparation is required for this study. 03/30/2018 12:30 PM  
  Appointment with [address] at [address] [place] [address] ([phone]) [facility] SERVICES! Dear Toby Robles: Thank you for requesting a [employer] account. Our records indicate that you already have an active [employer] account. You can access your account anytime at [email or web address] Did you know that you can access your hospital and ER discharge instructions at any time in [employer]? You can also review all of your test results from your hospital stay or ER visit. Additional Information If you have questions, please visit the Frequently Asked Questions section of the [employer] website at [email or web address] Remember, [employer] is NOT to be used for urgent needs. For medical emergencies, dial 911. Now available from your iPhone and Android! Please provide this summary of care documentation to your next provider. Your primary care clinician is listed as Donzella Hammans.  If you have any questions after today's visit, please call [phone].

## 2018-03-23 NOTE — PROGRESS NOTES
The patient presents to the office today with the chief complaint of low back pain and for a pre op evaluation    HPI    Ciara Shen complains of low back pain from spinal stenosis. he is now scheduled for surgical correction. Other than chronic the patient has no complaints. The patient denies chest pain or dyspnea. 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 Review of Systems   Respiratory: Negative for shortness of breath. Cardiovascular: Negative for chest pain and leg swelling. Musculoskeletal: Positive for back pain and neck pain. Allergies   Allergen Reactions    Levofloxacin Nausea Only     Severe nausea and dizziness    Sulfa (Sulfonamide Antibiotics) Unknown (comments)     Nausea, aching all over       Current Outpatient Prescriptions   Medication Sig Dispense Refill    diclofenac (VOLTAREN) 1 % gel Apply  to affected area four (4) times daily. 100 g 1    promethazine (PHENERGAN) 25 mg tablet take 1 tablet by mouth every 6 hours if needed for nausea 30 Tab 0    apixaban (ELIQUIS) 5 mg tablet Take 1 Tab by mouth two (2) times a day. 60 Tab 1    guanFACINE IR (TENEX) 1 mg IR tablet take 1 tablet by mouth once daily 30 Tab 2    labetalol (NORMODYNE) 300 mg tablet take 1 tablet by mouth twice a day 60 Tab 2    HYDROcodone-acetaminophen (NORCO) 7.5-325 mg per tablet Take 1 tablet by mouth every four hours as needed for pain. Max daily amount 5 tabs.  150 Tab 0    rOPINIRole (REQUIP) 0.5 mg tablet take 1 tablet by mouth twice a day 60 Tab 0    testosterone cypionate (DEPOTESTOTERONE CYPIONATE) 200 mg/mL injection inject 1 milliliter intramuscularly EVERY 30 DAYS as directed 1 mL 0    amLODIPine-Olmesartan 5-40 mg tab take 1 tablet by mouth once daily 30 Tab 0    DULoxetine (CYMBALTA) 30 mg capsule take 1 capsule by mouth once daily WITH 60 MG CAPSULE 30 Cap 0    hydroCHLOROthiazide (HYDRODIURIL) 25 mg tablet take 1 tablet by mouth once daily 30 Tab 1  SUMAtriptan (IMITREX) 100 mg tablet TAKE 1 TABLET BY MOUTH AT ONSET OF HEADACHE, MAY REPEAT 2 HOURS LATER. (MAX 2 PILLS IN 24 HOURS) 12 Tab 1    DULoxetine (CYMBALTA) 60 mg capsule take 1 capsule by mouth once daily 90 Cap 1    tiZANidine (ZANAFLEX) 4 mg capsule   0    labetalol (NORMODYNE) 300 mg tablet Take 150 mg by mouth two (2) times a day.  fluticasone (FLONASE) 50 mcg/actuation nasal spray 2 Sprays by Both Nostrils route two (2) times a day.  ACETAMINOPHEN/DIPHENHYDRAMINE (TYLENOL PM PO) Take 2 Tabs by mouth nightly.  naloxone (NARCAN) 4 mg/actuation nasal spray Use 1 spray intranasally into 1 nostril if needed. May repeat in 4 minutes if needed. 1 Each 1    tamsulosin (FLOMAX) 0.4 mg capsule take 1 capsule by mouth once daily 90 Cap 0    topiramate (TOPAMAX) 50 mg tablet TAKE 1 TABLET BY MOUTH TWICE A DAY TO PREVENT MIGRAINE 60 Tab 5    pantoprazole (PROTONIX) 40 mg tablet Take 1 Tab by mouth daily. 90 Tab 3    montelukast (SINGULAIR) 10 mg tablet TAKE 1 TABLET BY MOUTH EVERY DAY 90 Tab 3    Nilotinib (TASIGNA) 150 mg cap Take 300 mg by mouth two (2) times a day. Indications: Leukemia         Past Medical History:   Diagnosis Date    Abdominal pain, unspecified site     Acute reaction to stress     Allergic rhinitis due to pollen     Arthritis     Back injury     BPH (benign prostatic hypertrophy)     Calculus of ureter     Cancer (HCC)     history of Leukemia, in remission    Carotid duplex 06/17/2015    Mild < 50% bilateral ICA stenosis.  Dizziness and giddiness     Esophageal reflux     Essential hypertension     GERD (gastroesophageal reflux disease)     Headache(784.0)     History of echocardiogram 08/13/2015    EF 55-60%. No WMA. Mild-mod LVH. Gr 1 DDfx. No evidence of intracardiac shunt.   Mild AI.      Hx: UTI (urinary tract infection)     Hypertension     Hypogonadism in male     Kidney stones     Migraine     Neck injury     Polycythemia     Polycythemia, secondary     Sciatica     Unspecified retinal detachment     Unspecified sleep apnea     resolved since gastric bypass    Vision decreased     Weight loss        Past Surgical History:   Procedure Laterality Date    ABDOMEN SURGERY PROC UNLISTED  2012    Hernia    HX CATARACT REMOVAL Left     HX CERVICAL FUSION  4/4/2016    Cervical Spine Fusion    HX CHOLECYSTECTOMY      HX GASTRIC BYPASS  2006    HX KNEE ARTHROSCOPY      both knees (right knee twice, left once)    HX ORTHOPAEDIC  1981, 1995    lumbar laminectomy    HX OTHER SURGICAL      Two back surgeries    HX RETINAL DETACHMENT REPAIR Left        Social History     Social History    Marital status:      Spouse name: N/A    Number of children: N/A    Years of education: N/A     Occupational History    Not on file. Social History Main Topics    Smoking status: Former Smoker     Quit date: 2/22/1981    Smokeless tobacco: Never Used    Alcohol use No    Drug use: No    Sexual activity: Yes     Partners: Female     Other Topics Concern    Not on file     Social History Narrative       Patient does have an advanced directive on file    Visit Vitals    /60 (BP 1 Location: Left arm, BP Patient Position: Sitting)    Pulse 92    Temp 97.6 °F (36.4 °C) (Tympanic)    Resp 18    Ht 6' 4\" (1.93 m)    Wt 239 lb (108.4 kg)    SpO2 96%    BMI 29.09 kg/m2       Physical Exam   No Cervical Lymphadenopathy  No Supraclavicular Lymphadenopathy  Thyroid is Normal  Lungs are normal to percussion. Clear to auscultation   Heart:  S1 S2 are normal, No gallops, No mummers  No Carotid Bruits  Abdomen:  Normal Bowel Sounds. No tenderness. No masses. No Hepatomegaly or Splenomegly  LE:  Strong Pedal Pulses.   No Edema      BMI:  BMI is high but it was not addressed during this visit due to an acute medical problem      Hospital Outpatient Visit on 03/23/2018   Component Date Value Ref Range Status    WBC 03/23/2018 13.0  4.6 - 13.2 K/uL Final    RBC 03/23/2018 4.95  4.70 - 5.50 M/uL Final    HGB 03/23/2018 13.8  13.0 - 16.0 g/dL Final    HCT 03/23/2018 41.3  36.0 - 48.0 % Final    MCV 03/23/2018 83.4  74.0 - 97.0 FL Final    MCH 03/23/2018 27.9  24.0 - 34.0 PG Final    MCHC 03/23/2018 33.4  31.0 - 37.0 g/dL Final    RDW 03/23/2018 17.8* 11.6 - 14.5 % Final    PLATELET 67/08/5897 631  135 - 420 K/uL Final    MPV 03/23/2018 11.8  9.2 - 11.8 FL Final    NEUTROPHILS 03/23/2018 40* 42 - 75 % Final    LYMPHOCYTES 03/23/2018 50  20 - 51 % Final    MONOCYTES 03/23/2018 7  2 - 9 % Final    EOSINOPHILS 03/23/2018 2  0 - 5 % Final    BASOPHILS 03/23/2018 1  0 - 3 % Final    ABS. NEUTROPHILS 03/23/2018 5.2  1.8 - 8.0 K/UL Final    ABS. LYMPHOCYTES 03/23/2018 6.5* 0.8 - 3.5 K/UL Final    ABS. MONOCYTES 03/23/2018 0.9  0 - 1.0 K/UL Final    ABS. EOSINOPHILS 03/23/2018 0.3  0.0 - 0.4 K/UL Final    ABS. BASOPHILS 03/23/2018 0.1  0.0 - 0.1 K/UL Final    RBC COMMENTS 03/23/2018 NORMOCYTIC, NORMOCHROMIC    Final    DF 03/23/2018 MANUAL    Final    Sodium 03/23/2018 142  136 - 145 mmol/L Final    Potassium 03/23/2018 3.9  3.5 - 5.5 mmol/L Final    Chloride 03/23/2018 110* 100 - 108 mmol/L Final    CO2 03/23/2018 23  21 - 32 mmol/L Final    Anion gap 03/23/2018 9  3.0 - 18 mmol/L Final    Glucose 03/23/2018 101* 74 - 99 mg/dL Final    BUN 03/23/2018 30* 7.0 - 18 MG/DL Final    Creatinine 03/23/2018 2.10* 0.6 - 1.3 MG/DL Final    BUN/Creatinine ratio 03/23/2018 14  12 - 20   Final    GFR est AA 03/23/2018 38* >60 ml/min/1.73m2 Final    GFR est non-AA 03/23/2018 32* >60 ml/min/1.73m2 Final    Comment: (NOTE)  Estimated GFR is calculated using the Modification of Diet in Renal   Disease (MDRD) Study equation, reported for both  Americans   (GFRAA) and non- Americans (GFRNA), and normalized to 1.73m2   body surface area. The physician must decide which value applies to   the patient.  The MDRD study equation should only be used in   individuals age 25 or older. It has not been validated for the   following: pregnant women, patients with serious comorbid conditions,   or on certain medications, or persons with extremes of body size,   muscle mass, or nutritional status.  Calcium 03/23/2018 8.2* 8.5 - 10.1 MG/DL Final    Bilirubin, total 03/23/2018 0.5  0.2 - 1.0 MG/DL Final    ALT (SGPT) 03/23/2018 23  16 - 61 U/L Final    AST (SGOT) 03/23/2018 23  15 - 37 U/L Final    Alk. phosphatase 03/23/2018 70  45 - 117 U/L Final    Protein, total 03/23/2018 6.8  6.4 - 8.2 g/dL Final    Albumin 03/23/2018 3.6  3.4 - 5.0 g/dL Final    Globulin 03/23/2018 3.2  2.0 - 4.0 g/dL Final    A-G Ratio 03/23/2018 1.1  0.8 - 1.7   Final    TSH 03/23/2018 2.02  0.36 - 3.74 uIU/mL Final   Hospital Outpatient Visit on 02/19/2018   Component Date Value Ref Range Status    Creatinine, POC 02/19/2018 1.6* 0.6 - 1.3 MG/DL Final    GFRAA, POC 02/19/2018 52* >60 ml/min/1.73m2 Final    GFRNA, POC 02/19/2018 43* >60 ml/min/1.73m2 Final    Comment: Estimated GFR is calculated using the IDMS-traceable Modification of Diet in Renal Disease (MDRD) Study equation, reported for both  Americans (GFRAA) and non- Americans (GFRNA), and normalized to 1.73m2 body surface area. The physician must decide which value applies to the patient. The MDRD study equation should only be used in individuals age 25 or older. It has not been validated for the following: pregnant women, patients with serious comorbid conditions, or on certain medications, or persons with extremes of body size, muscle mass, or nutritional status.      Admission on 01/15/2018, Discharged on 01/15/2018   Component Date Value Ref Range Status    WBC 01/15/2018 12.4  4.6 - 13.2 K/uL Final    RBC 01/15/2018 4.39* 4.70 - 5.50 M/uL Final    HGB 01/15/2018 11.9* 13.0 - 16.0 g/dL Final    HCT 01/15/2018 36.4  36.0 - 48.0 % Final    MCV 01/15/2018 82.9  74.0 - 97.0 FL Final    MCH 01/15/2018 27.1  24.0 - 34.0 PG Final    MCHC 01/15/2018 32.7  31.0 - 37.0 g/dL Final    RDW 01/15/2018 17.4* 11.6 - 14.5 % Final    PLATELET 91/98/2629 489  135 - 420 K/uL Final    MPV 01/15/2018 11.8  9.2 - 11.8 FL Final    NEUTROPHILS 01/15/2018 55  42 - 75 % Final    LYMPHOCYTES 01/15/2018 33  20 - 51 % Final    MONOCYTES 01/15/2018 6  2 - 9 % Final    EOSINOPHILS 01/15/2018 2  0 - 5 % Final    BASOPHILS 01/15/2018 1  0 - 3 % Final    OTHER CELL 01/15/2018 3* 0   Final    ATYPICAL LYMPHOCYTES PRESENT    ABS. NEUTROPHILS 01/15/2018 6.8  1.8 - 8.0 K/UL Final    ABS. LYMPHOCYTES 01/15/2018 4.1* 0.8 - 3.5 K/UL Final    ABS. MONOCYTES 01/15/2018 0.7  0 - 1.0 K/UL Final    ABS. EOSINOPHILS 01/15/2018 0.2  0.0 - 0.4 K/UL Final    ABS. BASOPHILS 01/15/2018 0.1* 0.0 - 0.06 K/UL Final    DF 01/15/2018 MANUAL    Final    PLATELET COMMENTS 52/69/3261 ADEQUATE PLATELETS    Final    RBC COMMENTS 01/15/2018     Final                    Value:ANISOCYTOSIS  1+      Sodium 01/15/2018 145  136 - 145 mmol/L Final    Potassium 01/15/2018 4.8  3.5 - 5.5 mmol/L Final    Chloride 01/15/2018 115* 100 - 108 mmol/L Final    CO2 01/15/2018 23  21 - 32 mmol/L Final    Anion gap 01/15/2018 7  3.0 - 18 mmol/L Final    Glucose 01/15/2018 92  74 - 99 mg/dL Final    BUN 01/15/2018 37* 7.0 - 18 MG/DL Final    Creatinine 01/15/2018 1.92* 0.6 - 1.3 MG/DL Final    BUN/Creatinine ratio 01/15/2018 19  12 - 20   Final    GFR est AA 01/15/2018 42* >60 ml/min/1.73m2 Final    GFR est non-AA 01/15/2018 35* >60 ml/min/1.73m2 Final    Comment: (NOTE)  Estimated GFR is calculated using the Modification of Diet in Renal   Disease (MDRD) Study equation, reported for both  Americans   (GFRAA) and non- Americans (GFRNA), and normalized to 1.73m2   body surface area. The physician must decide which value applies to   the patient. The MDRD study equation should only be used in   individuals age 25 or older.  It has not been validated for the   following: pregnant women, patients with serious comorbid conditions,   or on certain medications, or persons with extremes of body size,   muscle mass, or nutritional status.  Calcium 01/15/2018 7.8* 8.5 - 10.1 MG/DL Final    Prothrombin time 01/15/2018 13.2  11.5 - 15.2 sec Final    INR 01/15/2018 1.1  0.8 - 1.2   Final    Comment:            INR Therapeutic Ranges         (on stable oral anticoagulant):     INDICATION                INR  DVT/PE/Atrial Fib          2.0-3.0  MI/Mechanical Heart Valve  2.5-3.5      ALCOHOL(ETHYL),SERUM 01/15/2018 <3  0 - 3 MG/DL Final    Color 01/15/2018 YELLOW    Final    Appearance 01/15/2018 CLEAR    Final    Specific gravity 01/15/2018 1.018  1.005 - 1.030   Final    pH (UA) 01/15/2018 5.0  5.0 - 8.0   Final    Protein 01/15/2018 NEGATIVE   NEG mg/dL Final    Glucose 01/15/2018 NEGATIVE   NEG mg/dL Final    Ketone 01/15/2018 NEGATIVE   NEG mg/dL Final    Bilirubin 01/15/2018 NEGATIVE   NEG   Final    Blood 01/15/2018 NEGATIVE   NEG   Final    Urobilinogen 01/15/2018 1.0  0.2 - 1.0 EU/dL Final    Nitrites 01/15/2018 NEGATIVE   NEG   Final    Leukocyte Esterase 01/15/2018 SMALL* NEG   Final    BENZODIAZEPINES 01/15/2018 NEGATIVE   NEG   Final    BARBITURATES 01/15/2018 NEGATIVE   NEG   Final    THC (TH-CANNABINOL) 01/15/2018 NEGATIVE   NEG   Final    OPIATES 01/15/2018 NEGATIVE   NEG   Final    PCP(PHENCYCLIDINE) 01/15/2018 NEGATIVE   NEG   Final    COCAINE 01/15/2018 NEGATIVE   NEG   Final    AMPHETAMINES 01/15/2018 NEGATIVE   NEG   Final    METHADONE 01/15/2018 NEGATIVE   NEG   Final    HDSCOM 01/15/2018 (NOTE)   Final    Comment: Specimen analysis was performed without chain of custody handling. These results should be used for medical purposes only and not for   legal or employment purposes. Unconfirmed screening results must not   be used for non-medical purposes.     The cut-off concentration for positive results are as follows:    AMPH     1000 ng/mL  QUENTIN      200 ng/mL  AKHIL      200 ng/mL  VINH       300 ng/mL  METH      300 ng/mL  OPI       300 ng/mL  PCP        25 ng/mL  THC        50 ng/mL        WBC 01/15/2018 5 to 9  0 - 4 /hpf Final    RBC 01/15/2018 0 to 2  0 - 5 /hpf Final    Epithelial cells 01/15/2018 FEW  0 - 5 /lpf Final    Bacteria 01/15/2018 1+* NEG /hpf Final    Special Requests: 01/15/2018 NO SPECIAL REQUESTS    Final    Culture result: 01/15/2018 NO GROWTH 2 DAYS    Final       .  Results for orders placed or performed during the hospital encounter of 03/23/18   CBC WITH AUTOMATED DIFF   Result Value Ref Range    WBC 13.0 4.6 - 13.2 K/uL    RBC 4.95 4.70 - 5.50 M/uL    HGB 13.8 13.0 - 16.0 g/dL    HCT 41.3 36.0 - 48.0 %    MCV 83.4 74.0 - 97.0 FL    MCH 27.9 24.0 - 34.0 PG    MCHC 33.4 31.0 - 37.0 g/dL    RDW 17.8 (H) 11.6 - 14.5 %    PLATELET 430 957 - 900 K/uL    MPV 11.8 9.2 - 11.8 FL    NEUTROPHILS 40 (L) 42 - 75 %    LYMPHOCYTES 50 20 - 51 %    MONOCYTES 7 2 - 9 %    EOSINOPHILS 2 0 - 5 %    BASOPHILS 1 0 - 3 %    ABS. NEUTROPHILS 5.2 1.8 - 8.0 K/UL    ABS. LYMPHOCYTES 6.5 (H) 0.8 - 3.5 K/UL    ABS. MONOCYTES 0.9 0 - 1.0 K/UL    ABS. EOSINOPHILS 0.3 0.0 - 0.4 K/UL    ABS. BASOPHILS 0.1 0.0 - 0.1 K/UL    RBC COMMENTS NORMOCYTIC, NORMOCHROMIC      DF MANUAL     METABOLIC PANEL, COMPREHENSIVE   Result Value Ref Range    Sodium 142 136 - 145 mmol/L    Potassium 3.9 3.5 - 5.5 mmol/L    Chloride 110 (H) 100 - 108 mmol/L    CO2 23 21 - 32 mmol/L    Anion gap 9 3.0 - 18 mmol/L    Glucose 101 (H) 74 - 99 mg/dL    BUN 30 (H) 7.0 - 18 MG/DL    Creatinine 2.10 (H) 0.6 - 1.3 MG/DL    BUN/Creatinine ratio 14 12 - 20      GFR est AA 38 (L) >60 ml/min/1.73m2    GFR est non-AA 32 (L) >60 ml/min/1.73m2    Calcium 8.2 (L) 8.5 - 10.1 MG/DL    Bilirubin, total 0.5 0.2 - 1.0 MG/DL    ALT (SGPT) 23 16 - 61 U/L    AST (SGOT) 23 15 - 37 U/L    Alk.  phosphatase 70 45 - 117 U/L    Protein, total 6.8 6.4 - 8.2 g/dL    Albumin 3.6 3.4 - 5.0 g/dL    Globulin 3.2 2.0 - 4.0 g/dL    A-G Ratio 1.1 0.8 - 1.7     TSH 3RD GENERATION   Result Value Ref Range    TSH 2.02 0.36 - 3.74 uIU/mL   Results for orders placed or performed in visit on 03/23/18   AMB POC EKG ROUTINE W/ 12 LEADS, INTER & REP    Impression    Atrial Fibrillation with a well controlled ventricular response - new from the previous tracing. A nonspecific intraventricular delay       Pre op testing:  Labs are ok but EKG shows new onset atrial fibrillation    Assessment / Plan      ICD-10-CM ICD-9-CM    1. Pre-op evaluation Z01.818 V72.84 CBC WITH AUTOMATED DIFF      METABOLIC PANEL, COMPREHENSIVE      AMB POC EKG ROUTINE W/ 12 LEADS, INTER & REP      CANCELED: URINALYSIS W/MICROSCOPIC      CANCELED: PROTHROMBIN TIME + INR      CANCELED: PTT      CANCELED: XR CHEST PA LAT   2. Sciatica, unspecified laterality M54.30 724.3 CBC WITH AUTOMATED DIFF      METABOLIC PANEL, COMPREHENSIVE      CANCELED: URINALYSIS W/MICROSCOPIC      CANCELED: PROTHROMBIN TIME + INR      CANCELED: PTT      CANCELED: XR CHEST PA LAT   3. Essential hypertension I10 401.9 CBC WITH AUTOMATED DIFF      METABOLIC PANEL, COMPREHENSIVE      CANCELED: URINALYSIS W/MICROSCOPIC      CANCELED: PROTHROMBIN TIME + INR      CANCELED: PTT      CANCELED: XR CHEST PA LAT   4. Chronic myeloproliferative disease (HCC) D47.1 238.79 CBC WITH AUTOMATED DIFF      METABOLIC PANEL, COMPREHENSIVE      CANCELED: URINALYSIS W/MICROSCOPIC      CANCELED: PROTHROMBIN TIME + INR      CANCELED: PTT      CANCELED: XR CHEST PA LAT   5. Atrial fibrillation, unspecified type ([address] I48.91 427.31 TSH 3RD GENERATION      2D ECHO COMPLETE ADULT (TTE) W OR WO CONTR     TSH ordered  Due to new onset atrial fibrillation and the need for anticoagulation I recommend that the surgery be delayed until the cardiac situation can be stabilized.     Add eliquis  he was advised to continue his maintenance medications  2D Echo ordered  To Cardiology    Follow-up Disposition:  Return in about 3 weeks (around 4/13/2018). I asked Carol Ariza if he has any questions and I answered the questions.   Carol Ariza states that he understands the treatment plan and agrees with the treatment plan      Addendum:  Cardiac work up is complete:  THE PATIENT IS OK FOR SURGERY

## 2018-03-24 RX ORDER — DICLOFENAC SODIUM 10 MG/G
GEL TOPICAL 4 TIMES DAILY
Qty: 100 G | Refills: 1 | Status: SHIPPED | OUTPATIENT
Start: 2018-03-24 | End: 2018-08-27 | Stop reason: ALTCHOICE

## 2018-03-28 ENCOUNTER — HOSPITAL ENCOUNTER (OUTPATIENT)
Dept: NON INVASIVE DIAGNOSTICS | Age: 68
Discharge: HOME | End: 2018-03-28
Attending: INTERNAL MEDICINE
Payer: MEDICARE

## 2018-03-28 DIAGNOSIS — I48.91 ATRIAL FIBRILLATION, UNSPECIFIED TYPE (HCC): ICD-10-CM

## 2018-03-28 PROCEDURE — 93306 TTE W/DOPPLER COMPLETE: CPT

## 2018-03-28 NOTE — PROGRESS NOTES
Completed echocardiogram. Report to follow. I removed the band off and placed in shred box. The patient removed the band and keep it. The patient wore the band out.     CS

## 2018-03-29 ENCOUNTER — OFFICE VISIT (OUTPATIENT)
Dept: CARDIOLOGY CLINIC | Age: 68
End: 2018-03-29

## 2018-03-29 VITALS
HEART RATE: 80 BPM | SYSTOLIC BLOOD PRESSURE: 100 MMHG | BODY MASS INDEX: 29.1 KG/M2 | WEIGHT: 239 LBS | OXYGEN SATURATION: 97 % | HEIGHT: 76 IN | DIASTOLIC BLOOD PRESSURE: 80 MMHG

## 2018-03-29 DIAGNOSIS — I10 ESSENTIAL HYPERTENSION: ICD-10-CM

## 2018-03-29 DIAGNOSIS — I48.91 ATRIAL FIBRILLATION, UNSPECIFIED TYPE (HCC): Primary | ICD-10-CM

## 2018-03-29 DIAGNOSIS — C95.91 LEUKEMIA IN REMISSION, UNSPECIFIED LEUKEMIA TYPE (HCC): ICD-10-CM

## 2018-03-29 DIAGNOSIS — I49.3 PVCS (PREMATURE VENTRICULAR CONTRACTIONS): ICD-10-CM

## 2018-03-29 NOTE — PROGRESS NOTES
History of Present Illness: A 75 y.o. male referred by Dr. Vineet Au for new diagnosis of atrial fibrillation. It was a rather incidental finding as he was planning to have back surgery at  and he was noted to have an irregular heart beat. He has been started on Eliquis. He has not had any bleeding issues. He was last seen by Dr. Erika Fu in 2015. At that point, he had PVCs. Echocardiogram yesterday showed normal function with moderate LVH. He notices perhaps at times some shortness of breath and dizziness. He has not had any syncope, chest pain. Impression/Plan:  Atrial fibrillation of unknown duration found incidentally during recent preoperative evaluation. Planned back surgery L6 which is postponed for now. Recent initiation of Eliquis. Echocardiogram March 2018 with normal function and moderate LVH, normal left atrial size. 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 Hypertension. Chronic pain. Upon further discussion he did have what sounds like a recent viral illness which could be a trigger. His atrial fibrillation is of unknown duration. He is currently on Eliquis for rate control. I have asked for an event monitor for a few weeks. Echocardiogram reviewed with normal function. There is now moderate LVH. At this point, I would recommend holding off on surgery until I see him back. His creatinine was also elevated potentially due to recent viral illness and dehydration. When I see him back if he is doing well, I would recommend proceeding with surgery as any intervention even cardioversion at this point would require prolonged anticoagulation and his back needs to be fixed. All questions answered.      Past Medical History:   Diagnosis Date    Abdominal pain, unspecified site     Acute reaction to stress     Allergic rhinitis due to pollen     Arthritis     Back injury     BPH (benign prostatic hypertrophy)     Calculus of ureter     Cancer (HCC)     history of Leukemia, in remission    Carotid duplex 06/17/2015 Mild < 50% bilateral ICA stenosis.  Dizziness and giddiness     Esophageal reflux     Essential hypertension     GERD (gastroesophageal reflux disease)     Headache(784.0)     History of echocardiogram 08/13/2015    EF 55-60%. No WMA. Mild-mod LVH. Gr 1 DDfx. No evidence of intracardiac shunt. Mild AI.      Hx: UTI (urinary tract infection)     Hypertension     Hypogonadism in male     Kidney stones     Migraine     Neck injury     Polycythemia     Polycythemia, secondary     Sciatica     Unspecified retinal detachment     Unspecified sleep apnea     resolved since gastric bypass    Vision decreased     Weight loss        Current Outpatient Prescriptions   Medication Sig Dispense Refill    diclofenac (VOLTAREN) 1 % gel Apply  to affected area four (4) times daily. 100 g 1    promethazine (PHENERGAN) 25 mg tablet take 1 tablet by mouth every 6 hours if needed for nausea 30 Tab 0    apixaban (ELIQUIS) 5 mg tablet Take 1 Tab by mouth two (2) times a day. 60 Tab 1    guanFACINE IR (TENEX) 1 mg IR tablet take 1 tablet by mouth once daily 30 Tab 2    labetalol (NORMODYNE) 300 mg tablet take 1 tablet by mouth twice a day 60 Tab 2    HYDROcodone-acetaminophen (NORCO) 7.5-325 mg per tablet Take 1 tablet by mouth every four hours as needed for pain. Max daily amount 5 tabs. 150 Tab 0    rOPINIRole (REQUIP) 0.5 mg tablet take 1 tablet by mouth twice a day 60 Tab 0    testosterone cypionate (DEPOTESTOTERONE CYPIONATE) 200 mg/mL injection inject 1 milliliter intramuscularly EVERY 30 DAYS as directed 1 mL 0    amLODIPine-Olmesartan 5-40 mg tab take 1 tablet by mouth once daily 30 Tab 0    DULoxetine (CYMBALTA) 30 mg capsule take 1 capsule by mouth once daily WITH 60 MG CAPSULE 30 Cap 0    hydroCHLOROthiazide (HYDRODIURIL) 25 mg tablet take 1 tablet by mouth once daily 30 Tab 1    SUMAtriptan (IMITREX) 100 mg tablet TAKE 1 TABLET BY MOUTH AT ONSET OF HEADACHE, MAY REPEAT 2 HOURS LATER. (MAX 2 PILLS IN 24 HOURS) 12 Tab 1    DULoxetine (CYMBALTA) 60 mg capsule take 1 capsule by mouth once daily 90 Cap 1    tiZANidine (ZANAFLEX) 4 mg capsule   0    labetalol (NORMODYNE) 300 mg tablet Take 150 mg by mouth two (2) times a day.  fluticasone (FLONASE) 50 mcg/actuation nasal spray 2 Sprays by Both Nostrils route two (2) times a day.  ACETAMINOPHEN/DIPHENHYDRAMINE (TYLENOL PM PO) Take 2 Tabs by mouth nightly.  naloxone (NARCAN) 4 mg/actuation nasal spray Use 1 spray intranasally into 1 nostril if needed. May repeat in 4 minutes if needed. 1 Each 1    tamsulosin (FLOMAX) 0.4 mg capsule take 1 capsule by mouth once daily 90 Cap 0    topiramate (TOPAMAX) 50 mg tablet TAKE 1 TABLET BY MOUTH TWICE A DAY TO PREVENT MIGRAINE 60 Tab 5    pantoprazole (PROTONIX) 40 mg tablet Take 1 Tab by mouth daily. 90 Tab 3    montelukast (SINGULAIR) 10 mg tablet TAKE 1 TABLET BY MOUTH EVERY DAY 90 Tab 3    Nilotinib (TASIGNA) 150 mg cap Take 300 mg by mouth two (2) times a day. Indications: Leukemia         Social History   reports that he quit smoking about 37 years ago. He has never used smokeless tobacco.   reports that he does not drink alcohol. Family History  family history includes Diabetes in his father and son; Headache in his sister; Heart Attack in his mother; Hypertension in his father. Review of Systems  Except as stated above include:  Constitutional: Negative for fever, chills and malaise/fatigue. HEENT: No congestion or recent URI. Gastrointestinal: No nausea, vomiting, abdominal pain, bloody stools. Pulmonary:  Negative except as stated above. Cardiac:  Negative except as stated above. Musculoskeletal: Negative except as stated above. Neurological:  No localized symptoms. Skin:  Negative except as stated above. Psych:  Negative except as stated above. Endocrine:  Negative except as stated above.     PHYSICAL EXAM  BP Readings from Last 3 Encounters:   03/29/18 100/80   03/23/18 132/60   01/31/18 120/80     Pulse Readings from Last 3 Encounters:   03/29/18 80   03/23/18 92   01/31/18 78     Wt Readings from Last 3 Encounters:   03/29/18 108.4 kg (239 lb)   03/23/18 108.4 kg (239 lb)   02/19/18 108.9 kg (240 lb)     General:   Well developed, well groomed. Head/Neck:   No jugular venous distention     No carotid bruits. No evidence of xanthelasma. Lungs:   No respiratory distress. Clear bilaterally. Heart:    Regular rate and rhythm. Normal S1/S2. Palpation of heart with normal point of maximum impulse. No significant murmurs, rubs or gallops. Abdomen:   Soft and nontender. No palpable abdominal mass or bruits. Extremities:   Intact peripheral pulses. No significant edema. Neurological:   Alert and oriented to person, place, time. No focal neurological deficit visually.   Skin:   No obvious rash    Blood Pressure Metric:  low

## 2018-03-29 NOTE — PROGRESS NOTES
1. Have you been to the ER, urgent care clinic since your last visit? Hospitalized since your last visit? Yes, 1/15/18 for lethargy     2. Have you seen or consulted any other health care providers outside of the [address] [place] [address] since your last visit? Include any pap smears or colon screening.  No

## 2018-03-29 NOTE — MR AVS SNAPSHOT
[address] [place] [address] [place] [id number] [phone] Patient: Verner Miguel MRN: [id number] WHY:[birth year] Visit Information Date & Time Provider Department Dept. Phone Encounter #  
 3/29/2018  2:00 PM Elva Hayden MD [facility] [address] [id number] Upcoming Health Maintenance Date Due Hepatitis C Screening 1950 GLAUCOMA SCREENING Q2Y 6/22/2014 MEDICARE YEARLY EXAM 6/28/2018 COLONOSCOPY 11/2/2020 DTaP/Tdap/Td series (2 - Td) 6/3/2024 Allergies as of 3/29/2018  Review Complete On: 3/29/2018 By: Elva Hayden MD  
  
 Severity Noted Reaction Type Reactions Levofloxacin High   Nausea Only Severe nausea and dizziness Sulfa (Sulfonamide Antibiotics)  03/26/2014    Unknown (comments) Nausea, aching all over Current Immunizations  Reviewed on 1/12/2018 Name Date Influenza High Dose Vaccine PF 9/15/2017, 10/18/2016  9:42 AM  
 Influenza Vaccine (Quad) PF 10/1/2015 Pneumococcal Conjugate (PCV-13) 10/1/2015 Pneumococcal Polysaccharide (PPSV-23) 6/27/2017 11:34 AM  
 Pneumococcal Vaccine (Unspecified Type) 6/18/2011 Td 6/2/2006 Tdap 6/3/2014 Zoster Vaccine, Live 12/9/2013 Not reviewed this visit You Were Diagnosed With   
  
 Codes Comments Atrial fibrillation, unspecified type ([facility]    -  Primary ICD-10-CM: I48.91 
ICD-9-CM: 427.31 PVCs (premature ventricular contractions)     ICD-10-CM: I49.3 ICD-9-CM: 427.69 Essential hypertension     ICD-10-CM: I10 
ICD-9-CM: 401.9 Leukemia in remission, unspecified leukemia type ([facility]     ICD-10-CM: C95.91 
ICD-9-CM: 208.91 Vitals BP Pulse Height(growth percentile) Weight(growth percentile) SpO2 BMI  
 100/80 80 6' 4\" (1.93 m) 239 lb (108.4 kg) 97% 29.09 kg/m2 Smoking Status Former Smoker Vitals History BMI and BSA Data Body Mass Index Body Surface Area  
 29.09 kg/m 2 2.41 m 2 Preferred Pharmacy Pharmacy Name Phone [facility] [address] [facility] [place] [phone] Your Updated Medication List  
  
   
This list is accurate as of 3/29/18  2:55 PM.  Always use your most recent med list. amLODIPine-Olmesartan 5-40 mg Tab  
take 1 tablet by mouth once daily  
  
 apixaban 5 mg tablet Commonly known as:  Jason Skill Take 1 Tab by mouth two (2) times a day. diclofenac 1 % Gel Commonly known as:  VOLTAREN Apply  to affected area four (4) times daily. * DULoxetine 60 mg capsule Commonly known as:  CYMBALTA  
take 1 capsule by mouth once daily * DULoxetine 30 mg capsule Commonly known as:  CYMBALTA  
take 1 capsule by mouth once daily WITH 60 MG CAPSULE  
  
 fluticasone 50 mcg/actuation nasal spray Commonly known as:  José Miguel Speadrianaer 2 Sprays by Both Nostrils route two (2) times a day.  
  
 guanFACINE IR 1 mg IR tablet Commonly known as:  TENEX  
take 1 tablet by mouth once daily  
  
 hydroCHLOROthiazide 25 mg tablet Commonly known as:  HYDRODIURIL  
take 1 tablet by mouth once daily HYDROcodone-acetaminophen 7.5-325 mg per tablet Commonly known as:  Clemetine Kings Take 1 tablet by mouth every four hours as needed for pain. Max daily amount 5 tabs. * labetalol 300 mg tablet Commonly known as:  Georgine Reef Take 150 mg by mouth two (2) times a day. * labetalol 300 mg tablet Commonly known as:  Georgine Reef  
take 1 tablet by mouth twice a day  
  
 montelukast 10 mg tablet Commonly known as:  SINGULAIR  
TAKE 1 TABLET BY MOUTH EVERY DAY  
  
 naloxone 4 mg/actuation nasal spray Commonly known as:  [employer] Use 1 spray intranasally into 1 nostril if needed. May repeat in 4 minutes if needed. pantoprazole 40 mg tablet Commonly known as:  PROTONIX Take 1 Tab by mouth daily. promethazine 25 mg tablet Commonly known as:  PHENERGAN  
take 1 tablet by mouth every 6 hours if needed for nausea  
  
 rOPINIRole 0.5 mg tablet Commonly known as:  REQUIP  
take 1 tablet by mouth twice a day SUMAtriptan 100 mg tablet Commonly known as:  IMITREX  
TAKE 1 TABLET BY MOUTH AT ONSET OF HEADACHE, MAY REPEAT 2 HOURS LATER. (MAX 2 PILLS IN 24 HOURS)  
  
 tamsulosin 0.4 mg capsule Commonly known as:  FLOMAX  
take 1 capsule by mouth once daily TASIGNA 150 mg Cap Generic drug:  Nilotinib Take 300 mg by mouth two (2) times a day. Indications: Leukemia  
  
 testosterone cypionate 200 mg/mL injection Commonly known as:  DEPOTESTOTERONE CYPIONATE  
inject 1 milliliter intramuscularly EVERY 30 DAYS as directed  
  
 tiZANidine 4 mg capsule Commonly known as:  ZANAFLEX  
  
 topiramate 50 mg tablet Commonly known as:  TOPAMAX TAKE 1 TABLET BY MOUTH TWICE A DAY TO PREVENT MIGRAINE  
  
 TYLENOL PM PO Take 2 Tabs by mouth nightly. * Notice: This list has 4 medication(s) that are the same as other medications prescribed for you. Read the directions carefully, and ask your doctor or other care provider to review them with you. We Performed the Following AMB POC EKG ROUTINE W/ 12 LEADS, INTER & REP [09986 CPT(R)] ECG EVENT RECORD MONITORING SET-UP [id number] CPT(R)] Comments:  
 2 weeks Introducing [facility]! Dear Zenobia Cotter: Thank you for requesting a [employer] account. Our records indicate that you already have an active [employer] account. You can access your account anytime at [email or web address]. Anita Margarita/[employer] Did you know that you can access your hospital and ER discharge instructions at any time in [employer]? You can also review all of your test results from your hospital stay or ER visit. Additional Information If you have questions, please visit the Frequently Asked Questions section of the [employer] website at [email or web address]. Anita Margarita/[employer]/. Remember, [employer] is NOT to be used for urgent needs. For medical emergencies, dial 911. Now available from your iPhone and Android! Please provide this summary of care documentation to your next provider. Your primary care clinician is listed as Lucía Early. If you have any questions after today's visit, please call [phone].

## 2018-04-03 RX ORDER — DULOXETIN HYDROCHLORIDE 30 MG/1
CAPSULE, DELAYED RELEASE ORAL
Qty: 30 CAP | Refills: 0 | Status: SHIPPED | OUTPATIENT
Start: 2018-04-03 | End: 2018-05-03 | Stop reason: SDUPTHER

## 2018-04-04 RX ORDER — SUMATRIPTAN 100 MG/1
TABLET, FILM COATED ORAL
Qty: 12 TAB | Refills: 1 | Status: SHIPPED | OUTPATIENT
Start: 2018-04-04 | End: 2018-06-21 | Stop reason: SDUPTHER

## 2018-04-10 NOTE — TELEPHONE ENCOUNTER
Requested Prescriptions     Pending Prescriptions Disp Refills    topiramate (TOPAMAX) 50 mg tablet 60 Tab 5     NOTE:  Originally prescribed by another doctor, he no longer sees. WDL

## 2018-04-11 DIAGNOSIS — M54.50 CHRONIC BILATERAL LOW BACK PAIN WITHOUT SCIATICA: ICD-10-CM

## 2018-04-11 DIAGNOSIS — G89.29 CHRONIC BILATERAL LOW BACK PAIN WITHOUT SCIATICA: ICD-10-CM

## 2018-04-11 DIAGNOSIS — M54.2 CERVICALGIA: ICD-10-CM

## 2018-04-11 RX ORDER — TAMSULOSIN HYDROCHLORIDE 0.4 MG/1
CAPSULE ORAL
Qty: 90 CAP | Refills: 0 | Status: SHIPPED | OUTPATIENT
Start: 2018-04-11 | End: 2018-07-12 | Stop reason: SDUPTHER

## 2018-04-11 RX ORDER — TOPIRAMATE 50 MG/1
TABLET, FILM COATED ORAL
Qty: 60 TAB | Refills: 5 | Status: SHIPPED | OUTPATIENT
Start: 2018-04-11 | End: 2018-10-07 | Stop reason: SDUPTHER

## 2018-04-11 RX ORDER — HYDROCODONE BITARTRATE AND ACETAMINOPHEN 7.5; 325 MG/1; MG/1
TABLET ORAL
Qty: 150 TAB | Refills: 0 | Status: SHIPPED | OUTPATIENT
Start: 2018-04-13 | End: 2018-05-09 | Stop reason: SDUPTHER

## 2018-04-11 NOTE — TELEPHONE ENCOUNTER
From: Jose De Jesus Jacobson  To: Giselle Reyna MD  Sent: 4/11/2018 10:41 AM EDT  Subject: Medication Renewal Request    Original authorizing provider: MD Jose De Jesus Wagoner would like a refill of the following medications:  HYDROcodone-acetaminophen (NORCO) 7.5-325 mg per tablet Giselle Reyna MD]    Preferred pharmacy: [place] [id number]:

## 2018-04-12 DIAGNOSIS — R37 SEXUAL DYSFUNCTION: ICD-10-CM

## 2018-04-12 RX ORDER — TESTOSTERONE CYPIONATE 200 MG/ML
INJECTION INTRAMUSCULAR
Qty: 1 ML | Refills: 0 | Status: SHIPPED | OUTPATIENT
Start: 2018-04-12 | End: 2018-06-20 | Stop reason: SDUPTHER

## 2018-04-13 ENCOUNTER — TELEPHONE (OUTPATIENT)
Dept: INTERNAL MEDICINE CLINIC | Age: 68
End: 2018-04-13

## 2018-04-17 RX ORDER — ROPINIROLE 0.5 MG/1
TABLET, FILM COATED ORAL
Qty: 60 TAB | Refills: 0 | Status: SHIPPED | OUTPATIENT
Start: 2018-04-17 | End: 2018-05-21 | Stop reason: SDUPTHER

## 2018-04-19 ENCOUNTER — TELEPHONE (OUTPATIENT)
Dept: INTERNAL MEDICINE CLINIC | Age: 68
End: 2018-04-19

## 2018-04-19 NOTE — TELEPHONE ENCOUNTER
Received fax from [phone] requesting prior authorization on generic Imitrex. I called [phone] [facility] and obtained authorization good through 10/16/18. Faxed noted approval to [facility] and notified patient.

## 2018-04-26 RX ORDER — AMLODIPINE AND OLMESARTAN MEDOXOMIL 5; 40 MG/1; MG/1
TABLET ORAL
Qty: 30 TAB | Refills: 0 | Status: SHIPPED | OUTPATIENT
Start: 2018-04-26 | End: 2018-06-01 | Stop reason: SDUPTHER

## 2018-04-30 RX ORDER — HYDROCHLOROTHIAZIDE 25 MG/1
TABLET ORAL
Qty: 30 TAB | Refills: 1 | Status: SHIPPED | OUTPATIENT
Start: 2018-04-30 | End: 2018-06-30 | Stop reason: SDUPTHER

## 2018-05-03 ENCOUNTER — OFFICE VISIT (OUTPATIENT)
Dept: CARDIOLOGY CLINIC | Age: 68
End: 2018-05-03

## 2018-05-03 VITALS
BODY MASS INDEX: 29.47 KG/M2 | HEART RATE: 79 BPM | OXYGEN SATURATION: 97 % | WEIGHT: 242 LBS | HEIGHT: 76 IN | DIASTOLIC BLOOD PRESSURE: 94 MMHG | SYSTOLIC BLOOD PRESSURE: 144 MMHG

## 2018-05-03 DIAGNOSIS — I10 ESSENTIAL HYPERTENSION: ICD-10-CM

## 2018-05-03 DIAGNOSIS — Z01.810 PREOP CARDIOVASCULAR EXAM: ICD-10-CM

## 2018-05-03 DIAGNOSIS — C95.91 LEUKEMIA IN REMISSION, UNSPECIFIED LEUKEMIA TYPE (HCC): ICD-10-CM

## 2018-05-03 DIAGNOSIS — I49.3 PVCS (PREMATURE VENTRICULAR CONTRACTIONS): ICD-10-CM

## 2018-05-03 DIAGNOSIS — I48.91 ATRIAL FIBRILLATION, UNSPECIFIED TYPE (HCC): Primary | ICD-10-CM

## 2018-05-03 NOTE — LETTER
5/3/2018 8:59 AM 
 
Mr. Isabella Granturo [address] [facility] [place] [address]  [address] was seen in our office on May 3, 2018 for cardiac evaluation. From a cardiac standpoint he is cleared for back surgery and can hold Eliquis 48 hours prior to procedure. Please feel free to contact our office if you have any questions regarding this patient. Sincerely,       Mar Farley MD

## 2018-05-03 NOTE — MR AVS SNAPSHOT
[address] [place] [address] [place] [id number] 
[phone] Patient: Claribel Jauregui MRN: [id number] Visit Information Date & Time Provider Department Dept. Phone Encounter #  
 5/3/2018  8:40 AM Delmar Vo MD [facility] [address] [id number] Follow-up Instructions Follow-up and Disposition History Upcoming Health Maintenance Date Due Hepatitis C Screening 1950 GLAUCOMA SCREENING Q2Y 6/22/2014 MEDICARE YEARLY EXAM 6/28/2018 Influenza Age 5 to Adult 8/1/2018 COLONOSCOPY 11/2/2020 DTaP/Tdap/Td series (2 - Td) 6/3/2024 Allergies as of 5/3/2018  Review Complete On: 5/3/2018 By: Delmar Vo MD  
  
 Severity Noted Reaction Type Reactions Levofloxacin High   Nausea Only Severe nausea and dizziness Sulfa (Sulfonamide Antibiotics)  03/26/2014    Unknown (comments) Nausea, aching all over Current Immunizations  Reviewed on 1/12/2018 Name Date Influenza High Dose Vaccine PF 9/15/2017, 10/18/2016  9:42 AM  
 Influenza Vaccine (Quad) PF 10/1/2015 Pneumococcal Conjugate (PCV-13) 10/1/2015 Pneumococcal Polysaccharide (PPSV-23) 6/27/2017 11:34 AM  
 Pneumococcal Vaccine (Unspecified Type) 6/18/2011 Td 6/2/2006 Tdap 6/3/2014 Zoster Vaccine, Live 12/9/2013 Not reviewed this visit You Were Diagnosed With   
  
 Codes Comments Atrial fibrillation, unspecified type ([facility]    -  Primary ICD-10-CM: I48.91 
ICD-9-CM: 427.31 PVCs (premature ventricular contractions)     ICD-10-CM: I49.3 ICD-9-CM: 427.69 Essential hypertension     ICD-10-CM: I10 
ICD-9-CM: 401.9 Leukemia in remission, unspecified leukemia type ([facility]     ICD-10-CM: C95.91 
ICD-9-CM: 208.91 Preop cardiovascular exam     ICD-10-CM: Z01.810 ICD-9-CM: V72.81 Vitals BP Pulse Height(growth percentile) Weight(growth percentile) SpO2 BMI (!) 144/94 79 6' 4\" (1.93 m) 242 lb (109.8 kg) 97% 29.46 kg/m2 Smoking Status Former Smoker Vitals History BMI and BSA Data Body Mass Index Body Surface Area  
 29.46 kg/m 2 2.43 m 2 Preferred Pharmacy Pharmacy Name Phone [facility] [address] [place] [phone] Your Updated Medication List  
  
   
This list is accurate as of 5/3/18  9:04 AM.  Always use your most recent med list. amLODIPine-Olmesartan 5-40 mg Tab  
take 1 tablet by mouth once daily  
  
 apixaban 5 mg tablet Commonly known as:  Arbutus Bon Take 1 Tab by mouth two (2) times a day. diclofenac 1 % Gel Commonly known as:  VOLTAREN Apply  to affected area four (4) times daily. * DULoxetine 60 mg capsule Commonly known as:  CYMBALTA  
take 1 capsule by mouth once daily * DULoxetine 30 mg capsule Commonly known as:  CYMBALTA  
take 1 capsule by mouth once daily with 60 MG CAPSULE  
  
 fluticasone 50 mcg/actuation nasal spray Commonly known as:  Jem Public 2 Sprays by Both Nostrils route two (2) times a day.  
  
 guanFACINE IR 1 mg IR tablet Commonly known as:  TENEX  
take 1 tablet by mouth once daily  
  
 hydroCHLOROthiazide 25 mg tablet Commonly known as:  HYDRODIURIL  
take 1 tablet by mouth once daily HYDROcodone-acetaminophen 7.5-325 mg per tablet Commonly known as:  Mitch Boot Take 1 tablet by mouth every four hours as needed for pain. Max daily amount 5 tabs. * labetalol 300 mg tablet Commonly known as:  Cynthia Gilead Take 150 mg by mouth two (2) times a day. * labetalol 300 mg tablet Commonly known as:  Cynthia Stew  
take 1 tablet by mouth twice a day  
  
 montelukast 10 mg tablet Commonly known as:  SINGULAIR  
TAKE 1 TABLET BY MOUTH EVERY DAY  
  
 naloxone 4 mg/actuation nasal spray Commonly known as:  ConocoPhiadrianaips  
 Use 1 spray intranasally into 1 nostril if needed. May repeat in 4 minutes if needed. pantoprazole 40 mg tablet Commonly known as:  PROTONIX Take 1 Tab by mouth daily. promethazine 25 mg tablet Commonly known as:  PHENERGAN  
take 1 tablet by mouth every 6 hours if needed for nausea  
  
 rOPINIRole 0.5 mg tablet Commonly known as:  REQUIP  
take 1 tablet by mouth twice a day SUMAtriptan 100 mg tablet Commonly known as:  IMITREX  
TAKE 1 TABLET BY MOUTH AT ONSET OF HEADACHE, MAY REPEAT 2 HOURS LATER. (MAX 2 PILLS IN 24 HOURS)  
  
 tamsulosin 0.4 mg capsule Commonly known as:  FLOMAX  
take 1 capsule by mouth once daily TASIGNA 150 mg Cap Generic drug:  Nilotinib Take 300 mg by mouth two (2) times a day. Indications: Leukemia  
  
 testosterone cypionate 200 mg/mL injection Commonly known as:  DEPOTESTOTERONE CYPIONATE  
inject 1 milliliter intramuscularly EVERY 30 DAYS as directed  
  
 tiZANidine 4 mg capsule Commonly known as:  ZANAFLEX  
  
 topiramate 50 mg tablet Commonly known as:  TOPAMAX TAKE 1 TABLET BY MOUTH TWICE A DAY TO PREVENT MIGRAINE  
  
 TYLENOL PM PO Take 2 Tabs by mouth nightly. * Notice: This list has 4 medication(s) that are the same as other medications prescribed for you. Read the directions carefully, and ask your doctor or other care provider to review them with you. Introducing [facility]! Dear Alvie Riedel: Thank you for requesting a [facility] account. Our records indicate that you already have an active [facility] account. You can access your account anytime at [email or web address] Did you know that you can access your hospital and ER discharge instructions at any time in [facility]? You can also review all of your test results from your hospital stay or ER visit. Additional Information If you have questions, please visit the Frequently Asked Questions section of the [employer] website at [email or web address] Remember, [employer] is NOT to be used for urgent needs. For medical emergencies, dial 911. Now available from your iPhone and Android! Please provide this summary of care documentation to your next provider. Your primary care clinician is listed as Sarah Lobo. If you have any questions after today's visit, please call [phone].

## 2018-05-03 NOTE — PROGRESS NOTES
History of Present Illness: A 75 y.o. male initially referred by Dr. Yaneth Mcconnell for new diagnosis of atrial fibrillation. It was a rather incidental finding as he was planning to have back surgery at L6 level and he has already had two surgeries there. He has had a couple of neck surgeries. He was started on Eliquis. He has been doing relatively well. He is not aware of an arrhythmia. I ordered an event monitor. He had paroxysmal atrial fibrillation without any significant pauses and reasonably well controlled rates. He has been doing well. He is here to discuss plans for back surgery.     Impression/Plan:  Paroxysmal atrial fibrillation without symptoms, plan for rate control only. This was an incidental finding during during recent preoperative evaluation. Event monitor performed showed no significant tachycardia or bradycardia. Recent initiation of Eliquis. Planned back surgery L6 which was postponed, but okay to proceed at this time. Echocardiogram March 2018 with normal function and moderate LVH, reasonable left atrial size. 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 Hypertension. Chronic pain. History of multiple back and neck surgeries. At this point it is reasonable to proceed with surgery and I would ask to hold Eliquis at least a few days prior or longer depending on his spine surgeon. Once there is no evidence of bleeding, it can be restarted. His event monitor did not show any profound bradycardia or tachycardia especially with his atrial fibrillation, it is clearly paroxysmal without symptoms. My recommendation would be to pursue rate control only at this time. He is taking Labetalol. His most recent echocardiogram showed normal function. He can proceed with surgery at this time. I would like to see him back in about four months. Post operatively, I recommend telemetry monitoring while in the hospital. All questions answered.        Past Medical History:   Diagnosis Date    Abdominal pain, unspecified site  Acute reaction to stress     Allergic rhinitis due to pollen     Arthritis     Back injury     BPH (benign prostatic hypertrophy)     Calculus of ureter     Cancer (HCC)     history of Leukemia, in remission    Carotid duplex 06/17/2015    Mild < 50% bilateral ICA stenosis.  Dizziness and giddiness     Esophageal reflux     Essential hypertension     GERD (gastroesophageal reflux disease)     Headache(784.0)     History of echocardiogram 08/13/2015    EF 55-60%. No WMA. Mild-mod LVH. Gr 1 DDfx. No evidence of intracardiac shunt. Mild AI.      Hx: UTI (urinary tract infection)     Hypertension     Hypogonadism in male     Kidney stones     Migraine     Neck injury     Polycythemia     Polycythemia, secondary     Sciatica     Unspecified retinal detachment     Unspecified sleep apnea     resolved since gastric bypass    Vision decreased     Weight loss        Current Outpatient Prescriptions   Medication Sig Dispense Refill    hydroCHLOROthiazide (HYDRODIURIL) 25 mg tablet take 1 tablet by mouth once daily 30 Tab 1    amLODIPine-Olmesartan 5-40 mg tab take 1 tablet by mouth once daily 30 Tab 0    rOPINIRole (REQUIP) 0.5 mg tablet take 1 tablet by mouth twice a day 60 Tab 0    testosterone cypionate (DEPOTESTOTERONE CYPIONATE) 200 mg/mL injection inject 1 milliliter intramuscularly EVERY 30 DAYS as directed 1 mL 0    topiramate (TOPAMAX) 50 mg tablet TAKE 1 TABLET BY MOUTH TWICE A DAY TO PREVENT MIGRAINE 60 Tab 5    tamsulosin (FLOMAX) 0.4 mg capsule take 1 capsule by mouth once daily 90 Cap 0    HYDROcodone-acetaminophen (NORCO) 7.5-325 mg per tablet Take 1 tablet by mouth every four hours as needed for pain. Max daily amount 5 tabs. 150 Tab 0    SUMAtriptan (IMITREX) 100 mg tablet TAKE 1 TABLET BY MOUTH AT ONSET OF HEADACHE, MAY REPEAT 2 HOURS LATER. (MAX 2 PILLS IN 24 HOURS) 12 Tab 1    DULoxetine (CYMBALTA) 30 mg capsule take 1 capsule by mouth once daily with 60 MG CAPSULE 30 Cap 0    diclofenac (VOLTAREN) 1 % gel Apply  to affected area four (4) times daily. 100 g 1    promethazine (PHENERGAN) 25 mg tablet take 1 tablet by mouth every 6 hours if needed for nausea 30 Tab 0    apixaban (ELIQUIS) 5 mg tablet Take 1 Tab by mouth two (2) times a day. 60 Tab 1    guanFACINE IR (TENEX) 1 mg IR tablet take 1 tablet by mouth once daily 30 Tab 2    labetalol (NORMODYNE) 300 mg tablet take 1 tablet by mouth twice a day 60 Tab 2    DULoxetine (CYMBALTA) 60 mg capsule take 1 capsule by mouth once daily 90 Cap 1    tiZANidine (ZANAFLEX) 4 mg capsule   0    labetalol (NORMODYNE) 300 mg tablet Take 150 mg by mouth two (2) times a day.  fluticasone (FLONASE) 50 mcg/actuation nasal spray 2 Sprays by Both Nostrils route two (2) times a day.  ACETAMINOPHEN/DIPHENHYDRAMINE (TYLENOL PM PO) Take 2 Tabs by mouth nightly.  naloxone (NARCAN) 4 mg/actuation nasal spray Use 1 spray intranasally into 1 nostril if needed. May repeat in 4 minutes if needed. 1 Each 1    pantoprazole (PROTONIX) 40 mg tablet Take 1 Tab by mouth daily. 90 Tab 3    montelukast (SINGULAIR) 10 mg tablet TAKE 1 TABLET BY MOUTH EVERY DAY 90 Tab 3    Nilotinib (TASIGNA) 150 mg cap Take 300 mg by mouth two (2) times a day. Indications: Leukemia         Social History   reports that he quit smoking about 37 years ago. He has never used smokeless tobacco.   reports that he does not drink alcohol. Family History  family history includes Diabetes in his father and son; Headache in his sister; Heart Attack in his mother; Hypertension in his father. Review of Systems  Except as stated above include:  Constitutional: Negative for fever, chills and malaise/fatigue. HEENT: No congestion or recent URI. Gastrointestinal: No nausea, vomiting, abdominal pain, bloody stools. Pulmonary:  Negative except as stated above. Cardiac:  Negative except as stated above.   Musculoskeletal: Negative except as stated above.  Neurological:  No localized symptoms. Skin:  Negative except as stated above. Psych:  Negative except as stated above. Endocrine:  Negative except as stated above. PHYSICAL EXAM  BP Readings from Last 3 Encounters:   05/03/18 (!) 144/94   03/29/18 100/80   03/23/18 132/60     Pulse Readings from Last 3 Encounters:   05/03/18 79   03/29/18 80   03/23/18 92     Wt Readings from Last 3 Encounters:   05/03/18 109.8 kg (242 lb)   03/29/18 108.4 kg (239 lb)   03/23/18 108.4 kg (239 lb)     General:   Well developed, well groomed. Head/Neck:   No jugular venous distention     No carotid bruits. No evidence of xanthelasma. Lungs:   No respiratory distress. Clear bilaterally. Heart:    Regular rate and rhythm. Normal S1/S2. Palpation of heart with normal point of maximum impulse. No significant murmurs, rubs or gallops. Abdomen:   Soft and nontender. No palpable abdominal mass or bruits. Extremities:   Intact peripheral pulses. No significant edema. Neurological:   Alert and oriented to person, place, time. No focal neurological deficit visually.   Skin:   No obvious rash    Blood Pressure Metric:  Sunita Garcia has been given the following recommendations today due to his elevated BP reading: monitor weekly and followup Dr. Esther Dumont

## 2018-05-04 RX ORDER — DULOXETIN HYDROCHLORIDE 30 MG/1
CAPSULE, DELAYED RELEASE ORAL
Qty: 30 CAP | Refills: 0 | Status: SHIPPED | OUTPATIENT
Start: 2018-05-04 | End: 2018-06-05 | Stop reason: SDUPTHER

## 2018-05-09 DIAGNOSIS — M54.50 CHRONIC BILATERAL LOW BACK PAIN WITHOUT SCIATICA: ICD-10-CM

## 2018-05-09 DIAGNOSIS — M54.2 CERVICALGIA: ICD-10-CM

## 2018-05-09 DIAGNOSIS — G89.29 CHRONIC BILATERAL LOW BACK PAIN WITHOUT SCIATICA: ICD-10-CM

## 2018-05-09 RX ORDER — HYDROCODONE BITARTRATE AND ACETAMINOPHEN 7.5; 325 MG/1; MG/1
TABLET ORAL
Qty: 150 TAB | Refills: 0 | Status: SHIPPED | OUTPATIENT
Start: 2018-05-14 | End: 2018-06-05

## 2018-05-09 NOTE — TELEPHONE ENCOUNTER
From: Maci Dowd  To: Clifton Corral MD  Sent: 5/9/2018 11:25 AM EDT  Subject: Medication Renewal Request    Original authorizing provider: MD Maci Quintanilla would like a refill of the following medications:  HYDROcodone-acetaminophen (NORCO) 7.5-325 mg per tablet Clifton Corral MD]    Preferred pharmacy: [place] [id number]:

## 2018-05-21 RX ORDER — ROPINIROLE 0.5 MG/1
TABLET, FILM COATED ORAL
Qty: 60 TAB | Refills: 0 | Status: SHIPPED | OUTPATIENT
Start: 2018-05-21 | End: 2018-12-13 | Stop reason: SDUPTHER

## 2018-05-22 RX ORDER — ROPINIROLE 0.5 MG/1
TABLET, FILM COATED ORAL
Qty: 60 TAB | Refills: 0 | Status: ON HOLD | OUTPATIENT
Start: 2018-05-22 | End: 2018-06-04 | Stop reason: SDUPTHER

## 2018-05-25 ENCOUNTER — HOSPITAL ENCOUNTER (OUTPATIENT)
Dept: LAB | Age: 68
Discharge: HOME | End: 2018-05-25
Payer: MEDICARE

## 2018-05-25 ENCOUNTER — HOSPITAL ENCOUNTER (OUTPATIENT)
Dept: GENERAL RADIOLOGY | Age: 68
Discharge: HOME | End: 2018-05-25
Payer: MEDICARE

## 2018-05-25 DIAGNOSIS — M48.061 LUMBAR STENOSIS: ICD-10-CM

## 2018-05-25 LAB
ABO + RH BLD: NORMAL
ALBUMIN SERPL-MCNC: 3.7 G/DL (ref 3.4–5)
ALBUMIN/GLOB SERPL: 1.2 {RATIO} (ref 0.8–1.7)
ALP SERPL-CCNC: 68 U/L (ref 45–117)
ALT SERPL-CCNC: 19 U/L (ref 16–61)
ANION GAP SERPL CALC-SCNC: 9 MMOL/L (ref 3–18)
APPEARANCE UR: CLEAR
APTT PPP: 31.7 SEC (ref 23–36.4)
AST SERPL-CCNC: 13 U/L (ref 15–37)
BACTERIA URNS QL MICRO: ABNORMAL /HPF
BASOPHILS # BLD: 0 K/UL (ref 0–0.1)
BASOPHILS NFR BLD: 1 % (ref 0–2)
BILIRUB SERPL-MCNC: 0.6 MG/DL (ref 0.2–1)
BILIRUB UR QL: NEGATIVE
BLOOD GROUP ANTIBODIES SERPL: NORMAL
BUN SERPL-MCNC: 29 MG/DL (ref 7–18)
BUN/CREAT SERPL: 17 (ref 12–20)
CALCIUM SERPL-MCNC: 8.6 MG/DL (ref 8.5–10.1)
CHLORIDE SERPL-SCNC: 111 MMOL/L (ref 100–108)
CO2 SERPL-SCNC: 22 MMOL/L (ref 21–32)
COLOR UR: YELLOW
CREAT SERPL-MCNC: 1.66 MG/DL (ref 0.6–1.3)
DIFFERENTIAL METHOD BLD: ABNORMAL
EOSINOPHIL # BLD: 0.2 K/UL (ref 0–0.4)
EOSINOPHIL NFR BLD: 3 % (ref 0–5)
EPITH CASTS URNS QL MICRO: ABNORMAL /LPF (ref 0–5)
ERYTHROCYTE [DISTWIDTH] IN BLOOD BY AUTOMATED COUNT: 17.1 % (ref 11.6–14.5)
GLOBULIN SER CALC-MCNC: 3.1 G/DL (ref 2–4)
GLUCOSE SERPL-MCNC: 83 MG/DL (ref 74–99)
GLUCOSE UR STRIP.AUTO-MCNC: NEGATIVE MG/DL
HCT VFR BLD AUTO: 35.5 % (ref 36–48)
HGB BLD-MCNC: 12 G/DL (ref 13–16)
HGB UR QL STRIP: NEGATIVE
INR PPP: 1 (ref 0.8–1.2)
KETONES UR QL STRIP.AUTO: NEGATIVE MG/DL
LEUKOCYTE ESTERASE UR QL STRIP.AUTO: ABNORMAL
LYMPHOCYTES # BLD: 4.3 K/UL (ref 0.9–3.6)
LYMPHOCYTES NFR BLD: 56 % (ref 21–52)
MCH RBC QN AUTO: 27.1 PG (ref 24–34)
MCHC RBC AUTO-ENTMCNC: 33.8 G/DL (ref 31–37)
MCV RBC AUTO: 80.3 FL (ref 74–97)
MONOCYTES # BLD: 0.6 K/UL (ref 0.05–1.2)
MONOCYTES NFR BLD: 8 % (ref 3–10)
NEUTS SEG # BLD: 2.4 K/UL (ref 1.8–8)
NEUTS SEG NFR BLD: 32 % (ref 40–73)
NITRITE UR QL STRIP.AUTO: NEGATIVE
PH UR STRIP: 5.5 [PH] (ref 5–8)
PLATELET # BLD AUTO: 320 K/UL (ref 135–420)
PMV BLD AUTO: 11.6 FL (ref 9.2–11.8)
POTASSIUM SERPL-SCNC: 4.4 MMOL/L (ref 3.5–5.5)
PROT SERPL-MCNC: 6.8 G/DL (ref 6.4–8.2)
PROT UR STRIP-MCNC: NEGATIVE MG/DL
PROTHROMBIN TIME: 12.5 SEC (ref 11.5–15.2)
RBC # BLD AUTO: 4.42 M/UL (ref 4.7–5.5)
RBC #/AREA URNS HPF: ABNORMAL /HPF (ref 0–5)
SODIUM SERPL-SCNC: 142 MMOL/L (ref 136–145)
SP GR UR REFRACTOMETRY: 1.01 (ref 1–1.03)
SPECIMEN EXP DATE BLD: NORMAL
UROBILINOGEN UR QL STRIP.AUTO: 1 EU/DL (ref 0.2–1)
WBC # BLD AUTO: 7.6 K/UL (ref 4.6–13.2)
WBC URNS QL MICRO: ABNORMAL /HPF (ref 0–4)

## 2018-05-25 PROCEDURE — 36415 COLL VENOUS BLD VENIPUNCTURE: CPT | Performed by: NEUROLOGICAL SURGERY

## 2018-05-25 PROCEDURE — 80053 COMPREHEN METABOLIC PANEL: CPT | Performed by: NEUROLOGICAL SURGERY

## 2018-05-25 PROCEDURE — 71046 X-RAY EXAM CHEST 2 VIEWS: CPT

## 2018-05-25 PROCEDURE — 85025 COMPLETE CBC W/AUTO DIFF WBC: CPT | Performed by: NEUROLOGICAL SURGERY

## 2018-05-25 PROCEDURE — 81001 URINALYSIS AUTO W/SCOPE: CPT | Performed by: NEUROLOGICAL SURGERY

## 2018-05-25 PROCEDURE — 85610 PROTHROMBIN TIME: CPT | Performed by: NEUROLOGICAL SURGERY

## 2018-05-25 PROCEDURE — 86900 BLOOD TYPING SEROLOGIC ABO: CPT | Performed by: NEUROLOGICAL SURGERY

## 2018-05-25 PROCEDURE — 85730 THROMBOPLASTIN TIME PARTIAL: CPT | Performed by: NEUROLOGICAL SURGERY

## 2018-06-01 RX ORDER — AMLODIPINE AND OLMESARTAN MEDOXOMIL 5; 40 MG/1; MG/1
TABLET ORAL
Qty: 30 TAB | Refills: 0 | Status: SHIPPED | OUTPATIENT
Start: 2018-06-01 | End: 2018-07-02 | Stop reason: SDUPTHER

## 2018-06-04 PROBLEM — M48.062 LUMBAR STENOSIS WITH NEUROGENIC CLAUDICATION: Status: ACTIVE | Noted: 2018-06-04

## 2018-06-05 RX ORDER — DULOXETIN HYDROCHLORIDE 30 MG/1
CAPSULE, DELAYED RELEASE ORAL
Qty: 30 CAP | Refills: 0 | Status: SHIPPED | OUTPATIENT
Start: 2018-06-05 | End: 2018-07-14 | Stop reason: SDUPTHER

## 2018-06-06 ENCOUNTER — PATIENT OUTREACH (OUTPATIENT)
Dept: INTERNAL MEDICINE CLINIC | Age: 68
End: 2018-06-06

## 2018-06-06 NOTE — PROGRESS NOTES
Hospital Discharge Follow-Up      Date/Time:  2018 8:55 AM    Patient was admitted to [facility] on 18 and discharged on 18 for a lumbar laminectomy of L5. The physician discharge summary was available at the time of outreach. Patient was contacted within one business days of discharge. Top Challenges reviewed with the provider   Doing well         Method of communication with provider :email    Inpatient RRAT score: not calculated  Was this a readmission? no   Patient stated reason for the readmission: NA    Nurse Navigator (NN) contacted the patient by telephone to perform post hospital discharge assessment. Verified name and  with patient as identifiers. Provided introduction to self, and explanation of the Nurse Navigator role. Reviewed discharge instructions and red flags with patient who verbalized understanding. Patient given an opportunity to ask questions and does not have any further questions or concerns at this time. The patient agrees to contact the PCP office for questions related to their healthcare. NN provided contact information for future reference. Summary of patient's top problems:  1. Back surgery  2. neuropathy      Home Health orders at discharge: [address]: NA  Date of initial visit: NA    Durable Medical Equipment ordered/company: NA  Durable Medical Equipment received: NA    Barriers to care? none noted at this time. Patient lives with supportive wife who assists as needed    Advance Care Planning:   Does patient have an Advance Directive:  reviewed and current     Medication(s):     New Medications at Discharge: Flexeril, Percocet  Changed Medications at Discharge: labetalol, Requip  Discontinued Medications at Discharge: Norco    Medication reconciliation was performed with patient, who verbalizes understanding of administration of home medications.   There were no barriers to obtaining medications identified at this time.    Referral to Pharm D needed: no     Spoke with patient who stated \"I'm doing surprisingly well. I didn't expect to feel this well after surgery. \"  Reveiwed red flags and activity instructions in detail. Patient has read and understands. Current Outpatient Prescriptions   Medication Sig    cyclobenzaprine (FLEXERIL) 10 mg tablet Take 0.5 Tabs by mouth three (3) times daily as needed for Muscle Spasm(s).  oxyCODONE-acetaminophen (PERCOCET 10)  mg per tablet Take 1 Tab by mouth every four (4) hours as needed. Max Daily Amount: 6 Tabs.  DULoxetine (CYMBALTA) 30 mg capsule take 1 capsule by mouth once daily with 60 MG CAPSULES    amLODIPine-Olmesartan 5-40 mg tab take 1 tablet by mouth once daily    rOPINIRole (REQUIP) 0.5 mg tablet take 1 tablet by mouth twice a day    hydroCHLOROthiazide (HYDRODIURIL) 25 mg tablet take 1 tablet by mouth once daily    testosterone cypionate (DEPOTESTOTERONE CYPIONATE) 200 mg/mL injection inject 1 milliliter intramuscularly EVERY 30 DAYS as directed    topiramate (TOPAMAX) 50 mg tablet TAKE 1 TABLET BY MOUTH TWICE A DAY TO PREVENT MIGRAINE    tamsulosin (FLOMAX) 0.4 mg capsule take 1 capsule by mouth once daily    apixaban (ELIQUIS) 5 mg tablet Take 1 Tab by mouth two (2) times a day.  labetalol (NORMODYNE) 300 mg tablet take 1 tablet by mouth twice a day    DULoxetine (CYMBALTA) 60 mg capsule take 1 capsule by mouth once daily    fluticasone (FLONASE) 50 mcg/actuation nasal spray 2 Sprays by Both Nostrils route two (2) times a day.  pantoprazole (PROTONIX) 40 mg tablet Take 1 Tab by mouth daily.  montelukast (SINGULAIR) 10 mg tablet TAKE 1 TABLET BY MOUTH EVERY DAY    Nilotinib (TASIGNA) 150 mg cap Take 300 mg by mouth two (2) times a day. Indications: Leukemia    SUMAtriptan (IMITREX) 100 mg tablet TAKE 1 TABLET BY MOUTH AT ONSET OF HEADACHE, MAY REPEAT 2 HOURS LATER. (MAX 2 PILLS IN 24 HOURS)    diclofenac (VOLTAREN) 1 % gel Apply  to affected area four (4) times daily.  promethazine (PHENERGAN) 25 mg tablet take 1 tablet by mouth every 6 hours if needed for nausea    ACETAMINOPHEN/DIPHENHYDRAMINE (TYLENOL PM PO) Take 2 Tabs by mouth nightly.  naloxone (NARCAN) 4 mg/actuation nasal spray Use 1 spray intranasally into 1 nostril if needed. May repeat in 4 minutes if needed. No current facility-administered medications for this visit. There are no discontinued medications. PCP/Specialist follow up:   Future Appointments  Date Time Provider Panchito Chau   6/21/2018 10:30 AM [facility] PSAT RM 1 [facility]   9/6/2018 8:20 AM Nisreen Rojas MD [address] [place] [address]      6/26/18Aron Calzada, neurosurgery    Goals      Attends follow-up appointments as directed. Plan:  Monitor for attendance at apts and assist as needed to schedule.  Understands red flags post discharge.             Plan:  Review discharge instructions as listed below:    6/6/18-patient able to teach back s/s prompting an immediate call to physician    Notify your Physician if you experience:  · Increased Shortness of Breath  · Dizziness  · Fainting  · Black Stools  · Vomiting  · Coughing White, Frothy or Bloody Sputum  · Dry cough that will not go away  · Increased swelling of arms, legs, ankles or abdomen  · Develop a rash  · Difficulty breathing while laying down  · Urine problems: pain, burning, urgency or difficulty  · Temperature greater than 100 degrees F, shaking, chills for more than 24 hours  · Increased skin bruising  · Sore Mouth, throat or gums  · Increased cough, fatigue  · Clicking/popping sound in a joint  · Increased limb shortening or turning outward           Call your Doctor right away if you have new symptoms such as:  · Cough that is worse at night and when you are lying down  · Swelling in your legs, ankles, feet, abdomen and/or veins in the neck

## 2018-06-15 ENCOUNTER — PATIENT OUTREACH (OUTPATIENT)
Dept: INTERNAL MEDICINE CLINIC | Age: 68
End: 2018-06-15

## 2018-06-15 NOTE — PROGRESS NOTES
Hospital Discharge Follow-Up        Patient was admitted to [facility] on 6/4/18 and discharged on 6/5/18 for a lumbar laminectomy of L5. Spoke with patient who stated \"I'm doing pretty well. The pain in my legs is almost completely gone. I saw Dr. Rosaura Padilla on Monday (6/11/18) and he told me everything was fine. \"    Patient voiced no questions or concerns and understands to contact surgeon/PCP for red flags or other concerns. Goals Addressed             Most Recent     Attends follow-up appointments as directed. On track (6/15/2018)             Plan:  Monitor for attendance at apts and assist as needed to schedule.   6/11/18-attended apt with Dr. Rosaura Padilla, surgeon

## 2018-06-20 DIAGNOSIS — R37 SEXUAL DYSFUNCTION: ICD-10-CM

## 2018-06-20 RX ORDER — TESTOSTERONE CYPIONATE 200 MG/ML
INJECTION INTRAMUSCULAR
Qty: 1 ML | Refills: 1 | Status: SHIPPED | OUTPATIENT
Start: 2018-06-20 | End: 2018-06-22 | Stop reason: SDUPTHER

## 2018-06-21 DIAGNOSIS — M54.2 CERVICALGIA: Primary | ICD-10-CM

## 2018-06-21 RX ORDER — HYDROCODONE BITARTRATE AND ACETAMINOPHEN 7.5; 325 MG/1; MG/1
TABLET ORAL
Qty: 150 TAB | Refills: 0 | Status: SHIPPED | OUTPATIENT
Start: 2018-06-21 | End: 2018-07-17 | Stop reason: SDUPTHER

## 2018-06-21 RX ORDER — APIXABAN 5 MG/1
TABLET, FILM COATED ORAL
Qty: 60 TAB | Refills: 1 | Status: SHIPPED | OUTPATIENT
Start: 2018-06-21 | End: 2018-08-26 | Stop reason: SDUPTHER

## 2018-06-22 DIAGNOSIS — R37 SEXUAL DYSFUNCTION: ICD-10-CM

## 2018-06-22 RX ORDER — SUMATRIPTAN 100 MG/1
TABLET, FILM COATED ORAL
Qty: 12 TAB | Refills: 1 | Status: SHIPPED | OUTPATIENT
Start: 2018-06-22 | End: 2018-08-23 | Stop reason: SDUPTHER

## 2018-06-23 RX ORDER — TESTOSTERONE CYPIONATE 200 MG/ML
INJECTION INTRAMUSCULAR
Qty: 1 ML | Refills: 1 | Status: SHIPPED | OUTPATIENT
Start: 2018-06-23 | End: 2018-07-17 | Stop reason: SDUPTHER

## 2018-06-24 RX ORDER — LABETALOL 300 MG/1
TABLET, FILM COATED ORAL
Qty: 60 TAB | Refills: 2 | Status: SHIPPED | OUTPATIENT
Start: 2018-06-24 | End: 2018-09-17 | Stop reason: SDUPTHER

## 2018-06-24 RX ORDER — GUANFACINE HYDROCHLORIDE 1 MG/1
TABLET ORAL
Qty: 30 TAB | Refills: 2 | Status: SHIPPED | OUTPATIENT
Start: 2018-06-24 | End: 2018-09-17 | Stop reason: SDUPTHER

## 2018-06-29 RX ORDER — MONTELUKAST SODIUM 10 MG/1
TABLET ORAL
Qty: 90 TAB | Refills: 3 | Status: SHIPPED | OUTPATIENT
Start: 2018-06-29 | End: 2018-08-27 | Stop reason: ALTCHOICE

## 2018-06-30 RX ORDER — HYDROCHLOROTHIAZIDE 25 MG/1
TABLET ORAL
Qty: 30 TAB | Refills: 1 | Status: SHIPPED | OUTPATIENT
Start: 2018-06-30 | End: 2018-08-27 | Stop reason: ALTCHOICE

## 2018-07-02 RX ORDER — AMLODIPINE AND OLMESARTAN MEDOXOMIL 5; 40 MG/1; MG/1
TABLET ORAL
Qty: 30 TAB | Refills: 0 | Status: SHIPPED | OUTPATIENT
Start: 2018-07-02 | End: 2018-08-02 | Stop reason: SDUPTHER

## 2018-07-06 ENCOUNTER — HOSPITAL ENCOUNTER (OUTPATIENT)
Dept: PHYSICAL THERAPY | Age: 68
Discharge: HOME | End: 2018-07-06
Payer: MEDICARE

## 2018-07-06 PROCEDURE — G8978 MOBILITY CURRENT STATUS: HCPCS

## 2018-07-06 PROCEDURE — G8979 MOBILITY GOAL STATUS: HCPCS

## 2018-07-06 PROCEDURE — 97110 THERAPEUTIC EXERCISES: CPT

## 2018-07-06 PROCEDURE — 97161 PT EVAL LOW COMPLEX 20 MIN: CPT

## 2018-07-06 NOTE — PROGRESS NOTES
PT DAILY TREATMENT NOTE/LUMBAR EVAL 3-16    Patient Name: Kalen Solorio  Date:2018  : 1950  [x]  Patient  Verified  [place]or: [place] MED[place]ARE / Plan: [place] MEDICARE PART A & B / Product Type: Medicare /    In time:12:10  Out time:12:47  Total Treatment Time (min): 37  Total Timed Codes (min): 10  1:1 Treatment Time ( only): 37   Visit #: 1 of 10    Treatment Area: Low back pain [M54.5]  SUBJECTIVE  Pain Level (0-10 scale): 4/10  [x]constant []intermittent []improving []worsening []no change since onset  6/10- prolonged standing  2/10- sitting, sleeping on side      Any medication changes, allergies to medications, adverse drug reactions, diagnosis change, or new procedure performed?: [x] No    [] Yes (see summary sheet for update)  Subjective functional status/changes:     PLOF: independent with all ADLs, yard work, household chores, fishing, and hunting  CLOF: for a couple of years--> unable to perform daily household chores without frequent rest breaks, no fishing/ hunting, independent with all ADLs  Pain in ilateral back when standing for too long - varies around 10mins  Mechanism of Injury: Status post Lumbar laminectomy 18 suffered disc bulge due to lifting/ twisting a heavy object, 14 years later had another disc problem from the [employer]  Currently- onset of pain a \"few\" years ago, pain has not gotten any better or worse since initial onset. Cannot recall any recent injury that caused the pain. Pain is in bilateral low back paraspinals around L4- prior to surgery was radiating down into R LE.  Currently the pain is only in his back and from his R knee to foot  Current symptoms/Complaints: Bilateral L4 paraspinal pain, pain from R knee to R foot when not taking prednisone  Previous Treatment/Compliance: Previously was given epidural injections x 4 for herniated disk- with relief + therapy  PMHx/Surgical Hx: neck suregery - disc and neck problems (rods   Work Hx: retired  Living Situation: lives with wife in one story house, [address] one [address]- difficulty with asc/ desc stairs - have to walk sideways   Pt Goals:\" get my life back \"  Barriers: []pain []financial []time []transportation []other  Motivation: Return to independence within his house  Cognition: A & O x 4    Other:    OBJECTIVE/EXAMINATION    Mobility: ambulates with right trunk lean and foot drop on L   Self Care: Independent with all ADLs        27 min [x]Eval                  []Re-Eval       10 min Therapeutic Exercise:  [] See flow sheet :   Rationale: increase ROM, increase strength, improve balance and increase proprioception to improve the patients ability to perform household chores with independence              With   [] TE   [] TA   [] neuro   [] other: Patient Education: [x] Review HEP    [] Progressed/Changed HEP based on:   [] positioning   [] body mechanics   [] transfers   [] heat/ice application    [] other:        Physical Therapy Evaluation - Lumbar Spine ([facility])    Symptoms:  Aggravated by:   [] Bending [] Sitting [x] Standing [x] Walking   [x] Moving [] Cough [] Sneeze [] Valsalva   [] AM  [] PM  Lying:  [] sup   [x] pro   [] sidelying   [] Other:     Eased by:    [] Bending [] Sitting [] Standing [] Walking   [] Moving [] AM  [] PM  Lying: [] sup  [] pro  [x] sidelying   [] Other:     General Health:  Red Flags Indicated? [] Yes    [x] No  [] Yes [x] No Recent weight change (If yes, due to dieting?  [] Yes  [] No)   [] Yes [x] No Weakness in legs during walking  [] Yes [x] No Unremitting pain at night  [] Yes [x] No Abdominal pain or problems  [] Yes [x] No Rectal bleeding  [] Yes [x] No Feet more cold or painful in cold weather  [] Yes [x] No Menstrual irregularities  [] Yes [x] No Blood or pain with urination  [] Yes [x] No Dysfunction of bowel or bladder  [] Yes [x] No Recent illness within past 3 weeks (i.e, cold, flu)  [] Yes [x] No Numbness/tingling in buttock/genitalia region      Diagnostic Tests: [] Lab work [x] X-rays    [] CT [x] MRI     [] Other:  Results: Post op lumbar laminectomy    OBJECTIVE  Posture:  Lateral Shift: [x] R    [x] L     [] +  [x] -  Kyphosis: [x] Increased [] Decreased   []  WNL  Lordosis:  [] Increased [x] Decreased   [] WNL  Pelvic symmetry: [x] WNL    [] Other:    Gait:  [] Normal     [x] Abnormal:  ambulates with right trunk lean and foot drop on L     Active Movements:   ROM  AROM Comments:pain, area   Forward flexion 40-60 80 deg    Extension 20-30 8 deg    SB right 20-30 16 deg With pain   SB left 20-30 14 deg    Rotation right 5-10 50%    Rotation left 5-10 50%        Hip ROM:   L R    Hip Flexion  110 120   Hip IR 16 10   Hip ER 25 25       Neuro Screen   Decrease sensation to R big toe and left lateral foot  Unable to maintain L DF    Dural Mobility:  SLR Sitting: [] R    [] L    [] +    [x] -  @ (degrees):           Supine: [] R    [] L    [] +    [x] -  @ (degrees):     Palpation  [x] Min  [] Mod  [] Severe    Location: bilateral paraspinals L2-L4      Strength   L(0-5) R (0-5) N/T   Hip Flexion (L1,2) 5 5 []   Knee Extension (L3,4) 4 4 []   Ankle Dorsiflexion (L4) 2+ 5 []   Great Toe Extension (L5) -- -- []   Ankle Plantarflexion (S1)  functional testing 4 4 []   Knee Flexion (S1,2) 4 4 []   Hip Abduction 3+ 3+ []   Hip IR 4 4 []   Hip ER 4 4 []     Special Tests      Sacroilliac:  Crests: symmetrical    ASIS: symmetrical    PSIS: symmetrical           Hip: Lucía:  [x] R    [x] L    [x] +    [] -     Scour:  [x] R    [] L    [x] +    [] -          Deficits: Hamstrings 90/90: positive bilaterally         Other tests/comments:  Squat: Poor form with squat  Unable to SLS bilaterally  Unable to heel walk with L LE  Difficulty with toe walking   Tightness in bilateral adductors, hamstrings and quadriceps    Pain Level (0-10 scale) post treatment: 4    ASSESSMENT/Changes in Function: 70year old male patient presents to the clinic status post lumbar laminectomy x 4 weeks ago (6/5/18).  Patient reports having constant back pain for a \"few\" years with no relief in symptoms. Patient demonstrates a decrease in bilateral LE strength,  AROM, deficits with balance, and an increase in pain. Patient unable to perform household chores, participate in hobbies (such as fishing/ hunting), and requires frequent seated rest breaks throughout his day due to pain. Patient is a good candidate for skilled outpatient physical therapy to address the impairments listed above. Patient rehab potential is good due to PLOF and motivation to return to independence within his home. Patient will continue to benefit from skilled PT services to modify and progress therapeutic interventions, address functional mobility deficits, address ROM deficits, address strength deficits, analyze and address soft tissue restrictions, analyze and modify body mechanics/ergonomics, assess and modify postural abnormalities and address imbalance/dizziness to attain remaining goals. [x]  See Plan of Care  []  See progress note/recertification  []  See Discharge Summary         Progress towards goals / Updated goals:  Short Term Goals: To be met in 5 visits  1. Patient will be independent with HEP in order to maintain gains made with physical therapy.                        Eval: Issued                        Current:    2. Patient will report 2/10 pain to aide with increase in activity tolerance and performance within therapy.                         Eval: 4                        Current  3. Patient will be able to SLS for 10 sec each leg in order to assist with stair negotiation within home.                         Eval: Unable to maintain                         Current:    Long Term Goals: To be accomplished in 10 treatments:  1. Patient will report 0-1/10 pain to aide with increase in activity tolerance and performance within therapy.                         Eval:                         Current:    2.  Patient will demonstrate appropriate core control and body mechanics to enable full participation with work demands.                         Eval:                         Current:    3. Patient will report at least 50% improvement to allow ease with ADLs and household chores.                         Eval:                        Current:    4. Patient will be able to ascend/ descend 3 steps pain free with single handrail in order to ensure safety with entering/ exiting the home.                         Eval:                        Current:  5. Patient will increase FOTO score to 54 in order to show increase tolerance to community activities.     Eval:   Current:      PLAN  []  Upgrade activities as tolerated     [x]  Continue plan of care  []  Update interventions per flow sheet       []  Discharge due to:_  []  Other:_      Elbert Baldwin, PT 7/6/2018  11:36 AM

## 2018-07-06 NOTE — PROGRESS NOTES
[facility] [address] [id number] [facility], [address] [place] [address], [address] [place] [address],[address]  [phone] fax      Plan of Care/ Statement of Necessity for Physical Therapy Services    Patient name: Andrea Peck Start of Care: 2018   Referral source: Nathen Escalona : 1950    Medical Diagnosis: Low back pain [M54.5]   Onset Date:18    Treatment Diagnosis: Lumbar Laminectomy   Prior Hospitalization: see medical history Provider#: [id number]   Medications: Verified on Patient summary List    Comorbidities: HBP, arthritis   Prior Level of Function: Independent with all ADLs, performed household chores/ yard work. The Plan of Care and following information is based on the information from the initial evaluation. Assessment/ key information: 70year old male patient presents to the clinic status post lumbar laminectomy x 4 weeks ago (18). Patient reports having constant back pain for a \"few\" years with no relief in symptoms. Patient unable to perform household chores, participate in hobbies (such as fishing/ hunting), and requires frequent seated rest breaks throughout his day due to pain. Patient demonstrates a decrease in bilateral LE strength,  AROM, deficits with balance, and an increase in pain. Patient is a good candidate for skilled outpatient physical therapy to address the impairments listed above. Patient rehab potential is good due to PLOF and motivation to return to independence within his home.     Evaluation Complexity History MEDIUM  Complexity : 1-2 comorbidities / personal factors will impact the outcome/ POC ; Examination LOW Complexity : 1-2 Standardized tests and measures addressing body structure, function, activity limitation and / or participation in recreation  ;Presentation LOW Complexity : Stable, uncomplicated  ;Clinical Decision Making MEDIUM Complexity : FOTO score of 26-74  Overall Complexity Rating: LOW   Problem List: pain affecting function, decrease ROM, decrease strength, impaired gait/ balance, decrease ADL/ functional abilitiies, decrease activity tolerance and decrease flexibility/ joint mobility   Treatment Plan may include any combination of the following: Therapeutic exercise, Therapeutic activities, Neuromuscular re-education, Physical agent/modality, Gait/balance training, Manual therapy, Patient education, Self Care training, Functional mobility training, Home safety training and Stair training  Patient / Family readiness to learn indicated by: asking questions, trying to perform skills and interest  Persons(s) to be included in education: patient (P)  Barriers to Learning/Limitations: None  Patient Goal (s): Get my life back  Patient Self Reported Health Status: good  Rehabilitation Potential: good    Short Term Goals: To be met in 5 visits  1. Patient will be independent with HEP in order to maintain gains made with physical therapy.                        Eval: Issued                        Current:    2. Patient will report 2/10 pain to aide with increase in activity tolerance and performance within therapy.                         Eval: 4                        Current  3. Patient will be able to SLS for 10 sec each leg in order to assist with stair negotiation within home.                         Eval: Unable to maintain                         Current:    Long Term Goals: To be accomplished in 10 treatments:  1. Patient will report 0-1/10 pain to aide with increase in activity tolerance and performance within therapy.                         Eval:                         Current:    2. Patient will demonstrate appropriate core control and body mechanics to enable full participation with work demands.                         Eval:                         Current:    3. Patient will report at least 50% improvement to allow ease with ADLs and household chores.                       Eval:                        Current:    4. Patient will be able to ascend/ descend 3 steps pain free with single handrail in order to ensure safety with entering/ exiting the home.                         Eval:                        Current:  5. Patient will increase FOTO score to 54 in order to show increase tolerance to community activities. Eval:                        Current:      Frequency / Duration: Patient to be seen 3 times per week for 3 weeks. Patient/ Caregiver education and instruction: Diagnosis, prognosis, exercises   [x]  Plan of care has been reviewed with MASSIEL    G-Codes (GP)  Mobility   Current  CK= 40-59%   Goal  CK= 40-59%    The severity rating is based on clinical judgment and the FOTO score. Certification Period: 7/6/18-8/6/18  Daria Downs, PT 7/6/2018 11:36 AM    ________________________________________________________________________    I certify that the above Therapy Services are being furnished while the patient is under my care. I agree with the treatment plan and certify that this therapy is necessary.     [de-identified] Signature:____________________  Date:____________Time: _________    Please sign and return to [facility] [address] [place] [address], [address] [place] [address], [address] [place] [address],[facility] 300  [phone] fax

## 2018-07-09 ENCOUNTER — PATIENT OUTREACH (OUTPATIENT)
Dept: INTERNAL MEDICINE CLINIC | Age: 68
End: 2018-07-09

## 2018-07-09 NOTE — PROGRESS NOTES
Hospital Discharge Follow-Up          Patient was admitted [facility] 6/4/18 and discharged on 6/5/18 for a lumbar laminectomy of L5. Spoke with patient who stated \"I'm doing well. I'm having less pain now. \"  Attended apt with Dr. Haydee Moreland, surgeon, on 6/26/18 and is attending outpatient physical therapy. Patient has graduated from the Transitions of Care Coordination  program on 7/9/18. Patient's symptoms are stable at this time. Patient/family has the ability to self-manage. Care management goals have been completed at this time. No further nurse navigator follow up scheduled. Pt has nurse navigator's contact information for any further questions, concerns, or needs. Patients upcoming visits:  Future Appointments  Date Time Provider Panchito Chau   7/10/2018 8:30 AM Evelyn Bernard, PTA [facility]   7/12/2018 3:00 PM Evelyn Bernard, PTA [facility]   7/13/2018 8:30 AM Evelyn Bernard, PTA [facility]   7/16/2018 8:00 AM Elsa Rodriguez, PT [facility]   7/18/2018 9:30 AM Lizzie Petersen, PT [facility]   7/20/2018 10:00 AM Lizzie Petersen, PT [facility]   7/23/2018 9:30 AM Leoma Brittle, PT [facility]   7/25/2018 9:30 AM Lizzie Petersen, PT [facility]   7/27/2018 9:30 AM Evelyn Bernard, PTA [facility]   7/30/2018 9:30 AM Evelyn Bernard, PTA [facility]   9/6/2018 8:20 AM Jennifer Luevano MD [address] [place] [address]             Most Recent     Attends follow-up appointments as directed. On track (7/9/2018)             Plan:  Monitor for attendance at apts and assist as needed to schedule.   6/11/18, 6/26/18-attended apt with Dr. Haydee Moreland, surgeon

## 2018-07-10 ENCOUNTER — HOSPITAL ENCOUNTER (OUTPATIENT)
Dept: PHYSICAL THERAPY | Age: 68
Discharge: HOME | End: 2018-07-10
Payer: MEDICARE

## 2018-07-10 PROCEDURE — 97110 THERAPEUTIC EXERCISES: CPT

## 2018-07-10 PROCEDURE — 97032 APPL MODALITY 1+ESTIM EA 15: CPT

## 2018-07-10 NOTE — PROGRESS NOTES
PT DAILY TREATMENT NOTE - [facility]     Patient Name: Martir Triana  Date:7/10/2018  : 1950  [x]  Patient  Verified  Payor: Jadiel Castellanos / Plan: [place] MEDICARE PART A & B / Product Type: Medicare /    In time:8:29   Out time:9:31  Total Treatment Time (min): 51  Total Timed Codes (min): 51  1:1 Treatment Time ( [address] only): 46  Visit #: 2 of 10    Treatment Area: Low back pain [M54.5]    SUBJECTIVE  Pain Level (0-10 scale): 3  Any medication changes, allergies to medications, adverse drug reactions, diagnosis change, or new procedure performed?: [x] No    [] Yes (see summary sheet for update)  Subjective functional status/changes:   [] No changes reported  Some  Pain.     OBJECTIVE    Modality rationale: decrease edema, decrease inflammation, decrease pain and increase tissue extensibility to improve the patients ability to perform ADL    Min Type Additional Details    [] Estim:  []Unatt       []IFC  []Premod                        []Other:  []w/ice   []w/heat  Position:  Location:   10 [x] Estim: [x]Att    []TENS instruct  []NMES                    [x]Other: LTO []w/US   []w/ice   []w/heat  Position:left SL  Location: lateral  Right  LE    []  Traction: [] Cervical       []Lumbar                       [] Prone          []Supine                       []Intermittent   []Continuous Lbs:  [] before manual  [] after manual    []  Ultrasound: []Continuous   [] Pulsed                           []1MHz   []3MHz W/cm2:  Location:    []  Iontophoresis with dexamethasone         Location: [] Take home patch   [] In clinic    []  Ice     []  heat  []  Ice massage  []  Laser   []  Anodyne Position:  Location:    []  Laser with stim  []  Other:  Position:  Location:    []  Vasopneumatic Device Pressure:       [] lo [] med [] hi   Temperature: [] lo [] med [] hi   [x] Skin assessment post-treatment:  [x]intact []redness- no adverse reaction    []redness - adverse reaction:       min []Eval                  []Re-Eval       41 min Therapeutic Exercise:  [x] See flow sheet :   Rationale: increase ROM and increase strength to improve the patients ability to perform ADL     min Therapeutic Activity:  []  See flow sheet :   Rationale:   to improve the patients ability to       min Neuromuscular Re-education:  []  See flow sheet :   Rationale:   to improve the patients ability to      min Manual Therapy:     Rationale: decrease pain, increase ROM, increase tissue extensibility and decrease edema  to perform ADL      min Gait Training:  ___ feet with ___ device on level surfaces with ___ level of assist   Rationale: With   [x] TE   [] TA   [] neuro   [] other: Patient Education: [x] Review HEP    [] Progressed/Changed HEP based on:   [] positioning   [] body mechanics   [] transfers   [] heat/ice application    [] other:      Other Objective/Functional Measures:  C/o  Burning  Sensation/pain at  Lateral  Right LE    Added  HS  Stretch/nerve  glides    Pain Level (0-10 scale) post treatment: 1-2    ASSESSMENT/Changes in Function: Demo   right hamstring  Tightness. Discussed  With pt on performing HS stretches  At home. Benefited  With treatment. Patient will continue to benefit from skilled PT services to address functional mobility deficits, address ROM deficits, address strength deficits, analyze and address soft tissue restrictions, analyze and cue movement patterns and instruct in home and community integration to attain remaining goals. [x]  See Plan of Care  []  See progress note/recertification  []  See Discharge Summary         Progress towards goals / Updated goals:  Short Term Goals: To be met in 5 visits  1. Patient will be independent with HEP in order to maintain gains made with physical therapy.                        Eval: Issued                        Current:    2.  Patient will report 2/10 pain to aide with increase in activity tolerance and performance within therapy.                         Eval: 4                        Current  3. Patient will be able to SLS for 10 sec each leg in order to assist with stair negotiation within home.                         Eval: Unable to maintain                         Current:    Long Term Goals: To be accomplished in 10 treatments:  1. Patient will report 0-1/10 pain to aide with increase in activity tolerance and performance within therapy.                         Eval:                         Current:    2. Patient will demonstrate appropriate core control and body mechanics to enable full participation with work demands.                         Eval:                         Current:    3. Patient will report at least 50% improvement to allow ease with ADLs and household chores.                         Eval:                        Current:    4. Patient will be able to ascend/ descend 3 steps pain free with single handrail in order to ensure safety with entering/ exiting the home.                         Eval:                        Current:  5. Patient will increase FOTO score to 54 in order to show increase tolerance to community activities.                          Eval:                        Current:         PLAN  []  Upgrade activities as tolerated     [x]  Continue plan of care  []  Update interventions per flow sheet       []  Discharge due to:_  []  Other:_      Evelyn Bernard PTA 7/10/2018  8:48 AM    Future Appointments  Date Time Provider Panchito Chau   7/12/2018 3:00 PM Evelyn Clark PTA [facility]   7/13/2018 8:30 AM Evelyn Bernard PTA [facility]   7/16/2018 8:00 AM Chano Alonso, PT [facility]   7/18/2018 9:30 AM Earney Comer, PT [facility]   7/20/2018 10:00 AM Earney Comer, PT [facility]   7/23/2018 9:30 AM Christian Arnold, PT [facility]   7/25/2018 9:30 AM Earney Comer, PT [facility]   7/27/2018 9:30 AM Evelyn Bernard, PTA [facility]   7/30/2018 9:30 AM Evelyn Bernard, PTA [facility]   9/6/2018 8:20 AM Jolanta Angeles MD [facility] [address]

## 2018-07-12 ENCOUNTER — HOSPITAL ENCOUNTER (OUTPATIENT)
Dept: PHYSICAL THERAPY | Age: 68
Discharge: HOME | End: 2018-07-12
Payer: MEDICARE

## 2018-07-12 PROCEDURE — 97140 MANUAL THERAPY 1/> REGIONS: CPT

## 2018-07-12 PROCEDURE — 97110 THERAPEUTIC EXERCISES: CPT

## 2018-07-12 RX ORDER — TAMSULOSIN HYDROCHLORIDE 0.4 MG/1
CAPSULE ORAL
Qty: 90 CAP | Refills: 0 | Status: SHIPPED | OUTPATIENT
Start: 2018-07-12 | End: 2018-10-26 | Stop reason: SDUPTHER

## 2018-07-12 NOTE — PROGRESS NOTES
PT DAILY TREATMENT NOTE - [facility]     Patient Name: Carla Millville  Date:2018  : 1950  [x]  Patient  Verified  Payor: Chang Larsen / Plan: [place] MEDICARE PART A & B / Product Type: Medicare /    In time: 2:54  Out time:3:49  Total Treatment Time (min): 49  Total Timed Codes (min): 49  1:1 Treatment Time ([facility] only): 49  Visit #: 3 of 10    Treatment Area: Low back pain [M54.5]    SUBJECTIVE  Pain Level (0-10 scale): 2  Any medication changes, allergies to medications, adverse drug reactions, diagnosis change, or new procedure performed?: [x] No    [] Yes (see summary sheet for update)  Subjective functional status/changes:   [] No changes reported  Pt  C/o  Pain across  Back  With walking.     OBJECTIVE    Modality rationale: decrease edema, decrease inflammation, decrease pain and increase tissue extensibility to improve the patients ability to perform ADL    Min Type Additional Details    [] Estim:  []Unatt       []IFC  []Premod                        []Other:  []w/ice   []w/heat  Position:  Location:    [] Estim: []Att    []TENS instruct  []NMES                    []Other:  []w/US   []w/ice   []w/heat  Position:  Location:    []  Traction: [] Cervical       []Lumbar                       [] Prone          []Supine                       []Intermittent   []Continuous Lbs:  [] before manual  [] after manual    []  Ultrasound: []Continuous   [] Pulsed                           []1MHz   []3MHz W/cm2:  Location:    []  Iontophoresis with dexamethasone         Location: [] Take home patch   [] In clinic    []  Ice     []  heat  []  Ice massage  []  Laser   []  Anodyne Position:  Location:    []  Laser with stim  []  Other:  Position:  Location:    []  Vasopneumatic Device Pressure:       [] lo [] med [] hi   Temperature: [] lo [] med [] hi   [x] Skin assessment post-treatment:  [x]intact []redness- no adverse reaction    []redness - adverse reaction:      min []Eval                  []Re-Eval       39 min Therapeutic Exercise:  [] See flow sheet :   Rationale: increase ROM and increase strength to improve the patients ability to perform ADL      min Therapeutic Activity:  []  See flow sheet :   Rationale:   to improve the patients ability to       min Neuromuscular Re-education:  []  See flow sheet :   Rationale:   to improve the patients ability to     10 min Manual Therapy:  STM/DTM  (B) LSP   Rationale: decrease pain, increase ROM, increase tissue extensibility and decrease edema  to perform ADL      min Gait Training:  ___ feet with ___ device on level surfaces with ___ level of assist   Rationale: With   [x] TE   [] TA   [] neuro   [] other: Patient Education: [x] Review HEP    [] Progressed/Changed HEP based on:   [] positioning   [] body mechanics   [] transfers   [] heat/ice application    [] other:      Other Objective/Functional Measures: Added  ISO AB   SB/  Tightness  (B) LSP    Pain Level (0-10 scale) post treatment: 0    ASSESSMENT/Changes in Function: pt  Reported  Benefit  With LTO at  Lateral  Right LE. Minimal pain  At left LE today. Patient will continue to benefit from skilled PT services to address functional mobility deficits, address ROM deficits, address strength deficits, analyze and address soft tissue restrictions, analyze and cue movement patterns and instruct in home and community integration to attain remaining goals. [x]  See Plan of Care  []  See progress note/recertification  []  See Discharge Summary         Progress towards goals / Updated goals:  Short Term Goals: To be met in 5 visits  1. Patient will be independent with HEP in order to maintain gains made with physical therapy.                        Eval: Issued                        Current:  met  7/12/18  2. Patient will report 2/10 pain to aide with increase in activity tolerance and performance within therapy.                         Eval: 4                        Current  3.  Patient will be able to SLS for 10 sec each leg in order to assist with stair negotiation within home.                         Eval: Unable to maintain                         Current:    Long Term Goals: To be accomplished in 10 treatments:  1. Patient will report 0-1/10 pain to aide with increase in activity tolerance and performance within therapy.                         Eval:                         Current:    2. Patient will demonstrate appropriate core control and body mechanics to enable full participation with work demands.                         Eval:                         Current:    3. Patient will report at least 50% improvement to allow ease with ADLs and household chores.                         Eval:                        Current:    4. Patient will be able to ascend/ descend 3 steps pain free with single handrail in order to ensure safety with entering/ exiting the home.                         Eval:                        Current:  5. Patient will increase FOTO score to 54 in order to show increase tolerance to community activities.                          Eval:                        Current:      PLAN  []  Upgrade activities as tolerated     []  Continue plan of care  []  Update interventions per flow sheet       []  Discharge due to:_  []  Other:_      Evelyn Bernard PTA 7/12/2018  3:15 PM    Future Appointments  Date Time Provider Panchito Chau   7/13/2018 8:30 AM Evelyn Bernard, PTA [facility]   7/16/2018 8:00 AM Renate Boss, PT [facility]   7/18/2018 9:30 AM Orien Severs, PT [facility]   7/20/2018 10:00 AM Orien Severs, PT [facility]   7/23/2018 9:30 AM Eitan Muhammad, PT [facility]   7/25/2018 9:30 AM Orien Severs, PT [facility]   7/27/2018 9:30 AM Malik Friend, PTA [facility]   7/30/2018 9:30 AM Malik Friend, PTA [facility]   9/6/2018 8:20 AM Zenia Ramos MD [address] [place] [address]

## 2018-07-13 ENCOUNTER — HOSPITAL ENCOUNTER (OUTPATIENT)
Dept: PHYSICAL THERAPY | Age: 68
Discharge: HOME | End: 2018-07-13
Payer: MEDICARE

## 2018-07-13 PROCEDURE — 97032 APPL MODALITY 1+ESTIM EA 15: CPT

## 2018-07-13 PROCEDURE — 97110 THERAPEUTIC EXERCISES: CPT

## 2018-07-13 NOTE — PROGRESS NOTES
PT DAILY TREATMENT NOTE - [facility]     Patient Name: Kalen Solorio  Date:2018  : 1950  [x]  Patient  Verified  Payor: Diomedes Vega / Plan: [place] MED[place] / Product Type: Medicare /    In time:  8:36  Out time:9:27  Total Treatment Time (min): 50  Total Timed Codes (min): 50  1:1 Treatment Time ( [address] only): 50  Visit #: 4 of 10    Treatment Area: Low back pain [M54.5]    SUBJECTIVE  Pain Level (0-10 scale): 2  Any medication changes, allergies to medications, adverse drug reactions, diagnosis change, or new procedure performed?: [x] No    [] Yes (see summary sheet for update)  Subjective functional status/changes:   [] No changes reported  Pain at my leg  Came  Back  On last  Night but  Not  As  Painful.     OBJECTIVE    Modality rationale: decrease edema, decrease inflammation, decrease pain and increase tissue extensibility to improve the patients ability to perform ADL    Min Type Additional Details    [] Estim:  []Unatt       []IFC  []Premod                        []Other:  []w/ice   []w/heat  Position:  Location:   10 [x] Estim: [x]Att    []TENS instruct  []NMES                    [x]Other:LTO  []w/US   []w/ice   []w/heat  Position:right SL  Location:lateral  Left LE    []  Traction: [] Cervical       []Lumbar                       [] Prone          []Supine                       []Intermittent   []Continuous Lbs:  [] before manual  [] after manual    []  Ultrasound: []Continuous   [] Pulsed                           []1MHz   []3MHz W/cm2:  Location:    []  Iontophoresis with dexamethasone         Location: [] Take home patch   [] In clinic    []  Ice     []  heat  []  Ice massage  []  Laser   []  Anodyne Position:  Location:    []  Laser with stim  []  Other:  Position:  Location:    []  Vasopneumatic Device Pressure:       [] lo [] med [] hi   Temperature: [] lo [] med [] hi   [x] Skin assessment post-treatment:  [x]intact []redness- no adverse reaction    []redness - adverse reaction: min []Eval                  []Re-Eval       40 min Therapeutic Exercise:  [x] See flow sheet :   Rationale: increase ROM and increase strength to improve the patients ability to perform ADL      min Therapeutic Activity:  []  See flow sheet :   Rationale:   to improve the patients ability to       min Neuromuscular Re-education:  []  See flow sheet :   Rationale:   to improve the patients ability to    min Manual Therapy:     Rationale: decrease pain, increase ROM, increase tissue extensibility and decrease edema  to perform ADL      min Gait Training:  ___ feet with ___ device on level surfaces with ___ level of assist   Rationale: With   [x] TE   [] TA   [] neuro   [] other: Patient Education: [x] Review HEP    [] Progressed/Changed HEP based on:   [] positioning   [] body mechanics   [] transfers   [] heat/ice application    [] other:      Other Objective/Functional Measures:  Increased  Rep  Per flow  sheet    Pain Level (0-10 scale) post treatment: 0    ASSESSMENT/Changes in Function: pt  Reported  That  He  Walked  On yesterday  With no pain. Demo  Good  Core  Control on SB  Today. Patient will continue to benefit from skilled PT services to address functional mobility deficits, address ROM deficits, address strength deficits, analyze and address soft tissue restrictions, analyze and cue movement patterns and instruct in home and community integration to attain remaining goals. [x]  See Plan of Care  []  See progress note/recertification  []  See Discharge Summary         Progress towards goals / Updated goals:  Short Term Goals: To be met in 5 visits  1. Patient will be independent with HEP in order to maintain gains made with physical therapy.                        Eval: Issued                        Current:  met  7/12/18  2.  Patient will report 2/10 pain to aide with increase in activity tolerance and performance within therapy.                         Eval: 4                        Current  3. Patient will be able to SLS for 10 sec each leg in order to assist with stair negotiation within home.                         Eval: Unable to maintain                         Current:    Long Term Goals: To be accomplished in 10 treatments:  1. Patient will report 0-1/10 pain to aide with increase in activity tolerance and performance within therapy.                         Eval:                         Current:    2. Patient will demonstrate appropriate core control and body mechanics to enable full participation with work demands.                         Eval:                         Current:    3. Patient will report at least 50% improvement to allow ease with ADLs and household chores.                         Eval:                        Current:    4. Patient will be able to ascend/ descend 3 steps pain free with single handrail in order to ensure safety with entering/ exiting the home.                         Eval:                        Current:  5. Patient will increase FOTO score to 54 in order to show increase tolerance to community activities.                          Eval:                        Current:      PLAN  []  Upgrade activities as tolerated     [x]  Continue plan of care  []  Update interventions per flow sheet       []  Discharge due to:_  [x]  Other:_  REASSESS FOTO NV    [address] [facility], [facility] 7/13/2018  8:42 AM    Future Appointments  Date Time Provider Panchito Chau   7/16/2018 8:00 AM Toya Artis, PT [facility]   7/18/2018 9:30 AM Holger Tong, PT [facility]   7/20/2018 10:00 AM Holger Tong PT [facility]   7/23/2018 9:30 AM Marcela Hughes PT [facility]   7/25/2018 9:30 AM Holger Tong PT [facility]   7/27/2018 9:30 AM [address] [place] [address], [facility]   7/30/2018 9:30 AM [address] [place] [address], [facility]   9/6/2018 8:20 AM Willian Lopez MD [address] [place] [address]

## 2018-07-16 ENCOUNTER — HOSPITAL ENCOUNTER (OUTPATIENT)
Dept: PHYSICAL THERAPY | Age: 68
Discharge: HOME | End: 2018-07-16
Payer: MEDICARE

## 2018-07-16 PROCEDURE — 97032 APPL MODALITY 1+ESTIM EA 15: CPT

## 2018-07-16 PROCEDURE — 97110 THERAPEUTIC EXERCISES: CPT

## 2018-07-16 RX ORDER — DULOXETIN HYDROCHLORIDE 30 MG/1
CAPSULE, DELAYED RELEASE ORAL
Qty: 30 CAP | Refills: 0 | Status: SHIPPED | OUTPATIENT
Start: 2018-07-16 | End: 2018-07-24 | Stop reason: ALTCHOICE

## 2018-07-16 NOTE — PROGRESS NOTES
PT DAILY TREATMENT NOTE - [facility]     Patient Name: Verónica Ramirez  Date:2018  : 1950  [x]  Patient  Verified  Payor: Mansoor Muro / Plan: [place] MEDICARE PART A & B / Product Type: Medicare /    In time:8:03  Out time:8:57  Total Treatment Time (min): 54  Total Timed Codes (min): 54  1:1 Treatment Time ( [address] only): 45   Visit #: 4 of 10    Treatment Area: Low back pain [M54.5]    SUBJECTIVE  Pain Level (0-10 scale): 4  Any medication changes, allergies to medications, adverse drug reactions, diagnosis change, or new procedure performed?: [x] No    [] Yes (see summary sheet for update)  Subjective functional status/changes:   [] No changes reported  States that his back and leg were doing well over the weekend but he woke up about 5AM with pain, now pain is about a 4/10.     OBJECTIVE    Modality rationale: decrease edema, decrease inflammation and decrease pain to improve the patients ability to ease with tolerance to ADLs   Min Type Additional Details    [] Estim:  []Unatt       []IFC  []Premod                        []Other:  []w/ice   []w/heat  Position:  Location:   8 [x] Estim: [x]Att    []TENS instruct  []NMES                    []Other: LTO []w/US   []w/ice   []w/heat  Position: sidelying  Location: right lateral leg    []  Traction: [] Cervical       []Lumbar                       [] Prone          []Supine                       []Intermittent   []Continuous Lbs:  [] before manual  [] after manual    []  Ultrasound: []Continuous   [] Pulsed                           []1MHz   []3MHz W/cm2:  Location:    []  Iontophoresis with dexamethasone         Location: [] Take home patch   [] In clinic    []  Ice     []  heat  []  Ice massage  []  Laser   []  Anodyne Position:  Location:    []  Laser with stim  []  Other:  Position:  Location:    []  Vasopneumatic Device Pressure:       [] lo [] med [] hi   Temperature: [] lo [] med [] hi   [] Skin assessment post-treatment:  []intact []redness- no adverse reaction    []redness - adverse reaction:         47 min Therapeutic Exercise:  [] See flow sheet :   Rationale: increase ROM, increase strength and improve balance to improve the patients ability to perform daily household chores. With   [] TE   [] TA   [] neuro   [] other: Patient Education: [x] Review HEP    [] Progressed/Changed HEP based on:   [] positioning   [] body mechanics   [] transfers   [] heat/ice application    [] other:      Other Objective/Functional Measures: Tolerated increases in therex well with no changes in symptoms. Pain Level (0-10 scale) post treatment: 3    ASSESSMENT/Changes in Function: Patient continues to show improvements with signs/ symptoms however still demonstrates a decrease in strength and an increase in pain with functional activities. Patient will continue to benefit from skilled PT services to modify and progress therapeutic interventions, address functional mobility deficits, address ROM deficits, address strength deficits, analyze and modify body mechanics/ergonomics and assess and modify postural abnormalities to attain remaining goals. [x]  See Plan of Care  []  See progress note/recertification  []  See Discharge Summary         Progress towards goals / Updated goals:  Short Term Goals: To be met in 5 visits  1. Patient will be independent with HEP in order to maintain gains made with physical therapy.                        Eval: Issued                        Current:  met  7/12/18  2. Patient will report 2/10 pain to aide with increase in activity tolerance and performance within therapy.                         Eval: 4                        Current: 4 7/16/18, 2 7/13/18  3. Patient will be able to SLS for 10 sec each leg in order to assist with stair negotiation within home.                         Eval: Unable to maintain                         Current:    Long Term Goals: To be accomplished in 10 treatments:  1.  Patient will report 0-1/10 pain to aide with increase in activity tolerance and performance within therapy.                         Eval:                         Current:    2. Patient will demonstrate appropriate core control and body mechanics to enable full participation with work demands.                         Eval:                         Current:    3. Patient will report at least 50% improvement to allow ease with ADLs and household chores.                         Eval:                        Current:    4. Patient will be able to ascend/ descend 3 steps pain free with single handrail in order to ensure safety with entering/ exiting the home.                         Eval:                        Current:  5. Patient will increase FOTO score to 54 in order to show increase tolerance to community activities.                          Eval:                        Current:      PLAN  []  Upgrade activities as tolerated     [x]  Continue plan of care  []  Update interventions per flow sheet       []  Discharge due to:_  []  Other:_      Emilio Menezes, PT 7/16/2018  7:59 AM    Future Appointments  Date Time Provider Panchito Chau   7/16/2018 8:00 AM Emilio Menezes PT [facility]   7/18/2018 9:30 AM Domi Mcfadden, PT [facility]   7/20/2018 10:00 AM Domi Mcfadden PT [facility]   7/23/2018 9:30 AM Rachel Booker PT [facility]   7/25/2018 9:30 AM Domi Mcfadden PT [facility]   7/27/2018 9:30 AM Ana María Khan, PTA [facility]   7/30/2018 9:30 AM Ana María Khan, PTA [facility]   9/6/2018 8:20 AM Sahil Vilchis MD [address] [place] [address]

## 2018-07-17 DIAGNOSIS — M54.2 CERVICALGIA: ICD-10-CM

## 2018-07-17 DIAGNOSIS — R37 SEXUAL DYSFUNCTION: ICD-10-CM

## 2018-07-17 RX ORDER — HYDROCODONE BITARTRATE AND ACETAMINOPHEN 7.5; 325 MG/1; MG/1
TABLET ORAL
Qty: 150 TAB | Refills: 0 | Status: SHIPPED | OUTPATIENT
Start: 2018-07-17 | End: 2018-07-18 | Stop reason: SDUPTHER

## 2018-07-17 RX ORDER — TESTOSTERONE CYPIONATE 200 MG/ML
200 INJECTION INTRAMUSCULAR ONCE
Qty: 1 ML | Refills: 0 | Status: SHIPPED | OUTPATIENT
Start: 2018-07-17 | End: 2018-07-18 | Stop reason: SDUPTHER

## 2018-07-17 NOTE — TELEPHONE ENCOUNTER
From: Jose Zarate  To: Ezra Solano MD  Sent: 7/17/2018 3:25 PM EDT  Subject: Medication Renewal Request    Original authorizing provider: MD Jose Gutiérrez would like a refill of the following medications:  HYDROcodone-acetaminophen (NORCO) 7.5-325 mg per tablet Ezra Solano MD]  testosterone cypionate (DEPOTESTOTERONE CYPIONATE) 200 mg/mL injection Ezra Solano MD]    Preferred pharmacy: [facility] [id number] [address]    Comment:

## 2018-07-18 ENCOUNTER — HOSPITAL ENCOUNTER (OUTPATIENT)
Dept: PHYSICAL THERAPY | Age: 68
Discharge: HOME | End: 2018-07-18
Payer: MEDICARE

## 2018-07-18 ENCOUNTER — TELEPHONE (OUTPATIENT)
Dept: INTERNAL MEDICINE CLINIC | Age: 68
End: 2018-07-18

## 2018-07-18 DIAGNOSIS — R37 SEXUAL DYSFUNCTION: ICD-10-CM

## 2018-07-18 DIAGNOSIS — M54.2 CERVICALGIA: ICD-10-CM

## 2018-07-18 PROCEDURE — 97110 THERAPEUTIC EXERCISES: CPT

## 2018-07-18 PROCEDURE — 97032 APPL MODALITY 1+ESTIM EA 15: CPT

## 2018-07-18 RX ORDER — TESTOSTERONE CYPIONATE 200 MG/ML
200 INJECTION INTRAMUSCULAR ONCE
Qty: 1 ML | Refills: 0 | Status: SHIPPED | OUTPATIENT
Start: 2018-07-18 | End: 2018-07-18

## 2018-07-18 RX ORDER — HYDROCODONE BITARTRATE AND ACETAMINOPHEN 7.5; 325 MG/1; MG/1
TABLET ORAL
Qty: 150 TAB | Refills: 0 | Status: SHIPPED | OUTPATIENT
Start: 2018-07-18 | End: 2018-08-14 | Stop reason: SDUPTHER

## 2018-07-18 NOTE — TELEPHONE ENCOUNTER
Requested Prescriptions     Pending Prescriptions Disp Refills    testosterone cypionate (DEPOTESTOTERONE CYPIONATE) 200 mg/mL injection 1 mL 0     Si mL by IntraMUSCular route once for 1 dose. Max Daily Amount: 200 mg.    HYDROcodone-acetaminophen (NORCO) 7.5-325 mg per tablet 150 Tab 0     Sig: Take one tablet by mouth every four hours as needed for pain.  Max five tablets daily       Last   Testosterone 18                     Norco   18

## 2018-07-18 NOTE — PROGRESS NOTES
PT DAILY TREATMENT NOTE - [facility]     Patient Name: Andria Burrows  Date:2018  : 1950  [x]  Patient  Verified  Payor: Juvenal Guzman / Plan: [place] MEDICARE PART A & B / Product Type: Medicare /    In time:936  Out time:1017  Total Treatment Time (min): 40  Total Timed Codes (min): 40  1:1 Treatment Time ( [address] only): 40   Visit #: 6 of 10  COUNT CORRECTED    Treatment Area: Low back pain [M54.5]    SUBJECTIVE  Pain Level (0-10 scale): 2  Any medication changes, allergies to medications, adverse drug reactions, diagnosis change, or new procedure performed?: [x] No    [] Yes (see summary sheet for update)  Subjective functional status/changes:   [] No changes reported  Notes benefit of laser.     OBJECTIVE    Modality rationale: decrease inflammation, decrease pain and increase tissue extensibility to improve the patients ability to aid with increase tolerance to ADLS and activities   Min Type Additional Details    [] Estim:  []Unatt       []IFC  []Premod                        []Other:  []w/ice   []w/heat  Position:  Location:   10 [x] Estim: [x]Att    []TENS instruct  []NMES                    [x]Other: LTO []w/US   []w/ice   []w/heat  Position:Left SL  Location:Right lateral lower leg    []  Traction: [] Cervical       []Lumbar                       [] Prone          []Supine                       []Intermittent   []Continuous Lbs:  [] before manual  [] after manual    []  Ultrasound: []Continuous   [] Pulsed                           []1MHz   []3MHz W/cm2:  Location:    []  Iontophoresis with dexamethasone         Location: [] Take home patch   [] In clinic    []  Ice     []  heat  []  Ice massage  []  Laser   []  Anodyne Position:  Location:    []  Laser with stim  []  Other:  Position:  Location:    []  Vasopneumatic Device Pressure:       [] lo [] med [] hi   Temperature: [] lo [] med [] hi   [] Skin assessment post-treatment:  []intact []redness- no adverse reaction    []redness - adverse reaction: min []Eval                  []Re-Eval       30 min Therapeutic Exercise:  [x] See flow sheet :   Rationale: increase ROM, increase strength and improve coordination to improve the patients ability to aid with increase tolerance to ADLS and activities     min Therapeutic Activity:  []  See flow sheet :   Rationale:  to improve the patients ability to       min Neuromuscular Re-education:  []  See flow sheet :   Rationale:   to improve the patients ability to    min Manual Therapy:     Rationale:  to      min Gait Training:  ___ feet with ___ device on level surfaces with ___ level of assist   Rationale: With   [] TE   [] TA   [] neuro   [] other: Patient Education: [x] Review HEP    [] Progressed/Changed HEP based on:   [] positioning   [] body mechanics   [] transfers   [] heat/ice application    [] other:      Other Objective/Functional Measures: VC exercises and tech. Pain Level (0-10 scale) post treatment: <2    ASSESSMENT/Changes in Function: Tolerated well. Patient will continue to benefit from skilled PT services to modify and progress therapeutic interventions, address ROM deficits, address strength deficits, analyze and address soft tissue restrictions, analyze and cue movement patterns, analyze and modify body mechanics/ergonomics, assess and modify postural abnormalities and instruct in home and community integration to attain remaining goals. [x]  See Plan of Care  []  See progress note/recertification  []  See Discharge Summary         Progress towards goals / Updated goals:  Short Term Goals: To be met in 5 visits  1. Patient will be independent with HEP in order to maintain gains made with physical therapy.                        Eval: Issued                        Current:  met  7/12/18 7/18/18  2.  Patient will report 2/10 pain to aide with increase in activity tolerance and performance within therapy.                         Eval: 4                        Current: 4 7/16/18, 2 7/13/18 7/18/18  3. Patient will be able to SLS for 10 sec each leg in order to assist with stair negotiation within home.                         Eval: Unable to maintain                         Current:    Long Term Goals: To be accomplished in 10 treatments:  1. Patient will report 0-1/10 pain to aide with increase in activity tolerance and performance within therapy.                         Eval:                         Current:  2 7/18/18  2. Patient will demonstrate appropriate core control and body mechanics to enable full participation with work demands.                         Eval:                         Current:    3. Patient will report at least 50% improvement to allow ease with ADLs and household chores.                         Eval:                        Current:  no % but notes improvement 7/18/18  4. Patient will be able to ascend/ descend 3 steps pain free with single handrail in order to ensure safety with entering/ exiting the home.                         Eval:                        Current:  5. Patient will increase FOTO score to 54 in order to show increase tolerance to community activities.                          Eval:                        YODATFI:  97 7/16/18       PLAN  [x]  Upgrade activities as tolerated     [x]  Continue plan of care  []  Update interventions per flow sheet       []  Discharge due to:_  []  Other:_      Nila Mcintosh PT 7/18/2018  9:52 AM    Future Appointments  Date Time Provider Panchito Chau   7/20/2018 10:00 AM Nila Mcintosh PT [facility]   7/23/2018 9:30 AM Jeff Gaspar PT [facility]   7/25/2018 9:30 AM Nila Mcintosh PT [facility]   7/27/2018 9:30 AM Junior Forte PTA [facility]   7/30/2018 9:30 AM Junior Forte PTA [facility]   9/6/2018 8:20 AM Arcenio Fountain MD [address] [place] [address]

## 2018-07-20 ENCOUNTER — HOSPITAL ENCOUNTER (OUTPATIENT)
Dept: PHYSICAL THERAPY | Age: 68
Discharge: HOME | End: 2018-07-20
Payer: MEDICARE

## 2018-07-20 PROCEDURE — 97032 APPL MODALITY 1+ESTIM EA 15: CPT

## 2018-07-20 PROCEDURE — 97110 THERAPEUTIC EXERCISES: CPT

## 2018-07-20 NOTE — PROGRESS NOTES
PT DAILY TREATMENT NOTE - [facility]     Patient Name: Carla Crossville  Date:2018  : 1950  [x]  Patient  Verified  Payor: [place] MEDICARE / Plan: [place] MEDICARE PART A & B / Product Type: Medicare /    In time:958  Out time:1054  Total Treatment Time (min): 52  Total Timed Codes (min): 52  1:1 Treatment Time ( [address] only): 38   Visit #: 7 of 10    Treatment Area: Low back pain [M54.5]    SUBJECTIVE  Pain Level (0-10 scale): 3  Any medication changes, allergies to medications, adverse drug reactions, diagnosis change, or new procedure performed?: [x] No    [] Yes (see summary sheet for update)  Subjective functional status/changes:   [] No changes reported  The laser really does help the leg. I had a little fall yesterday coming up the steps with the groceries. I didn't  my left leg high enough and tripped.     OBJECTIVE    Modality rationale: decrease pain and increase tissue extensibility to improve the patients ability to aid with increase tolerance to ALDs and activities   Min Type Additional Details    [] Estim:  []Unatt       []IFC  []Premod                        []Other:  []w/ice   []w/heat  Position:  Location:   10 [x] Estim: [x]Att    []TENS instruct  []NMES                    [x]Other: LTO []w/US   []w/ice   []w/heat  Position:Left SL  Location:Right lower lateral leg    []  Traction: [] Cervical       []Lumbar                       [] Prone          []Supine                       []Intermittent   []Continuous Lbs:  [] before manual  [] after manual    []  Ultrasound: []Continuous   [] Pulsed                           []1MHz   []3MHz W/cm2:  Location:    []  Iontophoresis with dexamethasone         Location: [] Take home patch   [] In clinic    []  Ice     []  heat  []  Ice massage  []  Laser   []  Anodyne Position:  Location:    []  Laser with stim  []  Other:  Position:  Location:    []  Vasopneumatic Device Pressure:       [] lo [] med [] hi   Temperature: [] lo [] med [] hi   [] Skin assessment post-treatment:  []intact []redness- no adverse reaction    []redness - adverse reaction:      min []Eval                  []Re-Eval       42 min Therapeutic Exercise:  [] See flow sheet :   Rationale: increase ROM, increase strength and improve coordination to improve the patients ability to aid with increase tolerance to ADLS and activities     min Therapeutic Activity:  []  See flow sheet :   Rationale:   to improve the patients ability to       min Neuromuscular Re-education:  []  See flow sheet :   Rationale:   to improve the patients ability to      min Manual Therapy:     Rationale:  to      min Gait Training:  ___ feet with ___ device on level surfaces with ___ level of assist   Rationale: With   [] TE   [] TA   [] neuro   [] other: Patient Education: [x] Review HEP    [] Progressed/Changed HEP based on:   [] positioning   [] body mechanics   [] transfers   [] heat/ice application    [] other:      Other Objective/Functional Measures: VC exercises and tech. Discussed plan to increase PREs     Pain Level (0-10 scale) post treatment: <3    ASSESSMENT/Changes in Function: tolerated well. Patient will continue to benefit from skilled PT services to modify and progress therapeutic interventions, address functional mobility deficits, address ROM deficits, address strength deficits, analyze and address soft tissue restrictions, analyze and cue movement patterns, analyze and modify body mechanics/ergonomics, assess and modify postural abnormalities and instruct in home and community integration to attain remaining goals. [x]  See Plan of Care  []  See progress note/recertification  []  See Discharge Summary         Progress towards goals / Updated goals:  Short Term Goals: To be met in 5 visits  1.  Patient will be independent with HEP in order to maintain gains made with physical therapy.                        Eval: Issued                        Current:  met  7/12/18 7/18/18 7/20/18  2. Patient will report 2/10 pain to aide with increase in activity tolerance and performance within therapy.                         Eval: 4                        Current: 4 7/16/18, 2 7/13/18 7/18/18    3 7/20/18  3. Patient will be able to SLS for 10 sec each leg in order to assist with stair negotiation within home.                         Eval: Unable to maintain                         Current:    Long Term Goals: To be accomplished in 10 treatments:  1. Patient will report 0-1/10 pain to aide with increase in activity tolerance and performance within therapy.                         Eval:                         Current:  2 7/18/18   3 7/20/18  2. Patient will demonstrate appropriate core control and body mechanics to enable full participation with work demands.                         Eval:                         Current:    3. Patient will report at least 50% improvement to allow ease with ADLs and household chores.                         Eval:                        Current:  no % but notes improvement 7/18/18  4. Patient will be able to ascend/ descend 3 steps pain free with single handrail in order to ensure safety with entering/ exiting the home.                         Eval:                        Current:  5. Patient will increase FOTO score to 54 in order to show increase tolerance to community activities.                          Eval:                        OTFJHGO:  26 7/16/18  PLAN  [x]  Upgrade activities as tolerated     [x]  Continue plan of care  []  Update interventions per flow sheet       []  Discharge due to:_  [x]  Other:_  Increase PRES to 3#    Arron Barksdale, PT 7/20/2018  10:23 AM    Future Appointments  Date Time Provider Panchito Chau   7/23/2018 9:30 AM Joelle Novak PT [facility]   7/25/2018 9:30 AM Arron Barksdale, PT [facility]   7/27/2018 9:30 AM Horacio Maurice PTA [facility]   7/30/2018 9:30 AM Evelyn Bernard, PTA [facility]   9/6/2018 8:20 AM Binnie Cranker Genie Lantigua MD [address] [place] [address]

## 2018-07-23 ENCOUNTER — APPOINTMENT (OUTPATIENT)
Dept: PHYSICAL THERAPY | Age: 68
End: 2018-07-23
Payer: MEDICARE

## 2018-07-24 ENCOUNTER — OFFICE VISIT (OUTPATIENT)
Dept: INTERNAL MEDICINE CLINIC | Age: 68
End: 2018-07-24

## 2018-07-24 VITALS
HEIGHT: 76 IN | HEART RATE: 64 BPM | DIASTOLIC BLOOD PRESSURE: 60 MMHG | WEIGHT: 236 LBS | OXYGEN SATURATION: 96 % | BODY MASS INDEX: 28.74 KG/M2 | SYSTOLIC BLOOD PRESSURE: 90 MMHG | TEMPERATURE: 98.1 F

## 2018-07-24 DIAGNOSIS — L55.1 SUNBURN, SECOND DEGREE: Primary | ICD-10-CM

## 2018-07-24 RX ORDER — SILVER SULFADIAZINE 10 G/1000G
CREAM TOPICAL DAILY
COMMUNITY
End: 2018-07-24 | Stop reason: SDUPTHER

## 2018-07-24 RX ORDER — CEPHALEXIN 500 MG/1
CAPSULE ORAL
Qty: 30 CAP | Refills: 0 | Status: SHIPPED | OUTPATIENT
Start: 2018-07-24 | End: 2018-08-23

## 2018-07-24 RX ORDER — SILVER SULFADIAZINE 10 G/1000G
CREAM TOPICAL
Qty: 400 G | Refills: 1 | Status: SHIPPED | OUTPATIENT
Start: 2018-07-24 | End: 2018-08-27 | Stop reason: ALTCHOICE

## 2018-07-24 NOTE — PROGRESS NOTES
Niru Tamayo presents today for   Chief Complaint   Patient presents with    Well Male    Sunburn     blisters on feet       Niru Tamayo preferred language for health care discussion is english. h  Is someone accompanying this pt? no    Is the patient using any DME equipment during OV? no    Depression Screening:  PHQ over the last two weeks 3/23/2018   Little interest or pleasure in doing things Several days   Feeling down, depressed, irritable, or hopeless Not at all   Total Score PHQ 2 1       Learning Assessment:  Learning Assessment 5/3/2018   PRIMARY LEARNER Patient   HIGHEST LEVEL OF EDUCATION - PRIMARY LEARNER  -   [facility] -   [address] [place] [address]    NEED -   LEARNER PREFERENCE PRIMARY DEMONSTRATION   ANSWERED BY patient   RELATIONSHIP SELF       Abuse Screening:  Abuse Screening Questionnaire 11/2/2017   Do you ever feel afraid of your partner? N   Are you in a relationship with someone who physically or mentally threatens you? N   Is it safe for you to go home? Y       Fall Risk  Fall Risk Assessment, last 12 mths 7/9/2018   Able to walk? Yes   Fall in past 12 months? No   Fall with injury? -   Number of falls in past 12 months -   Fall Risk Score -       Health Maintenance reviewed and discussed and ordered per Provider. Health Maintenance Due   Topic Date Due    Hepatitis C Screening  1950    GLAUCOMA SCREENING Q2Y  06/22/2014    MEDICARE YEARLY EXAM  06/28/2018   . Pt currently taking Antiplatelet therapy? Yes eliquis    Coordination of Care:  1. Have you been to the ER, urgent care clinic since your last visit?  no  Hospitalized since your last visit? no    2. Have you seen or consulted any other health care providers outside of the [address] [place] [address] since your last visit? no Include any pap smears or colon screening.  no

## 2018-07-25 ENCOUNTER — HOSPITAL ENCOUNTER (OUTPATIENT)
Dept: PHYSICAL THERAPY | Age: 68
Discharge: HOME | End: 2018-07-25
Payer: MEDICARE

## 2018-07-25 PROCEDURE — 97032 APPL MODALITY 1+ESTIM EA 15: CPT

## 2018-07-25 PROCEDURE — 97110 THERAPEUTIC EXERCISES: CPT

## 2018-07-25 RX ORDER — ROPINIROLE 0.5 MG/1
TABLET, FILM COATED ORAL
Qty: 60 TAB | Refills: 0 | Status: SHIPPED | OUTPATIENT
Start: 2018-07-25 | End: 2018-08-23

## 2018-07-25 NOTE — PROGRESS NOTES
PT DAILY TREATMENT NOTE - [facility]     Patient Name: Manny Carty  Date:2018  : 1950  [x]  Patient  Verified  Payor: [place] MEDICARE / Plan: VA MEDICARE PART A & B / Product Type: Medicare /    In time:939  Out time:1021  Total Treatment Time (min): 41  Total Timed Codes (min): 41  1:1 Treatment Time ( [address] only): 25   Visit #: 8 of 10    Treatment Area: Low back pain [M54.5]    SUBJECTIVE  Pain Level (0-10 scale): 3  Any medication changes, allergies to medications, adverse drug reactions, diagnosis change, or new procedure performed?: [x] No    [] Yes (see summary sheet for update)  Subjective functional status/changes:   [] No changes reported  The scab came off my knee. OBJECTIVE    Modality rationale: decrease pain and increase tissue extensibility to improve the patients ability to aid with increase tolerance to ADLs and activities. Min Type Additional Details    [] Estim:  []Unatt       []IFC  []Premod                        []Other:  []w/ice   []w/heat  Position:  Location:   10 [x] Estim: [x]Att    []TENS instruct  []NMES                    [x]Other: LTO  []w/US   []w/ice   []w/heat  Position:SL  Location:Right lateral lower leg.      []  Traction: [] Cervical       []Lumbar                       [] Prone          []Supine                       []Intermittent   []Continuous Lbs:  [] before manual  [] after manual    []  Ultrasound: []Continuous   [] Pulsed                           []1MHz   []3MHz W/cm2:  Location:    []  Iontophoresis with dexamethasone         Location: [] Take home patch   [] In clinic    []  Ice     []  heat  []  Ice massage  []  Laser   []  Anodyne Position:  Location:    []  Laser with stim  []  Other:  Position:  Location:    []  Vasopneumatic Device Pressure:       [] lo [] med [] hi   Temperature: [] lo [] med [] hi   [] Skin assessment post-treatment:  []intact []redness- no adverse reaction    []redness - adverse reaction:      min []Eval []Re-Eval       31 min Therapeutic Exercise:  [x] See flow sheet :   Rationale: increase ROM, increase strength, improve coordination, improve balance and increase proprioception to improve the patients ability to aid with increase tolerance to activities and ADLS     min Therapeutic Activity:  []  See flow sheet :   Rationale:  to improve the patients ability to        min Neuromuscular Re-education:  []  See flow sheet :   Rationale:   to improve the patients ability to      min Manual Therapy:     Rationale:  to      min Gait Training:  ___ feet with ___ device on level surfaces with ___ level of assist   Rationale: With   [] TE   [] TA   [] neuro   [] other: Patient Education: [x] Review HEP    [] Progressed/Changed HEP based on:   [] positioning   [] body mechanics   [] transfers   [] heat/ice application    [] other:      Other Objective/Functional Measures: VC exercises and tech. 3 #  PRES , 8 inch step up. Pain Level (0-10 scale) post treatment: <3    ASSESSMENT/Changes in Function: tolerated well. Patient will continue to benefit from skilled PT services to modify and progress therapeutic interventions, address functional mobility deficits, address ROM deficits, address strength deficits, analyze and address soft tissue restrictions, analyze and cue movement patterns, analyze and modify body mechanics/ergonomics, assess and modify postural abnormalities and instruct in home and community integration to attain remaining goals. [x]  See Plan of Care  []  See progress note/recertification  []  See Discharge Summary         Progress towards goals / Updated goals:     Short Term Goals: To be met in 5 visits  1. Patient will be independent with HEP in order to maintain gains made with physical therapy.                        Eval: Issued                        Current:  met  7/12/18 7/18/18 7/20/18 7/25/18  2.  Patient will report 2/10 pain to aide with increase in activity tolerance and performance within therapy.                         Eval: 4                        Current: 4 7/16/18, 2 7/13/18 7/18/18    3 7/20/18  3 7/25/18  3. Patient will be able to SLS for 10 sec each leg in order to assist with stair negotiation within home.                         Eval: Unable to maintain                         Current:    Long Term Goals: To be accomplished in 10 treatments:  1. Patient will report 0-1/10 pain to aide with increase in activity tolerance and performance within therapy.                         Eval:                         Current:  2 7/18/18   3 7/20/18    3 7/25/18  2. Patient will demonstrate appropriate core control and body mechanics to enable full participation with work demands.                         Eval:                         Current:    3. Patient will report at least 50% improvement to allow ease with ADLs and household chores.                         Eval:                        Current:  no % but notes improvement 7/18/18 7/25/18  4. Patient will be able to ascend/ descend 3 steps pain free with single handrail in order to ensure safety with entering/ exiting the home.                         Eval:                        Current:  5. Patient will increase FOTO score to 54 in order to show increase tolerance to community activities.                          Eval:                        RGSORYX:  89 7/16/18  PLAN  [x]  Upgrade activities as tolerated     [x]  Continue plan of care  []  Update interventions per flow sheet       []  Discharge due to:_  []  Other:_      Radha Garcia, PT 7/25/2018  9:54 AM    Future Appointments  Date Time Provider Panchito Chau   7/27/2018 9:30 AM Ethan Soto PTA [facility]   7/27/2018 1:00 PM Heather Osman MD [address]   7/30/2018 9:30 AM Ethan Soto PTA [facility]   9/6/2018 8:20 AM Andrae Daly MD [address] [place] [address]
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
## 2018-07-27 ENCOUNTER — OFFICE VISIT (OUTPATIENT)
Dept: INTERNAL MEDICINE CLINIC | Age: 68
End: 2018-07-27

## 2018-07-27 ENCOUNTER — HOSPITAL ENCOUNTER (OUTPATIENT)
Dept: PHYSICAL THERAPY | Age: 68
Discharge: HOME | End: 2018-07-27
Payer: MEDICARE

## 2018-07-27 VITALS
SYSTOLIC BLOOD PRESSURE: 118 MMHG | HEART RATE: 90 BPM | HEIGHT: 76 IN | TEMPERATURE: 97.9 F | OXYGEN SATURATION: 97 % | DIASTOLIC BLOOD PRESSURE: 72 MMHG

## 2018-07-27 DIAGNOSIS — L55.1 SUNBURN, SECOND DEGREE: Primary | ICD-10-CM

## 2018-07-27 PROCEDURE — 97110 THERAPEUTIC EXERCISES: CPT

## 2018-07-27 PROCEDURE — 97032 APPL MODALITY 1+ESTIM EA 15: CPT

## 2018-07-27 NOTE — PROGRESS NOTES
PT DAILY TREATMENT NOTE - [facility]     Patient Name: Justo Alonzo  Date:2018  : 1950  [x]  Patient  Verified  Payor: Kathe Modi / Plan: [place] & B / Product Type: Medicare /    In time:9:35  Out time:10:25  Total Treatment Time (min): 43  Total Timed Codes (min): 43  1:1 Treatment Time ( [address] only): 37  Visit #: 9 of 10    Treatment Area: Low back pain [M54.5]    SUBJECTIVE  Pain Level (0-10 scale): .5  Any medication changes, allergies to medications, adverse drug reactions, diagnosis change, or new procedure performed?: [x] No    [] Yes (see summary sheet for update)  Subjective functional status/changes:   [] No changes reported   Pain has  Lessen  Since  Tasneem been coming  Here.     OBJECTIVE    Modality rationale: decrease edema, decrease inflammation, decrease pain and increase tissue extensibility to improve the patients ability to perform ADL   Min Type Additional Details    [] Estim:  []Unatt       []IFC  []Premod                        []Other:  []w/ice   []w/heat  Position:  Location:   8 [x] Estim: [x]Att    []TENS instruct  []NMES                    [x]Other: LTO []w/US   []w/ice   []w/heat  Position:left SL  Location:right lateral LE    []  Traction: [] Cervical       []Lumbar                       [] Prone          []Supine                       []Intermittent   []Continuous Lbs:  [] before manual  [] after manual    []  Ultrasound: []Continuous   [] Pulsed                           []1MHz   []3MHz W/cm2:  Location:    []  Iontophoresis with dexamethasone         Location: [] Take home patch   [] In clinic    []  Ice     []  heat  []  Ice massage  []  Laser   []  Anodyne Position:  Location:    []  Laser with stim  []  Other:  Position:  Location:    []  Vasopneumatic Device Pressure:       [] lo [] med [] hi   Temperature: [] lo [] med [] hi   [x] Skin assessment post-treatment:  [x]intact []redness- no adverse reaction    []redness - adverse reaction:      min []Eval []Re-Eval       35 min Therapeutic Exercise:  [x] See flow sheet :   Rationale: increase ROM and increase strength to improve the patients ability to perform ADL      min Therapeutic Activity:  []  See flow sheet :   Rationale:   to improve the patients ability to       min Neuromuscular Re-education:  []  See flow sheet :   Rationale:   to improve the patients ability to      min Manual Therapy:    Rationale: decrease pain, increase ROM, increase tissue extensibility and decrease edema  to perform ADL      min Gait Training:  ___ feet with ___ device on level surfaces with ___ level of assist   Rationale: With   [x] TE   [] TA   [] neuro   [] other: Patient Education: [x] Review HEP    [] Progressed/Changed HEP based on:   [] positioning   [] body mechanics   [] transfers   [] heat/ice application    [] other:      Other Objective/Functional Measures: Added  3#  PRE's    Pain Level (0-10 scale) post treatment: 0    ASSESSMENT/Changes in Function: Completed  Each there ex. Patient will continue to benefit from skilled PT services to address functional mobility deficits, address ROM deficits, address strength deficits, analyze and address soft tissue restrictions, analyze and cue movement patterns and instruct in home and community integration to attain remaining goals. [x]  See Plan of Care  []  See progress note/recertification  []  See Discharge Summary         Progress towards goals / Updated goals:  Short Term Goals: To be met in 5 visits  1. Patient will be independent with HEP in order to maintain gains made with physical therapy.                        Eval: Issued                        Current:  met  7/12/18 7/18/18 7/20/18 7/25/18  2. Patient will report 2/10 pain to aide with increase in activity tolerance and performance within therapy.                         Eval: 4                        Current: 4 7/16/18, 2 7/13/18 7/18/18    3 7/20/18  3 7/25/18  3.  Patient will be able to SLS for 10 sec each leg in order to assist with stair negotiation within home.                         Eval: Unable to maintain                         Current:    Long Term Goals: To be accomplished in 10 treatments:  1. Patient will report 0-1/10 pain to aide with increase in activity tolerance and performance within therapy.                         Eval:                         Current:  2 7/18/18   3 7/20/18    3 7/25/18  2. Patient will demonstrate appropriate core control and body mechanics to enable full participation with work demands.                         Eval:                         Current:    3. Patient will report at least 50% improvement to allow ease with ADLs and household chores.                         Eval:                        Current:  no % but notes improvement 7/18/18 7/25/18  4. Patient will be able to ascend/ descend 3 steps pain free with single handrail in order to ensure safety with entering/ exiting the home.                         Eval:                        Current:  5. Patient will increase FOTO score to 54 in order to show increase tolerance to community activities.                          Eval:                        [id number]:  05 7/16/18    PLAN  []  Upgrade activities as tolerated     []  Continue plan of care  []  Update interventions per flow sheet       []  Discharge due to:_  [x]  Other:_  REASSESS GOALS ALEX Bernard PTA 7/27/2018  9:50 AM    Future Appointments  Date Time Provider Panchito Chau   7/27/2018 1:00 PM Toni Denver, MD [address]   7/30/2018 9:30 AM Bria Molina PTA [id number]   9/6/2018 8:20 AM Juan Jose Olsen MD [address] [place] [address]

## 2018-07-30 ENCOUNTER — HOSPITAL ENCOUNTER (OUTPATIENT)
Dept: PHYSICAL THERAPY | Age: 68
Discharge: HOME | End: 2018-07-30
Payer: MEDICARE

## 2018-07-30 PROCEDURE — 97110 THERAPEUTIC EXERCISES: CPT

## 2018-07-30 NOTE — PROGRESS NOTES
PT DAILY TREATMENT NOTE - [facility]     Patient Name: Nestor Pfeiffer  Date:2018  : 1950  [x]  Patient  Verified  Payor: Alexei Clarke / Plan: [place] MEDICARE PART A & B / Product Type: Medicare /    In time: 9:36  Out time:10:30  Total Treatment Time (min): 50  Total Timed Codes (min): 38  1:1 Treatment Time ( [address] only): 38  Visit #: 10  of 10    Treatment Area: Low back pain [M54.5]    SUBJECTIVE  Pain Level (0-10 scale): .5  Any medication changes, allergies to medications, adverse drug reactions, diagnosis change, or new procedure performed?: [x] No    [] Yes (see summary sheet for update)  Subjective functional status/changes:   [] No changes reported  Feeling  Ok.   OBJECTIVE    Modality rationale: decrease edema, decrease inflammation, decrease pain and increase tissue extensibility to improve the patients ability to perform ADL    Min Type Additional Details    [] Estim:  []Unatt       []IFC  []Premod                        []Other:  []w/ice   []w/heat  Position:  Location:    [] Estim: []Att    []TENS instruct  []NMES                    []Other:  []w/US   []w/ice   []w/heat  Position:  Location:    []  Traction: [] Cervical       []Lumbar                       [] Prone          []Supine                       []Intermittent   []Continuous Lbs:  [] before manual  [] after manual    []  Ultrasound: []Continuous   [] Pulsed                           []1MHz   []3MHz W/cm2:  Location:    []  Iontophoresis with dexamethasone         Location: [] Take home patch   [] In clinic    []  Ice     []  heat  []  Ice massage  []  Laser   []  Anodyne Position:  Location:    []  Laser with stim  []  Other:  Position:  Location:    []  Vasopneumatic Device Pressure:       [] lo [] med [] hi   Temperature: [] lo [] med [] hi   [x] Skin assessment post-treatment:  [x]intact []redness- no adverse reaction    []redness - adverse reaction:      min []Eval                  []Re-Eval       50  1:1  38 min Therapeutic Exercise:  [x] See flow sheet :   Rationale: increase ROM and increase strength to improve the patients ability to perform ADL     min Therapeutic Activity:  []  See flow sheet :   Rationale:   to improve the patients ability to       min Neuromuscular Re-education:  []  See flow sheet :   Rationale:   to improve the patients ability to      min Manual Therapy:     Rationale: decrease pain, increase ROM, increase tissue extensibility and decrease edema  to perform ADL      min Gait Training:  ___ feet with ___ device on level surfaces with ___ level of assist   Rationale: With   [x] TE   [] TA   [] neuro   [] other: Patient Education: [x] Review HEP    [] Progressed/Changed HEP based on:   [] positioning   [] body mechanics   [] transfers   [] heat/ice application    [x] other: REASSESS  GOALS/DC  PROCEDURE     Other Objective/Functional Measures: FOTO: 62    Pain Level (0-10 scale) post treatment: 0    ASSESSMENT/Changes in Function:  Mr. Shamir Ngo  Reports  70%  Improvement. Pain  On average 2/10. FOTO has improved. Patient will continue to benefit from skilled PT services to address functional mobility deficits, address ROM deficits, address strength deficits, analyze and address soft tissue restrictions, analyze and cue movement patterns and instruct in home and community integration to attain remaining goals. [x]  See Plan of Care  []  See progress note/recertification  []  See Discharge Summary         Progress towards goals / Updated goals:  Short Term Goals: To be met in 5 visits  1. Patient will be independent with HEP in order to maintain gains made with physical therapy.                        Eval: Issued                        Current:  met  7/12/18 7/18/18 7/20/18 7/25/18  2.  Patient will report 2/10 pain to aide with increase in activity tolerance and performance within therapy.                         Eval: 4                        Current: 4 7/16/18, 2 7/13/18 7/18/18    3 7/20/18  3 7/25/18     2     7/30/18  3. Patient will be able to SLS for 10 sec each leg in order to assist with stair negotiation within home.                         Eval: Unable to maintain                         Current:  unable  7/30/18  Long Term Goals: To be accomplished in 10 treatments:  1. Patient will report 0-1/10 pain to aide with increase in activity tolerance and performance within therapy.                         Eval:                         Current:  2 7/18/18   3 7/20/18    3 7/25/18     2   7/30/18  2. Patient will demonstrate appropriate core control and body mechanics to enable full participation with work demands.                         Eval:                         Current:  met  7/30/18  3. Patient will report at least 50% improvement to allow ease with ADLs and household chores.                         Eval:                        Current:  70 % but notes improvement 7/30/18  4. Patient will be able to ascend/ descend 3 steps pain free with single handrail in order to ensure safety with entering/ exiting the home.                         Eval:                        Current:  Met   7/30/18   No pain   No  handrails  5. Patient will increase FOTO score to 54 in order to show increase tolerance to community activities.                          Eval:                        [id number]:  49  7/30/18       PLAN  []  Upgrade activities as tolerated     []  Continue plan of care  []  Update interventions per flow sheet       []  Discharge due to:_  [x]  Other:_ Jamaal 860, PTA 7/30/2018  9:43 AM    Future Appointments  Date Time Provider Panchito Chau   7/31/2018 12:30 PM Nadine Colvin  [facility] [place]   9/6/2018 8:20 AM Annamarie Negrete  [facility]

## 2018-07-30 NOTE — PROGRESS NOTES
The patient presents to the office today with the chief complaint of second degree sunburns    HPI    The patient is on Slivadene and Keflex. He feels less swelling in his feet and more movement of his feet. The redness and pain persists      Review of Systems   Respiratory: Negative for shortness of breath. Cardiovascular: Negative for chest pain and leg swelling. Skin:        Burns on feet       Allergies   Allergen Reactions    Levofloxacin Nausea Only     Severe nausea and dizziness    Sulfa (Sulfonamide Antibiotics) Unknown (comments)     Nausea, aching all over       Current Outpatient Prescriptions   Medication Sig Dispense Refill    rOPINIRole (REQUIP) 0.5 mg tablet take 1 tablet by mouth twice a day 60 Tab 0    silver sulfADIAZINE (SILVADENE) 1 % topical cream Apply daily to area of burns 400 g 1    cephALEXin (KEFLEX) 500 mg capsule 1 cap three times per day 30 Cap 0    HYDROcodone-acetaminophen (NORCO) 7.5-325 mg per tablet Take one tablet by mouth every four hours as needed for pain. Max five tablets daily 150 Tab 0    tamsulosin (FLOMAX) 0.4 mg capsule take 1 capsule by mouth once daily 90 Cap 0    amLODIPine-Olmesartan 5-40 mg tab take 1 tablet by mouth once daily 30 Tab 0    hydroCHLOROthiazide (HYDRODIURIL) 25 mg tablet take 1 tablet by mouth once daily 30 Tab 1    montelukast (SINGULAIR) 10 mg tablet TAKE 1 TABLET BY MOUTH EVERY DAY 90 Tab 3    guanFACINE IR (TENEX) 1 mg IR tablet take 1 tablet by mouth once daily 30 Tab 2    labetalol (NORMODYNE) 300 mg tablet take 1 tablet by mouth twice a day 60 Tab 2    SUMAtriptan (IMITREX) 100 mg tablet TAKE 1 TABLET BY MOUTH AT ONSET OF HEADACHE, MAY REPEAT 2 HOURS LATER. (MAX 2 PILLS IN 24 HOURS) 12 Tab 1    ELIQUIS 5 mg tablet take 1 tablet by mouth twice a day 60 Tab 1    cyclobenzaprine (FLEXERIL) 10 mg tablet Take 0.5 Tabs by mouth three (3) times daily as needed for Muscle Spasm(s).  45 Tab 1    oxyCODONE-acetaminophen (PERCOCET 10)  mg per tablet Take 1 Tab by mouth every four (4) hours as needed. Max Daily Amount: 6 Tabs. 40 Tab 0    rOPINIRole (REQUIP) 0.5 mg tablet take 1 tablet by mouth twice a day 60 Tab 0    topiramate (TOPAMAX) 50 mg tablet TAKE 1 TABLET BY MOUTH TWICE A DAY TO PREVENT MIGRAINE 60 Tab 5    diclofenac (VOLTAREN) 1 % gel Apply  to affected area four (4) times daily. 100 g 1    promethazine (PHENERGAN) 25 mg tablet take 1 tablet by mouth every 6 hours if needed for nausea 30 Tab 0    DULoxetine (CYMBALTA) 60 mg capsule take 1 capsule by mouth once daily 90 Cap 1    fluticasone (FLONASE) 50 mcg/actuation nasal spray 2 Sprays by Both Nostrils route two (2) times a day.  ACETAMINOPHEN/DIPHENHYDRAMINE (TYLENOL PM PO) Take 2 Tabs by mouth nightly.  naloxone (NARCAN) 4 mg/actuation nasal spray Use 1 spray intranasally into 1 nostril if needed. May repeat in 4 minutes if needed. 1 Each 1    pantoprazole (PROTONIX) 40 mg tablet Take 1 Tab by mouth daily. 90 Tab 3    Nilotinib (TASIGNA) 150 mg cap Take 300 mg by mouth two (2) times a day. Indications: Leukemia         Past Medical History:   Diagnosis Date    Abdominal pain, unspecified site     Acute reaction to stress     Allergic rhinitis due to pollen     Arrhythmia     afib    Arthritis     Back injury     BPH (benign prostatic hypertrophy)     Calculus of ureter     Cancer (HCC)     history of Leukemia, in remission    Carotid duplex 06/17/2015    Mild < 50% bilateral ICA stenosis.  Dizziness and giddiness     Esophageal reflux     Essential hypertension     GERD (gastroesophageal reflux disease)     Headache(784.0)     History of echocardiogram 08/13/2015    EF 55-60%. No WMA. Mild-mod LVH. Gr 1 DDfx. No evidence of intracardiac shunt.   Mild AI.      Hx: UTI (urinary tract infection)     Hypertension     Hypogonadism in male     Kidney stones     Migraine     Neck injury     Polycythemia     Polycythemia, secondary  Sciatica     Unspecified retinal detachment     Unspecified sleep apnea     resolved since gastric bypass    Vision decreased     Weight loss        Past Surgical History:   Procedure Laterality Date    ABDOMEN SURGERY PROC UNLISTED  2012    Hernia    HX CATARACT REMOVAL Left     HX CERVICAL FUSION  4/4/2016    Cervical Spine Fusion    HX CHOLECYSTECTOMY      HX GASTRIC BYPASS  2006    HX KNEE ARTHROSCOPY      both knees (right knee twice, left once)    HX ORTHOPAEDIC  1981, 1995    lumbar laminectomy    HX OTHER SURGICAL      Two back surgeries    HX RETINAL DETACHMENT REPAIR Left        Social History     Social History    Marital status:      Spouse name: N/A    Number of children: N/A    Years of education: N/A     Occupational History    Not on file. Social History Main Topics    Smoking status: Former Smoker     Quit date: 2/22/1981    Smokeless tobacco: Never Used    Alcohol use Yes      Comment: occasionally     Drug use: No    Sexual activity: Yes     Partners: Female     Other Topics Concern    Not on file     Social History Narrative    Patient lives with his wife in [place], he had 3 children by unfortunately lost 2 of them. He enjoys taking care of his yard and plays with the grandchildren.        Patient does have an advanced directive on file    Visit Vitals    /72 (BP 1 Location: Left arm, BP Patient Position: Sitting)    Pulse 90    Temp 97.9 °F (36.6 °C) (Tympanic)    Ht 6' 4\" (1.93 m)    SpO2 97%       Physical Exam   Skin:            BMI:  OK    Admission on 06/04/2018, Discharged on 06/05/2018   Component Date Value Ref Range Status    MRSA PCR SCREEN 06/04/2018 NEGATIVE  NEGATIVE   Final    ABO/Rh(D) 06/04/2018 AB Rh Positive    Final    Antibody screen 06/04/2018 NEG    Final    Glucose (POC) 06/04/2018 123* 65 - 105 mg/dL Final    Comment: Notified Nurse  : [id number]      Sodium 06/05/2018 142  136 - 145 mEq/L Final    Potassium 06/05/2018 4.5  3.5 - 5.1 mEq/L Final    Chloride 06/05/2018 112* 98 - 107 mEq/L Final    CO2 06/05/2018 22  21 - 32 mEq/L Final    Glucose 06/05/2018 132* 74 - 106 mg/dl Final    BUN 06/05/2018 29* 7 - 25 mg/dl Final    Creatinine 06/05/2018 1.3  0.6 - 1.3 mg/dl Final    GFR est AA 06/05/2018 >60    Final    Comment: THE [facility] STATES THAT THE MDRD STUDY EQUATION SHOULD ONLY BE USED ON  INDIVIDUALS 18 OR OLDER. THE REPORT ALSO NOTES THAT THE MDRD STUDY EQUATION HAS NOT BEEN  VALIDATED FOR USE WITH THE ELDERLY (OVER 79YEARS OF AGE), PREGNANT WOMEN, PATIENTS WITH SERIOUS  COMORBID CONDITIONS, OR PERSONS WITH EXTREMES OF BODY SIZE, MUSCLE MASS, OR NUTRITIONAL STATUS. APPLICATION OF THE EQUATION TO THESE PATIENT GROUPS MAY LEAD TO ERRORS IN GFR ESTIMATION. GFR  ESTIMATING EQUATIONS HAVE POORER AGREEMENT WITH MEASURED GFR FOR ILL HOSPITALIZED PATIENTS AND  FOR PEOPLE WITH NEAR NORMAL KIDNEY FUNCTION THAN FOR SUBJECTS IN THE MDRD STUDY. VALIDATION  STUDIES ARE IN PROGRESS TO EVALUATE THE MDRD STUDY EQUATION FOR ADDITIONAL ETHNIC GROUPS, THE  ELDERLY, VARIOUS DISEASE CONDITIONS, AND PEOPLE WITH NORMAL KIDNEY FUNCTION.   GFRA----REFERS TO   GFRO---REFERS TO OTHER RACES  REFERENCES AVAILABLE UPON REQUEST.      GFR est non-AA 06/05/2018 58    Final    Calcium 06/05/2018 8.9  8.5 - 10.1 mg/dl Final    Anion gap 06/05/2018 8  5 - 15 mmol/L Final   Hospital Outpatient Visit on 05/25/2018   Component Date Value Ref Range Status    Prothrombin time 05/25/2018 12.5  11.5 - 15.2 sec Final    INR 05/25/2018 1.0  0.8 - 1.2   Final    Comment:            INR Therapeutic Ranges         (on stable oral anticoagulant):     INDICATION                INR  DVT/PE/Atrial Fib          2.0-3.0  MI/Mechanical Heart Valve  2.5-3.5      aPTT 05/25/2018 31.7  23.0 - 36.4 SEC Final    WBC 05/25/2018 7.6  4.6 - 13.2 K/uL Final    RBC 05/25/2018 4.42* 4.70 - 5.50 M/uL Final    HGB 05/25/2018 12.0* 13.0 - 16.0 g/dL Final    HCT 05/25/2018 35.5* 36.0 - 48.0 % Final    MCV 05/25/2018 80.3  74.0 - 97.0 FL Final    MCH 05/25/2018 27.1  24.0 - 34.0 PG Final    MCHC 05/25/2018 33.8  31.0 - 37.0 g/dL Final    RDW 05/25/2018 17.1* 11.6 - 14.5 % Final    PLATELET 58/80/9106 872  135 - 420 K/uL Final    MPV 05/25/2018 11.6  9.2 - 11.8 FL Final    NEUTROPHILS 05/25/2018 32* 40 - 73 % Final    LYMPHOCYTES 05/25/2018 56* 21 - 52 % Final    MONOCYTES 05/25/2018 8  3 - 10 % Final    EOSINOPHILS 05/25/2018 3  0 - 5 % Final    BASOPHILS 05/25/2018 1  0 - 2 % Final    ABS. NEUTROPHILS 05/25/2018 2.4  1.8 - 8.0 K/UL Final    ABS. LYMPHOCYTES 05/25/2018 4.3* 0.9 - 3.6 K/UL Final    ABS. MONOCYTES 05/25/2018 0.6  0.05 - 1.2 K/UL Final    ABS. EOSINOPHILS 05/25/2018 0.2  0.0 - 0.4 K/UL Final    ABS. BASOPHILS 05/25/2018 0.0  0.0 - 0.1 K/UL Final    DF 05/25/2018 AUTOMATED    Final    Sodium 05/25/2018 142  136 - 145 mmol/L Final    Potassium 05/25/2018 4.4  3.5 - 5.5 mmol/L Final    Chloride 05/25/2018 111* 100 - 108 mmol/L Final    CO2 05/25/2018 22  21 - 32 mmol/L Final    Anion gap 05/25/2018 9  3.0 - 18 mmol/L Final    Glucose 05/25/2018 83  74 - 99 mg/dL Final    BUN 05/25/2018 29* 7.0 - 18 MG/DL Final    Creatinine 05/25/2018 1.66* 0.6 - 1.3 MG/DL Final    BUN/Creatinine ratio 05/25/2018 17  12 - 20   Final    GFR est AA 05/25/2018 50* >60 ml/min/1.73m2 Final    GFR est non-AA 05/25/2018 41* >60 ml/min/1.73m2 Final    Comment: (NOTE)  Estimated GFR is calculated using the Modification of Diet in Renal   Disease (MDRD) Study equation, reported for both  Americans   (GFRAA) and non- Americans (GFRNA), and normalized to 1.73m2   body surface area. The physician must decide which value applies to   the patient. The MDRD study equation should only be used in   individuals age 25 or older.  It has not been validated for the   following: pregnant women, patients with serious comorbid conditions,   or on certain medications, or persons with extremes of body size,   muscle mass, or nutritional status.  Calcium 05/25/2018 8.6  8.5 - 10.1 MG/DL Final    Bilirubin, total 05/25/2018 0.6  0.2 - 1.0 MG/DL Final    ALT (SGPT) 05/25/2018 19  16 - 61 U/L Final    AST (SGOT) 05/25/2018 13* 15 - 37 U/L Final    Alk. phosphatase 05/25/2018 68  45 - 117 U/L Final    Protein, total 05/25/2018 6.8  6.4 - 8.2 g/dL Final    Albumin 05/25/2018 3.7  3.4 - 5.0 g/dL Final    Globulin 05/25/2018 3.1  2.0 - 4.0 g/dL Final    A-G Ratio 05/25/2018 1.2  0.8 - 1.7   Final    Color 05/25/2018 YELLOW    Final    Appearance 05/25/2018 CLEAR    Final    Specific gravity 05/25/2018 1.015  1.005 - 1.030   Final    pH (UA) 05/25/2018 5.5  5.0 - 8.0   Final    Protein 05/25/2018 NEGATIVE   NEG mg/dL Final    Glucose 05/25/2018 NEGATIVE   NEG mg/dL Final    Ketone 05/25/2018 NEGATIVE   NEG mg/dL Final    Bilirubin 05/25/2018 NEGATIVE   NEG   Final    Blood 05/25/2018 NEGATIVE   NEG   Final    Urobilinogen 05/25/2018 1.0  0.2 - 1.0 EU/dL Final    Nitrites 05/25/2018 NEGATIVE   NEG   Final    Leukocyte Esterase 05/25/2018 SMALL* NEG   Final    Crossmatch Expiration 05/25/2018 06/07/2018   Final    ABO/Rh(D) 05/25/2018 AB POSITIVE   Final    Antibody screen 05/25/2018 NEG   Final    WBC 05/25/2018 4 to 10  0 - 4 /hpf Final    RBC 05/25/2018 0 to 3  0 - 5 /hpf Final    Epithelial cells 05/25/2018 FEW  0 - 5 /lpf Final    Bacteria 05/25/2018 1+* NEG /hpf Final       .No results found for any visits on 07/27/18. Assessment / Plan      ICD-10-CM ICD-9-CM    1. Sunburn, second degree L55.1 692.76        he was advised to continue his maintenance medications for another week  Recheck in one week  he was advised to continue his maintenance medications        Follow-up Disposition:  Return in about 1 week (around 8/3/2018).     I asked Jean Carlos Record if he has any questions and I answered the questions.   Coit Comment states that he understands the treatment plan and agrees with the treatment plan

## 2018-08-02 RX ORDER — AMLODIPINE AND OLMESARTAN MEDOXOMIL 5; 40 MG/1; MG/1
TABLET ORAL
Qty: 30 TAB | Refills: 0 | Status: SHIPPED | OUTPATIENT
Start: 2018-08-02 | End: 2018-08-27 | Stop reason: ALTCHOICE

## 2018-08-03 ENCOUNTER — OFFICE VISIT (OUTPATIENT)
Dept: INTERNAL MEDICINE CLINIC | Age: 68
End: 2018-08-03

## 2018-08-03 VITALS
DIASTOLIC BLOOD PRESSURE: 88 MMHG | HEART RATE: 67 BPM | OXYGEN SATURATION: 98 % | HEIGHT: 76 IN | SYSTOLIC BLOOD PRESSURE: 128 MMHG | TEMPERATURE: 97.9 F | RESPIRATION RATE: 16 BRPM

## 2018-08-03 DIAGNOSIS — L55.1 SUNBURN, SECOND DEGREE: ICD-10-CM

## 2018-08-03 DIAGNOSIS — Z00.00 MEDICARE ANNUAL WELLNESS VISIT, SUBSEQUENT: Primary | ICD-10-CM
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
## 2018-08-03 NOTE — PATIENT INSTRUCTIONS
Medicare Wellness Visit, Male The best way to improve and maintain good health is to have a healthy lifestyle by eating a well-balanced diet, exercising regularly, limiting alcohol and stopping smoking. Regular visits with your physician or non-physician health care provider also support your good health. Preventive screening tests can find health problems before they become diseases or illnesses. Here is a list of your current Health Maintenance items with a due date: 
Health Maintenance Topic Date Due  
 Hepatitis C Screening  1950  GLAUCOMA SCREENING Q2Y  06/22/2014  Influenza Age 5 to Adult  08/01/2018  MEDICARE YEARLY EXAM  08/04/2019  COLONOSCOPY  11/02/2020  
 DTaP/Tdap/Td series (2 - Td) 06/03/2024  ZOSTER VACCINE AGE 60>  Completed  Pneumococcal 65+ High/Highest Risk  Completed Preventive services such as immunizations prevent serious infections. All people over age 72 should have a Pneumovax and a Prevnar-13 shot to prevent potentially life threatening infections with the pneumococcus bacteria, a common cause of pneumonia. These are once in a lifetime unless you and your provider decide differently. All people over 65 should have a yearly influenza vaccine or \"flu\" shot. This does not prevent infection with cold viruses but has been proven to prevent hospitalization and death from influenza. Although Medicare part B \"regular Medicare\" currently only covers tetanus vaccination in the context of an injury, a tetanus vaccine (Tdap or Td) is recommended every 10 years. A shingles vaccine is recommended once in a lifetime after age 61. The Shingles vaccine is also not covered by Medicare part B. Note, however, that both the Shingles vaccine and Tdap/Td are generally covered by secondary carriers. Please check your coverage and out of pocket expenses.  Consider contacting your local health department because it may stock these vaccines for a reasonable charge. We currently have documentation of the following immunization history for you: 
Immunization History Administered Date(s) Administered  Influenza High Dose Vaccine PF 10/18/2016, 09/15/2017  Influenza Vaccine (Quad) PF 10/01/2015  Pneumococcal Conjugate (PCV-13) 10/01/2015  Pneumococcal Polysaccharide (PPSV-23) 06/27/2017  Pneumococcal Vaccine (Unspecified Type) 06/18/2011  Td 06/02/2006  Tdap 06/03/2014  Zoster Vaccine, Live 12/09/2013 Screening for infection with Hepatitis C is recommended for anyone born between [birth year] through [address]. The table at the bottom of this document indicates the status of this and other screening services. Screening for diabetes mellitus with a blood sugar test (glucose) should be done at least every 3 years until age 79. You and your health care provider may decide whether to continue screening after age 79. The most recent blood glucose we have on file for you is:  
Lab Results Component Value Date/Time Glucose 132 (H) 06/05/2018 06:54 AM  
 Glucose (POC) 123 (H) 06/04/2018 02:11 PM  
 
 
Glaucoma is a disease of the eye due to increased ocular pressure that can lead to blindness. People with risk factors for glaucoma ( race, diabetes, family history) should consider screening at least every 2 years by an eye professional. This may be covered annually if indicated as determined by you and your doctor. Cardiovascular screening tests that check for elevated lipids or cholesterol (fatty part of blood) which can lead to heart disease and strokes should be done every 4-6 years through age 79. You and your health care provider may decide whether to continue screening after age 79. The most recent lipid panel we have on file for you is:  
Lab Results Component Value Date/Time  Cholesterol, total 98 06/17/2011 03:20 AM  
 HDL Cholesterol 21 (L) 06/17/2011 03:20 AM  
 LDL, calculated 44.6 06/17/2011 03:20 AM  
 VLDL, calculated 32.4 06/17/2011 03:20 AM  
 Triglyceride 162 (H) 06/17/2011 03:20 AM  
 CHOL/HDL Ratio 4.7 06/17/2011 03:20 AM  
 
 
Colorectal cancer screening that evaluates for blood or polyps in your colon for people with average risk should be done yearly as a stool test, every five years as a flexible sigmoidoscope or every 10 years as a colonoscopy up to age 76. You and your health care provider may decide whether to continue screening after age 76. Men up to age 76 may elect to screen for prostate cancer with a blood test called a PSA at certain intervals, depending on their personal and family history. This decision is between the patient and his provider. The most recent PSA values we have on file for you are: 
Lab Results Component Value Date/Time  
 Prostate Specific Ag 2.8 04/07/2017 02:52 PM  
 
 
If you have been a smoker or had family history of abdominal aortic aneurysms, you and your provider may decide to schedule an ultrasound test of your aorta. People who have smoked the equivalent of 1 pack per day for 30 years or more may benefit from screening for lung cancer with a yearly low dose CT scan until they have been non smokers for 15 years or competing health conditions render this unlikely to be beneficial.  
 
Your Medicare Wellness Exam is recommended annually.
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
## 2018-08-03 NOTE — PROGRESS NOTES
Chief Complaint Patient presents with  Sunburn  
  follow up Depression Screening: PHQ over the last two weeks 8/3/2018 Little interest or pleasure in doing things Not at all Feeling down, depressed, irritable, or hopeless Not at all Total Score PHQ 2 0 Learning Assessment: 
Learning Assessment 5/3/2018 PRIMARY LEARNER Patient HIGHEST LEVEL OF EDUCATION - PRIMARY LEARNER  -  
BARRIERS PRIMARY LEARNER -  
CO-LEARNER CAREGIVER -  
PRIMARY LANGUAGE ENGLISH  NEED -  
LEARNER PREFERENCE PRIMARY DEMONSTRATION  
ANSWERED BY patient RELATIONSHIP SELF Abuse Screening: 
Abuse Screening Questionnaire 11/2/2017 Do you ever feel afraid of your partner? Yocasta Linton Are you in a relationship with someone who physically or mentally threatens you? Yocasta Linton Is it safe for you to go home? Lilia Crane Fall Risk Fall Risk Assessment, last 12 mths 8/3/2018 Able to walk? Yes Fall in past 12 months? No  
Fall with injury? -  
Number of falls in past 12 months - Fall Risk Score - Advance Directive: 1. Do you have an advance directive in place? Patient Reply:yes 1. Have you been to the ER, urgent care clinic since your last visit? Hospitalized since your last visit? No 
 
2. Have you seen or consulted any other health care providers outside of the [address] [place] [address] since your last visit? Include any pap smears or colon screening.  No

## 2018-08-07 NOTE — PROGRESS NOTES
[facility]  [address] [facility], [address] [place] [address], [address] [place], [place] [address]y [address]  [phone] fax      Discharge Summary    Patient name: Héctor Mccarty Start of Care: 2018   Referral source: Deandre Read : 1950                         Medical Diagnosis: Low back pain [M54.5] Onset Date:18                         Treatment Diagnosis: Lumbar Laminectomy   Prior Hospitalization: see medical history Provider#: [id number]   Medications: Verified on Patient summary List    Comorbidities: HBP, arthritis   Prior Level of Function: Independent with all ADLs, performed household chores/ yard work. Medications: Verified on Patient Summary List    Visits from Start of Care: 10    Missed Visits: 0  Reporting Period : 18 to 18      Summary of Care: Patient seen for ten visits with significant improvements in strength and decreases in his pain rating. He has shown improvements with activity tolerance and demonstrates appropriate body mechanics throughout his exercise routine. Patient has met or is progressing towards all of his goals. Short Term Goals: To be met in 5 visits  1. Patient will be independent with HEP in order to maintain gains made with physical therapy.                        Eval: Issued                        Current:  MET  2. Patient will report 2/10 pain to aide with increase in activity tolerance and performance within therapy.                         Eval: 4                        Current: MET  2  3. Patient will be able to SLS for 10 sec each leg in order to assist with stair negotiation within home.                         Eval: Unable to maintain                         Current: progressing  Long Term Goals: To be accomplished in 10 treatments:  1.  Patient will report 0-1/10 pain to aide with increase in activity tolerance and performance within therapy.                         Eval:                         Current: progressing - 0.5  2. Patient will demonstrate appropriate core control and body mechanics to enable full participation with work demands.                         Eval:                         Current:  MET  3. Patient will report at least 50% improvement to allow ease with ADLs and household chores.                         Eval:                        Current: MET 70%  4. Patient will be able to ascend/ descend 3 steps pain free with single handrail in order to ensure safety with entering/ exiting the home.                         Eval:                        Current:  MET  5. Patient will increase FOTO score to 54 in order to show increase tolerance to community activities.                       Eval:                        Current:  MET 62    G-Codes (GP)  Mobility   [id number] Goal  CJ= 20-39%  D/C  CK= 40-59%      The severity rating is based on clinical judgment and the FOTO score. ASSESSMENT/RECOMMENDATIONS:  [x]Discontinue therapy progressing towards or have reached established goals  []Discontinue therapy due to lack of appreciable progress towards goals  []Discontinue therapy due to lack of attendance or compliance  []Other:     Thank you for this referral.     Dorita Gonzalez, PT 8/7/2018 8:19 AM

## 2018-08-14 DIAGNOSIS — M54.2 CERVICALGIA: ICD-10-CM

## 2018-08-14 RX ORDER — HYDROCODONE BITARTRATE AND ACETAMINOPHEN 7.5; 325 MG/1; MG/1
TABLET ORAL
Qty: 150 TAB | Refills: 0 | Status: SHIPPED | OUTPATIENT
Start: 2018-08-14 | End: 2018-09-11 | Stop reason: SDUPTHER

## 2018-08-14 NOTE — TELEPHONE ENCOUNTER
From: Carla Fregoso  To: Felipe Guillen MD  Sent: 8/14/2018 8:28 AM EDT  Subject: Medication Renewal Request    Original authorizing provider: MD Carla Min would like a refill of the following medications:  HYDROcodone-acetaminophen (NORCO) 7.5-325 mg per tablet Felipe Guillen MD]    Preferred pharmacy: [place] [id number]:

## 2018-08-15 DIAGNOSIS — E29.1 HYPOGONADISM IN MALE: Primary | ICD-10-CM

## 2018-08-15 RX ORDER — DULOXETIN HYDROCHLORIDE 60 MG/1
CAPSULE, DELAYED RELEASE ORAL
Qty: 90 CAP | Refills: 1 | Status: SHIPPED | OUTPATIENT
Start: 2018-08-15 | End: 2018-08-27 | Stop reason: ALTCHOICE

## 2018-08-15 RX ORDER — TESTOSTERONE CYPIONATE 200 MG/ML
200 INJECTION INTRAMUSCULAR ONCE
Qty: 1 ML | Refills: 0 | Status: SHIPPED | OUTPATIENT
Start: 2018-08-15 | End: 2018-08-15

## 2018-08-15 RX ORDER — DULOXETIN HYDROCHLORIDE 30 MG/1
CAPSULE, DELAYED RELEASE ORAL
Qty: 30 CAP | Refills: 0 | Status: SHIPPED | OUTPATIENT
Start: 2018-08-15 | End: 2018-08-27 | Stop reason: ALTCHOICE

## 2018-08-15 NOTE — TELEPHONE ENCOUNTER
Requested Prescriptions     Pending Prescriptions Disp Refills    testosterone cypionate (DEPOTESTOTERONE CYPIONATE) 200 mg/mL injection 1 mL 0     Si mL by IntraMUSCular route once for 1 dose. Max Daily Amount: 200 mg.          Last refill 18

## 2018-08-19 ENCOUNTER — APPOINTMENT (OUTPATIENT)
Dept: CT IMAGING | Age: 68
DRG: 682 | End: 2018-08-19
Attending: EMERGENCY MEDICINE
Payer: MEDICARE

## 2018-08-19 ENCOUNTER — APPOINTMENT (OUTPATIENT)
Dept: GENERAL RADIOLOGY | Age: 68
DRG: 682 | End: 2018-08-19
Attending: EMERGENCY MEDICINE
Payer: MEDICARE

## 2018-08-19 ENCOUNTER — HOSPITAL ENCOUNTER (INPATIENT)
Age: 68
LOS: 4 days | Discharge: HOME HEALTH | DRG: 682 | End: 2018-08-23
Attending: EMERGENCY MEDICINE | Admitting: INTERNAL MEDICINE
Payer: MEDICARE

## 2018-08-19 DIAGNOSIS — N25.89 UREMIC ACIDOSIS: ICD-10-CM

## 2018-08-19 DIAGNOSIS — G93.41 ACUTE METABOLIC ENCEPHALOPATHY: Primary | ICD-10-CM

## 2018-08-19 DIAGNOSIS — N17.9 ACUTE RENAL FAILURE, UNSPECIFIED ACUTE RENAL FAILURE TYPE (HCC): ICD-10-CM

## 2018-08-19 PROBLEM — E87.5 ACUTE HYPERKALEMIA: Status: ACTIVE | Noted: 2018-08-19

## 2018-08-19 PROBLEM — E72.20 HYPERAMMONEMIA (HCC): Status: ACTIVE | Noted: 2018-08-19

## 2018-08-19 LAB
ALBUMIN SERPL-MCNC: 3.7 G/DL (ref 3.4–5)
ALBUMIN/GLOB SERPL: 1.1 {RATIO} (ref 0.8–1.7)
ALP SERPL-CCNC: 96 U/L (ref 45–117)
ALT SERPL-CCNC: 20 U/L (ref 16–61)
AMMONIA PLAS-SCNC: 76 UMOL/L (ref 11–32)
AMORPH CRY URNS QL MICRO: ABNORMAL
ANION GAP SERPL CALC-SCNC: 28 MMOL/L (ref 3–18)
APPEARANCE UR: ABNORMAL
AST SERPL-CCNC: 25 U/L (ref 15–37)
BACTERIA URNS QL MICRO: ABNORMAL /HPF
BASOPHILS # BLD: 0.2 K/UL (ref 0–0.1)
BASOPHILS NFR BLD: 1 % (ref 0–2)
BILIRUB SERPL-MCNC: 0.8 MG/DL (ref 0.2–1)
BILIRUB UR QL: NEGATIVE
BUN SERPL-MCNC: 75 MG/DL (ref 7–18)
BUN/CREAT SERPL: 9 (ref 12–20)
CALCIUM SERPL-MCNC: 7.3 MG/DL (ref 8.5–10.1)
CHLORIDE SERPL-SCNC: 99 MMOL/L (ref 100–108)
CO2 SERPL-SCNC: 9 MMOL/L (ref 21–32)
COLOR UR: YELLOW
CREAT SERPL-MCNC: 8.26 MG/DL (ref 0.6–1.3)
DIFFERENTIAL METHOD BLD: ABNORMAL
EOSINOPHIL # BLD: 0.3 K/UL (ref 0–0.4)
EOSINOPHIL NFR BLD: 2 % (ref 0–5)
EPITH CASTS URNS QL MICRO: NEGATIVE /LPF (ref 0–5)
ERYTHROCYTE [DISTWIDTH] IN BLOOD BY AUTOMATED COUNT: 18.5 % (ref 11.6–14.5)
ETHANOL SERPL-MCNC: <3 MG/DL (ref 0–3)
GLOBULIN SER CALC-MCNC: 3.5 G/DL (ref 2–4)
GLUCOSE SERPL-MCNC: 77 MG/DL (ref 74–99)
GLUCOSE UR STRIP.AUTO-MCNC: NEGATIVE MG/DL
HCT VFR BLD AUTO: 35.7 % (ref 36–48)
HGB BLD-MCNC: 11.4 G/DL (ref 13–16)
HGB UR QL STRIP: ABNORMAL
HYALINE CASTS URNS QL MICRO: ABNORMAL /LPF (ref 0–2)
KETONES UR QL STRIP.AUTO: ABNORMAL MG/DL
LEUKOCYTE ESTERASE UR QL STRIP.AUTO: ABNORMAL
LYMPHOCYTES # BLD: 5.6 K/UL (ref 0.9–3.6)
LYMPHOCYTES NFR BLD: 36 % (ref 21–52)
MAGNESIUM SERPL-MCNC: 2.5 MG/DL (ref 1.6–2.6)
MCH RBC QN AUTO: 26.9 PG (ref 24–34)
MCHC RBC AUTO-ENTMCNC: 31.9 G/DL (ref 31–37)
MCV RBC AUTO: 84.2 FL (ref 74–97)
MONOCYTES # BLD: 1.1 K/UL (ref 0.05–1.2)
MONOCYTES NFR BLD: 7 % (ref 3–10)
NEUTS SEG # BLD: 8.4 K/UL (ref 1.8–8)
NEUTS SEG NFR BLD: 54 % (ref 40–73)
NITRITE UR QL STRIP.AUTO: NEGATIVE
PH UR STRIP: 5 [PH] (ref 5–8)
PLATELET # BLD AUTO: 380 K/UL (ref 135–420)
PLATELET COMMENTS,PCOM: ABNORMAL
PMV BLD AUTO: 11.5 FL (ref 9.2–11.8)
POTASSIUM SERPL-SCNC: 5.7 MMOL/L (ref 3.5–5.5)
PROT SERPL-MCNC: 7.2 G/DL (ref 6.4–8.2)
PROT UR STRIP-MCNC: 30 MG/DL
RBC # BLD AUTO: 4.24 M/UL (ref 4.7–5.5)
RBC #/AREA URNS HPF: ABNORMAL /HPF (ref 0–5)
RBC MORPH BLD: ABNORMAL
SODIUM SERPL-SCNC: 136 MMOL/L (ref 136–145)
SP GR UR REFRACTOMETRY: 1.02 (ref 1–1.03)
TSH SERPL DL<=0.05 MIU/L-ACNC: 1 UIU/ML (ref 0.36–3.74)
UROBILINOGEN UR QL STRIP.AUTO: 1 EU/DL (ref 0.2–1)
WBC # BLD AUTO: 15.6 K/UL (ref 4.6–13.2)
WBC URNS QL MICRO: ABNORMAL /HPF (ref 0–4)

## 2018-08-19 PROCEDURE — 65270000029 HC RM PRIVATE

## 2018-08-19 PROCEDURE — 74011000258 HC RX REV CODE- 258: Performed by: EMERGENCY MEDICINE

## 2018-08-19 PROCEDURE — 74011250636 HC RX REV CODE- 250/636: Performed by: EMERGENCY MEDICINE

## 2018-08-19 PROCEDURE — 80053 COMPREHEN METABOLIC PANEL: CPT | Performed by: EMERGENCY MEDICINE

## 2018-08-19 PROCEDURE — 81001 URINALYSIS AUTO W/SCOPE: CPT | Performed by: EMERGENCY MEDICINE

## 2018-08-19 PROCEDURE — 74011000250 HC RX REV CODE- 250: Performed by: EMERGENCY MEDICINE

## 2018-08-19 PROCEDURE — 70450 CT HEAD/BRAIN W/O DYE: CPT

## 2018-08-19 PROCEDURE — A4216 STERILE WATER/SALINE, 10 ML: HCPCS | Performed by: INTERNAL MEDICINE

## 2018-08-19 PROCEDURE — 85025 COMPLETE CBC W/AUTO DIFF WBC: CPT | Performed by: EMERGENCY MEDICINE

## 2018-08-19 PROCEDURE — 74011250636 HC RX REV CODE- 250/636: Performed by: INTERNAL MEDICINE

## 2018-08-19 PROCEDURE — 80307 DRUG TEST PRSMV CHEM ANLYZR: CPT | Performed by: EMERGENCY MEDICINE

## 2018-08-19 PROCEDURE — 74011250637 HC RX REV CODE- 250/637: Performed by: EMERGENCY MEDICINE

## 2018-08-19 PROCEDURE — 96361 HYDRATE IV INFUSION ADD-ON: CPT

## 2018-08-19 PROCEDURE — 74011250637 HC RX REV CODE- 250/637: Performed by: INTERNAL MEDICINE

## 2018-08-19 PROCEDURE — 74176 CT ABD & PELVIS W/O CONTRAST: CPT

## 2018-08-19 PROCEDURE — 83735 ASSAY OF MAGNESIUM: CPT | Performed by: EMERGENCY MEDICINE

## 2018-08-19 PROCEDURE — 87086 URINE CULTURE/COLONY COUNT: CPT | Performed by: INTERNAL MEDICINE

## 2018-08-19 PROCEDURE — 82140 ASSAY OF AMMONIA: CPT | Performed by: EMERGENCY MEDICINE

## 2018-08-19 PROCEDURE — 71045 X-RAY EXAM CHEST 1 VIEW: CPT

## 2018-08-19 PROCEDURE — 51702 INSERT TEMP BLADDER CATH: CPT

## 2018-08-19 PROCEDURE — 96360 HYDRATION IV INFUSION INIT: CPT

## 2018-08-19 PROCEDURE — 77030005530 HC CATH URETH FOL40 BARD -B

## 2018-08-19 PROCEDURE — 84443 ASSAY THYROID STIM HORMONE: CPT | Performed by: EMERGENCY MEDICINE

## 2018-08-19 PROCEDURE — 99285 EMERGENCY DEPT VISIT HI MDM: CPT

## 2018-08-19 RX ORDER — SODIUM CHLORIDE 9 MG/ML
75 INJECTION, SOLUTION INTRAVENOUS CONTINUOUS
Status: DISCONTINUED | OUTPATIENT
Start: 2018-08-19 | End: 2018-08-20

## 2018-08-19 RX ORDER — LIDOCAINE HYDROCHLORIDE 20 MG/ML
JELLY TOPICAL ONCE
Status: COMPLETED | OUTPATIENT
Start: 2018-08-19 | End: 2018-08-19

## 2018-08-19 RX ORDER — OXYCODONE AND ACETAMINOPHEN 5; 325 MG/1; MG/1
1 TABLET ORAL
Status: DISCONTINUED | OUTPATIENT
Start: 2018-08-19 | End: 2018-08-23 | Stop reason: HOSPADM

## 2018-08-19 RX ORDER — ONDANSETRON 2 MG/ML
4 INJECTION INTRAMUSCULAR; INTRAVENOUS
Status: DISCONTINUED | OUTPATIENT
Start: 2018-08-19 | End: 2018-08-23 | Stop reason: HOSPADM

## 2018-08-19 RX ORDER — SODIUM POLYSTYRENE SULFONATE 15 G/60ML
30 SUSPENSION ORAL; RECTAL
Status: COMPLETED | OUTPATIENT
Start: 2018-08-19 | End: 2018-08-19

## 2018-08-19 RX ORDER — ACETAMINOPHEN 325 MG/1
650 TABLET ORAL
Status: DISCONTINUED | OUTPATIENT
Start: 2018-08-19 | End: 2018-08-23 | Stop reason: HOSPADM

## 2018-08-19 RX ORDER — PANTOPRAZOLE SODIUM 40 MG/1
TABLET, DELAYED RELEASE ORAL
Qty: 90 TAB | Refills: 3 | Status: SHIPPED | OUTPATIENT
Start: 2018-08-19 | End: 2018-08-27 | Stop reason: ALTCHOICE

## 2018-08-19 RX ORDER — DOCUSATE SODIUM 100 MG/1
100 CAPSULE, LIQUID FILLED ORAL
Status: DISCONTINUED | OUTPATIENT
Start: 2018-08-19 | End: 2018-08-23 | Stop reason: HOSPADM

## 2018-08-19 RX ORDER — NALOXONE HYDROCHLORIDE 0.4 MG/ML
0.4 INJECTION, SOLUTION INTRAMUSCULAR; INTRAVENOUS; SUBCUTANEOUS AS NEEDED
Status: DISCONTINUED | OUTPATIENT
Start: 2018-08-19 | End: 2018-08-23 | Stop reason: HOSPADM

## 2018-08-19 RX ORDER — TESTOSTERONE CYPIONATE 200 MG/ML
INJECTION INTRAMUSCULAR
COMMUNITY
End: 2018-09-11 | Stop reason: SDUPTHER

## 2018-08-19 RX ORDER — SODIUM BICARBONATE 1 MEQ/ML
SYRINGE (ML) INTRAVENOUS
Status: DISPENSED
Start: 2018-08-19 | End: 2018-08-20

## 2018-08-19 RX ADMIN — SODIUM BICARBONATE: 84 INJECTION INTRAVENOUS at 17:25

## 2018-08-19 RX ADMIN — SODIUM CHLORIDE 1000 ML: 900 INJECTION, SOLUTION INTRAVENOUS at 15:10

## 2018-08-19 RX ADMIN — LABETALOL HYDROCHLORIDE 300 MG: 200 TABLET, FILM COATED ORAL at 22:44

## 2018-08-19 RX ADMIN — SODIUM CHLORIDE 1000 ML: 900 INJECTION, SOLUTION INTRAVENOUS at 16:03

## 2018-08-19 RX ADMIN — SODIUM CHLORIDE 1000 ML: 900 INJECTION, SOLUTION INTRAVENOUS at 16:24

## 2018-08-19 RX ADMIN — CEFTRIAXONE SODIUM 2 G: 2 INJECTION, POWDER, FOR SOLUTION INTRAMUSCULAR; INTRAVENOUS at 22:44

## 2018-08-19 RX ADMIN — SODIUM POLYSTYRENE SULFONATE 30 G: 15 SUSPENSION ORAL; RECTAL at 16:51

## 2018-08-19 RX ADMIN — OXYCODONE HYDROCHLORIDE AND ACETAMINOPHEN 1 TABLET: 5; 325 TABLET ORAL at 22:49

## 2018-08-19 RX ADMIN — SODIUM CHLORIDE 75 ML/HR: 900 INJECTION, SOLUTION INTRAVENOUS at 22:47

## 2018-08-19 RX ADMIN — LIDOCAINE HYDROCHLORIDE 1 ML: 20 JELLY TOPICAL at 16:38

## 2018-08-19 NOTE — ED TRIAGE NOTES
Spouse states he has had confusion for the last few days, headache, dry mouth, aching all over, muscle twitching. She states he normally gets some confusion prior to receiving testosterone injections but then after receiving injection it improves. He got the injection on thursday but symptoms are worsening.

## 2018-08-19 NOTE — ED PROVIDER NOTES
EMERGENCY DEPARTMENT HISTORY AND PHYSICAL EXAM    1:26 PM      Date: 8/19/2018  Patient Name: Qian Delgado    History of Presenting Illness     Chief Complaint   Patient presents with    Altered mental status    Headache    Generalized Body Aches         History Provided By: Patient and Patient's Wife    Chief Complaint: confusion, tremors, generalized body aches, and dry mouth  Duration:  Days  Timing:  Acute  Location: general body  Quality: Aching  Severity: Mild  Modifying Factors: none   Associated Symptoms: denies any other associated signs or symptoms      Additional History (Context): Qian Delgado is a 70 y.o. male with HTN, HA, migraine, kidney stone, Afib, and leukiemia (remission) who presents with wife c/o confusion, tremors, generalized body aches, and dry mouth since yesterday. The pt states he is feeling dis oriented and extremely fatigued. Per the wife today the pt came out into the bright light and was like he couldn't see for about 10 minutes before resolving on it own. Notes the pt had a testosterone Friday; wife says usually before he gets the shot he is a little fatigued but a few days after he will be back to normal; this time he began getting worse. Also had back surgery 2 months ago but he has been doing well since surgery. Before the start of sx he was feeling fine. Pt is A&O x2; alert to self and place but not to year. Denies CP, SOB, fever, chills, nausea, vomiting, urinary sx, weakness, numbness, or any other associated sx. No other complaints or concerns. Information was also given by the pt wife to complete HPI and ROS.     PCP: Cassie Green MD      Past History     Past Medical History:  Past Medical History:   Diagnosis Date    Abdominal pain, unspecified site     Acute reaction to stress     Allergic rhinitis due to pollen     Arrhythmia     afib    Arthritis     Back injury     BPH (benign prostatic hypertrophy)     Calculus of ureter     Cancer (HCC) history of Leukemia, in remission    Carotid duplex 06/17/2015    Mild < 50% bilateral ICA stenosis.  Dizziness and giddiness     Esophageal reflux     Essential hypertension     GERD (gastroesophageal reflux disease)     Headache(784.0)     History of echocardiogram 08/13/2015    EF 55-60%. No WMA. Mild-mod LVH. Gr 1 DDfx. No evidence of intracardiac shunt. Mild AI.      Hx: UTI (urinary tract infection)     Hypertension     Hypogonadism in male     Kidney stones     Migraine     Neck injury     Polycythemia     Polycythemia, secondary     Sciatica     Unspecified retinal detachment     Unspecified sleep apnea     resolved since gastric bypass    Vision decreased     Weight loss        Past Surgical History:  Past Surgical History:   Procedure Laterality Date    ABDOMEN SURGERY PROC UNLISTED  2012    Hernia    HX CATARACT REMOVAL Left     HX CERVICAL FUSION  4/4/2016    Cervical Spine Fusion    HX CHOLECYSTECTOMY      HX GASTRIC BYPASS  2006    HX KNEE ARTHROSCOPY      both knees (right knee twice, left once)    HX ORTHOPAEDIC  1981, 1995    lumbar laminectomy    HX OTHER SURGICAL      Two back surgeries    HX RETINAL DETACHMENT REPAIR Left        Family History:  Family History   Problem Relation Age of Onset    Hypertension Father     Diabetes Father     Heart Attack Mother     Headache Sister     Diabetes Son        Social History:  Social History   Substance Use Topics    Smoking status: Former Smoker     Quit date: 2/22/1981    Smokeless tobacco: Never Used    Alcohol use Yes      Comment: occasionally        Allergies: Allergies   Allergen Reactions    Levofloxacin Nausea Only     Severe nausea and dizziness    Sulfa (Sulfonamide Antibiotics) Unknown (comments)     Nausea, aching all over         Review of Systems       Review of Systems   Constitutional: Positive for fatigue. Negative for fever. HENT: Negative for sore throat.     Eyes: Negative for redness and visual disturbance. Respiratory: Negative for shortness of breath and wheezing. Cardiovascular: Negative for chest pain. Gastrointestinal: Negative for abdominal pain and nausea. Endocrine: Negative for polyuria. Genitourinary: Negative for dysuria. Musculoskeletal: Positive for myalgias. Negative for arthralgias and neck stiffness. Skin: Negative for rash. Neurological: Positive for tremors. Negative for headaches. Psychiatric/Behavioral: Positive for confusion. All other systems reviewed and are negative. Physical Exam     Visit Vitals    /64    Pulse 84    Temp 97.7 °F (36.5 °C)    Resp 18    Ht 6' 5\" (1.956 m)    Wt 107 kg (236 lb)    SpO2 97%    BMI 27.99 kg/m2         Physical Exam   Constitutional: He appears well-developed and well-nourished. He appears lethargic. No distress. HENT:   Head: Normocephalic and atraumatic. Mouth/Throat: Oropharynx is clear and moist. Mucous membranes are dry. Eyes: Conjunctivae and EOM are normal. Pupils are equal, round, and reactive to light. No scleral icterus. Right eye exhibits no nystagmus. Left eye exhibits no nystagmus. Neck: Normal range of motion. Neck supple. No tracheal deviation present. No meningismus   Cardiovascular: Normal rate, normal heart sounds and intact distal pulses. Exam reveals no gallop and no friction rub. No murmur heard. Capillary refill < 3 seconds   Pulmonary/Chest: Effort normal and breath sounds normal. No stridor. No respiratory distress. He has no wheezes. Abdominal: Soft. Bowel sounds are normal. He exhibits no distension. There is no tenderness. Musculoskeletal: Normal range of motion. He exhibits no edema. Active spasms in arms and legs during exam.   Lymphadenopathy:     He has no cervical adenopathy. Neurological: He appears lethargic. No cranial nerve deficit or sensory deficit. Hand strength 5/5. Unable to do cerebellar finger to nose due to arm tremors.  Myoclonus in all extremities. has some asterixis. A&O x2; alert to self and place but not year. No slurred speech, no facial droop. Skin: Skin is warm and dry. No rash noted. He is not diaphoretic. No erythema. No pallor. Psychiatric: He has a normal mood and affect. Nursing note and vitals reviewed. Diagnostic Study Results     Labs -  Recent Results (from the past 12 hour(s))   CBC WITH AUTOMATED DIFF    Collection Time: 08/19/18  2:09 PM   Result Value Ref Range    WBC 15.6 (H) 4.6 - 13.2 K/uL    RBC 4.24 (L) 4.70 - 5.50 M/uL    HGB 11.4 (L) 13.0 - 16.0 g/dL    HCT 35.7 (L) 36.0 - 48.0 %    MCV 84.2 74.0 - 97.0 FL    MCH 26.9 24.0 - 34.0 PG    MCHC 31.9 31.0 - 37.0 g/dL    RDW 18.5 (H) 11.6 - 14.5 %    PLATELET 474 503 - 344 K/uL    MPV 11.5 9.2 - 11.8 FL    NEUTROPHILS 54 40 - 73 %    LYMPHOCYTES 36 21 - 52 %    MONOCYTES 7 3 - 10 %    EOSINOPHILS 2 0 - 5 %    BASOPHILS 1 0 - 2 %    ABS. NEUTROPHILS 8.4 (H) 1.8 - 8.0 K/UL    ABS. LYMPHOCYTES 5.6 (H) 0.9 - 3.6 K/UL    ABS. MONOCYTES 1.1 0.05 - 1.2 K/UL    ABS. EOSINOPHILS 0.3 0.0 - 0.4 K/UL    ABS. BASOPHILS 0.2 (H) 0.0 - 0.1 K/UL    DF MANUAL      PLATELET COMMENTS ADEQUATE PLATELETS      RBC COMMENTS ANISOCYTOSIS  1+       METABOLIC PANEL, COMPREHENSIVE    Collection Time: 08/19/18  2:09 PM   Result Value Ref Range    Sodium 136 136 - 145 mmol/L    Potassium 5.7 (H) 3.5 - 5.5 mmol/L    Chloride 99 (L) 100 - 108 mmol/L    CO2 9 (LL) 21 - 32 mmol/L    Anion gap 28 (H) 3.0 - 18 mmol/L    Glucose 77 74 - 99 mg/dL    BUN 75 (H) 7.0 - 18 MG/DL    Creatinine 8.26 (H) 0.6 - 1.3 MG/DL    BUN/Creatinine ratio 9 (L) 12 - 20      GFR est AA 8 (L) >60 ml/min/1.73m2    GFR est non-AA 6 (L) >60 ml/min/1.73m2    Calcium 7.3 (L) 8.5 - 10.1 MG/DL    Bilirubin, total 0.8 0.2 - 1.0 MG/DL    ALT (SGPT) 20 16 - 61 U/L    AST (SGOT) 25 15 - 37 U/L    Alk.  phosphatase 96 45 - 117 U/L    Protein, total 7.2 6.4 - 8.2 g/dL    Albumin 3.7 3.4 - 5.0 g/dL    Globulin 3.5 2.0 - 4.0 g/dL A-G Ratio 1.1 0.8 - 1.7     MAGNESIUM    Collection Time: 08/19/18  2:09 PM   Result Value Ref Range    Magnesium 2.5 1.6 - 2.6 mg/dL   TSH 3RD GENERATION    Collection Time: 08/19/18  2:09 PM   Result Value Ref Range    TSH 1.00 0.36 - 3.74 uIU/mL   ETHYL ALCOHOL    Collection Time: 08/19/18  2:09 PM   Result Value Ref Range    ALCOHOL(ETHYL),SERUM <3 0 - 3 MG/DL   AMMONIA    Collection Time: 08/19/18  2:09 PM   Result Value Ref Range    Ammonia 76 (H) 11 - 32 UMOL/L   URINALYSIS W/ RFLX MICROSCOPIC    Collection Time: 08/19/18  4:50 PM   Result Value Ref Range    Color YELLOW      Appearance CLOUDY      Specific gravity 1.024 1.005 - 1.030      pH (UA) 5.0 5.0 - 8.0      Protein 30 (A) NEG mg/dL    Glucose NEGATIVE  NEG mg/dL    Ketone TRACE (A) NEG mg/dL    Bilirubin NEGATIVE  NEG      Blood LARGE (A) NEG      Urobilinogen 1.0 0.2 - 1.0 EU/dL    Nitrites NEGATIVE  NEG      Leukocyte Esterase MODERATE (A) NEG     URINE MICROSCOPIC ONLY    Collection Time: 08/19/18  4:50 PM   Result Value Ref Range    WBC 11 to 20 0 - 4 /hpf    RBC 36 to 50 0 - 5 /hpf    Epithelial cells NEGATIVE  0 - 5 /lpf    Bacteria 1+ (A) NEG /hpf    Amorphous Crystals 1+ (A) NEG    Hyaline cast 0 to 3 0 - 2 /lpf       Radiologic Studies -   CT HEAD WO CONT   Final Result      XR CHEST PORT    (Results Pending)     CT head:  IMPRESSION:  No acute intracranial findings    CXR preliminary reading done by Dr. Guadalupe Son, DO; pt xray shows no acute process. Medical Decision Making   I am the first provider for this patient. I reviewed the vital signs, available nursing notes, past medical history, past surgical history, family history and social history. Vital Signs-Reviewed the patient's vital signs. Pulse Oximetry Analysis -100% RA non hypoxic.     Records Reviewed: Nursing Notes and Old Medical Records (Time of Review: 1:26 PM)    Provider Notes (Medical Decision Making): ddx AMS, metabolic, spasms, ?etoh abuse, med reaction, dehydration, neuro disorder, neoplasm, brain mass    Consult teleneurologist, check labs, CT head, CxR, give IVF, patient clinically dehydrated    Having spasms/myoclonus legs, mostly arms    MDM    Medications   sodium chloride 0.9 % bolus infusion 1,000 mL (1,000 mL IntraVENous New Bag 8/19/18 1624)     Followed by   sodium chloride 0.9 % bolus infusion 1,000 mL (not administered)   sodium bicarbonate 100 mEq in dextrose 5 % - 0.45% NaCl 1,000 mL infusion ( IntraVENous New Bag 8/19/18 1725)   sodium bicarbonate 8.4 % (1 mEq/mL) injection (not administered)   sodium chloride 0.9 % bolus infusion 1,000 mL (0 mL IntraVENous IV Completed 8/19/18 1600)   sodium polystyrene (KAYEXALATE) 15 gram/60 mL oral suspension 30 g (30 g Oral Given 8/19/18 1651)   lidocaine (URO-JET) 2 % jelly (1 mL Urethral Given 8/19/18 1638)       Critical Care Time:   CRITICAL CARE NOTE:  4:30 PM  I have spent 60 minutes of critical care time involved in lab review, consultations with specialist, family decision-making, and documentation. During this entire length of time I was immediately available to the patient. Critical Care: The reason for providing this level of medical care for this critically ill patient was due a critical illness that impaired one or more vital organ systems such that there was a high probability of imminent or life threatening deterioration in the patients condition with severe uremic acidosis/ARF, and encephalopathy. This care involved high complexity decision making to assess, manipulate, and support vital system functions, to treat this degree vital organ system failure and to prevent further life threatening deterioration of the patients condition. ED Course: Progress Notes, Reevaluation, and Consults:  2:02 PM, 8/19/2018   Consult:  Discussed care with Dr. Tung Jacinto, teleeurologist. Standard discussion; including history of patients chief complaint, available diagnostic results, and treatment course.  No stroke. Continue with lab work. Would recommend the pt be admitted at least for observation. Bun 75, Cr 8, severe dehydration with ARF  WBC wnl  LA 0.9  CO2 only 8, likely from uremic acidosis   Ammonia slightly elevated    Pt with acute metabolic encephalopathy; consult nephrologist    I have discussed results, dx's and plan to admit with pt and wife and they agree. 4:09 PM, 8/19/2018   Consult:  Discussed care with Dr. Mar Lowery, nephrologist Standard discussion; including history of patients chief complaint, available diagnostic results, and treatment course. Will accept consult. States place the pt on bicarb drip. 4:25 PM, 8/19/2018   Consult:  Discussed care with Dr. Rivera Neumann, hospitalist. Standard discussion; including history of patients chief complaint, available diagnostic results, and treatment course. Will accept admission to telemetry. Request CT abd/pelv w/o contrast in order to see kidney and prostate. Nothing found on the bladder scanner. [place] placed and about 250 cc out. Pt has received 3L of IVF. Reassessed pt. Still has spasms/myoclonus. No new complaints. ?medication reaction. UA (-)nitrites, pt denies urinary symptoms. RBC possibly from the [place]. For Hospitalized Patients:    1. Hospitalization Decision Time:  The decision to hospitalize the patient was made by Dr. Puneet Armijo at 4:00 pm on 8/19/2018    2. Aspirin: Aspirin was not given because the patient did not present with a stroke at the time of their Emergency Department evaluation    Diagnosis     Clinical Impression:   1. Acute metabolic encephalopathy    2. Uremic acidosis    3.  Acute renal failure, unspecified acute renal failure type [facility])        Disposition: Transfer to [facility] for telemetry admission    Follow-up Information     None               Attestations:  Clotilde Mckeon 128 acting as a scribe for and in the presence of Se Newton, DO      August 19, 2018 at [facility] PM       Provider Attestation:      I personally performed the services described in the documentation, reviewed the documentation, as recorded by the scribe in my presence, and it accurately and completely records my words and actions.  August 19, 2018 at 1:26 PM - Huong Rosales DO    _______________________________

## 2018-08-19 NOTE — IP AVS SNAPSHOT
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  ____________________________________________________________  
  
Lacie Romero Provider Signature:  ____________________________________________________________ Date:  ____________________________________________________________

## 2018-08-19 NOTE — ROUTINE PROCESS
TRANSFER - OUT REPORT:    Verbal report given to Elsie Reis RN(name) on [facility]  being transferred to tele room 408(unit) for routine progression of care       Report consisted of patients Situation, Background, Assessment and   Recommendations(SBAR). Information from the following report(s) ED Summary was reviewed with the receiving nurse. Lines:   Peripheral IV 08/19/18 Right Antecubital (Active)   Site Assessment Clean, dry, & intact 8/19/2018  2:11 PM   Phlebitis Assessment 0 8/19/2018  2:11 PM   Infiltration Assessment 0 8/19/2018  2:11 PM   Dressing Status Clean, dry, & intact 8/19/2018  2:11 PM   Dressing Type Transparent 8/19/2018  2:11 PM   Hub Color/Line Status Green;Flushed;Patent 8/19/2018  2:11 PM        Opportunity for questions and clarification was provided.       Patient transported with:   Monitor, Bicarb drip

## 2018-08-19 NOTE — ED NOTES
Pt resting on stretcher. Wife at bedside. Pt is alert, oriented to person, place, thinks it is 2004.  Continues to have muscle twitching

## 2018-08-19 NOTE — ED NOTES
Pt stood at bedside and attempted to urinate. Was unsuccessful.  Will try again after IV fluids have infused

## 2018-08-20 PROBLEM — R25.1 TREMOR OF UNKNOWN ORIGIN: Status: ACTIVE | Noted: 2018-08-20

## 2018-08-20 LAB
25(OH)D3 SERPL-MCNC: 20.6 NG/ML (ref 30–100)
ANION GAP SERPL CALC-SCNC: 13 MMOL/L (ref 3–18)
ANION GAP SERPL CALC-SCNC: 14 MMOL/L (ref 3–18)
ANION GAP SERPL CALC-SCNC: 20 MMOL/L (ref 3–18)
BASOPHILS # BLD: 0 K/UL (ref 0–0.1)
BASOPHILS NFR BLD: 0 % (ref 0–2)
BUN SERPL-MCNC: 56 MG/DL (ref 7–18)
BUN SERPL-MCNC: 62 MG/DL (ref 7–18)
BUN SERPL-MCNC: 67 MG/DL (ref 7–18)
BUN/CREAT SERPL: 13 (ref 12–20)
BUN/CREAT SERPL: 17 (ref 12–20)
BUN/CREAT SERPL: 18 (ref 12–20)
CALCIUM SERPL-MCNC: 6.4 MG/DL (ref 8.5–10.1)
CALCIUM SERPL-MCNC: 6.7 MG/DL (ref 8.5–10.1)
CALCIUM SERPL-MCNC: 6.8 MG/DL (ref 8.5–10.1)
CALCIUM SERPL-MCNC: 7 MG/DL (ref 8.5–10.1)
CHLORIDE SERPL-SCNC: 112 MMOL/L (ref 100–108)
CHLORIDE SERPL-SCNC: 114 MMOL/L (ref 100–108)
CHLORIDE SERPL-SCNC: 115 MMOL/L (ref 100–108)
CK SERPL-CCNC: 621 U/L (ref 39–308)
CO2 SERPL-SCNC: 11 MMOL/L (ref 21–32)
CO2 SERPL-SCNC: 17 MMOL/L (ref 21–32)
CO2 SERPL-SCNC: 19 MMOL/L (ref 21–32)
CREAT SERPL-MCNC: 3.07 MG/DL (ref 0.6–1.3)
CREAT SERPL-MCNC: 3.69 MG/DL (ref 0.6–1.3)
CREAT SERPL-MCNC: 5.02 MG/DL (ref 0.6–1.3)
CREAT UR-MCNC: 83 MG/DL (ref 30–125)
DIFFERENTIAL METHOD BLD: ABNORMAL
EOSINOPHIL # BLD: 0.1 K/UL (ref 0–0.4)
EOSINOPHIL #/AREA URNS HPF: NORMAL /[HPF]
EOSINOPHIL NFR BLD: 0 % (ref 0–5)
ERYTHROCYTE [DISTWIDTH] IN BLOOD BY AUTOMATED COUNT: 17.9 % (ref 11.6–14.5)
GLUCOSE SERPL-MCNC: 110 MG/DL (ref 74–99)
GLUCOSE SERPL-MCNC: 138 MG/DL (ref 74–99)
GLUCOSE SERPL-MCNC: 82 MG/DL (ref 74–99)
HCT VFR BLD AUTO: 30.8 % (ref 36–48)
HGB BLD-MCNC: 10.4 G/DL (ref 13–16)
IRON SATN MFR SERPL: 20 %
IRON SERPL-MCNC: 69 UG/DL (ref 50–175)
LYMPHOCYTES # BLD: 4 K/UL (ref 0.9–3.6)
LYMPHOCYTES NFR BLD: 35 % (ref 21–52)
MAGNESIUM SERPL-MCNC: 2.4 MG/DL (ref 1.6–2.6)
MAGNESIUM SERPL-MCNC: 2.5 MG/DL (ref 1.6–2.6)
MCH RBC QN AUTO: 26.9 PG (ref 24–34)
MCHC RBC AUTO-ENTMCNC: 33.8 G/DL (ref 31–37)
MCV RBC AUTO: 79.6 FL (ref 74–97)
MONOCYTES # BLD: 1.2 K/UL (ref 0.05–1.2)
MONOCYTES NFR BLD: 10 % (ref 3–10)
NEUTS SEG # BLD: 6 K/UL (ref 1.8–8)
NEUTS SEG NFR BLD: 55 % (ref 40–73)
PHOSPHATE SERPL-MCNC: 8.9 MG/DL (ref 2.5–4.9)
PLATELET # BLD AUTO: 280 K/UL (ref 135–420)
PMV BLD AUTO: 10.7 FL (ref 9.2–11.8)
POTASSIUM SERPL-SCNC: 3.8 MMOL/L (ref 3.5–5.5)
POTASSIUM SERPL-SCNC: 3.9 MMOL/L (ref 3.5–5.5)
POTASSIUM SERPL-SCNC: 4.3 MMOL/L (ref 3.5–5.5)
PTH-INTACT SERPL-MCNC: 460.6 PG/ML (ref 18.4–88)
RBC # BLD AUTO: 3.87 M/UL (ref 4.7–5.5)
SODIUM SERPL-SCNC: 143 MMOL/L (ref 136–145)
SODIUM SERPL-SCNC: 145 MMOL/L (ref 136–145)
SODIUM SERPL-SCNC: 147 MMOL/L (ref 136–145)
SODIUM UR-SCNC: 69 MMOL/L (ref 20–110)
TIBC SERPL-MCNC: 344 UG/DL (ref 250–450)
URATE SERPL-MCNC: 14.5 MG/DL (ref 2.6–7.2)
WBC # BLD AUTO: 11.2 K/UL (ref 4.6–13.2)

## 2018-08-20 PROCEDURE — 85025 COMPLETE CBC W/AUTO DIFF WBC: CPT | Performed by: INTERNAL MEDICINE

## 2018-08-20 PROCEDURE — 74011250636 HC RX REV CODE- 250/636: Performed by: INTERNAL MEDICINE

## 2018-08-20 PROCEDURE — 80048 BASIC METABOLIC PNL TOTAL CA: CPT | Performed by: INTERNAL MEDICINE

## 2018-08-20 PROCEDURE — 74011250637 HC RX REV CODE- 250/637: Performed by: INTERNAL MEDICINE

## 2018-08-20 PROCEDURE — 82306 VITAMIN D 25 HYDROXY: CPT | Performed by: INTERNAL MEDICINE

## 2018-08-20 PROCEDURE — 83735 ASSAY OF MAGNESIUM: CPT | Performed by: INTERNAL MEDICINE

## 2018-08-20 PROCEDURE — 74011000250 HC RX REV CODE- 250: Performed by: INTERNAL MEDICINE

## 2018-08-20 PROCEDURE — 36415 COLL VENOUS BLD VENIPUNCTURE: CPT | Performed by: INTERNAL MEDICINE

## 2018-08-20 PROCEDURE — 83540 ASSAY OF IRON: CPT | Performed by: INTERNAL MEDICINE

## 2018-08-20 PROCEDURE — 84100 ASSAY OF PHOSPHORUS: CPT | Performed by: INTERNAL MEDICINE

## 2018-08-20 PROCEDURE — 74011000258 HC RX REV CODE- 258: Performed by: INTERNAL MEDICINE

## 2018-08-20 PROCEDURE — 83970 ASSAY OF PARATHORMONE: CPT | Performed by: INTERNAL MEDICINE

## 2018-08-20 PROCEDURE — 87205 SMEAR GRAM STAIN: CPT | Performed by: INTERNAL MEDICINE

## 2018-08-20 PROCEDURE — 84550 ASSAY OF BLOOD/URIC ACID: CPT | Performed by: INTERNAL MEDICINE

## 2018-08-20 PROCEDURE — 65660000004 HC RM CVT STEPDOWN

## 2018-08-20 PROCEDURE — A4216 STERILE WATER/SALINE, 10 ML: HCPCS | Performed by: INTERNAL MEDICINE

## 2018-08-20 PROCEDURE — 82570 ASSAY OF URINE CREATININE: CPT | Performed by: INTERNAL MEDICINE

## 2018-08-20 PROCEDURE — 84300 ASSAY OF URINE SODIUM: CPT | Performed by: INTERNAL MEDICINE

## 2018-08-20 PROCEDURE — 82550 ASSAY OF CK (CPK): CPT | Performed by: INTERNAL MEDICINE

## 2018-08-20 RX ORDER — CALCIUM GLUCONATE 94 MG/ML
3.5 INJECTION, SOLUTION INTRAVENOUS ONCE
Status: DISCONTINUED | OUTPATIENT
Start: 2018-08-20 | End: 2018-08-20

## 2018-08-20 RX ORDER — CALCIUM CARBONATE 200(500)MG
400 TABLET,CHEWABLE ORAL 3 TIMES DAILY
Status: DISCONTINUED | OUTPATIENT
Start: 2018-08-20 | End: 2018-08-23 | Stop reason: HOSPADM

## 2018-08-20 RX ORDER — SODIUM CHLORIDE 450 MG/100ML
75 INJECTION, SOLUTION INTRAVENOUS CONTINUOUS
Status: DISCONTINUED | OUTPATIENT
Start: 2018-08-20 | End: 2018-08-21

## 2018-08-20 RX ADMIN — CALCIUM CARBONATE (ANTACID) CHEW TAB 500 MG 400 MG: 500 CHEW TAB at 21:50

## 2018-08-20 RX ADMIN — LABETALOL HYDROCHLORIDE 300 MG: 200 TABLET, FILM COATED ORAL at 10:05

## 2018-08-20 RX ADMIN — DEXTROSE MONOHYDRATE: 5 INJECTION, SOLUTION INTRAVENOUS at 10:59

## 2018-08-20 RX ADMIN — CALCIUM CARBONATE (ANTACID) CHEW TAB 500 MG 400 MG: 500 CHEW TAB at 18:31

## 2018-08-20 RX ADMIN — CEFTRIAXONE SODIUM 2 G: 2 INJECTION, POWDER, FOR SOLUTION INTRAMUSCULAR; INTRAVENOUS at 21:48

## 2018-08-20 RX ADMIN — CALCIUM GLUCONATE 3.5 G: 94 INJECTION, SOLUTION INTRAVENOUS at 18:32

## 2018-08-20 RX ADMIN — SODIUM CHLORIDE 75 ML/HR: 450 INJECTION, SOLUTION INTRAVENOUS at 15:00

## 2018-08-20 RX ADMIN — OXYCODONE HYDROCHLORIDE AND ACETAMINOPHEN 1 TABLET: 5; 325 TABLET ORAL at 10:04

## 2018-08-20 RX ADMIN — LABETALOL HYDROCHLORIDE 300 MG: 200 TABLET, FILM COATED ORAL at 21:49

## 2018-08-20 RX ADMIN — SODIUM CHLORIDE 75 ML/HR: 900 INJECTION, SOLUTION INTRAVENOUS at 11:02

## 2018-08-20 NOTE — PROGRESS NOTES
[facility] Hospitalist Group  Progress Note    Patient: Eliot Mckeon Age: 70 y.o. : 1950 MR#: [id number] SSN: [id number]  Date/Time: 2018     Subjective:     Review of systems  - more alert and awake   - family at bedside   - No NVD  - No CP       Assessment/Plan:   1. Acute metabolic encephalopathy - from KAREN   2 KAREN   3 Acute hyperkalemia   4 Acute NV   5 hx CML    PLAN   - Continue IV F - Renal functions are improving   - per patient's family AMS - improving   - Continue Current management per renal         Case discussed with:  []Patient  []Family  []Nursing  []Case Management  DVT Prophylaxis:  []Lovenox  []Hep SQ  []SCDs  []Coumadin   []On Heparin gtt          Objective:   VS:   Visit Vitals    BP (!) 137/95    Pulse 94    Temp 98.4 °F (36.9 °C)    Resp 22    Ht 6' 5\" (1.956 m)    Wt 110.2 kg (243 lb)    SpO2 95%    BMI 28.82 kg/m2      Tmax/24hrs: Temp (24hrs), Av.8 °F (36.6 °C), Min:97.2 °F (36.2 °C), Max:98.4 °F (36.9 °C)  IOBRIEF  Intake/Output Summary (Last 24 hours) at 18 1048  Last data filed at 18 1007   Gross per 24 hour   Intake           5097.5 ml   Output             2600 ml   Net           2497.5 ml       General:  Alert, cooperative, no acute distress   Cardiovascular: S1S2 - regular , No Murmur   Pulmonary: Equal expansion , No Use of accessory muscles , No Rales No Rhonchi    GI:  +BS in all four quadrants, soft, non-tender  Extremities:  No edema; 2+ dorsalis pedis pulses bilaterally  Neuro: Alert and oriented X 2.        Medications:   Current Facility-Administered Medications   Medication Dose Route Frequency    sodium bicarbonate 150 mEq in dextrose 5% 1,000 mL infusion   IntraVENous CONTINUOUS    cefTRIAXone (ROCEPHIN) 2 g in sterile water (preservative free) 20 mL IV syringe  2 g IntraVENous Q24H    labetalol (NORMODYNE) tablet 300 mg  300 mg Oral Q12H    Nilotinib cap 300 mg (Patient Supplied)  300 mg Oral Q12H    acetaminophen (TYLENOL) tablet 650 mg  650 mg Oral Q6H PRN    oxyCODONE-acetaminophen (PERCOCET) 5-325 mg per tablet 1 Tab  1 Tab Oral Q6H PRN    naloxone (NARCAN) injection 0.4 mg  0.4 mg IntraVENous PRN    ondansetron (ZOFRAN) injection 4 mg  4 mg IntraVENous Q6H PRN    docusate sodium (COLACE) capsule 100 mg  100 mg Oral BID PRN    0.9% sodium chloride infusion  75 mL/hr IntraVENous CONTINUOUS       Labs:    Recent Labs      08/20/18   0530  08/19/18   1409   WBC  11.2  15.6*   HGB  10.4*  11.4*   HCT  30.8*  35.7*   PLT  280  380     Recent Labs      08/20/18   0530  08/19/18   1409   NA  143  136   K  4.3  5.7*   CL  112*  99*   CO2  11*  9*   GLU  82  77   BUN  67*  75*   CREA  5.02*  8.26*   CA  6.4*  7.3*   MG  2.5  2.5   PHOS  8.9*   --    ALB   --   3.7   SGOT   --   25   ALT   --   20         Signed By: Amador Rosenberg MD     August 20, 2018

## 2018-08-20 NOTE — H&P
History & Physical    Patient: Joel Levy MRN: [id number]  CSN: [id number]    YOB: 1950  Age: 70 y.o. Sex: male      DOA: 8/19/2018    Chief Complaint:   Chief Complaint   Patient presents with    Altered mental status    Headache    Generalized Body Aches          HPI:     Joel Levy is a 70 y.o.  male who has PMH of CML on Tasigna, HTN, migraine, restless leg Sx, A-fib rate controlled on BB and Eliquis. Hx of Shingles s/p recent vaccination and Rt foot blister s/p one week of keflex last week. Pt is on Testosterone therapy  Pt presented to ER with AMS, generalized weakness, body aches and was found to have severe acute renal failure, dehydration and stiffness with resting tremors. Pt was also found to have +ve UA and elevated ammonia level. Pt is seen and examined. Currently alert and oriented but seems mildly rigid. C/o of body pain and has tremors. As per his wife, Pt usually has tremors but he is more stiff than usual and his face is flushed. She also reports poor oral intake and hydration lately and had poor urine out put as well. Pt has Hx of tremors and restless leg Sx on multiple meds but his wife reports worsening of his tremors  CT abdomen showed moderately severe enlarged prostate but bladder scan showed 250 CC and churchill was placed and Pt had 170 CC despite receiving 3 L of IVF. Continues to have mild tremors but states that he is feeling better. Pt was also found to have metabolic acidosis  And was started on Bicarb drip. Currently alert and oriented but continues to feel slightly stiff.       Past Medical History:   Diagnosis Date    Abdominal pain, unspecified site     Acute reaction to stress     Allergic rhinitis due to pollen     Arrhythmia     afib    Arthritis     Back injury     BPH (benign prostatic hypertrophy)     Calculus of ureter     Cancer (HCC)     history of Leukemia, in remission    Carotid duplex 06/17/2015    Mild < 50% bilateral ICA stenosis.  Dizziness and giddiness     Esophageal reflux     Essential hypertension     GERD (gastroesophageal reflux disease)     Headache(784.0)     History of echocardiogram 08/13/2015    EF 55-60%. No WMA. Mild-mod LVH. Gr 1 DDfx. No evidence of intracardiac shunt. Mild AI.      Hx: UTI (urinary tract infection)     Hypertension     Hypogonadism in male     Kidney stones     Migraine     Neck injury     Polycythemia     Polycythemia, secondary     Sciatica     Unspecified retinal detachment     Unspecified sleep apnea     resolved since gastric bypass    Vision decreased     Weight loss        Past Surgical History:   Procedure Laterality Date    ABDOMEN SURGERY PROC UNLISTED  2012    Hernia    HX CATARACT REMOVAL Left     HX CERVICAL FUSION  4/4/2016    Cervical Spine Fusion    HX CHOLECYSTECTOMY      HX GASTRIC BYPASS  2006    HX KNEE ARTHROSCOPY      both knees (right knee twice, left once)    HX ORTHOPAEDIC  1981, 1995    lumbar laminectomy    HX OTHER SURGICAL      Two back surgeries    HX RETINAL DETACHMENT REPAIR Left        Family History   Problem Relation Age of Onset    Hypertension Father     Diabetes Father     Heart Attack Mother     Headache Sister     Diabetes Son        Social History     Social History    Marital status:      Spouse name: N/A    Number of children: N/A    Years of education: N/A     Social History Main Topics    Smoking status: Former Smoker     Quit date: 2/22/1981    Smokeless tobacco: Never Used    Alcohol use Yes      Comment: occasionally     Drug use: No    Sexual activity: Yes     Partners: Female     Other Topics Concern    None     Social History Narrative    Patient lives with his wife in [place], he had 3 children by unfortunately lost 2 of them. He enjoys taking care of his yard and plays with the grandchildren.        Prior to Admission medications    Medication Sig Start Date End Date Taking? Authorizing Provider   pantoprazole (PROTONIX) 40 mg tablet take 1 tablet by mouth once daily 8/19/18  Yes Mike Allen MD   testosterone cypionate (DEPOTESTOTERONE CYPIONATE) 200 mg/mL injection by IntraMUSCular route every thirty (30) days. Yes Historical Provider   DULoxetine (CYMBALTA) 60 mg capsule take 1 capsule by mouth once daily 8/15/18  Yes Mike Allen MD   DULoxetine (CYMBALTA) 30 mg capsule take 1 capsule by mouth once daily WITH 60 MG CAPSULES 8/15/18  Yes Mike Allen MD   HYDROcodone-acetaminophen [facility]) 7.5-325 mg per tablet Take one tablet by mouth every four hours as needed for pain. Max five tablets daily 8/14/18  Yes Mike Allen MD   amLODIPine-Olmesartan 5-40 mg tab take 1 tablet by mouth once daily 8/2/18  Yes Mike Allen MD   rOPINIRole (REQUIP) 0.5 mg tablet take 1 tablet by mouth twice a day 7/25/18  Yes Mike Allen MD   cephALEXin [facility]) 500 mg capsule 1 cap three times per day 7/24/18  Yes Mike Allen MD   tamsulosin [facility]) 0.4 mg capsule take 1 capsule by mouth once daily 7/12/18  Yes Mike Allen MD   hydroCHLOROthiazide (HYDRODIURIL) 25 mg tablet take 1 tablet by mouth once daily 6/30/18  Yes Mike Allen MD   montelukast (SINGULAIR) 10 mg tablet TAKE 1 TABLET BY MOUTH EVERY DAY 6/29/18  Yes Mike Allen MD   guanFACINE IR (TENEX) 1 mg IR tablet take 1 tablet by mouth once daily 6/24/18  Yes Mike Allen MD   labetalol (NORMODYNE) 300 mg tablet take 1 tablet by mouth twice a day 6/24/18  Yes Mike Allen MD   SUMAtriptan (IMITREX) 100 mg tablet TAKE 1 TABLET BY MOUTH AT ONSET OF HEADACHE, MAY REPEAT 2 HOURS LATER. (MAX 2 PILLS IN 24 HOURS) 6/22/18  Yes Mike Allen MD   ELIQUIS 5 mg tablet take 1 tablet by mouth twice a day 6/21/18  Yes Mike Allen MD   rOPINIRole (REQUIP) 0.5 mg tablet take 1 tablet by mouth twice a day 5/21/18  Yes Mike Allen MD   topiramate (TOPAMAX) 50 mg tablet TAKE 1 TABLET BY MOUTH TWICE A DAY TO PREVENT MIGRAINE 4/11/18  Yes Elena Ko MD   fluticasone [facility]) 50 mcg/actuation nasal spray 2 Sprays by Both Nostrils route two (2) times a day. Yes Historical Provider   Nilotinib (TASIGNA) 150 mg cap Take 300 mg by mouth two (2) times a day. Indications: Leukemia   Yes Historical Provider   varicella-zoster recombinant, PF, (SHINGRIX, PF,) 50 mcg/0.5 mL susr injection 0.5 ml IM for the first dose. Repeat in 3 months 8/3/18   Elena Ko MD   silver sulfADIAZINE (SILVADENE) 1 % topical cream Apply daily to area of howell 7/24/18   Elena Ko MD   cyclobenzaprine (FLEXERIL) 10 mg tablet Take 0.5 Tabs by mouth three (3) times daily as needed for Muscle Spasm(s). 6/5/18   Samuella Bence, MD   oxyCODONE-acetaminophen (PERCOCET 10)  mg per tablet Take 1 Tab by mouth every four (4) hours as needed. Max Daily Amount: 6 Tabs. 6/5/18   Samuella Bence, MD   diclofenac (VOLTAREN) 1 % gel Apply  to affected area four (4) times daily. 3/24/18   Elena Ko MD   promethazine (PHENERGAN) 25 mg tablet take 1 tablet by mouth every 6 hours if needed for nausea 3/23/18   Elena Ko MD   ACETAMINOPHEN/DIPHENHYDRAMINE (TYLENOL PM PO) Take 2 Tabs by mouth nightly. Historical Provider   naloxone [facility]) 4 mg/actuation nasal spray Use 1 spray intranasally into 1 nostril if needed. May repeat in 4 minutes if needed. 1/15/18   Elena Ko MD       Allergies   Allergen Reactions    Levofloxacin Nausea Only     Severe nausea and dizziness    Sulfa (Sulfonamide Antibiotics) Unknown (comments)     Nausea, aching all over         Review of Systems  GENERAL: Patient alert, awake and oriented times 3, able to communicate full sentences and not in distress. HEENT: No change in vision, no earache, tinnitus, sore throat or sinus congestion. NECK: No pain or stiffness. PULMONARY: No shortness of breath, cough or wheeze.    Cardiovascular: no pnd / orthopnea, no CP  GASTROINTESTINAL: No abdominal pain, nausea, vomiting or diarrhea, melena or bright red blood per rectum. GENITOURINARY: No urinary frequency, urgency, hesitancy or dysuria. MUSCULOSKELETAL: No joint or muscle pain, no back pain, no recent trauma. DERMATOLOGIC: No rash, no itching, no lesions. ENDOCRINE: No polyuria, polydipsia, no heat or cold intolerance. No recent change in weight. HEMATOLOGICAL: No anemia or easy bruising or bleeding. NEUROLOGIC: No headache, seizures, numbness, tingling or weakness. Physical Exam:     Physical Exam:  Visit Vitals    /60 (BP 1 Location: Left arm)    Pulse 88    Temp 98.2 °F (36.8 °C)    Resp 18    Ht 6' 5\" (1.956 m)    Wt 107 kg (236 lb)    SpO2 96%    BMI 27.99 kg/m2      O2 Device: Room air    Temp (24hrs), Av.9 °F (36.6 °C), Min:97.7 °F (36.5 °C), Max:98.2 °F (36.8 °C)         0701 -  1900  In: 3000 [I.V.:3000]  Out: 250 [Urine:250]    General:  Alert, cooperative, mild distress, appears stated age. Head: Normocephalic, without obvious abnormality, atraumatic. Flushed red face    Eyes:  Conjunctivae/corneas clear. PERRL, EOMs intact. Nose: Nares normal. No drainage or sinus tenderness. Neck: Supple, symmetrical, trachea midline, no adenopathy, thyroid: no enlargement, no carotid bruit and no JVD. Lungs:   Clear to auscultation bilaterally. Heart:  Regular rate and rhythm, S1, S2 normal.     Abdomen: Soft, non-tender. Bowel sounds normal.    Extremities: Extremities shows mild rigidity and stuffiness  atraumatic, no cyanosis or edema. +ve tremors at rest    Pulses: 2+ and symmetric all extremities. Skin:  No rashes or lesions   Neurologic: AAOx3, No focal motor or sensory deficit. Labs Reviewed:    Lab results reviewed. For significant abnormal values and values requiring intervention, see assessment and plan.   CT, CXR and EKG    Procedures/imaging: see electronic medical records for all procedures/Xrays and details which were not copied into this note but were reviewed prior to creation of Plan      Assessment/Plan     Principal Problem:    ARF (acute renal failure) ([facility] [address] (8/19/2018)    Active Problems:    Acute hyperkalemia (8/19/2018)      Uremic acidosis (8/19/2018)      Hyperammonemia (HCC) (8/19/2018)      Acute metabolic encephalopathy (6/47/6071)      Tremor of unknown origin (8/20/2018)       Pt is admitted for acute encephalopathy possibly 2 ry to Acute renal failure and UTI  Stiffness and increasing tremors of unknown etiology   Uremic acidosis   Hx of CML on Tasigna   On testosterone therapy  Recent Abx Rx with Keflex for Rt foot cellulitis     Acute renal failure with unknown etiology >> dehydration Vs Abx Vs testosterone   Nephro consult is called  Started on IVF and Bicarb drip     Tremors and stiffness >> on Ropinirole and flexeril >> Holding for now.   Will need Neuro consult if worsens or not resolved  CT head is -ve     HTN and Hx of A-fib >> Holding Eliquis and continue BB    Hx of CML >> continue [place]       DVT/GI Prophylaxis: SCD's    Plan of care is discussed in details with Patient/Family at bedside and agreed upon    Kiarra Armenta MD  8/20/2018 9:46 PM

## 2018-08-20 NOTE — PROGRESS NOTES
conducted an initial consultation and Spiritual Assessment for Lindsey Barrett, who is a 70 y.o.,male. Patients Primary Language is: [place]. According to the patients EMR Adventism Affiliation is: [facility].     The reason the Patient came to the hospital is:   Patient Active Problem List    Diagnosis Date Noted    Tremor of unknown origin 08/20/2018    Acute hyperkalemia 08/19/2018    Uremic acidosis 08/19/2018    Hyperammonemia ([facility] [address] 08/19/2018    ARF (acute renal failure) ([facility] [address] 08/19/2018    Acute metabolic encephalopathy 85/69/1831    Lumbar stenosis with neurogenic claudication 06/04/2018    Bruises easily 03/23/2018    Neuropathy 10/23/2017    Cramps, muscle, general 06/27/2017    Chronic renal insufficiency 12/05/2016    S/P ORIF (open reduction internal fixation) fracture 09/28/2016    Left foot drop 08/06/2016    Cervical kyphosis 06/13/2016    Advance directive discussed with patient 05/12/2016    CML in remission ([facility] [address] 04/30/2016    Essential hypertension 04/16/2016    Cervical spine disease 04/16/2016    Unspecified retinal detachment     Sciatica     Esophageal reflux     Hypogonadism in male     Polycythemia, secondary     Dizziness and giddiness     Acute reaction to stress     Unspecified sleep apnea     Allergic rhinitis due to pollen     PVCs (premature ventricular contractions) 08/19/2015    Headache(784.0) 03/13/2012    Migraine, unspecified, with intractable migraine, so stated, without mention of status migrainosus 03/13/2012    Cervicalgia 03/13/2012    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   Patient was seen with wife feeding the patient with lunch because patient was having tremors as shared by patient's wife - a condition that just recently evolved. Explored issues of mana, belief, spirituality and Sikh/ritual needs while hospitalized. Listened empathically as wife disclosed her 's medical and spiritual history on behalf of the patient. Patient and wife atttends services at [facility] in [place]. Provided information about Spiritual Care Services. Offered assurance of continued prayers on patient's behalf alllowing patient to continue his meal.  Chart reviewed. The following outcomes where achieved:  Patient shared limited information about both his medical narrative and spiritual journey/beliefs.  confirmed Patient's [employer] Affiliation with [facility] in [place]. Patient processed feeling about current hospitalization. Patient expressed gratitude for 's visit. Assessment:  Patient does not have any Sikh/cultural needs that will affect patients preferences in health care. There are no spiritual or Sikh issues which require intervention at this time. Plan:  Chaplains will continue to follow and will provide pastoral care on an as needed/requested basis.  recommends bedside caregivers page  on duty if patient shows signs of acute spiritual or emotional distress.      Intern [address] [place] [address]   [phone]

## 2018-08-20 NOTE — CONSULTS
Consult Note    Consult requested by: You Mckinney MD    ADMIT DATE: 8/19/2018  CONSULT DATE: August 20, 2018                 Admission diagnosis: ARF (acute renal failure) [facility])   Reason for Nephrology Consultation: KAREN      Assessment:       1) KAREN on CKD 3 ( baseline ~ 1.9-2) d/t prerenal azotemia v/s ATN in setting of poor intake /UTI/relative hypotension      Patient was taking naproxyn 400 mg BID for several weeks after his surgery which may have caused ATN/AIN       Being on ARB , HCTZ may also have contributed to KAREN and appropriately held       Continue hydration , avoid too tight BP control, allow SBP ~150s      UA suspicious for infection , on ceftriaxone , urine studies send ( electrolytes, urine eos )        CT abdomen showed , ann nephrolithiasis , no hydro , enlarged prostate       Richard in place, making good urine , creat trending down      BMP pjhos , mag q12 hrs   2) CKD 3 , does have min proteinuria , quantify , etiology HTN nephrosclerosis likely   2) AG Metabolic acidoses d/t KAREN , on bicarb drip , improving  3) Metabolic encephalopathy improving  D/t KAREN , also been on multiple pain meds , on requip for       Restless leg syndrome which in setting of KAREN may be causing neurological side effects       Including jerking   4) HTN , avoid too tight BP control  5) hyperkalemia resolved  6) anemia : check iron levels   7) sec hyperpara , check pth , phos high will need phos binder when appetite improves  8) suspected UTI , follow Cx , on ceftriaxone   9) Nephrolithiasis : risk stratification outpatient  10) hypocalcemia with hyperphosphatasia , likely causing/agravating his symptoms ,?  Tetany /twitching      Replace agressively, check pth, vitd     Please call with questions,    Iona Schneider MD FASN  Cell [phone]  Pager: [phone]         HPI:    Niru Tamayo is a 70 y.o. male with HTN, HA, migraine, kidney stone, Afib, and h/o leukiemia  who presented wife c/o confusion, tremors, jerking , and weakness over last several days   Patient says that he was feeling better few weeks back after his back surgery ( 2 mths back) , started having the above symptoms. He was taking naprosyn BID for several weeks after his surgery. He had also been taking requip for restless leg syndrome. Patient noted decreasing urine output, denies any hematuria or dysuria. Denies fever or chills . Denies focal weakness. Denies SOB, edema. Poor intake over last several days , no diarhea > Did have frequency of urination , but no dysurea. Past Medical History:   Diagnosis Date    Abdominal pain, unspecified site     Acute reaction to stress     Allergic rhinitis due to pollen     Arrhythmia     afib    Arthritis     Back injury     BPH (benign prostatic hypertrophy)     Calculus of ureter     Cancer (HCC)     history of Leukemia, in remission    Carotid duplex 06/17/2015    Mild < 50% bilateral ICA stenosis.  Dizziness and giddiness     Esophageal reflux     Essential hypertension     GERD (gastroesophageal reflux disease)     Headache(784.0)     History of echocardiogram 08/13/2015    EF 55-60%. No WMA. Mild-mod LVH. Gr 1 DDfx. No evidence of intracardiac shunt.   Mild AI.      Hx: UTI (urinary tract infection)     Hypertension     Hypogonadism in male     Kidney stones     Migraine     Neck injury     Polycythemia     Polycythemia, secondary     Sciatica     Unspecified retinal detachment     Unspecified sleep apnea     resolved since gastric bypass    Vision decreased     Weight loss       Past Surgical History:   Procedure Laterality Date    ABDOMEN SURGERY PROC UNLISTED  2012    Hernia    HX CATARACT REMOVAL Left     HX CERVICAL FUSION  4/4/2016    Cervical Spine Fusion    HX CHOLECYSTECTOMY      HX GASTRIC BYPASS  2006    HX KNEE ARTHROSCOPY      both knees (right knee twice, left once)    HX ORTHOPAEDIC  1981, 1995    lumbar laminectomy    HX OTHER SURGICAL      Two back surgeries    HX RETINAL DETACHMENT REPAIR Left        Social History     Social History    Marital status:      Spouse name: N/A    Number of children: N/A    Years of education: N/A     Occupational History    Not on file. Social History Main Topics    Smoking status: Former Smoker     Quit date: 2/22/1981    Smokeless tobacco: Never Used    Alcohol use Yes      Comment: occasionally     Drug use: No    Sexual activity: Yes     Partners: Female     Other Topics Concern    Not on file     Social History Narrative    Patient lives with his wife in [place], he had 3 children by unfortunately lost 2 of them. He enjoys taking care of his yard and plays with the grandchildren. Family History   Problem Relation Age of Onset    Hypertension Father     Diabetes Father     Heart Attack Mother     Headache Sister     Diabetes Son      Allergies   Allergen Reactions    Levofloxacin Nausea Only     Severe nausea and dizziness    Sulfa (Sulfonamide Antibiotics) Unknown (comments)     Nausea, aching all over        Home Medications:     Prescriptions Prior to Admission   Medication Sig    pantoprazole (PROTONIX) 40 mg tablet take 1 tablet by mouth once daily    testosterone cypionate (DEPOTESTOTERONE CYPIONATE) 200 mg/mL injection by IntraMUSCular route every thirty (30) days.  DULoxetine (CYMBALTA) 60 mg capsule take 1 capsule by mouth once daily    DULoxetine (CYMBALTA) 30 mg capsule take 1 capsule by mouth once daily WITH 60 MG CAPSULES    HYDROcodone-acetaminophen (NORCO) 7.5-325 mg per tablet Take one tablet by mouth every four hours as needed for pain.  Max five tablets daily    amLODIPine-Olmesartan 5-40 mg tab take 1 tablet by mouth once daily    rOPINIRole (REQUIP) 0.5 mg tablet take 1 tablet by mouth twice a day    cephALEXin (KEFLEX) 500 mg capsule 1 cap three times per day    tamsulosin (FLOMAX) 0.4 mg capsule take 1 capsule by mouth once daily    hydroCHLOROthiazide (HYDRODIURIL) 25 mg tablet take 1 tablet by mouth once daily    montelukast (SINGULAIR) 10 mg tablet TAKE 1 TABLET BY MOUTH EVERY DAY    guanFACINE IR (TENEX) 1 mg IR tablet take 1 tablet by mouth once daily    labetalol (NORMODYNE) 300 mg tablet take 1 tablet by mouth twice a day    SUMAtriptan (IMITREX) 100 mg tablet TAKE 1 TABLET BY MOUTH AT ONSET OF HEADACHE, MAY REPEAT 2 HOURS LATER. (MAX 2 PILLS IN 24 HOURS)    ELIQUIS 5 mg tablet take 1 tablet by mouth twice a day    rOPINIRole (REQUIP) 0.5 mg tablet take 1 tablet by mouth twice a day    topiramate (TOPAMAX) 50 mg tablet TAKE 1 TABLET BY MOUTH TWICE A DAY TO PREVENT MIGRAINE    fluticasone (FLONASE) 50 mcg/actuation nasal spray 2 Sprays by Both Nostrils route two (2) times a day.  Nilotinib (TASIGNA) 150 mg cap Take 300 mg by mouth two (2) times a day. Indications: Leukemia    varicella-zoster recombinant, PF, (SHINGRIX, PF,) 50 mcg/0.5 mL susr injection 0.5 ml IM for the first dose. Repeat in 3 months    silver sulfADIAZINE (SILVADENE) 1 % topical cream Apply daily to area of burns    cyclobenzaprine (FLEXERIL) 10 mg tablet Take 0.5 Tabs by mouth three (3) times daily as needed for Muscle Spasm(s).  oxyCODONE-acetaminophen (PERCOCET 10)  mg per tablet Take 1 Tab by mouth every four (4) hours as needed. Max Daily Amount: 6 Tabs.  diclofenac (VOLTAREN) 1 % gel Apply  to affected area four (4) times daily.  promethazine (PHENERGAN) 25 mg tablet take 1 tablet by mouth every 6 hours if needed for nausea    ACETAMINOPHEN/DIPHENHYDRAMINE (TYLENOL PM PO) Take 2 Tabs by mouth nightly.  naloxone (NARCAN) 4 mg/actuation nasal spray Use 1 spray intranasally into 1 nostril if needed. May repeat in 4 minutes if needed.        Current Inpatient Medications:     Current Facility-Administered Medications   Medication Dose Route Frequency    sodium bicarbonate 150 mEq in dextrose 5% 1,000 mL infusion   IntraVENous CONTINUOUS    cefTRIAXone (ROCEPHIN) 2 g in sterile water (preservative free) 20 mL IV syringe  2 g IntraVENous Q24H    labetalol (NORMODYNE) tablet 300 mg  300 mg Oral Q12H    Nilotinib cap 300 mg (Patient Supplied)  300 mg Oral Q12H    acetaminophen (TYLENOL) tablet 650 mg  650 mg Oral Q6H PRN    oxyCODONE-acetaminophen (PERCOCET) 5-325 mg per tablet 1 Tab  1 Tab Oral Q6H PRN    naloxone (NARCAN) injection 0.4 mg  0.4 mg IntraVENous PRN    ondansetron (ZOFRAN) injection 4 mg  4 mg IntraVENous Q6H PRN    docusate sodium (COLACE) capsule 100 mg  100 mg Oral BID PRN    0.9% sodium chloride infusion  75 mL/hr IntraVENous CONTINUOUS       Review of Systems:   No fever or chills. No sore throat. No cough or hemoptysis. No shortness of breath or chest pain. No orthopnea or paroxysmal nocturnal dyspnea. No nausea, vomiting, abdominal pain, melena or hematochezia. No ankle swelling, no joint paints. No muscle aches. No skin changes. No dizziness or lightheadedness. No headaches. Physical Assessment:     Vitals:    08/20/18 0043 08/20/18 0454 08/20/18 0836 08/20/18 1105   BP: 125/60 111/64 (!) 137/95 109/58   Pulse: 88 86 94 82   Resp: 18 18 22 20   Temp: 98.2 °F (36.8 °C) 97.2 °F (36.2 °C) 98.4 °F (36.9 °C) 98.5 °F (36.9 °C)   SpO2: 96% 96% 95% 93%   Weight:       Height:         Last 3 Recorded Weights in this Encounter    08/19/18 1323 08/19/18 2000   Weight: 107 kg (236 lb) 110.2 kg (243 lb)     Admission weight: Weight: 107 kg (236 lb) (08/19/18 1323)      Intake/Output Summary (Last 24 hours) at 08/20/18 1109  Last data filed at 08/20/18 1007   Gross per 24 hour   Intake           5097.5 ml   Output             2600 ml   Net           2497.5 ml       Does have intermittent back pain  HEENT: dry mucosa  Neck: no cervical lymphadenopathy or thyromegaly. Lungs: good air entry, clear to auscultation bilaterally. Cardiovascular system: S1, S2, regular rate and rhythm.   Abdomen: soft, non tender, non distended. Positive bowel sounds. Extremities: no clubbing, cyanosis or edema  Neurologic: Alert, oriented time three. No focal deficits , does have   intermittent rigidity and jerky sudden movts       Data Review:    Labs: Results:       Chemistry Recent Labs      08/20/18   0530  08/19/18   1409   GLU  82  77   NA  143  136   K  4.3  5.7*   CL  112*  99*   CO2  11*  9*   BUN  67*  75*   CREA  5.02*  8.26*   CA  6.4*  7.3*   AGAP  20*  28*   BUCR  13  9*   AP   --   96   TP   --   7.2   ALB   --   3.7   GLOB   --   3.5   AGRAT   --   1.1   PHOS  8.9*   --          CBC w/Diff Recent Labs      08/20/18   0530  08/19/18   1409   WBC  11.2  15.6*   RBC  3.87*  4.24*   HGB  10.4*  11.4*   HCT  30.8*  35.7*   PLT  280  380   GRANS  55  54   LYMPH  35  36   EOS  0  2         Iron/Ferritin No results for input(s): IRON in the last 72 hours. No lab exists for component: TIBCCALC   PTH/VIT D No results for input(s): PTH in the last 72 hours.     No lab exists for component: VITD           Hina Arboleda MD  8/20/2018  11:09 AM      August 20, 2018

## 2018-08-20 NOTE — PROGRESS NOTES
Noted patients calcium being low , could be the cause of his sympotoms of rigidity/twitching   Will give 3.5 gm IV xochitl gluconate X 1 , start tums 400 mg TID , pth and vit D levels pending  Check Ion xochitl and phos in AM     Please call with questions,    Toshia Jarrett MD [facility]  Cell [phone]  Pager: [phone]

## 2018-08-20 NOTE — ROUTINE PROCESS
Bedside and Verbal shift change report given to Monalisa Allen RN (oncoming nurse) by Rodrigue Poe RN (offgoing nurse). Report included the following information SBAR, Kardex, Intake/Output, Recent Results and Cardiac Rhythm NSR.

## 2018-08-21 LAB
ANION GAP SERPL CALC-SCNC: 12 MMOL/L (ref 3–18)
ANION GAP SERPL CALC-SCNC: 5 MMOL/L (ref 3–18)
BASOPHILS # BLD: 0 K/UL (ref 0–0.06)
BASOPHILS NFR BLD: 0 % (ref 0–3)
BUN SERPL-MCNC: 32 MG/DL (ref 7–18)
BUN SERPL-MCNC: 40 MG/DL (ref 7–18)
BUN/CREAT SERPL: 24 (ref 12–20)
BUN/CREAT SERPL: 25 (ref 12–20)
CA-I SERPL-SCNC: 1.17 MMOL/L (ref 1.12–1.32)
CALCIUM SERPL-MCNC: 8.4 MG/DL (ref 8.5–10.1)
CALCIUM SERPL-MCNC: 8.4 MG/DL (ref 8.5–10.1)
CHLORIDE SERPL-SCNC: 112 MMOL/L (ref 100–108)
CHLORIDE SERPL-SCNC: 115 MMOL/L (ref 100–108)
CO2 SERPL-SCNC: 22 MMOL/L (ref 21–32)
CO2 SERPL-SCNC: 29 MMOL/L (ref 21–32)
CREAT SERPL-MCNC: 1.34 MG/DL (ref 0.6–1.3)
CREAT SERPL-MCNC: 1.63 MG/DL (ref 0.6–1.3)
DIFFERENTIAL METHOD BLD: ABNORMAL
EOSINOPHIL # BLD: 0.2 K/UL (ref 0–0.4)
EOSINOPHIL NFR BLD: 2 % (ref 0–5)
ERYTHROCYTE [DISTWIDTH] IN BLOOD BY AUTOMATED COUNT: 17 % (ref 11.6–14.5)
GLUCOSE SERPL-MCNC: 103 MG/DL (ref 74–99)
GLUCOSE SERPL-MCNC: 108 MG/DL (ref 74–99)
HCT VFR BLD AUTO: 30.6 % (ref 36–48)
HGB BLD-MCNC: 10.3 G/DL (ref 13–16)
LYMPHOCYTES # BLD: 3.5 K/UL (ref 0.8–3.5)
LYMPHOCYTES NFR BLD: 38 % (ref 20–51)
MAGNESIUM SERPL-MCNC: 2.2 MG/DL (ref 1.6–2.6)
MCH RBC QN AUTO: 26.1 PG (ref 24–34)
MCHC RBC AUTO-ENTMCNC: 33.7 G/DL (ref 31–37)
MCV RBC AUTO: 77.5 FL (ref 74–97)
MONOCYTES # BLD: 0.9 K/UL (ref 0–1)
MONOCYTES NFR BLD: 10 % (ref 2–9)
NEUTS SEG # BLD: 4.6 K/UL (ref 1.8–8)
NEUTS SEG NFR BLD: 50 % (ref 42–75)
PHOSPHATE SERPL-MCNC: 3.1 MG/DL (ref 2.5–4.9)
PLATELET # BLD AUTO: 280 K/UL (ref 135–420)
PLATELET COMMENTS,PCOM: ABNORMAL
PMV BLD AUTO: 11.1 FL (ref 9.2–11.8)
POTASSIUM SERPL-SCNC: 3.4 MMOL/L (ref 3.5–5.5)
POTASSIUM SERPL-SCNC: 4.3 MMOL/L (ref 3.5–5.5)
RBC # BLD AUTO: 3.95 M/UL (ref 4.7–5.5)
RBC MORPH BLD: ABNORMAL
SODIUM SERPL-SCNC: 146 MMOL/L (ref 136–145)
SODIUM SERPL-SCNC: 149 MMOL/L (ref 136–145)
URATE SERPL-MCNC: 10.2 MG/DL (ref 2.6–7.2)
URATE SERPL-MCNC: 12.5 MG/DL (ref 2.6–7.2)
WBC # BLD AUTO: 9.2 K/UL (ref 4.6–13.2)
WBC MORPH BLD: ABNORMAL

## 2018-08-21 PROCEDURE — 51798 US URINE CAPACITY MEASURE: CPT

## 2018-08-21 PROCEDURE — 82330 ASSAY OF CALCIUM: CPT | Performed by: INTERNAL MEDICINE

## 2018-08-21 PROCEDURE — 65660000004 HC RM CVT STEPDOWN

## 2018-08-21 PROCEDURE — 74011250636 HC RX REV CODE- 250/636: Performed by: INTERNAL MEDICINE

## 2018-08-21 PROCEDURE — 74011000258 HC RX REV CODE- 258: Performed by: INTERNAL MEDICINE

## 2018-08-21 PROCEDURE — 85025 COMPLETE CBC W/AUTO DIFF WBC: CPT | Performed by: INTERNAL MEDICINE

## 2018-08-21 PROCEDURE — 84550 ASSAY OF BLOOD/URIC ACID: CPT | Performed by: INTERNAL MEDICINE

## 2018-08-21 PROCEDURE — 81001 URINALYSIS AUTO W/SCOPE: CPT | Performed by: INTERNAL MEDICINE

## 2018-08-21 PROCEDURE — 84100 ASSAY OF PHOSPHORUS: CPT | Performed by: INTERNAL MEDICINE

## 2018-08-21 PROCEDURE — 36415 COLL VENOUS BLD VENIPUNCTURE: CPT | Performed by: INTERNAL MEDICINE

## 2018-08-21 PROCEDURE — A4216 STERILE WATER/SALINE, 10 ML: HCPCS | Performed by: INTERNAL MEDICINE

## 2018-08-21 PROCEDURE — 74011250637 HC RX REV CODE- 250/637: Performed by: INTERNAL MEDICINE

## 2018-08-21 PROCEDURE — 80048 BASIC METABOLIC PNL TOTAL CA: CPT | Performed by: INTERNAL MEDICINE

## 2018-08-21 PROCEDURE — 74011000250 HC RX REV CODE- 250: Performed by: INTERNAL MEDICINE

## 2018-08-21 PROCEDURE — 83735 ASSAY OF MAGNESIUM: CPT | Performed by: INTERNAL MEDICINE

## 2018-08-21 RX ORDER — TAMSULOSIN HYDROCHLORIDE 0.4 MG/1
0.4 CAPSULE ORAL DAILY
Status: DISCONTINUED | OUTPATIENT
Start: 2018-08-21 | End: 2018-08-23 | Stop reason: HOSPADM

## 2018-08-21 RX ORDER — AMLODIPINE BESYLATE 5 MG/1
5 TABLET ORAL DAILY
Status: DISCONTINUED | OUTPATIENT
Start: 2018-08-21 | End: 2018-08-22

## 2018-08-21 RX ORDER — POTASSIUM CHLORIDE 20 MEQ/1
40 TABLET, EXTENDED RELEASE ORAL
Status: COMPLETED | OUTPATIENT
Start: 2018-08-21 | End: 2018-08-21

## 2018-08-21 RX ORDER — CALCITRIOL 0.25 UG/1
0.25 CAPSULE ORAL DAILY
Status: DISCONTINUED | OUTPATIENT
Start: 2018-08-22 | End: 2018-08-23 | Stop reason: HOSPADM

## 2018-08-21 RX ORDER — ERGOCALCIFEROL 1.25 MG/1
50000 CAPSULE ORAL EVERY 2 WEEKS
Status: DISCONTINUED | OUTPATIENT
Start: 2018-08-21 | End: 2018-08-23 | Stop reason: HOSPADM

## 2018-08-21 RX ADMIN — POTASSIUM CHLORIDE 40 MEQ: 20 TABLET, EXTENDED RELEASE ORAL at 09:18

## 2018-08-21 RX ADMIN — AMLODIPINE BESYLATE 5 MG: 5 TABLET ORAL at 09:18

## 2018-08-21 RX ADMIN — CALCIUM CARBONATE (ANTACID) CHEW TAB 500 MG 400 MG: 500 CHEW TAB at 17:21

## 2018-08-21 RX ADMIN — CALCIUM CARBONATE (ANTACID) CHEW TAB 500 MG 400 MG: 500 CHEW TAB at 09:18

## 2018-08-21 RX ADMIN — LABETALOL HYDROCHLORIDE 300 MG: 200 TABLET, FILM COATED ORAL at 09:18

## 2018-08-21 RX ADMIN — OXYCODONE HYDROCHLORIDE AND ACETAMINOPHEN 1 TABLET: 5; 325 TABLET ORAL at 04:22

## 2018-08-21 RX ADMIN — DEXTROSE MONOHYDRATE: 5 INJECTION, SOLUTION INTRAVENOUS at 11:05

## 2018-08-21 RX ADMIN — OXYCODONE HYDROCHLORIDE AND ACETAMINOPHEN 1 TABLET: 5; 325 TABLET ORAL at 21:54

## 2018-08-21 RX ADMIN — CEFTRIAXONE SODIUM 2 G: 2 INJECTION, POWDER, FOR SOLUTION INTRAMUSCULAR; INTRAVENOUS at 21:53

## 2018-08-21 RX ADMIN — TAMSULOSIN HYDROCHLORIDE 0.4 MG: 0.4 CAPSULE ORAL at 11:05

## 2018-08-21 RX ADMIN — ERGOCALCIFEROL 50000 UNITS: 1.25 CAPSULE ORAL at 17:21

## 2018-08-21 RX ADMIN — LABETALOL HYDROCHLORIDE 300 MG: 200 TABLET, FILM COATED ORAL at 21:52

## 2018-08-21 RX ADMIN — SODIUM CHLORIDE 75 ML/HR: 450 INJECTION, SOLUTION INTRAVENOUS at 05:00

## 2018-08-21 RX ADMIN — DEXTROSE MONOHYDRATE: 5 INJECTION, SOLUTION INTRAVENOUS at 05:00

## 2018-08-21 RX ADMIN — SODIUM CHLORIDE 3 MG: 900 INJECTION, SOLUTION INTRAVENOUS at 02:54

## 2018-08-21 RX ADMIN — CALCIUM CARBONATE (ANTACID) CHEW TAB 500 MG 200 MG: 500 CHEW TAB at 21:52

## 2018-08-21 NOTE — PROGRESS NOTES
Problem: Falls - Risk of  Goal: *Absence of Falls  Document Jerri Fall Risk and appropriate interventions in the flowsheet. Outcome: Progressing Towards Goal  Fall Risk Interventions:  Mobility Interventions: Assess mobility with egress test, Communicate number of staff needed for ambulation/transfer, Patient to call before getting OOB, PT Consult for mobility concerns, PT Consult for assist device competence, Utilize walker, cane, or other assistive device    Mentation Interventions: Adequate sleep, hydration, pain control, Door open when patient unattended, Family/sitter at bedside    Medication Interventions: Patient to call before getting OOB, Evaluate medications/consider consulting pharmacy    Elimination Interventions: Call light in reach, Patient to call for help with toileting needs    History of Falls Interventions: Consult care management for discharge planning, Door open when patient unattended, Room close to nurse's station        Problem: Pressure Injury - Risk of  Goal: *Prevention of pressure injury  Document Vince Scale and appropriate interventions in the flowsheet. Outcome: Progressing Towards Goal  Pressure Injury Interventions:  Sensory Interventions: Assess changes in LOC, Keep linens dry and wrinkle-free, Minimize linen layers         Activity Interventions: Pressure redistribution bed/mattress(bed type), PT/OT evaluation    Mobility Interventions: HOB 30 degrees or less, Pressure redistribution bed/mattress (bed type), PT/OT evaluation    Nutrition Interventions: Document food/fluid/supplement intake                    Problem: Fluid Volume - Risk of, Imbalanced  Goal: *Balanced intake and output  Outcome: Progressing Towards Goal  Pt needs encouragement for oral intake. IVF running. Pt no longer confused. . Tremors are less than yesterday.

## 2018-08-21 NOTE — PROGRESS NOTES
[facility] Hospitalist Group  Progress Note    Patient: Mehdi Guy Age: 70 y.o. : 1950 MR#: [id number] SSN: [id number]  Date/Time: 2018     Subjective:     Review of systems  - more alert and awake   - family at bedside   - No NVD  - No CP       Assessment/Plan:   1. 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 Follow with Nephrology   7 BPH with mild symptomatology - Dr William Reid following   8 H/o Renal calculi          Case discussed with:  [x]Patient  [x]Family  []Nursing  []Case Management  DVT Prophylaxis:  []Lovenox  []Hep SQ  []SCDs  []Coumadin   []On Heparin gtt          Objective:   VS:   Visit Vitals    BP (!) 181/95    Pulse 75    Temp 98.3 °F (36.8 °C)    Resp 20    Ht 6' 5\" (1.956 m)    Wt 110.2 kg (243 lb)    SpO2 98%    BMI 28.82 kg/m2      Tmax/24hrs: Temp (24hrs), Av.3 °F (36.8 °C), Min:98 °F (36.7 °C), Max:98.5 °F (36.9 °C)  IOBRIEF    Intake/Output Summary (Last 24 hours) at 18 09  Last data filed at 18 3831   Gross per 24 hour   Intake          3176.25 ml   Output             3850 ml   Net          -673.75 ml       General:  Alert, cooperative, no acute distress   Cardiovascular: S1S2 - regular , No Murmur   Pulmonary: Equal expansion , No Use of accessory muscles , No Rales No Rhonchi    GI:  +BS in all four quadrants, soft, non-tender  Extremities:  No edema; 2+ dorsalis pedis pulses bilaterally  Neuro: Alert and oriented X 2.        Medications:   Current Facility-Administered Medications   Medication Dose Route Frequency    sodium bicarbonate 75 mEq, potassium chloride 20 mEq in dextrose 5% 1,000 mL infusion   IntraVENous CONTINUOUS    amLODIPine (NORVASC) tablet 5 mg  5 mg Oral DAILY    potassium chloride (K-DUR, KLOR-CON) SR tablet 40 mEq  40 mEq Oral NOW    0.45% sodium chloride infusion  75 mL/hr IntraVENous CONTINUOUS    calcium carbonate (TUMS) chewable tablet 400 mg [elemental]  400 mg Oral TID    cefTRIAXone (ROCEPHIN) 2 g in sterile water (preservative free) 20 mL IV syringe  2 g IntraVENous Q24H    labetalol (NORMODYNE) tablet 300 mg  300 mg Oral Q12H    Nilotinib cap 300 mg (Patient Supplied)  300 mg Oral Q12H    acetaminophen (TYLENOL) tablet 650 mg  650 mg Oral Q6H PRN    oxyCODONE-acetaminophen (PERCOCET) 5-325 mg per tablet 1 Tab  1 Tab Oral Q6H PRN    naloxone (NARCAN) injection 0.4 mg  0.4 mg IntraVENous PRN    ondansetron (ZOFRAN) injection 4 mg  4 mg IntraVENous Q6H PRN    docusate sodium (COLACE) capsule 100 mg  100 mg Oral BID PRN       Labs:    Recent Labs      08/21/18   0549  08/20/18   0530  08/19/18   1409   WBC  9.2  11.2  15.6*   HGB  10.3*  10.4*  11.4*   HCT  30.6*  30.8*  35.7*   PLT  280  280  380     Recent Labs      08/21/18   0549  08/20/18   1700  08/20/18   1220  08/20/18   0530  08/19/18   1409   NA  149*  147*  145  143  136   K  3.4*  3.8  3.9  4.3  5.7*   CL  115*  115*  114*  112*  99*   CO2  22  19*  17*  11*  9*   GLU  108*  138*  110*  82  77   BUN  40*  56*  62*  67*  75*   CREA  1.63*  3.07*  3.69*  5.02*  8.26*   CA  8.4*  7.0*  6.8*  6.7*  6.4*  7.3*   MG  2.2   --   2.4  2.5  2.5   PHOS  3.1   --    --   8.9*   --    ALB   --    --    --    --   3.7   SGOT   --    --    --    --   25   ALT   --    --    --    --   20         Signed By: Marvene Severe, MD     August 21, 2018

## 2018-08-21 NOTE — PROGRESS NOTES
CMS went to speak with the pt in regards to the [employer] from Medicare About Your Rights. \"  Pt was able to review and sign the documents provided. Family were present at bedside. Signed documents can be found in the chart for review; additional copies were left with the pt for review.

## 2018-08-21 NOTE — PROGRESS NOTES
Followed up on patients labs   Excellent urine output with improving renal parameters with hydration  Noted improvement in metabolic acidoses with bicarb drip    Patient noted to have h/p CML present;y on treatment with Loel Poag noted with following electrolyte abn : hyperkalemia on presentation ,  Hyperphosphatasia with hypocalcemia , hyperuricemia , associated with   Renal failure. Labs are suspicious for tumour lysis syndrome and there have been a few case reports pf  TLS with  [place] . Although uncommon to have TLS in CML and with a relatively  Normal wbc count, but labs do indicate it. Would be good to discuss with  Hem/onc. I would like  To continue to provide aggressive hydration to prevent precipitation of uric acid '  Crystals and calcium phosphate in tubules , Also alkalinizing the urine may be useful  I am also going to give a dose of rasburicase to treat patients high uric acid level. Recheck phos level , goal is to keep calciumX phos produce < 70 to avoid precipitating in  Renal tubules. I called and discussed with patients wife and nurse.      Please call with questions    Fareed Vitale MD FASN  Cell [phone]  Pager: [phone]

## 2018-08-21 NOTE — PROGRESS NOTES
met with patient completed the initial Spiritual Assessment of the patient, and offered Pastoral Care, see flow sheets for interventions.  extended the assurance of prayer and spiritual care materials. Patient does not have any Sikh/cultural needs that will affect patients preferences in health care. The patient was informed that chaplains will continue to follow and will provide pastoral care on an as needed/requested basis.       Nicholas [phone]  Pager:[phone]

## 2018-08-21 NOTE — PROGRESS NOTES
In Patient Progress note      Admit Date: 8/19/2018          Impression:        1) KAREN on CKD 3 ( baseline ~ 1.9-2) d/t prerenal azotemia in setting of poor intake /UTI/relative hypotension      Patient was taking naproxyn 400 mg BID for several weeks after his surgery which may have caused ATN/AIN       Being on ARB , HCTZ may also have contributed to KAREN and appropriately held       Brisk improvement in renal function, excellent urine output , volume status looks ok      on ceftriaxone , urine cx pending      CT abdomen showed , ann nephrolithiasis , no hydro , enlarged prostate      Will cut down  IVF to 75 cc/hrs of half strength bicarb drip       OK to D/C churchill catheter      BMP phos , mag, uric acid  q12 hrs   2) CKD 3 , d/t  etiology HTN nephrosclerosis   2) AG Metabolic acidoses improving   3) Metabolic encephalopathy improving  D/t KAREN   4) HTN , restarted amlodipine and labetalol  5) hypokalemia , replace K   6) anemia : check iron levels   7) sec hyperpara , pth high , will add calcitriol 0.25 mcg daily , continue calcium ,       acute hyper phos improved with improving renal functions. 8) suspected UTI , follow Cx , on ceftriaxone    9) Nephrolithiasis : risk stratification outpatient  10) hyperuricemia , hypocalcemia, hyperphosphatasia , suspicious for TLS but with stable WBC count         And low risk of TLS in CMS which has been stable unlikely. I have given a dose of rasburicase and        Uric acid seems to be trending down. May need another dose tonight . I have discussed with Dr Gabo Flor and she will see the patient.   11) Hypernatremia : hypotonic fluids, monitor     Please call with questions,     Bell Mariscal MD FASN  Cell [phone]  Pager: [phone]    Subjective:     Feels better  Denies any jerking, rigidity   Excellent urine output    Current Facility-Administered Medications:     sodium bicarbonate 75 mEq, potassium chloride 20 mEq in dextrose 5% 1,000 mL infusion, , IntraVENous, CONTINUOUS, Austen Herrera MD    amLODIPine (NORVASC) tablet 5 mg, 5 mg, Oral, DAILY, Austen Herrera MD, 5 mg at 08/21/18 0772    tamsulosin (FLOMAX) capsule 0.4 mg, 0.4 mg, Oral, DAILY, Marvene Severe, MD    0.45% sodium chloride infusion, 75 mL/hr, IntraVENous, CONTINUOUS, Zeferino Griffin MD, Last Rate: 75 mL/hr at 08/21/18 0500, 75 mL/hr at 08/21/18 0500    calcium carbonate (TUMS) chewable tablet 400 mg [elemental], 400 mg, Oral, TID, Austen Herrera MD, 400 mg at 08/21/18 0180    cefTRIAXone (ROCEPHIN) 2 g in sterile water (preservative free) 20 mL IV syringe, 2 g, IntraVENous, Q24H, Zeferino Griffin MD, 2 g at 08/20/18 2148    labetalol (NORMODYNE) tablet 300 mg, 300 mg, Oral, Q12H, Austen Herrera MD, 300 mg at 08/21/18 7256    Nilotinib cap 300 mg (Patient Supplied), 300 mg, Oral, Q12H, Zeferino Griffin MD, 300 mg at 08/21/18 0916    acetaminophen (TYLENOL) tablet 650 mg, 650 mg, Oral, Q6H PRN, Zeferino Griffin MD    oxyCODONE-acetaminophen (PERCOCET) 5-325 mg per tablet 1 Tab, 1 Tab, Oral, Q6H PRN, Zeferino Griffin MD, 1 Tab at 08/21/18 0422    naloxone (NARCAN) injection 0.4 mg, 0.4 mg, IntraVENous, PRN, Zeferino Griffin MD    ondansetron [facility]) injection 4 mg, 4 mg, IntraVENous, Q6H PRN, Zeferino Griffin MD    docusate sodium (COLACE) capsule 100 mg, 100 mg, Oral, BID PRN, Zeferino Griffin MD        Objective:     Visit Vitals    BP (!) 181/95    Pulse 75    Temp 98.3 °F (36.8 °C)    Resp 20    Ht 6' 5\" (1.956 m)    Wt 110.2 kg (243 lb)    SpO2 98%    BMI 28.82 kg/m2         Intake/Output Summary (Last 24 hours) at 08/21/18 0944  Last data filed at 08/21/18 3010   Gross per 24 hour   Intake          3176.25 ml   Output             3850 ml   Net          -673.75 ml       Physical Exam:     gen NAD  HENT mmm  RS AEBE clear  CVS s1 s2 wnl no JVD  GI soft BS +  Ext no edema       Data Review:    Recent Labs      08/21/18   0549   WBC  9.2   RBC  3.95*   HCT  30.6* MCV  77.5   MCH  26.1   MCHC  33.7   RDW  17.0*     Recent Labs      08/21/18   0549  08/20/18   1700  08/20/18   1220  08/20/18   0530  08/19/18   1409   BUN  40*  56*  62*  67*  75*   CREA  1.63*  3.07*  3.69*  5.02*  8.26*   CA  8.4*  7.0*  6.8*  6.7*  6.4*  7.3*   ALB   --    --    --    --   3.7   K  3.4*  3.8  3.9  4.3  5.7*   NA  149*  147*  145  143  136   CL  115*  115*  114*  112*  99*   CO2  22  19*  17*  11*  9*   PHOS  3.1   --    --   8.9*   --    GLU  108*  138*  110*  82  77       Hina Arboleda MD

## 2018-08-21 NOTE — PROGRESS NOTES
1330 - Pulled churchill. 1530 - one immeasurable urine occurrence. Pt stated he urinated while having BM. 1700 - UO 150cc's.      1920- Bedside and Verbal shift change report given to [facility] (ICU RN) (oncoming nurse) by Steven Mejias RN (offgoing nurse). Report included the following information SBAR, Kardex, Intake/Output, MAR and Cardiac Rhythm NSR.

## 2018-08-21 NOTE — PROGRESS NOTES
Reason for Admission:   ARF                  RRAT Score:    18              Do you (patient/family) have any concerns for transition/discharge? no                Plan for utilizing home health: possibly        Likelihood of readmission?   mod            Transition of Care Plan:   Home with or without [place]  Care Management Interventions  PCP Verified by CM: Yes  Palliative Care Criteria Met (RRAT>21 & CHF Dx)?: No  Mode of Transport at Discharge: Self  Transition of Care Consult (CM Consult): Discharge Planning  MyChart Signup: No  Discharge Durable Medical Equipment: No  Physical Therapy Consult: No  Occupational Therapy Consult: No  Speech Therapy Consult: No  Current Support Network: Lives with Spouse  Confirm Follow Up Transport: Family  Plan discussed with Pt/Family/Caregiver: Yes  Freedom of Choice Offered: Yes   Resource Information Provided?: No  Discharge Location  Discharge Placement: Home      Met wit pt at b/s. PLOF was independent and driving. Pt lives with spouse. Owns no DME and pt stated he has used no community resources in the past. [facility] FOC signed for [facility]  , if needed.

## 2018-08-21 NOTE — ROUTINE PROCESS
Bedside and Verbal shift change report given to [facility] (oncoming nurse) by Brennen Botello RN (offgoing nurse). Report included the following information SBAR, Intake/Output and Recent Results.

## 2018-08-22 LAB
ANION GAP SERPL CALC-SCNC: 7 MMOL/L (ref 3–18)
ANION GAP SERPL CALC-SCNC: 8 MMOL/L (ref 3–18)
APPEARANCE UR: CLEAR
ATRIAL RATE: 83 BPM
BACTERIA SPEC CULT: NORMAL
BACTERIA URNS QL MICRO: ABNORMAL /HPF
BASOPHILS # BLD: 0 K/UL (ref 0–0.06)
BASOPHILS NFR BLD: 0 % (ref 0–3)
BILIRUB UR QL: NEGATIVE
BUN SERPL-MCNC: 21 MG/DL (ref 7–18)
BUN SERPL-MCNC: 26 MG/DL (ref 7–18)
BUN/CREAT SERPL: 20 (ref 12–20)
BUN/CREAT SERPL: 22 (ref 12–20)
CALCIUM SERPL-MCNC: 8.1 MG/DL (ref 8.5–10.1)
CALCIUM SERPL-MCNC: 8.2 MG/DL (ref 8.5–10.1)
CALCULATED P AXIS, ECG09: 27 DEGREES
CALCULATED R AXIS, ECG10: 23 DEGREES
CALCULATED T AXIS, ECG11: 25 DEGREES
CHLORIDE SERPL-SCNC: 109 MMOL/L (ref 100–108)
CHLORIDE SERPL-SCNC: 112 MMOL/L (ref 100–108)
CO2 SERPL-SCNC: 26 MMOL/L (ref 21–32)
CO2 SERPL-SCNC: 27 MMOL/L (ref 21–32)
COLOR UR: YELLOW
CREAT SERPL-MCNC: 1.04 MG/DL (ref 0.6–1.3)
CREAT SERPL-MCNC: 1.17 MG/DL (ref 0.6–1.3)
DIAGNOSIS, 93000: NORMAL
DIFFERENTIAL METHOD BLD: ABNORMAL
EOSINOPHIL # BLD: 0.1 K/UL (ref 0–0.4)
EOSINOPHIL NFR BLD: 1 % (ref 0–5)
EPITH CASTS URNS QL MICRO: ABNORMAL /LPF (ref 0–5)
ERYTHROCYTE [DISTWIDTH] IN BLOOD BY AUTOMATED COUNT: 16.7 % (ref 11.6–14.5)
GLUCOSE SERPL-MCNC: 95 MG/DL (ref 74–99)
GLUCOSE SERPL-MCNC: 97 MG/DL (ref 74–99)
GLUCOSE UR STRIP.AUTO-MCNC: NEGATIVE MG/DL
HCT VFR BLD AUTO: 29.5 % (ref 36–48)
HGB BLD-MCNC: 10 G/DL (ref 13–16)
HGB UR QL STRIP: ABNORMAL
KETONES UR QL STRIP.AUTO: NEGATIVE MG/DL
LEUKOCYTE ESTERASE UR QL STRIP.AUTO: ABNORMAL
LYMPHOCYTES # BLD: 4.2 K/UL (ref 0.8–3.5)
LYMPHOCYTES NFR BLD: 40 % (ref 20–51)
MAGNESIUM SERPL-MCNC: 1.9 MG/DL (ref 1.6–2.6)
MCH RBC QN AUTO: 26.9 PG (ref 24–34)
MCHC RBC AUTO-ENTMCNC: 33.9 G/DL (ref 31–37)
MCV RBC AUTO: 79.3 FL (ref 74–97)
MONOCYTES # BLD: 0.8 K/UL (ref 0–1)
MONOCYTES NFR BLD: 8 % (ref 2–9)
NEUTS SEG # BLD: 5.4 K/UL (ref 1.8–8)
NEUTS SEG NFR BLD: 51 % (ref 42–75)
NITRITE UR QL STRIP.AUTO: NEGATIVE
P-R INTERVAL, ECG05: 174 MS
PH UR STRIP: 6.5 [PH] (ref 5–8)
PLATELET # BLD AUTO: 260 K/UL (ref 135–420)
PLATELET COMMENTS,PCOM: ABNORMAL
PMV BLD AUTO: 10.9 FL (ref 9.2–11.8)
POTASSIUM SERPL-SCNC: 3.8 MMOL/L (ref 3.5–5.5)
POTASSIUM SERPL-SCNC: 4.2 MMOL/L (ref 3.5–5.5)
PROT UR STRIP-MCNC: ABNORMAL MG/DL
Q-T INTERVAL, ECG07: 392 MS
QRS DURATION, ECG06: 106 MS
QTC CALCULATION (BEZET), ECG08: 460 MS
RBC # BLD AUTO: 3.72 M/UL (ref 4.7–5.5)
RBC #/AREA URNS HPF: ABNORMAL /HPF (ref 0–5)
RBC MORPH BLD: ABNORMAL
SERVICE CMNT-IMP: NORMAL
SODIUM SERPL-SCNC: 143 MMOL/L (ref 136–145)
SODIUM SERPL-SCNC: 146 MMOL/L (ref 136–145)
SP GR UR REFRACTOMETRY: 1.01 (ref 1–1.03)
URATE SERPL-MCNC: 5.3 MG/DL (ref 2.6–7.2)
URATE SERPL-MCNC: 6.2 MG/DL (ref 2.6–7.2)
UROBILINOGEN UR QL STRIP.AUTO: 1 EU/DL (ref 0.2–1)
VENTRICULAR RATE, ECG03: 83 BPM
WBC # BLD AUTO: 10.5 K/UL (ref 4.6–13.2)
WBC MORPH BLD: ABNORMAL
WBC URNS QL MICRO: ABNORMAL /HPF (ref 0–4)

## 2018-08-22 PROCEDURE — 97161 PT EVAL LOW COMPLEX 20 MIN: CPT

## 2018-08-22 PROCEDURE — 74011000250 HC RX REV CODE- 250: Performed by: INTERNAL MEDICINE

## 2018-08-22 PROCEDURE — 85025 COMPLETE CBC W/AUTO DIFF WBC: CPT | Performed by: INTERNAL MEDICINE

## 2018-08-22 PROCEDURE — 80048 BASIC METABOLIC PNL TOTAL CA: CPT | Performed by: INTERNAL MEDICINE

## 2018-08-22 PROCEDURE — 74011250637 HC RX REV CODE- 250/637: Performed by: INTERNAL MEDICINE

## 2018-08-22 PROCEDURE — 97116 GAIT TRAINING THERAPY: CPT

## 2018-08-22 PROCEDURE — 74011250636 HC RX REV CODE- 250/636: Performed by: INTERNAL MEDICINE

## 2018-08-22 PROCEDURE — 84550 ASSAY OF BLOOD/URIC ACID: CPT | Performed by: INTERNAL MEDICINE

## 2018-08-22 PROCEDURE — 36415 COLL VENOUS BLD VENIPUNCTURE: CPT | Performed by: INTERNAL MEDICINE

## 2018-08-22 PROCEDURE — 93005 ELECTROCARDIOGRAM TRACING: CPT

## 2018-08-22 PROCEDURE — 74011000258 HC RX REV CODE- 258: Performed by: INTERNAL MEDICINE

## 2018-08-22 PROCEDURE — 65660000004 HC RM CVT STEPDOWN

## 2018-08-22 PROCEDURE — 83735 ASSAY OF MAGNESIUM: CPT | Performed by: INTERNAL MEDICINE

## 2018-08-22 RX ORDER — SODIUM CHLORIDE 450 MG/100ML
75 INJECTION, SOLUTION INTRAVENOUS CONTINUOUS
Status: DISCONTINUED | OUTPATIENT
Start: 2018-08-22 | End: 2018-08-22

## 2018-08-22 RX ORDER — AMLODIPINE BESYLATE 10 MG/1
10 TABLET ORAL DAILY
Status: DISCONTINUED | OUTPATIENT
Start: 2018-08-22 | End: 2018-08-23 | Stop reason: HOSPADM

## 2018-08-22 RX ADMIN — CALCIUM CARBONATE (ANTACID) CHEW TAB 500 MG 400 MG: 500 CHEW TAB at 09:08

## 2018-08-22 RX ADMIN — OXYCODONE HYDROCHLORIDE AND ACETAMINOPHEN 1 TABLET: 5; 325 TABLET ORAL at 04:44

## 2018-08-22 RX ADMIN — CALCIUM CARBONATE (ANTACID) CHEW TAB 500 MG 400 MG: 500 CHEW TAB at 16:43

## 2018-08-22 RX ADMIN — CEFTRIAXONE SODIUM 2 G: 2 INJECTION, POWDER, FOR SOLUTION INTRAMUSCULAR; INTRAVENOUS at 21:51

## 2018-08-22 RX ADMIN — CALCIUM CARBONATE (ANTACID) CHEW TAB 500 MG 400 MG: 500 CHEW TAB at 21:48

## 2018-08-22 RX ADMIN — SODIUM CHLORIDE 75 ML/HR: 450 INJECTION, SOLUTION INTRAVENOUS at 03:12

## 2018-08-22 RX ADMIN — LABETALOL HYDROCHLORIDE 300 MG: 200 TABLET, FILM COATED ORAL at 21:48

## 2018-08-22 RX ADMIN — TAMSULOSIN HYDROCHLORIDE 0.4 MG: 0.4 CAPSULE ORAL at 09:08

## 2018-08-22 RX ADMIN — CALCITRIOL 0.25 MCG: 0.25 CAPSULE ORAL at 09:08

## 2018-08-22 RX ADMIN — OXYCODONE HYDROCHLORIDE AND ACETAMINOPHEN 1 TABLET: 5; 325 TABLET ORAL at 16:43

## 2018-08-22 RX ADMIN — LABETALOL HYDROCHLORIDE 300 MG: 200 TABLET, FILM COATED ORAL at 09:08

## 2018-08-22 RX ADMIN — SODIUM CHLORIDE 75 ML/HR: 450 INJECTION, SOLUTION INTRAVENOUS at 16:42

## 2018-08-22 RX ADMIN — AMLODIPINE BESYLATE 10 MG: 10 TABLET ORAL at 09:08

## 2018-08-22 NOTE — PROGRESS NOTES
[facility] Hospitalist Group  Progress Note    Patient: Susana Madison Age: 70 y.o. : 1950 MR#: [id number] SSN: [id number]  Date/Time: 2018     Subjective:     Review of systems  - more alert and awake   - family at bedside   - No NVD  - No CP       Assessment/Plan:   1. Acute metabolic encephalopathy - from KAREN - improved   2  KAREN -  - Improving with IVF - churchill reinserted for urinary retention   3 Acute hyperkalemia - now hypokalemia - monitor and replace   4 Acute NV - resolved   5 hx CML- dr Melo Ramirez following - Dr Shiavm Philippe consulted , reviewed   6 Tumor lysis syndrome suspect - high uric acid +hyperkalemia +high phos - On Calcium po , s/p rasburicase dose , alkalinization of urine . Follow with Nephrology   7 BPH with mild symptomatology - Dr Ambar Sharpe - consulted to day as patient failed churchill discontinuation   8 H/o Renal calculi          Case discussed with:  [x]Patient  [x]Family  []Nursing  []Case Management  DVT Prophylaxis:  []Lovenox  []Hep SQ  []SCDs  []Coumadin   []On Heparin gtt          Objective:   VS:   Visit Vitals    /85    Pulse 71    Temp 98.1 °F (36.7 °C)    Resp 18    Ht 6' 5\" (1.956 m)    Wt 110.2 kg (242 lb 14.4 oz)    SpO2 99%    BMI 28.8 kg/m2      Tmax/24hrs: Temp (24hrs), Av.2 °F (36.8 °C), Min:97.4 °F (36.3 °C), Max:98.9 °F (37.2 °C)  IOBRIEF    Intake/Output Summary (Last 24 hours) at 18 6444  Last data filed at 18 5432   Gross per 24 hour   Intake           2182.5 ml   Output             2450 ml   Net           -267.5 ml       General:  Alert, cooperative, no acute distress   Cardiovascular: S1S2 - regular , No Murmur   Pulmonary: Equal expansion , No Use of accessory muscles , No Rales No Rhonchi    GI:  +BS in all four quadrants, soft, non-tender  Extremities:  No edema; 2+ dorsalis pedis pulses bilaterally  Neuro: Alert and oriented X 2.        Medications:   Current Facility-Administered Medications   Medication Dose Route Frequency    0.45% sodium chloride infusion  75 mL/hr IntraVENous CONTINUOUS    amLODIPine (NORVASC) tablet 10 mg  10 mg Oral DAILY    tamsulosin (FLOMAX) capsule 0.4 mg  0.4 mg Oral DAILY    calcitRIOL (ROCALTROL) capsule 0.25 mcg  0.25 mcg Oral DAILY    ergocalciferol (ERGOCALCIFEROL) capsule 50,000 Units  50,000 Units Oral EVERY 2 WEEKS    calcium carbonate (TUMS) chewable tablet 400 mg [elemental]  400 mg Oral TID    cefTRIAXone (ROCEPHIN) 2 g in sterile water (preservative free) 20 mL IV syringe  2 g IntraVENous Q24H    labetalol (NORMODYNE) tablet 300 mg  300 mg Oral Q12H    Nilotinib cap 300 mg (Patient Supplied)  300 mg Oral Q12H    acetaminophen (TYLENOL) tablet 650 mg  650 mg Oral Q6H PRN    oxyCODONE-acetaminophen (PERCOCET) 5-325 mg per tablet 1 Tab  1 Tab Oral Q6H PRN    naloxone (NARCAN) injection 0.4 mg  0.4 mg IntraVENous PRN    ondansetron (ZOFRAN) injection 4 mg  4 mg IntraVENous Q6H PRN    docusate sodium (COLACE) capsule 100 mg  100 mg Oral BID PRN       Labs:    Recent Labs      08/22/18   0417  08/21/18   0549  08/20/18   0530   WBC  10.5  9.2  11.2   HGB  10.0*  10.3*  10.4*   HCT  29.5*  30.6*  30.8*   PLT  260  280  280     Recent Labs      08/22/18   0417  08/21/18   1905  08/21/18   0549   08/20/18   1220  08/20/18   0530  08/19/18   1409   NA  146*  146*  149*   < >  145  143  136   K  3.8  4.3  3.4*   < >  3.9  4.3  5.7*   CL  112*  112*  115*   < >  114*  112*  99*   CO2  27  29  22   < >  17*  11*  9*   GLU  97  103*  108*   < >  110*  82  77   BUN  26*  32*  40*   < >  62*  67*  75*   CREA  1.17  1.34*  1.63*   < >  3.69*  5.02*  8.26*   CA  8.1*  8.4*  8.4*   < >  6.8*  6.7*  6.4*  7.3*   MG  1.9   --   2.2   --   2.4  2.5  2.5   PHOS   --    --   3.1   --    --   8.9*   --    ALB   --    --    --    --    --    --   3.7   SGOT   --    --    --    --    --    --   25   ALT   --    --    --    --    --    --   20    < > = values in this interval not displayed.          Signed By: Sophie Dos Santos MD     August 22, 2018

## 2018-08-22 NOTE — PROGRESS NOTES
Dr Zandra Tucker will be covering me tomorrow 8/22/18 and will be rounding on the patient. Please page him at [phone] with any questions. I am also available by page or cell if needed.  I will be back on 8/23/18    Please call with questions,    Arvin Addison MD [facility]  Cell [phone]  Pager: [phone]

## 2018-08-22 NOTE — ROUTINE PROCESS
Bedside and Verbal shift change report given to Bryce Malik RN (oncoming nurse) by Eri Vasquez RN (offgoing nurse). Report included the following information SBAR, Kardex, Intake/Output, MAR, Recent Results and Cardiac Rhythm NSR.

## 2018-08-22 NOTE — PROGRESS NOTES
RENAL PROGRESS NOTE        Beatrice Edgar         Assessment/Plan:     · KAREN (uti/sepsis/volume depletion). Resolving. D/c ivf. · CKD 3.    · UTI/sepsis. On abx. · Urinary retention. F/u with urology as op. · HTN. Labetalol restarted. May soon restart  Amlodipine/olmesartan. · SHPT/hypocalciemia. For now continue calcitriol, may not need long term. Subjective:  Patient complaints off: Feels much better. No SOB/CP/N/V. Tolerates diet. Richard had to be reinserted due to dysuria, dribbling.        Patient Active Problem List   Diagnosis Code    Kidney stone N20.0    Calculus of ureter N20.1    BPH (benign prostatic hypertrophy) with urinary obstruction N40.1, N13.8    Headache(784.0) R51    Migraine, unspecified, with intractable migraine, so stated, without mention of status migrainosus G43.919    Cervicalgia M54.2    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    PVCs (premature ventricular contractions) I49.3    Unspecified retinal detachment H33.20    Sciatica M54.30    Esophageal reflux K21.9    Hypogonadism in male E29.1    Polycythemia, secondary D75.1    Dizziness and giddiness R42    Acute reaction to stress F43.0    Unspecified sleep apnea G47.30    Allergic rhinitis due to pollen J30.1    Essential hypertension I10    Cervical spine disease M48.9    CML in remission ([facility] [address] C92.11    Advance directive discussed with patient Z71.89    Cervical kyphosis M40.202    Left foot drop M21.372    S/P ORIF (open reduction internal fixation) fracture Z96.7, Z87.81    Chronic renal insufficiency N18.9    Cramps, muscle, general R25.2    Neuropathy G62.9    Bruises easily R23.8    Lumbar stenosis with neurogenic claudication M48.062    Acute hyperkalemia E87.5    Uremic acidosis N25.89    Hyperammonemia (HCC) E72.20    ARF (acute renal failure) (HCC) N17.9    Acute metabolic encephalopathy P01.56    Tremor of unknown origin R25.1       Current Facility-Administered Medications   Medication Dose Route Frequency Provider Last Rate Last Dose    0.45% sodium chloride infusion  75 mL/hr IntraVENous CONTINUOUS Eduin Stark MD 75 mL/hr at 08/22/18 1642 75 mL/hr at 08/22/18 1642    amLODIPine (NORVASC) tablet 10 mg  10 mg Oral DAILY Eduin Stark MD   10 mg at 08/22/18 0908    tamsulosin (FLOMAX) capsule 0.4 mg  0.4 mg Oral DAILY Madhavi Cheatham MD   0.4 mg at 08/22/18 3401    calcitRIOL (ROCALTROL) capsule 0.25 mcg  0.25 mcg Oral DAILY Griselda Gaytan MD   0.25 mcg at 08/22/18 0908    ergocalciferol (ERGOCALCIFEROL) capsule 50,000 Units  50,000 Units Oral EVERY 2 WEEKS Griselda Gaytan MD   50,000 Units at 08/21/18 1721    calcium carbonate (TUMS) chewable tablet 400 mg [elemental]  400 mg Oral TID Griselda Gaytan MD   400 mg at 08/22/18 1643    cefTRIAXone (ROCEPHIN) 2 g in sterile water (preservative free) 20 mL IV syringe  2 g IntraVENous Q24H Eduin Stark MD   2 g at 08/21/18 2153    labetalol (NORMODYNE) tablet 300 mg  300 mg Oral Q12H Griselda Gaytan MD   300 mg at 08/22/18 0908    Nilotinib cap 300 mg (Patient Supplied)  300 mg Oral Q12H Eduin Stark MD   300 mg at 08/22/18 1650    acetaminophen (TYLENOL) tablet 650 mg  650 mg Oral Q6H PRN Eduin Stark MD        oxyCODONE-acetaminophen (PERCOCET) 5-325 mg per tablet 1 Tab  1 Tab Oral Q6H PRN Eduin Stark MD   1 Tab at 08/22/18 1643    naloxone (NARCAN) injection 0.4 mg  0.4 mg IntraVENous PRN Eduin Stark MD        ondansetron [facility]) injection 4 mg  4 mg IntraVENous Q6H PRN Eduin Stakr MD        docusate sodium (COLACE) capsule 100 mg  100 mg Oral BID PRN Eduin Stark MD           Objective  Vitals:    08/22/18 0714 08/22/18 0810 08/22/18 1111 08/22/18 1601   BP: (!) 145/111 159/85 130/85 (!) 150/91   Pulse: 71  74 80   Resp: 18 21 17   Temp: 98.1 °F (36.7 °C)  98.4 °F (36.9 °C) 98.2 °F (36.8 °C)   SpO2: 99%  95% 98%   Weight:       Height:             Intake/Output Summary (Last 24 hours) at 08/22/18 1751  Last data filed at 08/22/18 1738   Gross per 24 hour   Intake           2422.5 ml   Output             2825 ml   Net           -402.5 ml           Admission weight: Weight: 107 kg (236 lb) (08/19/18 1323)  Last Weight Metrics:  Weight Loss Metrics 8/22/2018 7/24/2018 6/4/2018 5/3/2018 3/29/2018 3/23/2018 1/31/2018   Today's Wt 242 lb 14.4 oz 236 lb 238 lb 12.1 oz 242 lb 239 lb 239 lb 238 lb   BMI 28.8 kg/m2 28.73 kg/m2 29.06 kg/m2 29.46 kg/m2 29.09 kg/m2 29.09 kg/m2 28.97 kg/m2             Physical Assessment:     General: NAD, alert and oriented. Neck: No jvd. LUNGS: Clear to Auscultation, No rales, rhonchi or wheezes. CVS EXM: S1, S2  RRR, no murmurs/gallops/rubs. Abdomen: soft, non tender. Lower Extremities:  no edema. Lab    CBC w/Diff Recent Labs      08/22/18   0417  08/21/18   0549  08/20/18   0530   WBC  10.5  9.2  11.2   RBC  3.72*  3.95*  3.87*   HGB  10.0*  10.3*  10.4*   HCT  29.5*  30.6*  30.8*   PLT  260  280  280   GRANS  51  50  55   LYMPH  40  38  35   EOS  1  2  0        Chemistry Recent Labs      08/22/18   0417  08/21/18   1905  08/21/18   0549   GLU  97  103*  108*   NA  146*  146*  149*   K  3.8  4.3  3.4*   CL  112*  112*  115*   CO2  27  29  22   BUN  26*  32*  40*   CREA  1.17  1.34*  1.63*   CA  8.1*  8.4*  8.4*   AGAP  7  5  12   BUCR  22*  24*  25*   PHOS   --    --   3.1         Lab Results   Component Value Date/Time    Iron 69 08/20/2018 12:20 PM    TIBC 344 08/20/2018 12:20 PM    Iron % saturation 20 08/20/2018 12:20 PM    Lab Results   Component Value Date/Time    Calcium 8.1 (L) 08/22/2018 04:17 AM    Phosphorus 3.1 08/21/2018 05:49 AM        Jock Councilman, M.D.   [facility]  Phone

## 2018-08-22 NOTE — PROGRESS NOTES
Phone: [phone]     Hematology / Oncology Progress Note  [facility]      Patient: Lani Jackson   MRN: [id number]         CSN: [id number]    YOB: 1950      Admit Date: 2018    Assessment:     Principal Problem:    ARF (acute renal failure) ([facility] [address] (2018)    Active Problems:    Acute hyperkalemia (2018)      Uremic acidosis (2018)      Hyperammonemia ([address] (2018)      Acute metabolic encephalopathy (3213)      Tremor of unknown origin (2018)    H/o CML, on tasigna/nilotinib,  , excellent molecular response  Hypocalcemia, tetany   encephalopathy  Acute kidney injury  Urinary retension  Plan:     Electrolytes improving with replacement and one dose of rasburicase. D/w Renal  Ct Nilotinib, has had molecular response with stable counts  Risk of prolongation of QTc interval  While on nilotinib With electrolyte abnormalities. Rpt EKG  ?  Etiology for urinary retension, needs eval  Out of bed to chair    Chaya Quinn MD  [facility] [phone]      Subjective:     Awake alert, failed voiding trial    Objective:     Visit Vitals    BP (!) 145/111    Pulse 71    Temp 98.1 °F (36.7 °C)    Resp 18    Ht 6' 5\" (1.956 m)    Wt 110.2 kg (242 lb 14.4 oz)    SpO2 99%    BMI 28.8 kg/m2             Temp (24hrs), Av.2 °F (36.8 °C), Min:97.4 °F (36.3 °C), Max:98.9 °F (37.2 °C)        Intake/Output Summary (Last 24 hours) at 18 7659  Last data filed at 18 7987   Gross per 24 hour   Intake           2062.5 ml   Output             2450 ml   Net           -387.5 ml     Review of Systems:   Constitutional: negative for fevers, chills, sweats and fatigue  Eyes: negative for visual disturbance, redness and icterus  Ears, Nose, Mouth, Throat, and Face: negative for tinnitus, epistaxis, sore mouth and hoarseness  Respiratory: negative for cough, sputum, hemoptysis, pleurisy/chest pain or wheezing  Cardiovascular: negative for chest pain, chest pressure/discomfort, palpitations, irregular heart beats, syncope, paroxysmal nocturnal dyspnea  Gastrointestinal: negative for reflux symptoms, nausea, vomiting, change in bowel habits, melena, diarrhea, constipation and abdominal pain  Genitourinary retension, churchill in place  Integument: negative for rash, skin lesion(s) and pruritus  Hematologic/Lymphatic: negative for easy bruising, bleeding and lymphadenopathy  Musculoskeletal:negative for myalgias, arthralgias and bone pain  Neurological: negative for headaches, dizziness, seizures, paresthesia and weakness    Physical Exam:  Constitutional: Alert, oriented, not in distress  Eyes: PERRLA, anicteric, no redness  Ears, nose, mouth, throat, and face: no palpable Lymph nodes, no mucositis, no thrush. Respiratory: symmetrical expansion, no rales, no rhonchi, no wheezing. Cardiovascular: S1S2, no pathologic murmur, no rub. Gastrointestinal: soft, benign, non tender, no HSM, normal bowel sounds, no mass. Integument: no rash, no petechiae, no ecchymosis. Musculoskeletal: no edema, no cyanosis, no clubbing.   Neurological:tremors and dystonia resolved    Labs:  Recent Results (from the past 24 hour(s))   URIC ACID    Collection Time: 08/21/18 12:56 PM   Result Value Ref Range    Uric acid 10.2 (H) 2.6 - 7.2 MG/DL   METABOLIC PANEL, BASIC    Collection Time: 08/21/18  7:05 PM   Result Value Ref Range    Sodium 146 (H) 136 - 145 mmol/L    Potassium 4.3 3.5 - 5.5 mmol/L    Chloride 112 (H) 100 - 108 mmol/L    CO2 29 21 - 32 mmol/L    Anion gap 5 3.0 - 18 mmol/L    Glucose 103 (H) 74 - 99 mg/dL    BUN 32 (H) 7.0 - 18 MG/DL    Creatinine 1.34 (H) 0.6 - 1.3 MG/DL    BUN/Creatinine ratio 24 (H) 12 - 20      GFR est AA >60 >60 ml/min/1.73m2    GFR est non-AA 53 (L) >60 ml/min/1.73m2    Calcium 8.4 (L) 8.5 - 10.1 MG/DL   URINALYSIS W/MICROSCOPIC    Collection Time: 08/21/18 11:25 PM   Result Value Ref Range    Color YELLOW      Appearance CLEAR      Specific gravity 1.014 1.005 - 1.030      pH (UA) 6.5 5.0 - 8.0      Protein TRACE (A) NEG mg/dL    Glucose NEGATIVE  NEG mg/dL    Ketone NEGATIVE  NEG mg/dL    Bilirubin NEGATIVE  NEG      Blood SMALL (A) NEG      Urobilinogen 1.0 0.2 - 1.0 EU/dL    Nitrites NEGATIVE  NEG      Leukocyte Esterase TRACE (A) NEG      WBC 5 to 9 0 - 4 /hpf    RBC 20 to 30 0 - 5 /hpf    Epithelial cells FEW 0 - 5 /lpf    Bacteria 1+ (A) NEG /hpf   CBC WITH AUTOMATED DIFF    Collection Time: 08/22/18  4:17 AM   Result Value Ref Range    WBC 10.5 4.6 - 13.2 K/uL    RBC 3.72 (L) 4.70 - 5.50 M/uL    HGB 10.0 (L) 13.0 - 16.0 g/dL    HCT 29.5 (L) 36.0 - 48.0 %    MCV 79.3 74.0 - 97.0 FL    MCH 26.9 24.0 - 34.0 PG    MCHC 33.9 31.0 - 37.0 g/dL    RDW 16.7 (H) 11.6 - 14.5 %    PLATELET 947 483 - 631 K/uL    MPV 10.9 9.2 - 11.8 FL    NEUTROPHILS 51 42 - 75 %    LYMPHOCYTES 40 20 - 51 %    MONOCYTES 8 2 - 9 %    EOSINOPHILS 1 0 - 5 %    BASOPHILS 0 0 - 3 %    ABS. NEUTROPHILS 5.4 1.8 - 8.0 K/UL    ABS. LYMPHOCYTES 4.2 (H) 0.8 - 3.5 K/UL    ABS. MONOCYTES 0.8 0 - 1.0 K/UL    ABS. EOSINOPHILS 0.1 0.0 - 0.4 K/UL    ABS.  BASOPHILS 0.0 0.0 - 0.06 K/UL    DF MANUAL      PLATELET COMMENTS ADEQUATE PLATELETS      RBC COMMENTS ANISOCYTOSIS  1+        RBC COMMENTS OVALOCYTES  1+        RBC COMMENTS STOMATOCYTES  1+        WBC COMMENTS ATYPICAL LYMPHOCYTES PRESENT     MAGNESIUM    Collection Time: 08/22/18  4:17 AM   Result Value Ref Range    Magnesium 1.9 1.6 - 2.6 mg/dL   URIC ACID    Collection Time: 08/22/18  4:17 AM   Result Value Ref Range    Uric acid 6.2 2.6 - 7.2 MG/DL   METABOLIC PANEL, BASIC    Collection Time: 08/22/18  4:17 AM   Result Value Ref Range    Sodium 146 (H) 136 - 145 mmol/L    Potassium 3.8 3.5 - 5.5 mmol/L    Chloride 112 (H) 100 - 108 mmol/L    CO2 27 21 - 32 mmol/L    Anion gap 7 3.0 - 18 mmol/L    Glucose 97 74 - 99 mg/dL    BUN 26 (H) 7.0 - 18 MG/DL    Creatinine 1.17 0.6 - 1.3 MG/DL    BUN/Creatinine ratio 22 (H) 12 - 20      GFR est AA >60 >60 ml/min/1.73m2    GFR est non-AA >60 >60 ml/min/1.73m2    Calcium 8.1 (L) 8.5 - 10.1 MG/DL

## 2018-08-22 NOTE — PROGRESS NOTES
Problem: Mobility Impaired (Adult and Pediatric)  Goal: *Acute Goals and Plan of Care (Insert Text)  Outcome: Resolved/Met Date Met: 08/22/18  physical Therapy EVALUATION and Discharge    Patient: Justo Alonzo (64 y.o. male)  Date: 8/22/2018  Primary Diagnosis: ARF (acute renal failure) (HCC)  Uremic acidosis  Acute metabolic encephalopathy  Acute hyperkalemia  Hyperammonemia (HCC)        Precautions:    (universal)    ASSESSMENT AND RECOMMENDATIONS:  PT orders received and patient cleared by nursing to participate with therapy. Patient is a 70 y.o. male admitted to the hospital due to AMS, headache, and body aches. Pt found to be in renal failure and dehydration. Pt has a history of shingles with vaccination. He had recent sunburn on R foot causing a blister and is s/p 1 week Keflex. Patient consents to PT evaluation and treatment. Pt has L foot drop since 1980s per pt and uses a brace for long distances and community distances. Pt does have some movement of L ankle DF but 3-/5. Pt modified independent and safe with bed mobility and transfers. Pt ambulated 500 feet no AD supervision/modified independent with foot slap noted on L LE otherwise WFL no LOB. Pt practiced stairs 2 steps 1 HRA supervision/modified independent. Pt is currently at baseline and has no PT needs in the hospital.     Educated pt on OOB with nursing 3-5 times a day and therex. Skilled physical therapy is not indicated at this time. Discharge Recommendations: None  Further Equipment Recommendations for Discharge: N/A      SUBJECTIVE:   Patient stated I have foot drop.     OBJECTIVE DATA SUMMARY:     Past Medical History:   Diagnosis Date    Abdominal pain, unspecified site     Acute reaction to stress     Allergic rhinitis due to pollen     Arrhythmia     afib    Arthritis     Back injury     BPH (benign prostatic hypertrophy)     Calculus of ureter     Cancer (HCC)     history of Leukemia, in remission    Carotid duplex 06/17/2015    Mild < 50% bilateral ICA stenosis.  Dizziness and giddiness     Esophageal reflux     Essential hypertension     GERD (gastroesophageal reflux disease)     Headache(784.0)     History of echocardiogram 08/13/2015    EF 55-60%. No WMA. Mild-mod LVH. Gr 1 DDfx. No evidence of intracardiac shunt. Mild AI.      Hx: UTI (urinary tract infection)     Hypertension     Hypogonadism in male     Kidney stones     Migraine     Neck injury     Polycythemia     Polycythemia, secondary     Sciatica     Unspecified retinal detachment     Unspecified sleep apnea     resolved since gastric bypass    Vision decreased     Weight loss      Past Surgical History:   Procedure Laterality Date    ABDOMEN SURGERY PROC UNLISTED  2012    Hernia    HX CATARACT REMOVAL Left     HX CERVICAL FUSION  4/4/2016    Cervical Spine Fusion    HX CHOLECYSTECTOMY      HX GASTRIC BYPASS  2006    HX KNEE ARTHROSCOPY      both knees (right knee twice, left once)    HX ORTHOPAEDIC  1981, 1995    lumbar laminectomy    HX OTHER SURGICAL      Two back surgeries    HX RETINAL DETACHMENT REPAIR Left      Barriers to Learning/Limitations: None  Compensate with: N/A  Prior Level of Function/Home Situation: Independent with mobility including gait no AD. Home Situation  Home Environment: Private residence  # Steps to Enter: 4  Rails to Enter: Yes  Hand Rails : Bilateral  One/Two Story Residence: One story  Living Alone: No  Support Systems: Spouse/Significant Other/Partner  Patient Expects to be Discharged to[de-identified] Private residence  Current DME Used/Available at Home: Cane, straight, Grab bars, Raised toilet seat, Shower chair  Critical Behavior:  Neurologic State: Alert  Psychosocial  Patient Behaviors: Calm; Cooperative  Strength:    Strength:  (B LE WFL except L ankle DF 3-/5)  Tone & Sensation:   Tone: Normal (B LE)  Sensation: Intact (B LE)   Range Of Motion:  AROM:  (B LE WFL except L ankle decreased DF)  Functional Mobility:  Bed Mobility:  Supine to Sit: Modified independent  Scooting: Modified independent  Transfers:  Sit to Stand: Modified independent  Stand to Sit: Modified independent  Balance:   Sitting: Intact  Standing: Intact  Ambulation/Gait Training:  Distance (ft): 500 Feet (ft)  Assistive Device:  (none)  Ambulation - Level of Assistance: Independent;Supervision  Base of Support:  [facility])  Speed/Varsha:  [facility])  Stairs:  Number of Stairs Trained: 2  Stairs - Level of Assistance: Supervision;Modified independent  Rail Use: Right   Therapeutic Exercises: Ankle pumps, LAQ, and marching  Pain:  Pre: 0/10    Post: 0/10    Activity Tolerance:   good  Please refer to the flowsheet for vital signs taken during this treatment. After treatment:   [x] Patient left in no apparent distress sitting up in chair  [] Patient left sitting on EOB  [] Patient left in no apparent distress in bed  [] Patient declined to be OOB at this time due to    [x] Call bell left within reach  [x] Nursing notified(Rosa)  [] Caregiver present  [] Bed alarm activated  [x] Personal items in reach  COMMUNICATION/EDUCATION:   [x]         Fall prevention education was provided and the patient/caregiver indicated understanding. [x]         Patient/family have participated as able in goal setting and plan of care. [x]         Patient/family agree to work toward stated goals and plan of care. []         Patient understands intent and goals of therapy, but is neutral about his/her participation. []         Patient is unable to participate in goal setting and plan of care. Thank you for this referral.  Mane Singleton, PT, DPT   Time Calculation: 23 mins    Mobility  Current  CH= 0%   Goal  CH= 0%  D/C  CH= 0%. The severity rating is based on the Level of Assistance required for Functional Mobility and ADLs.     Eval Complexity: History: MEDIUM  Complexity : 1-2 comorbidities / personal factors will impact the outcome/ POC Exam:HIGH Complexity : 4+ Standardized tests and measures addressing body structure, function, activity limitation and / or participation in recreation  Presentation: LOW Complexity : Stable, uncomplicated  Clinical Decision Making:Low Complexity   Overall Complexity:LOW

## 2018-08-22 NOTE — CONSULTS
Chief Complaint   Patient presents with    Altered mental status    Headache    Generalized Body Aches       HISTORY OF PRESENT ILLNESS:  Verónica Ramirez is a 70 y.o. male who is seen today at the request of his attending physician because of urinary retention. In summary he has been seen in our office in the past by Dr. Quan Vega but has not been in the office for a number of years. On this particular occasion he had undergone back surgery about several weeks ago and actually had completed the physical therapy and was up and about and working in his yard. He was still taking significant doses of NSAIDs and over the past weekend developed a very profound feeling of being unwell with nausea vomiting tremors and an inability to void. When he presented to the emergency room his creatinine was greater than 8 and he had hyperkalemia but on the bladder scan and on insertion of the catheter he had only about 300 cc of urine. It was felt that his acute renal failure and insufficiency was probably secondary to dehydration in the acute kidney injury from the pain medication. Over the next couple of days with hydration his creatinine has come down and is now at his baseline levels. I have reviewed his CT scan of him and there has been no evidence of hydroureter or hydronephrosis so I think clearly there is no evidence of any obstructive component to his renal failure. He has been on Flomax for a number of years but it seems to be losing its efficacy in helping him urinate now. Once he became ambulatory and much more alert and feeling better with resolution of his renal failure, a catheter was removed and he underwent a trial of voiding. He was unable to urinate at all and so the catheter was reinserted and we were consulted. ROS all documented on the chart.   Past Medical History:   Diagnosis Date    Abdominal pain, unspecified site     Acute reaction to stress     Allergic rhinitis due to pollen  Arrhythmia     afib    Arthritis     Back injury     BPH (benign prostatic hypertrophy)     Calculus of ureter     Cancer (HCC)     history of Leukemia, in remission    Carotid duplex 06/17/2015    Mild < 50% bilateral ICA stenosis.  Dizziness and giddiness     Esophageal reflux     Essential hypertension     GERD (gastroesophageal reflux disease)     Headache(784.0)     History of echocardiogram 08/13/2015    EF 55-60%. No WMA. Mild-mod LVH. Gr 1 DDfx. No evidence of intracardiac shunt.   Mild AI.      Hx: UTI (urinary tract infection)     Hypertension     Hypogonadism in male     Kidney stones     Migraine     Neck injury     Polycythemia     Polycythemia, secondary     Sciatica     Unspecified retinal detachment     Unspecified sleep apnea     resolved since gastric bypass    Vision decreased     Weight loss        Past Surgical History:   Procedure Laterality Date    ABDOMEN SURGERY PROC UNLISTED  2012    Hernia    HX CATARACT REMOVAL Left     HX CERVICAL FUSION  4/4/2016    Cervical Spine Fusion    HX CHOLECYSTECTOMY      HX GASTRIC BYPASS  2006    HX KNEE ARTHROSCOPY      both knees (right knee twice, left once)    HX ORTHOPAEDIC  1981, 1995    lumbar laminectomy    HX OTHER SURGICAL      Two back surgeries    HX RETINAL DETACHMENT REPAIR Left        Social History   Substance Use Topics    Smoking status: Former Smoker     Quit date: 2/22/1981    Smokeless tobacco: Never Used    Alcohol use Yes      Comment: occasionally        Allergies   Allergen Reactions    Levofloxacin Nausea Only     Severe nausea and dizziness    Sulfa (Sulfonamide Antibiotics) Unknown (comments)     Nausea, aching all over       Family History   Problem Relation Age of Onset    Hypertension Father     Diabetes Father     Heart Attack Mother     Headache Sister     Diabetes Son        Current Facility-Administered Medications   Medication Dose Route Frequency Provider Last Rate Last Dose    0.45% sodium chloride infusion  75 mL/hr IntraVENous CONTINUOUS Kiarra Armenta MD 75 mL/hr at 08/22/18 1642 75 mL/hr at 08/22/18 1642    amLODIPine (NORVASC) tablet 10 mg  10 mg Oral DAILY Kiarra Armenta MD   10 mg at 08/22/18 0908    tamsulosin (FLOMAX) capsule 0.4 mg  0.4 mg Oral DAILY Lilly Osman MD   0.4 mg at 08/22/18 4074    calcitRIOL (ROCALTROL) capsule 0.25 mcg  0.25 mcg Oral DAILY Isaias Milan MD   0.25 mcg at 08/22/18 0908    ergocalciferol (ERGOCALCIFEROL) capsule 50,000 Units  50,000 Units Oral EVERY 2 WEEKS Isaias iMlan MD   50,000 Units at 08/21/18 1721    calcium carbonate (TUMS) chewable tablet 400 mg [elemental]  400 mg Oral TID Isaias Milan MD   400 mg at 08/22/18 1643    cefTRIAXone (ROCEPHIN) 2 g in sterile water (preservative free) 20 mL IV syringe  2 g IntraVENous Q24H Kiarra Armenta MD   2 g at 08/21/18 2153    labetalol (NORMODYNE) tablet 300 mg  300 mg Oral Q12H Isaias Milan MD   300 mg at 08/22/18 0908    Nilotinib cap 300 mg (Patient Supplied)  300 mg Oral Q12H Kiarra Armenta MD   300 mg at 08/22/18 5513    acetaminophen (TYLENOL) tablet 650 mg  650 mg Oral Q6H PRN Kiarra Armenta MD        oxyCODONE-acetaminophen (PERCOCET) 5-325 mg per tablet 1 Tab  1 Tab Oral Q6H PRN Kiarra Armenta MD   1 Tab at 08/22/18 1643    naloxone (NARCAN) injection 0.4 mg  0.4 mg IntraVENous PRN Kiarra Armenta MD        [facility]) injection 4 mg  4 mg IntraVENous Q6H PRN Kiarra Armenta MD        docusate sodium (COLACE) capsule 100 mg  100 mg Oral BID PRN Kiarra Armenta MD             PHYSICAL EXAMINATION:   Visit Vitals    BP (!) 150/91    Pulse 80    Temp 98.2 °F (36.8 °C)    Resp 17    Ht 6' 5\" (1.956 m)    Wt 242 lb 14.4 oz (110.2 kg)    SpO2 98%    BMI 28.8 kg/m2     Constitutional: WDWN, Pleasant and appropriate affect, No acute distress.     CV:  No peripheral swelling noted  Respiratory: No respiratory distress or difficulties  Abdomen:  No abdominal masses or tenderness. No CVA tenderness. No inguinal hernias noted.  Male:    JORJE: On rectal examinations there are no obvious lesions around the anus. He does have fairly good anal sphincter tone. The prostate itself to me feels to be about 40 g in size flat with no nodularity or induration and probably generally benign in nature. SCROTUM:  No scrotal rash or lesions noticed. Normal bilateral testes and epididymis. PENIS: Richard catheter is in place. Skin: No jaundice. Neuro/Psych:  Alert and oriented x 3, affect appropriate. Lymphatic:   No enlarged inguinal lymph nodes. Results for orders placed or performed during the hospital encounter of 08/19/18   CULTURE, URINE   Result Value Ref Range    Special Requests: NO SPECIAL REQUESTS      Culture result: NO GROWTH 2 DAYS     CBC WITH AUTOMATED DIFF   Result Value Ref Range    WBC 15.6 (H) 4.6 - 13.2 K/uL    RBC 4.24 (L) 4.70 - 5.50 M/uL    HGB 11.4 (L) 13.0 - 16.0 g/dL    HCT 35.7 (L) 36.0 - 48.0 %    MCV 84.2 74.0 - 97.0 FL    MCH 26.9 24.0 - 34.0 PG    MCHC 31.9 31.0 - 37.0 g/dL    RDW 18.5 (H) 11.6 - 14.5 %    PLATELET 654 099 - 005 K/uL    MPV 11.5 9.2 - 11.8 FL    NEUTROPHILS 54 40 - 73 %    LYMPHOCYTES 36 21 - 52 %    MONOCYTES 7 3 - 10 %    EOSINOPHILS 2 0 - 5 %    BASOPHILS 1 0 - 2 %    ABS. NEUTROPHILS 8.4 (H) 1.8 - 8.0 K/UL    ABS. LYMPHOCYTES 5.6 (H) 0.9 - 3.6 K/UL    ABS. MONOCYTES 1.1 0.05 - 1.2 K/UL    ABS. EOSINOPHILS 0.3 0.0 - 0.4 K/UL    ABS.  BASOPHILS 0.2 (H) 0.0 - 0.1 K/UL    DF MANUAL      PLATELET COMMENTS ADEQUATE PLATELETS      RBC COMMENTS ANISOCYTOSIS  1+       METABOLIC PANEL, COMPREHENSIVE   Result Value Ref Range    Sodium 136 136 - 145 mmol/L    Potassium 5.7 (H) 3.5 - 5.5 mmol/L    Chloride 99 (L) 100 - 108 mmol/L    CO2 9 (LL) 21 - 32 mmol/L    Anion gap 28 (H) 3.0 - 18 mmol/L    Glucose 77 74 - 99 mg/dL    BUN 75 (H) 7.0 - 18 MG/DL    Creatinine 8.26 (H) 0.6 - 1.3 MG/DL BUN/Creatinine ratio 9 (L) 12 - 20      GFR est AA 8 (L) >60 ml/min/1.73m2    GFR est non-AA 6 (L) >60 ml/min/1.73m2    Calcium 7.3 (L) 8.5 - 10.1 MG/DL    Bilirubin, total 0.8 0.2 - 1.0 MG/DL    ALT (SGPT) 20 16 - 61 U/L    AST (SGOT) 25 15 - 37 U/L    Alk.  phosphatase 96 45 - 117 U/L    Protein, total 7.2 6.4 - 8.2 g/dL    Albumin 3.7 3.4 - 5.0 g/dL    Globulin 3.5 2.0 - 4.0 g/dL    A-G Ratio 1.1 0.8 - 1.7     MAGNESIUM   Result Value Ref Range    Magnesium 2.5 1.6 - 2.6 mg/dL   TSH 3RD GENERATION   Result Value Ref Range    TSH 1.00 0.36 - 3.74 uIU/mL   URINALYSIS W/ RFLX MICROSCOPIC   Result Value Ref Range    Color YELLOW      Appearance CLOUDY      Specific gravity 1.024 1.005 - 1.030      pH (UA) 5.0 5.0 - 8.0      Protein 30 (A) NEG mg/dL    Glucose NEGATIVE  NEG mg/dL    Ketone TRACE (A) NEG mg/dL    Bilirubin NEGATIVE  NEG      Blood LARGE (A) NEG      Urobilinogen 1.0 0.2 - 1.0 EU/dL    Nitrites NEGATIVE  NEG      Leukocyte Esterase MODERATE (A) NEG     ETHYL ALCOHOL   Result Value Ref Range    ALCOHOL(ETHYL),SERUM <3 0 - 3 MG/DL   AMMONIA   Result Value Ref Range    Ammonia 76 (H) 11 - 32 UMOL/L   URINE MICROSCOPIC ONLY   Result Value Ref Range    WBC 11 to 20 0 - 4 /hpf    RBC 36 to 50 0 - 5 /hpf    Epithelial cells NEGATIVE  0 - 5 /lpf    Bacteria 1+ (A) NEG /hpf    Amorphous Crystals 1+ (A) NEG    Hyaline cast 0 to 3 0 - 2 /lpf   METABOLIC PANEL, BASIC   Result Value Ref Range    Sodium 143 136 - 145 mmol/L    Potassium 4.3 3.5 - 5.5 mmol/L    Chloride 112 (H) 100 - 108 mmol/L    CO2 11 (L) 21 - 32 mmol/L    Anion gap 20 (H) 3.0 - 18 mmol/L    Glucose 82 74 - 99 mg/dL    BUN 67 (H) 7.0 - 18 MG/DL    Creatinine 5.02 (H) 0.6 - 1.3 MG/DL    BUN/Creatinine ratio 13 12 - 20      GFR est AA 14 (L) >60 ml/min/1.73m2    GFR est non-AA 12 (L) >60 ml/min/1.73m2    Calcium 6.4 (L) 8.5 - 10.1 MG/DL   MAGNESIUM   Result Value Ref Range    Magnesium 2.5 1.6 - 2.6 mg/dL   PHOSPHORUS   Result Value Ref Range Phosphorus 8.9 (H) 2.5 - 4.9 MG/DL   CBC WITH AUTOMATED DIFF   Result Value Ref Range    WBC 11.2 4.6 - 13.2 K/uL    RBC 3.87 (L) 4.70 - 5.50 M/uL    HGB 10.4 (L) 13.0 - 16.0 g/dL    HCT 30.8 (L) 36.0 - 48.0 %    MCV 79.6 74.0 - 97.0 FL    MCH 26.9 24.0 - 34.0 PG    MCHC 33.8 31.0 - 37.0 g/dL    RDW 17.9 (H) 11.6 - 14.5 %    PLATELET 780 729 - 328 K/uL    MPV 10.7 9.2 - 11.8 FL    NEUTROPHILS 55 40 - 73 %    LYMPHOCYTES 35 21 - 52 %    MONOCYTES 10 3 - 10 %    EOSINOPHILS 0 0 - 5 %    BASOPHILS 0 0 - 2 %    ABS. NEUTROPHILS 6.0 1.8 - 8.0 K/UL    ABS. LYMPHOCYTES 4.0 (H) 0.9 - 3.6 K/UL    ABS. MONOCYTES 1.2 0.05 - 1.2 K/UL    ABS. EOSINOPHILS 0.1 0.0 - 0.4 K/UL    ABS.  BASOPHILS 0.0 0.0 - 0.1 K/UL    DF AUTOMATED     EOSINOPHILS, URINE   Result Value Ref Range    Eosinophils,urine NO EOSINOPHILS SEEN     SODIUM, UR, RANDOM   Result Value Ref Range    Sodium urine, random 69 20 - 110 MMOL/L   CREATININE, UR, RANDOM   Result Value Ref Range    Creatinine, urine 83.00 30 - 125 mg/dL   CK   Result Value Ref Range     (H) 39 - 308 U/L   PTH INTACT   Result Value Ref Range    Calcium 6.8 (L) 8.5 - 10.1 MG/DL    PTH, Intact 460.6 (H) 18.4 - 77.8 pg/mL   METABOLIC PANEL, BASIC   Result Value Ref Range    Sodium 145 136 - 145 mmol/L    Potassium 3.9 3.5 - 5.5 mmol/L    Chloride 114 (H) 100 - 108 mmol/L    CO2 17 (L) 21 - 32 mmol/L    Anion gap 14 3.0 - 18 mmol/L    Glucose 110 (H) 74 - 99 mg/dL    BUN 62 (H) 7.0 - 18 MG/DL    Creatinine 3.69 (H) 0.6 - 1.3 MG/DL    BUN/Creatinine ratio 17 12 - 20      GFR est AA 20 (L) >60 ml/min/1.73m2    GFR est non-AA 16 (L) >60 ml/min/1.73m2    Calcium 6.7 (L) 8.5 - 91.2 MG/DL   METABOLIC PANEL, BASIC   Result Value Ref Range    Sodium 147 (H) 136 - 145 mmol/L    Potassium 3.8 3.5 - 5.5 mmol/L    Chloride 115 (H) 100 - 108 mmol/L    CO2 19 (L) 21 - 32 mmol/L    Anion gap 13 3.0 - 18 mmol/L    Glucose 138 (H) 74 - 99 mg/dL    BUN 56 (H) 7.0 - 18 MG/DL    Creatinine 3.07 (H) 0.6 - 1.3 MG/DL    BUN/Creatinine ratio 18 12 - 20      GFR est AA 25 (L) >60 ml/min/1.73m2    GFR est non-AA 20 (L) >60 ml/min/1.73m2    Calcium 7.0 (L) 8.5 - 10.1 MG/DL   IRON PROFILE   Result Value Ref Range    Iron 69 50 - 175 ug/dL    TIBC 344 250 - 450 ug/dL    Iron % saturation 20 %   MAGNESIUM   Result Value Ref Range    Magnesium 2.4 1.6 - 2.6 mg/dL   VITAMIN D, 25 HYDROXY   Result Value Ref Range    Vitamin D 25-Hydroxy 20.6 (L) 30 - 100 ng/mL   URIC ACID   Result Value Ref Range    Uric acid 14.5 (H) 2.6 - 7.2 MG/DL   CBC WITH AUTOMATED DIFF   Result Value Ref Range    WBC 9.2 4.6 - 13.2 K/uL    RBC 3.95 (L) 4.70 - 5.50 M/uL    HGB 10.3 (L) 13.0 - 16.0 g/dL    HCT 30.6 (L) 36.0 - 48.0 %    MCV 77.5 74.0 - 97.0 FL    MCH 26.1 24.0 - 34.0 PG    MCHC 33.7 31.0 - 37.0 g/dL    RDW 17.0 (H) 11.6 - 14.5 %    PLATELET 696 075 - 255 K/uL    MPV 11.1 9.2 - 11.8 FL    NEUTROPHILS 50 42 - 75 %    LYMPHOCYTES 38 20 - 51 %    MONOCYTES 10 (H) 2 - 9 %    EOSINOPHILS 2 0 - 5 %    BASOPHILS 0 0 - 3 %    ABS. NEUTROPHILS 4.6 1.8 - 8.0 K/UL    ABS. LYMPHOCYTES 3.5 0.8 - 3.5 K/UL    ABS. MONOCYTES 0.9 0 - 1.0 K/UL    ABS. EOSINOPHILS 0.2 0.0 - 0.4 K/UL    ABS.  BASOPHILS 0.0 0.0 - 0.06 K/UL    DF MANUAL      PLATELET COMMENTS ADEQUATE PLATELETS      RBC COMMENTS ANISOCYTOSIS  2+        RBC COMMENTS OVALOCYTES  2+        RBC COMMENTS STOMATOCYTES  1+        WBC COMMENTS ATYPICAL LYMPHOCYTES PRESENT     MAGNESIUM   Result Value Ref Range    Magnesium 2.2 1.6 - 2.6 mg/dL   PHOSPHORUS   Result Value Ref Range    Phosphorus 3.1 2.5 - 4.9 MG/DL   METABOLIC PANEL, BASIC   Result Value Ref Range    Sodium 149 (H) 136 - 145 mmol/L    Potassium 3.4 (L) 3.5 - 5.5 mmol/L    Chloride 115 (H) 100 - 108 mmol/L    CO2 22 21 - 32 mmol/L    Anion gap 12 3.0 - 18 mmol/L    Glucose 108 (H) 74 - 99 mg/dL    BUN 40 (H) 7.0 - 18 MG/DL    Creatinine 1.63 (H) 0.6 - 1.3 MG/DL    BUN/Creatinine ratio 25 (H) 12 - 20      GFR est AA 51 (L) >60 ml/min/1.73m2    GFR est non-AA 42 (L) >60 ml/min/1.73m2    Calcium 8.4 (L) 8.5 - 10.1 MG/DL   URIC ACID   Result Value Ref Range    Uric acid 12.5 (H) 2.6 - 7.2 MG/DL   CALCIUM, IONIZED   Result Value Ref Range    Ionized Calcium 1.17 1.12 - 1.32 MMOL/L   URIC ACID   Result Value Ref Range    Uric acid 10.2 (H) 2.6 - 7.2 MG/DL   METABOLIC PANEL, BASIC   Result Value Ref Range    Sodium 146 (H) 136 - 145 mmol/L    Potassium 4.3 3.5 - 5.5 mmol/L    Chloride 112 (H) 100 - 108 mmol/L    CO2 29 21 - 32 mmol/L    Anion gap 5 3.0 - 18 mmol/L    Glucose 103 (H) 74 - 99 mg/dL    BUN 32 (H) 7.0 - 18 MG/DL    Creatinine 1.34 (H) 0.6 - 1.3 MG/DL    BUN/Creatinine ratio 24 (H) 12 - 20      GFR est AA >60 >60 ml/min/1.73m2    GFR est non-AA 53 (L) >60 ml/min/1.73m2    Calcium 8.4 (L) 8.5 - 10.1 MG/DL   CBC WITH AUTOMATED DIFF   Result Value Ref Range    WBC 10.5 4.6 - 13.2 K/uL    RBC 3.72 (L) 4.70 - 5.50 M/uL    HGB 10.0 (L) 13.0 - 16.0 g/dL    HCT 29.5 (L) 36.0 - 48.0 %    MCV 79.3 74.0 - 97.0 FL    MCH 26.9 24.0 - 34.0 PG    MCHC 33.9 31.0 - 37.0 g/dL    RDW 16.7 (H) 11.6 - 14.5 %    PLATELET 524 864 - 225 K/uL    MPV 10.9 9.2 - 11.8 FL    NEUTROPHILS 51 42 - 75 %    LYMPHOCYTES 40 20 - 51 %    MONOCYTES 8 2 - 9 %    EOSINOPHILS 1 0 - 5 %    BASOPHILS 0 0 - 3 %    ABS. NEUTROPHILS 5.4 1.8 - 8.0 K/UL    ABS. LYMPHOCYTES 4.2 (H) 0.8 - 3.5 K/UL    ABS. MONOCYTES 0.8 0 - 1.0 K/UL    ABS. EOSINOPHILS 0.1 0.0 - 0.4 K/UL    ABS.  BASOPHILS 0.0 0.0 - 0.06 K/UL    DF MANUAL      PLATELET COMMENTS ADEQUATE PLATELETS      RBC COMMENTS ANISOCYTOSIS  1+        RBC COMMENTS OVALOCYTES  1+        RBC COMMENTS STOMATOCYTES  1+        WBC COMMENTS ATYPICAL LYMPHOCYTES PRESENT     MAGNESIUM   Result Value Ref Range    Magnesium 1.9 1.6 - 2.6 mg/dL   URIC ACID   Result Value Ref Range    Uric acid 6.2 2.6 - 7.2 MG/DL   METABOLIC PANEL, BASIC   Result Value Ref Range    Sodium 146 (H) 136 - 145 mmol/L    Potassium 3.8 3.5 - 5.5 mmol/L    Chloride 112 (H) 100 - 108 mmol/L    CO2 27 21 - 32 mmol/L    Anion gap 7 3.0 - 18 mmol/L    Glucose 97 74 - 99 mg/dL    BUN 26 (H) 7.0 - 18 MG/DL    Creatinine 1.17 0.6 - 1.3 MG/DL    BUN/Creatinine ratio 22 (H) 12 - 20      GFR est AA >60 >60 ml/min/1.73m2    GFR est non-AA >60 >60 ml/min/1.73m2    Calcium 8.1 (L) 8.5 - 10.1 MG/DL   URINALYSIS W/MICROSCOPIC   Result Value Ref Range    Color YELLOW      Appearance CLEAR      Specific gravity 1.014 1.005 - 1.030      pH (UA) 6.5 5.0 - 8.0      Protein TRACE (A) NEG mg/dL    Glucose NEGATIVE  NEG mg/dL    Ketone NEGATIVE  NEG mg/dL    Bilirubin NEGATIVE  NEG      Blood SMALL (A) NEG      Urobilinogen 1.0 0.2 - 1.0 EU/dL    Nitrites NEGATIVE  NEG      Leukocyte Esterase TRACE (A) NEG      WBC 5 to 9 0 - 4 /hpf    RBC 20 to 30 0 - 5 /hpf    Epithelial cells FEW 0 - 5 /lpf    Bacteria 1+ (A) NEG /hpf   URIC ACID   Result Value Ref Range    Uric acid 5.3 2.6 - 7.2 MG/DL   EKG, 12 LEAD, INITIAL   Result Value Ref Range    Ventricular Rate 83 BPM    Atrial Rate 83 BPM    P-R Interval 174 ms    QRS Duration 106 ms    Q-T Interval 392 ms    QTC Calculation (Bezet) 460 ms    Calculated P Axis 27 degrees    Calculated R Axis 23 degrees    Calculated T Axis 25 degrees    Diagnosis       Normal sinus rhythm  Normal ECG  When compared with ECG of 23-SEP-2016 16:40,  No significant change was found  Confirmed by Lavon Novoa MD, Antonio Acosta ([id number]) on 8/22/2018 5:02:42 PM           REVIEW OF LABS AND IMAGING:     Imaging Report Reviewed? YES     Images Reviewed? YES           Other Lab Data Reviewed? YES         ASSESSMENT:     ICD-10-CM ICD-9-CM    1. Acute metabolic encephalopathy K00.82 348.31    2. Uremic acidosis N25.89 588.89    3. Acute renal failure, unspecified acute renal failure type ([facility] [address] N17.9 584.9             PLAN / DISCUSSION: : I think his urinary retention is probably multifactorial in origin.   He does indeed have an enlarged prostate and I think the Flomax is probably reached its maximum benefit to him. I think a combination of both the surgery but more especially the acute renal failure brought on by the medication usage and dehydration weakened him to the point he was simply unable to urinate. At this point in time I would recommend that we leave his Richard catheter in and attached to a leg bag. I would let him go on home with the catheter in place and I would probably wait another week or 10 days before trying another trial of voiding. I think once his strength gets back to normal and his serum chemistries and muscle tone returned to a more normal state he may be able to urinate again spontaneously. He is probably going to need a cystoscopy and possibly the addition of finasteride or dutasteride to try and shrink the gland if we are able to get him to urinate. We can follow this up in the office. Thank you for allowing us to see your patient. The patient expresses understanding and agreement of the discussion and plan. Felipe Cardozo MD on 8/22/2018         Please note: This document has been produced using voice recognition software. Unrecognized errors in transcription may be present.

## 2018-08-23 ENCOUNTER — HOME HEALTH ADMISSION (OUTPATIENT)
Dept: HOME HEALTH SERVICES | Facility: HOME HEALTH | Age: 68
End: 2018-08-23

## 2018-08-23 VITALS
SYSTOLIC BLOOD PRESSURE: 155 MMHG | TEMPERATURE: 97.6 F | HEART RATE: 92 BPM | BODY MASS INDEX: 28.68 KG/M2 | HEIGHT: 77 IN | OXYGEN SATURATION: 98 % | RESPIRATION RATE: 20 BRPM | WEIGHT: 242.9 LBS | DIASTOLIC BLOOD PRESSURE: 91 MMHG

## 2018-08-23 LAB
ANION GAP SERPL CALC-SCNC: 8 MMOL/L (ref 3–18)
BUN SERPL-MCNC: 17 MG/DL (ref 7–18)
BUN/CREAT SERPL: 17 (ref 12–20)
CALCIUM SERPL-MCNC: 8.2 MG/DL (ref 8.5–10.1)
CHLORIDE SERPL-SCNC: 113 MMOL/L (ref 100–108)
CO2 SERPL-SCNC: 26 MMOL/L (ref 21–32)
CREAT SERPL-MCNC: 0.99 MG/DL (ref 0.6–1.3)
GLUCOSE SERPL-MCNC: 98 MG/DL (ref 74–99)
MAGNESIUM SERPL-MCNC: 1.8 MG/DL (ref 1.6–2.6)
POTASSIUM SERPL-SCNC: 4 MMOL/L (ref 3.5–5.5)
SODIUM SERPL-SCNC: 147 MMOL/L (ref 136–145)
URATE SERPL-MCNC: 3.9 MG/DL (ref 2.6–7.2)

## 2018-08-23 PROCEDURE — 36415 COLL VENOUS BLD VENIPUNCTURE: CPT | Performed by: INTERNAL MEDICINE

## 2018-08-23 PROCEDURE — 74011250637 HC RX REV CODE- 250/637: Performed by: INTERNAL MEDICINE

## 2018-08-23 PROCEDURE — 80048 BASIC METABOLIC PNL TOTAL CA: CPT | Performed by: INTERNAL MEDICINE

## 2018-08-23 PROCEDURE — 83735 ASSAY OF MAGNESIUM: CPT | Performed by: INTERNAL MEDICINE

## 2018-08-23 RX ORDER — SUMATRIPTAN 100 MG/1
TABLET, FILM COATED ORAL
Qty: 12 TAB | Refills: 1 | Status: SHIPPED | OUTPATIENT
Start: 2018-08-23 | End: 2018-08-27 | Stop reason: ALTCHOICE

## 2018-08-23 RX ADMIN — LABETALOL HYDROCHLORIDE 300 MG: 200 TABLET, FILM COATED ORAL at 09:01

## 2018-08-23 RX ADMIN — OXYCODONE HYDROCHLORIDE AND ACETAMINOPHEN 1 TABLET: 5; 325 TABLET ORAL at 01:32

## 2018-08-23 RX ADMIN — CALCITRIOL 0.25 MCG: 0.25 CAPSULE ORAL at 09:01

## 2018-08-23 RX ADMIN — TAMSULOSIN HYDROCHLORIDE 0.4 MG: 0.4 CAPSULE ORAL at 09:01

## 2018-08-23 RX ADMIN — APIXABAN 5 MG: 5 TABLET, FILM COATED ORAL at 09:47

## 2018-08-23 RX ADMIN — CALCIUM CARBONATE (ANTACID) CHEW TAB 500 MG 400 MG: 500 CHEW TAB at 09:01

## 2018-08-23 RX ADMIN — AMLODIPINE BESYLATE 10 MG: 10 TABLET ORAL at 09:01

## 2018-08-23 NOTE — PROGRESS NOTES
Problem: Falls - Risk of  Goal: *Absence of Falls  Document Jerri Fall Risk and appropriate interventions in the flowsheet. Outcome: Progressing Towards Goal  Fall Risk Interventions:  Mobility Interventions: Patient to call before getting OOB    Mentation Interventions: Bed/chair exit alarm, Door open when patient unattended, More frequent rounding, Reorient patient, Room close to nurse's station    Medication Interventions: Patient to call before getting OOB, Teach patient to arise slowly    Elimination Interventions: Call light in reach, Toilet paper/wipes in reach, Toileting schedule/hourly rounds    History of Falls Interventions: Investigate reason for fall, Room close to nurse's station        Problem: Pressure Injury - Risk of  Goal: *Prevention of pressure injury  Document Vince Scale and appropriate interventions in the flowsheet.    Outcome: Progressing Towards Goal  Pressure Injury Interventions:  Sensory Interventions: Avoid rigorous massage over bony prominences, Check visual cues for pain, Discuss PT/OT consult with provider         Activity Interventions: Increase time out of bed, Pressure redistribution bed/mattress(bed type), PT/OT evaluation    Mobility Interventions: HOB 30 degrees or less, Pressure redistribution bed/mattress (bed type), PT/OT evaluation    Nutrition Interventions: Document food/fluid/supplement intake

## 2018-08-23 NOTE — HOME CARE
Rec HC Order - d/c noted for today - per KARISSA Lott, pt has already left the building and address/phone is correct [facility] will follow for SN/PT/OT - D Adriana JOHNS

## 2018-08-23 NOTE — ROUTINE PROCESS
1930-Bedside and verbal report from [facility], [address] [facility]. Pt resting in bed, no c/o pain, NAD, call bell within reach, on room air, Churchill draining clear/yellow urine, bed low for safety. 2030-Pt resting, NAD, call bell within reach, no c/o pain, bed low for safety. 2130-Pt resting, NAD, call bell within reach, no c/o pain, bed low for safety, churchill draining/patent, SR on the monitor. BP stable. 2230-Pt resting, NAD, call bell within reach, no c/o pain, discussed AM care with pt, pt refused assistance with toileting stating \"im going home tomorrow and cant wait for a shower\"    2330-Pt resting, NAD, vital signs stable, no c/o pain, bed low for safety, churchill emptied of 500 cc. 0130-Pt sleeping, NAD, call bell within reach, pt called for pain medication, PRN Percocet given. Pain assessment complete. 0200-Pain reassessment completed. Pt resting. 0400-Pt sleeping, NAD, call bell within reach, no apparent pain, call bell within reach. 0600-Pt sleeping, NAD, call bell within reach, no apparent pain, call bell within reach. 0700-Bedside and verbal report given to NAT Lindo.
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
## 2018-08-23 NOTE — PROGRESS NOTES
Transition of Care (RICARDO) Plan: Mukesh sandoval     RICARDO Transportation:  family     How is patient being transported at discharge? When? Is transport scheduled? Follow-up appointment and transportation:     PCP? yes     Specialist?     Time/Date? Who is transporting to the follow-up appointment? family      Is transport for follow up appointment scheduled? yes    Communication plan (with patient/family): discussed with pt      Who is being called? Patient or Next of Kin? Responsible party? pt      What number(s) is to be used? What service provider is calling for [facility] services? When are they calling? Readmission Risk?   (Green/Low; Yellow/Moderate; Red/High): low      Referral called to Romy Flores [id number] with [facility]

## 2018-08-23 NOTE — PROGRESS NOTES
In Patient Progress note      Admit Date: 8/19/2018          Impression:     1) KAREN on CKD 3 ( baseline ~ 1.9-2) d/t prerenal azotemia in setting of poor intake /UTI/relative hypotension, alsoNSAID use      Being on ARB , HCTZ may also have contributed to KAREN and appropriately held       Improved volume status      CT abdomen showed, ann nephrolithiasis , no hydro , enlarged prostate      Encourage po intake, keep churchill catheter in place until f/u with urology   2) CKD 3 , d/t  etiology HTN nephrosclerosis   2) AG Metabolic acidoses resolved   4) HTN, restarted amlodipine and labetalol, will uptitrate for better control       On f/u in office  5) hypokalemia , replace K   6) anemia : check iron levels    7) sec hyperpara , pth high , continue  calcitriol 0.25 mcg daily , continue calcium until f/u   9) Nephrolithiasis : risk stratification outpatient  10) hyperuricemia , improved with a dose of rasburicase , needs low purine diet , f/u uric acid on        clinic visit .  Will look up patients uric acid level from past .  11) Hypernatremia : recommend increase free water intake , needs f/u in office       I will setup office f/u in 2 weeks for the patient , ok to discharge from renal view point  Please call with questions,     Deon Schmitz MD FASN  Cell [phone]  Pager: [phone]    Subjective:     Feels better  Energy levels improved     Current Facility-Administered Medications:     apixaban (ELIQUIS) tablet 5 mg, 5 mg, Oral, Q12H, Marvene Severe, MD, 5 mg at 08/23/18 0947    amLODIPine (NORVASC) tablet 10 mg, 10 mg, Oral, DAILY, Zeferino Griffin MD, 10 mg at 08/23/18 0901    tamsulosin (FLOMAX) capsule 0.4 mg, 0.4 mg, Oral, DAILY, Marvene Severe, MD, 0.4 mg at 08/23/18 0901    calcitRIOL (ROCALTROL) capsule 0.25 mcg, 0.25 mcg, Oral, DAILY, Austen Herrera MD, 0.25 mcg at 08/23/18 0901    ergocalciferol (ERGOCALCIFEROL) capsule 50,000 Units, 50,000 Units, Oral, EVERY 2 WEEKS, Austen Herrera MD, 50,000 Units at 08/21/18 1721    calcium carbonate (TUMS) chewable tablet 400 mg [elemental], 400 mg, Oral, TID, Yuriy Cavazos MD, 400 mg at 08/23/18 0901    cefTRIAXone (ROCEPHIN) 2 g in sterile water (preservative free) 20 mL IV syringe, 2 g, IntraVENous, Q24H, Kenia York MD, 2 g at 08/22/18 2151    labetalol (NORMODYNE) tablet 300 mg, 300 mg, Oral, Q12H, Yuriy Cavazos MD, 300 mg at 08/23/18 0901    Nilotinib cap 300 mg (Patient Supplied), 300 mg, Oral, Q12H, Kenia York MD, 300 mg at 08/23/18 0902    acetaminophen (TYLENOL) tablet 650 mg, 650 mg, Oral, Q6H PRN, Kenia York MD    oxyCODONE-acetaminophen (PERCOCET) 5-325 mg per tablet 1 Tab, 1 Tab, Oral, Q6H PRN, Kenia York MD, 1 Tab at 08/23/18 0132    naloxone [facility]) injection 0.4 mg, 0.4 mg, IntraVENous, PRN, Kenia York MD    ondansetron [facility]) injection 4 mg, 4 mg, IntraVENous, Q6H PRN, Kenia York MD    docusate sodium (COLACE) capsule 100 mg, 100 mg, Oral, BID PRN, Kenia York MD        Objective:     Visit Vitals    BP (!) 155/91    Pulse 92    Temp 97.6 °F (36.4 °C)    Resp 20    Ht 6' 5\" (1.956 m)    Wt 110.2 kg (242 lb 14.4 oz)    SpO2 98%    BMI 28.8 kg/m2         Intake/Output Summary (Last 24 hours) at 08/23/18 1000  Last data filed at 08/23/18 0847   Gross per 24 hour   Intake             1010 ml   Output             3450 ml   Net            -2440 ml       Physical Exam:     gen NAD  HENT mmm  RS AEBE clear  CVS s1 s2 wnl no JVD  GI soft BS +  Ext no edema     Data Review:    Recent Labs      08/22/18   0417   WBC  10.5   RBC  3.72*   HCT  29.5*   MCV  79.3   MCH  26.9   MCHC  33.9   RDW  16.7*     Recent Labs      08/23/18   0550  08/22/18   1735  08/22/18   0417  08/21/18   1905  08/21/18   0549  08/20/18   1700  08/20/18   1220   BUN  17  21*  26*  32*  40*  56*  62*   CREA  0.99  1.04  1.17  1.34*  1.63*  3.07*  3.69*   CA  8.2*  8.2*  8.1*  8.4*  8.4*  7.0*  6.8*  6.7*   K 4.0  4.2  3.8  4.3  3.4*  3.8  3.9   NA  147*  143  146*  146*  149*  147*  145   CL  113*  109*  112*  112*  115*  115*  114*   CO2  26  26  27  29  22  19*  17*   PHOS   --    --    --    --   3.1   --    --    GLU  98  95  97  103*  108*  138*  110*       Fabiana Jones MD

## 2018-08-24 ENCOUNTER — PATIENT OUTREACH (OUTPATIENT)
Dept: INTERNAL MEDICINE CLINIC | Age: 68
End: 2018-08-24
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
## 2018-08-25 ENCOUNTER — HOME CARE VISIT (OUTPATIENT)
Dept: HOME HEALTH SERVICES | Facility: HOME HEALTH | Age: 68
End: 2018-08-25

## 2018-08-26 ENCOUNTER — HOME CARE VISIT (OUTPATIENT)
Dept: HOME HEALTH SERVICES | Facility: HOME HEALTH | Age: 68
End: 2018-08-26

## 2018-08-27 ENCOUNTER — OFFICE VISIT (OUTPATIENT)
Dept: INTERNAL MEDICINE CLINIC | Age: 68
End: 2018-08-27

## 2018-08-27 VITALS
HEART RATE: 76 BPM | HEIGHT: 77 IN | WEIGHT: 241 LBS | BODY MASS INDEX: 28.46 KG/M2 | TEMPERATURE: 97.4 F | DIASTOLIC BLOOD PRESSURE: 76 MMHG | OXYGEN SATURATION: 98 % | SYSTOLIC BLOOD PRESSURE: 118 MMHG

## 2018-08-27 DIAGNOSIS — I10 ESSENTIAL HYPERTENSION: ICD-10-CM

## 2018-08-27 DIAGNOSIS — I48.0 PAROXYSMAL ATRIAL FIBRILLATION (HCC): ICD-10-CM

## 2018-08-27 DIAGNOSIS — C92.11 CML IN REMISSION (HCC): ICD-10-CM

## 2018-08-27 DIAGNOSIS — Z87.448 HX OF RENAL FAILURE: Primary | ICD-10-CM

## 2018-08-27 DIAGNOSIS — N40.1 BENIGN PROSTATIC HYPERPLASIA WITH URINARY OBSTRUCTION: ICD-10-CM

## 2018-08-27 DIAGNOSIS — N13.8 BENIGN PROSTATIC HYPERPLASIA WITH URINARY OBSTRUCTION: ICD-10-CM

## 2018-08-27 DIAGNOSIS — R79.89 INCREASED AMMONIA LEVEL: ICD-10-CM

## 2018-08-27 RX ORDER — ROPINIROLE 0.5 MG/1
TABLET, FILM COATED ORAL
Qty: 60 TAB | Refills: 0 | Status: SHIPPED | OUTPATIENT
Start: 2018-08-27 | End: 2018-08-28

## 2018-08-27 RX ORDER — LANOLIN ALCOHOL/MO/W.PET/CERES
1000 CREAM (GRAM) TOPICAL DAILY
COMMUNITY
End: 2018-08-27 | Stop reason: SDUPTHER

## 2018-08-27 RX ORDER — BISMUTH SUBSALICYLATE 262 MG
1 TABLET,CHEWABLE ORAL DAILY
COMMUNITY

## 2018-08-27 RX ORDER — LANOLIN ALCOHOL/MO/W.PET/CERES
1000 CREAM (GRAM) TOPICAL DAILY
COMMUNITY
End: 2019-04-01 | Stop reason: ALTCHOICE

## 2018-08-27 RX ORDER — VITAMIN E 268 MG
400 CAPSULE ORAL DAILY
COMMUNITY
End: 2021-09-17

## 2018-08-27 RX ORDER — APIXABAN 5 MG/1
TABLET, FILM COATED ORAL
Qty: 60 TAB | Refills: 1 | Status: SHIPPED | OUTPATIENT
Start: 2018-08-27 | End: 2018-11-30 | Stop reason: SDUPTHER

## 2018-08-27 RX ORDER — CHOLECALCIFEROL (VITAMIN D3) 125 MCG
1 CAPSULE ORAL DAILY
COMMUNITY
End: 2019-04-01 | Stop reason: ALTCHOICE
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
## 2018-08-27 NOTE — PROGRESS NOTES
Eliot Mckeon presents today for   Chief Complaint   Patient presents with   [facility] Follow Up       Eliot Mckeon preferred language for health care discussion is english. Is someone accompanying this pt? Yes wife    Is the patient using any DME equipment during OV? no    Depression Screening:  PHQ over the last two weeks 8/27/2018   Little interest or pleasure in doing things Not at all   Feeling down, depressed, irritable, or hopeless Not at all   Total Score PHQ 2 0       Learning Assessment:  Learning Assessment 5/3/2018   PRIMARY LEARNER Patient   HIGHEST LEVEL OF EDUCATION - PRIMARY LEARNER  -   [facility] -   [address] [place] [address]    NEED -   LEARNER PREFERENCE PRIMARY DEMONSTRATION   ANSWERED BY patient   RELATIONSHIP SELF       Abuse Screening:  Abuse Screening Questionnaire 11/2/2017   Do you ever feel afraid of your partner? N   Are you in a relationship with someone who physically or mentally threatens you? N   Is it safe for you to go home? Y       Fall Risk  Fall Risk Assessment, last 12 mths 8/27/2018   Able to walk? Yes   Fall in past 12 months? Yes   Fall with injury? -   Number of falls in past 12 months 1   Fall Risk Score -       Health Maintenance reviewed and discussed and ordered per Provider. Health Maintenance Due   Topic Date Due    Hepatitis C Screening  1950    GLAUCOMA SCREENING Q2Y  06/22/2014    Influenza Age 5 to Adult  08/01/2018   . Pt currently taking Antiplatelet therapy? yes Eliquis    Coordination of Care:  1. Have you been to the ER, urgent care clinic since your last visit? Yes [facility]    2. Have you seen or consulted any other health care providers outside of the [facility] since your last visit?no Include any pap smears or colon screening.  no

## 2018-08-28 ENCOUNTER — OFFICE VISIT (OUTPATIENT)
Dept: UROLOGY | Age: 68
End: 2018-08-28

## 2018-08-28 ENCOUNTER — PATIENT OUTREACH (OUTPATIENT)
Dept: INTERNAL MEDICINE CLINIC | Age: 68
End: 2018-08-28

## 2018-08-28 ENCOUNTER — HOSPITAL ENCOUNTER (OUTPATIENT)
Dept: LAB | Age: 68
Discharge: HOME | End: 2018-08-28
Payer: MEDICARE

## 2018-08-28 VITALS
HEART RATE: 70 BPM | WEIGHT: 241 LBS | SYSTOLIC BLOOD PRESSURE: 106 MMHG | BODY MASS INDEX: 28.46 KG/M2 | OXYGEN SATURATION: 94 % | DIASTOLIC BLOOD PRESSURE: 63 MMHG | HEIGHT: 77 IN

## 2018-08-28 DIAGNOSIS — N40.1 URINARY RETENTION DUE TO BENIGN PROSTATIC HYPERPLASIA: Primary | ICD-10-CM

## 2018-08-28 DIAGNOSIS — R33.8 URINARY RETENTION DUE TO BENIGN PROSTATIC HYPERPLASIA: Primary | ICD-10-CM

## 2018-08-28 DIAGNOSIS — R79.89 INCREASED AMMONIA LEVEL: ICD-10-CM

## 2018-08-28 LAB
ALBUMIN SERPL-MCNC: 3.4 G/DL (ref 3.4–5)
ALBUMIN/GLOB SERPL: 1 {RATIO} (ref 0.8–1.7)
ALP SERPL-CCNC: 70 U/L (ref 45–117)
ALT SERPL-CCNC: 16 U/L (ref 16–61)
AMMONIA PLAS-SCNC: 31 UMOL/L (ref 11–32)
ANION GAP SERPL CALC-SCNC: 9 MMOL/L (ref 3–18)
AST SERPL-CCNC: 13 U/L (ref 15–37)
BASOPHILS # BLD: 0 K/UL (ref 0–0.1)
BASOPHILS NFR BLD: 0 % (ref 0–3)
BILIRUB SERPL-MCNC: 0.5 MG/DL (ref 0.2–1)
BUN SERPL-MCNC: 21 MG/DL (ref 7–18)
BUN/CREAT SERPL: 17 (ref 12–20)
CALCIUM SERPL-MCNC: 7.9 MG/DL (ref 8.5–10.1)
CHLORIDE SERPL-SCNC: 111 MMOL/L (ref 100–108)
CO2 SERPL-SCNC: 23 MMOL/L (ref 21–32)
CREAT SERPL-MCNC: 1.25 MG/DL (ref 0.6–1.3)
DIFFERENTIAL METHOD BLD: ABNORMAL
EOSINOPHIL # BLD: 0.1 K/UL (ref 0–0.4)
EOSINOPHIL NFR BLD: 1 % (ref 0–5)
ERYTHROCYTE [DISTWIDTH] IN BLOOD BY AUTOMATED COUNT: 18.3 % (ref 11.6–14.5)
GLOBULIN SER CALC-MCNC: 3.5 G/DL (ref 2–4)
GLUCOSE SERPL-MCNC: 92 MG/DL (ref 74–99)
HCT VFR BLD AUTO: 32.6 % (ref 36–48)
HGB BLD-MCNC: 10.7 G/DL (ref 13–16)
LYMPHOCYTES # BLD: 5.1 K/UL (ref 0.8–3.5)
LYMPHOCYTES NFR BLD: 44 % (ref 20–51)
MCH RBC QN AUTO: 27 PG (ref 24–34)
MCHC RBC AUTO-ENTMCNC: 32.8 G/DL (ref 31–37)
MCV RBC AUTO: 82.1 FL (ref 74–97)
MONOCYTES # BLD: 0.3 K/UL (ref 0–1)
MONOCYTES NFR BLD: 3 % (ref 2–9)
NEUTS SEG # BLD: 6 K/UL (ref 1.8–8)
NEUTS SEG NFR BLD: 52 % (ref 42–75)
PLATELET # BLD AUTO: 346 K/UL (ref 135–420)
PLATELET COMMENTS,PCOM: ABNORMAL
PMV BLD AUTO: 11.4 FL (ref 9.2–11.8)
POTASSIUM SERPL-SCNC: 4.5 MMOL/L (ref 3.5–5.5)
PROT SERPL-MCNC: 6.9 G/DL (ref 6.4–8.2)
RBC # BLD AUTO: 3.97 M/UL (ref 4.7–5.5)
RBC MORPH BLD: ABNORMAL
SODIUM SERPL-SCNC: 143 MMOL/L (ref 136–145)
WBC # BLD AUTO: 11.5 K/UL (ref 4.6–13.2)
WBC MORPH BLD: ABNORMAL

## 2018-08-28 PROCEDURE — 36415 COLL VENOUS BLD VENIPUNCTURE: CPT | Performed by: INTERNAL MEDICINE

## 2018-08-28 PROCEDURE — 80053 COMPREHEN METABOLIC PANEL: CPT | Performed by: INTERNAL MEDICINE

## 2018-08-28 PROCEDURE — 85025 COMPLETE CBC W/AUTO DIFF WBC: CPT | Performed by: INTERNAL MEDICINE

## 2018-08-28 PROCEDURE — 82140 ASSAY OF AMMONIA: CPT | Performed by: INTERNAL MEDICINE

## 2018-08-28 NOTE — PROGRESS NOTES
Chief Complaint   Patient presents with   [facility] Follow Up    Urinary Retention       HISTORY OF PRESENT ILLNESS:  Verónica Ramirez is a 70 y.o. male who presents today following a consult in the hospital where he had been admitted with renal failure and dehydration. He has actually taken Flomax for a number of years for urinary outlet symptoms but it has been losing its effectiveness recently. When he was acutely and severely ill in the hospital he did go into urinary retention but his upper tracts on CT scan looked fine and were not causing any upper tract damage. Nevertheless, we left the catheter in and he presents to the office today in follow-up. ROS documented on the chart    Past Medical History:   Diagnosis Date    Abdominal pain, unspecified site     Acute reaction to stress     Allergic rhinitis due to pollen     Arrhythmia     afib    Arthritis     Back injury     BPH (benign prostatic hypertrophy)     Calculus of ureter     Cancer (HCC)     history of Leukemia, in remission    Carotid duplex 06/17/2015    Mild < 50% bilateral ICA stenosis.  Dizziness and giddiness     Esophageal reflux     Essential hypertension     GERD (gastroesophageal reflux disease)     Headache(784.0)     History of echocardiogram 08/13/2015    EF 55-60%. No WMA. Mild-mod LVH. Gr 1 DDfx. No evidence of intracardiac shunt.   Mild AI.      Hx: UTI (urinary tract infection)     Hypertension     Hypogonadism in male     Kidney stones     Migraine     Neck injury     Polycythemia     Polycythemia, secondary     Sciatica     Unspecified retinal detachment     Unspecified sleep apnea     resolved since gastric bypass    Vision decreased     Weight loss        Past Surgical History:   Procedure Laterality Date    ABDOMEN SURGERY PROC UNLISTED  2012    Hernia    HX CATARACT REMOVAL Left     HX CERVICAL FUSION  4/4/2016    Cervical Spine Fusion    HX CHOLECYSTECTOMY      HX GASTRIC BYPASS  2006    HX KNEE ARTHROSCOPY      both knees (right knee twice, left once)    HX ORTHOPAEDIC  1981, 1995    lumbar laminectomy    HX OTHER SURGICAL      Two back surgeries    HX RETINAL DETACHMENT REPAIR Left        Social History   Substance Use Topics    Smoking status: Former Smoker     Quit date: 2/22/1981    Smokeless tobacco: Never Used    Alcohol use Yes      Comment: occasionally        Allergies   Allergen Reactions    Levofloxacin Nausea Only     Severe nausea and dizziness    Sulfa (Sulfonamide Antibiotics) Unknown (comments)     Nausea, aching all over       Family History   Problem Relation Age of Onset    Hypertension Father     Diabetes Father     Heart Attack Mother     Headache Sister     Diabetes Son        Current Outpatient Prescriptions   Medication Sig Dispense Refill    ELIQUIS 5 mg tablet take 1 tablet by mouth twice a day 60 Tab 1    cyanocobalamin (VITAMIN B-12) 1,000 mcg tablet Take 1,000 mcg by mouth daily.  cholecalciferol, vitamin D3, (VITAMIN D3) 2,000 unit tab Take  by mouth.  calcium carbonate/vitamin D3 (CALCIUM + D PO) Take  by mouth.  vitamin E (AQUA GEMS) 400 unit capsule Take  by mouth daily.  testosterone cypionate (DEPOTESTOTERONE CYPIONATE) 200 mg/mL injection by IntraMUSCular route every thirty (30) days.  HYDROcodone-acetaminophen (NORCO) 7.5-325 mg per tablet Take one tablet by mouth every four hours as needed for pain.  Max five tablets daily 150 Tab 0    tamsulosin (FLOMAX) 0.4 mg capsule take 1 capsule by mouth once daily 90 Cap 0    guanFACINE IR (TENEX) 1 mg IR tablet take 1 tablet by mouth once daily 30 Tab 2    labetalol (NORMODYNE) 300 mg tablet take 1 tablet by mouth twice a day 60 Tab 2    rOPINIRole (REQUIP) 0.5 mg tablet take 1 tablet by mouth twice a day 60 Tab 0    topiramate (TOPAMAX) 50 mg tablet TAKE 1 TABLET BY MOUTH TWICE A DAY TO PREVENT MIGRAINE 60 Tab 5    prasterone, dhea, (DHEA) 50 mg tab Take  by mouth.  multivitamin (ONE A DAY) tablet Take 1 Tab by mouth daily.  ACETAMINOPHEN/DIPHENHYDRAMINE (TYLENOL PM PO) Take 2 Tabs by mouth nightly. PHYSICAL EXAMINATION:   Visit Vitals    /63 (BP 1 Location: Left arm, BP Patient Position: Sitting)    Pulse 70    Ht 6' 5\" (1.956 m)    Wt 241 lb (109.3 kg)    SpO2 94%    BMI 28.58 kg/m2     Constitutional: WDWN, Pleasant and appropriate affect, No acute distress. CV:  No peripheral swelling noted  Respiratory: No respiratory distress or difficulties  Abdomen:  No abdominal masses or tenderness. No CVA tenderness. No inguinal hernias noted.  Male:    JORJE: JORJE is not repeated today. In the hospital his gland is about 40 g in size and benign in nature. SCROTUM:  No scrotal rash or lesions noticed. Normal bilateral testes and epididymis. PENIS: Urethral meatus normal in location and size. No urethral discharge. Skin: No jaundice. Neuro/Psych:  Alert and oriented x 3, affect appropriate. Lymphatic:   No enlarged inguinal lymph nodes. REVIEW OF LABS AND IMAGING:     Imaging Report Reviewed? YES     Images Reviewed? YES           Other Lab Data Reviewed? YES         ASSESSMENT:     ICD-10-CM ICD-9-CM    1. Urinary retention due to benign prostatic hyperplasia N40.1 600.91     R33.8 788.20             PLAN / DISCUSSION: : He is doing and feeling much better and is up and ambulatory. Is the afternoon today when he comes into the office so I do not want to remove his Richard catheter today. I have marked the side-port that inflates the balloon and have instructed his wife to go ahead and just snip that off with scissors in the morning. If there is no problem, I would like to have him continue the Flomax for now and see him back in about 6 weeks.   At that time I would like to change him over or at least add finasteride or dutasteride to his regimen and hopefully wean him off Flomax which I think is losing its effectiveness. The patient expresses understanding and agreement of the discussion and plan. Monster Knott MD on 8/28/2018         Please note: This document has been produced using voice recognition software. Unrecognized errors in transcription may be present.

## 2018-08-28 NOTE — PROGRESS NOTES
MrNatan Womack has a reminder for a \"due or due soon\" health maintenance. I have asked that he contact his primary care provider for follow-up on this health maintenance.

## 2018-08-28 NOTE — MR AVS SNAPSHOT
[facility] [address] [address] [address] 
[phone] Patient: Eliot Mckeon MRN: [id number] URU:5/69/8863 Visit Information Date & Time Provider Department Dept. Phone Encounter #  
 8/28/2018  1:00 PM Isi Garcia [place] [facility] [phone] Follow-up Instructions Return in about 6 weeks (around 10/9/2018) for Bladder scan, PSA. Follow-up and Disposition History Your Appointments 9/6/2018  9:45 AM  
Follow Up with Ab Summers MD  
[address] [place] [address]) Appt Note: [address], [address] [facility] [address]  
[phone]  
  
   
 [address], [address] [facility] [address]  
  
    
 10/4/2018  8:45 AM  
Follow Up with Ab Summers MD  
[facility]) Appt Note: 2 month [address], [address] [facility] [address]  
[phone]  
  
    
 10/4/2018 12:40 PM  
Follow Up with Danielle Soler MD  
[facility] ([facility]) Appt Note: 4 mo f/u; was scheduled w/wrong provider; l/m to r/s appt due to provider out of office-alb; 4 month f/up [facility] [address] [address] [address] [phone] [place]  
  
    
 10/15/2018 10:15 AM  
Office Visit with Dean Samuels MD  
[facility]) Appt Note: checkup [address] [place] [address] [address]  
[phone] [address] [facility] [address] Upcoming Health Maintenance Date Due Hepatitis C Screening 1950 GLAUCOMA SCREENING Q2Y 6/22/2014 Influenza Age 5 to Adult 8/1/2018 MEDICARE YEARLY EXAM 8/4/2019 COLONOSCOPY 11/2/2020 DTaP/Tdap/Td series (2 - Td) 6/3/2024 Allergies as of 8/28/2018  Review Complete On: 8/28/2018 By: Jose Monterroso MD  
  
 Severity Noted Reaction Type Reactions Levofloxacin High   Nausea Only Severe nausea and dizziness Sulfa (Sulfonamide Antibiotics)  03/26/2014    Unknown (comments) Nausea, aching all over Current Immunizations  Reviewed on 8/22/2018 Name Date Influenza High Dose Vaccine PF 9/15/2017, 10/18/2016  9:42 AM  
 Influenza Vaccine (Quad) PF 10/1/2015 Pneumococcal Conjugate (PCV-13) 10/1/2015 Pneumococcal Polysaccharide (PPSV-23) 6/27/2017 11:34 AM  
 Pneumococcal Vaccine (Unspecified Type) 6/18/2011 Td 6/2/2006 Tdap 6/3/2014 Zoster Vaccine, Live 12/9/2013 Not reviewed this visit You Were Diagnosed With   
  
 Codes Comments Urinary retention due to benign prostatic hyperplasia    -  Primary ICD-10-CM: N40.1, R33.8 ICD-9-CM: 600.91, 788.20 Vitals BP Pulse Height(growth percentile) Weight(growth percentile) SpO2 BMI  
 106/63 (BP 1 Location: Left arm, BP Patient Position: Sitting) 70 6' 5\" (1.956 m) 241 lb (109.3 kg) 94% 28.58 kg/m2 Smoking Status Former Smoker Vitals History BMI and BSA Data Body Mass Index Body Surface Area 28.58 kg/m 2 2.44 m 2 Preferred Pharmacy Pharmacy Name Phone [facility], [address] [place] [address] [phone] Your Updated Medication List  
  
   
This list is accurate as of 8/28/18  1:40 PM.  Always use your most recent med list.  
  
  
  
  
 CALCIUM + D PO Take  by mouth. DHEA 50 mg Tab Generic drug:  prasterone (dhea) Take  by mouth. ELIQUIS 5 mg tablet Generic drug:  apixaban  
take 1 tablet by mouth twice a day  
  
 guanFACINE IR 1 mg IR tablet Commonly known as:  TENEX  
take 1 tablet by mouth once daily HYDROcodone-acetaminophen 7.5-325 mg per tablet Commonly known as:  [facility]  
 Take one tablet by mouth every four hours as needed for pain. Max five tablets daily  
  
 labetalol 300 mg tablet Commonly known as:  Dala Fulshear  
take 1 tablet by mouth twice a day  
  
 multivitamin tablet Commonly known as:  ONE A DAY Take 1 Tab by mouth daily. rOPINIRole 0.5 mg tablet Commonly known as:  REQUIP  
take 1 tablet by mouth twice a day  
  
 tamsulosin 0.4 mg capsule Commonly known as:  FLOMAX  
take 1 capsule by mouth once daily  
  
 testosterone cypionate 200 mg/mL injection Commonly known as:  DEPOTESTOTERONE CYPIONATE  
by IntraMUSCular route every thirty (30) days. topiramate 50 mg tablet Commonly known as:  TOPAMAX TAKE 1 TABLET BY MOUTH TWICE A DAY TO PREVENT MIGRAINE  
  
 TYLENOL PM PO Take 2 Tabs by mouth nightly. VITAMIN B-12 1,000 mcg tablet Generic drug:  cyanocobalamin Take 1,000 mcg by mouth daily. VITAMIN D3 2,000 unit Tab Generic drug:  cholecalciferol (vitamin D3) Take  by mouth.  
  
 vitamin E 400 unit capsule Commonly known as:  [address] Take  by mouth daily. Follow-up Instructions Return in about 6 weeks (around 10/9/2018) for Bladder scan, PSA. Patient Instructions Urinary Retention: Care Instructions Your Care Instructions Urinary retention means that you aren't able to urinate. In men, it is often caused by a blockage of the urinary tract from an enlarged prostate gland. In men and women, it can also be caused by an infection or nerve damage. Or it may be a side effect of a medicine. The doctor may have drained the urine from your bladder. If you still have problems passing urine, you may need to use a catheter at home. This is used to empty your bladder until the problem can be fixed. Your doctor may put a catheter in your bladder before you go home. If so, he or she will tell you when to come back to have the catheter removed. The doctor has checked you closely. But problems can develop later. If you notice any problems or new symptoms, get medical treatment right away. Follow-up care is a key part of your treatment and safety. Be sure to make and go to all appointments, and call your doctor if you are having problems. It's also a good idea to know your test results and keep a list of the medicines you take. How can you care for yourself at home? · Take your medicines exactly as prescribed. Call your doctor if you think you are having a problem with your medicine. You will get more details on the specific medicines your doctor prescribes. · Check with your doctor before you use any over-the-counter medicines. Many cold and allergy medicines, for example, can make this problem worse. Make sure your doctor knows all of the medicines, vitamins, supplements, and herbal remedies you take. · Spread out through the day the amount of fluid you drink. Do not drink a lot at bedtime. · Avoid alcohol and caffeine. · If you have been given a catheter, or if one is already in place, follow the instructions you were given. Always wash your hands before and after you handle the catheter. When should you call for help? Call your doctor now or seek immediate medical care if: 
  · You cannot urinate at all, or it is getting harder to urinate.  
  · You have symptoms of a urinary tract infection. These may include: 
¨ Pain or burning when you urinate. ¨ A frequent need to urinate without being able to pass much urine. ¨ Pain in the flank, which is just below the rib cage and above the waist on either side of the back. ¨ Blood in your urine. ¨ A fever.  
 Watch closely for changes in your health, and be sure to contact your doctor if: 
  · You have any problems with your catheter.  
  · You do not get better as expected. Where can you learn more? Go to http://shamar-keegan.info/. Enter M244 in the search box to learn more about \"Urinary Retention: Care Instructions. \" Current as of: May 12, 2017 Content Version: 11.7 © 3260-5312 [employer]onomix. Care instructions adapted under license by [employer] (which disclaims liability or warranty for this information). If you have questions about a medical condition or this instruction, always ask your healthcare professional. [address] any warranty or liability for your use of this information. Patient Instructions History Introducing [facility] SERVICES! Dear Evelyn Wallace: Thank you for requesting a [employer] account. Our records indicate that you already have an active [employer] account. You can access your account anytime at [email or web address] Did you know that you can access your hospital and ER discharge instructions at any time in [employer]? You can also review all of your test results from your hospital stay or ER visit. Additional Information If you have questions, please visit the Frequently Asked Questions section of the [employer] website at [email or web address] Remember, [employer] is NOT to be used for urgent needs. For medical emergencies, dial 911. Now available from your iPhone and Android! Please provide this summary of care documentation to your next provider. Your primary care clinician is listed as Karlene Bracket. If you have any questions after today's visit, please call [phone].

## 2018-08-28 NOTE — PROGRESS NOTES
Hospital Discharge Follow-Up 
  
  
[facility] and discharged on 8/23/18 for AMS, ARF, hyperkalemia, urinary retention. The patient attended an appointment with Dr. Cathie Kamara on 8/27/18. Goals Addressed Most Recent  Attends follow-up appointments as directed. On track (8/28/2018) Plan:  Monitor for attendance at apts and assist as needed to schedule 8/27/18-attended apt with Dr. Cathie Kamara

## 2018-08-28 NOTE — PATIENT INSTRUCTIONS
Urinary Retention: Care Instructions  Your Care Instructions    Urinary retention means that you aren't able to urinate. In men, it is often caused by a blockage of the urinary tract from an enlarged prostate gland. In men and women, it can also be caused by an infection or nerve damage. Or it may be a side effect of a medicine. The doctor may have drained the urine from your bladder. If you still have problems passing urine, you may need to use a catheter at home. This is used to empty your bladder until the problem can be fixed. Your doctor may put a catheter in your bladder before you go home. If so, he or she will tell you when to come back to have the catheter removed. The doctor has checked you closely. But problems can develop later. If you notice any problems or new symptoms, get medical treatment right away. Follow-up care is a key part of your treatment and safety. Be sure to make and go to all appointments, and call your doctor if you are having problems. It's also a good idea to know your test results and keep a list of the medicines you take. How can you care for yourself at home? · Take your medicines exactly as prescribed. Call your doctor if you think you are having a problem with your medicine. You will get more details on the specific medicines your doctor prescribes. · Check with your doctor before you use any over-the-counter medicines. Many cold and allergy medicines, for example, can make this problem worse. Make sure your doctor knows all of the medicines, vitamins, supplements, and herbal remedies you take. · Spread out through the day the amount of fluid you drink. Do not drink a lot at bedtime. · Avoid alcohol and caffeine. · If you have been given a catheter, or if one is already in place, follow the instructions you were given. Always wash your hands before and after you handle the catheter. When should you call for help?   Call your doctor now or seek immediate medical care if:    · You cannot urinate at all, or it is getting harder to urinate.     · You have symptoms of a urinary tract infection. These may include:  ¨ Pain or burning when you urinate. ¨ A frequent need to urinate without being able to pass much urine. ¨ Pain in the flank, which is just below the rib cage and above the waist on either side of the back. ¨ Blood in your urine. ¨ A fever.    Watch closely for changes in your health, and be sure to contact your doctor if:    · You have any problems with your catheter.     · You do not get better as expected. Where can you learn more? Go to http://shamar-keegan.info/. Enter M244 in the search box to learn more about \"Urinary Retention: Care Instructions. \"  Current as of: May 12, 2017  Content Version: 11.7  © 5497-6011 [employer]. Care instructions adapted under license by [employer] (which disclaims liability or warranty for this information). If you have questions about a medical condition or this instruction, always ask your healthcare professional. [place] [address] any warranty or liability for your use of this information.

## 2018-08-29 RX ORDER — HYDROCHLOROTHIAZIDE 25 MG/1
TABLET ORAL
Qty: 30 TAB | Refills: 1 | Status: SHIPPED | OUTPATIENT
Start: 2018-08-29 | End: 2018-10-29 | Stop reason: SDUPTHER

## 2018-09-05 ENCOUNTER — PATIENT OUTREACH (OUTPATIENT)
Dept: INTERNAL MEDICINE CLINIC | Age: 68
End: 2018-09-05

## 2018-09-05 NOTE — PROGRESS NOTES
Hospital Discharge Follow-Up    
   
Patient was admitted [facility] 8/19/18 and discharged on 8/23/18 for AMS, ARF, hyperkalemia, urinary retention. Goals Addressed Most Recent  Attends follow-up appointments as directed. On track (9/5/2018) Plan:  Monitor for attendance at apts and assist as needed to schedule 8/27/18-attended apt with Dr. Kayleen Healy 8/29/18-attended apt with Dr. Deena Ohara, urology

## 2018-09-06 ENCOUNTER — OFFICE VISIT (OUTPATIENT)
Dept: INTERNAL MEDICINE CLINIC | Age: 68
End: 2018-09-06

## 2018-09-06 VITALS
RESPIRATION RATE: 22 BRPM | SYSTOLIC BLOOD PRESSURE: 122 MMHG | HEART RATE: 74 BPM | HEIGHT: 77 IN | TEMPERATURE: 97.9 F | BODY MASS INDEX: 27.87 KG/M2 | WEIGHT: 236 LBS | OXYGEN SATURATION: 98 % | DIASTOLIC BLOOD PRESSURE: 76 MMHG

## 2018-09-06 DIAGNOSIS — M54.50 CHRONIC RIGHT-SIDED LOW BACK PAIN WITHOUT SCIATICA: Primary | ICD-10-CM

## 2018-09-06 DIAGNOSIS — Z87.448 HX OF RENAL FAILURE: ICD-10-CM

## 2018-09-06 DIAGNOSIS — C92.11 CML IN REMISSION (HCC): ICD-10-CM

## 2018-09-06 DIAGNOSIS — Z23 ENCOUNTER FOR IMMUNIZATION: ICD-10-CM

## 2018-09-06 DIAGNOSIS — G89.29 CHRONIC RIGHT-SIDED LOW BACK PAIN WITHOUT SCIATICA: Primary | ICD-10-CM

## 2018-09-06 NOTE — PROGRESS NOTES
Chief Complaint   Patient presents with    Well Male       Depression Screening:  PHQ over the last two weeks 8/27/2018   Little interest or pleasure in doing things Not at all   Feeling down, depressed, irritable, or hopeless Not at all   Total Score PHQ 2 0       Learning Assessment:  Learning Assessment 5/3/2018   PRIMARY LEARNER Patient   HIGHEST LEVEL OF EDUCATION - PRIMARY LEARNER  -   Aliyah 22 LEARNER -   [facility]    NEED -   LEARNER PREFERENCE PRIMARY DEMONSTRATION   ANSWERED BY patient   RELATIONSHIP SELF       Abuse Screening:  Abuse Screening Questionnaire 11/2/2017   Do you ever feel afraid of your partner? N   Are you in a relationship with someone who physically or mentally threatens you? N   Is it safe for you to go home? Y       Fall Risk  Fall Risk Assessment, last 12 mths 8/27/2018   Able to walk? Yes   Fall in past 12 months? Yes   Fall with injury? -   Number of falls in past 12 months 1   Fall Risk Score -             1. Have you been to the ER, urgent care clinic since your last visit? Hospitalized since your last visit? No    2. Have you seen or consulted any other health care providers outside of the [facility] since your last visit? Include any pap smears or colon screening.  No

## 2018-09-08 NOTE — PROGRESS NOTES
The patient presents to the office today with the chief complaint of low back pain    HPI    The patient continues with continued low back pain. The patient has resolution of his previously noted sciatica but the low back pain persists. The patient expressed frustration over the continued pain. 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 The renal problem was also affected by use of an ARB for BP and dehydration. The patient's renal function has been better. Review of Systems   Respiratory: Negative for shortness of breath. Cardiovascular: Negative for chest pain and leg swelling. Musculoskeletal: Positive for back pain. Allergies   Allergen Reactions    Levofloxacin Nausea Only     Severe nausea and dizziness    Sulfa (Sulfonamide Antibiotics) Unknown (comments)     Nausea, aching all over       Current Outpatient Prescriptions   Medication Sig Dispense Refill    hydroCHLOROthiazide (HYDRODIURIL) 25 mg tablet take 1 tablet by mouth once daily 30 Tab 1    ELIQUIS 5 mg tablet take 1 tablet by mouth twice a day 60 Tab 1    cyanocobalamin (VITAMIN B-12) 1,000 mcg tablet Take 1,000 mcg by mouth daily.  prasterone, dhea, (DHEA) 50 mg tab Take  by mouth.  cholecalciferol, vitamin D3, (VITAMIN D3) 2,000 unit tab Take  by mouth.  calcium carbonate/vitamin D3 (CALCIUM + D PO) Take  by mouth.  multivitamin (ONE A DAY) tablet Take 1 Tab by mouth daily.  vitamin E (AQUA GEMS) 400 unit capsule Take  by mouth daily.  testosterone cypionate (DEPOTESTOTERONE CYPIONATE) 200 mg/mL injection by IntraMUSCular route every thirty (30) days.  HYDROcodone-acetaminophen (NORCO) 7.5-325 mg per tablet Take one tablet by mouth every four hours as needed for pain.  Max five tablets daily 150 Tab 0    tamsulosin (FLOMAX) 0.4 mg capsule take 1 capsule by mouth once daily 90 Cap 0    guanFACINE IR (TENEX) 1 mg IR tablet take 1 tablet by mouth once daily 30 Tab 2    labetalol (NORMODYNE) 300 mg tablet take 1 tablet by mouth twice a day 60 Tab 2    rOPINIRole (REQUIP) 0.5 mg tablet take 1 tablet by mouth twice a day 60 Tab 0    topiramate (TOPAMAX) 50 mg tablet TAKE 1 TABLET BY MOUTH TWICE A DAY TO PREVENT MIGRAINE 60 Tab 5    ACETAMINOPHEN/DIPHENHYDRAMINE (TYLENOL PM PO) Take 2 Tabs by mouth nightly. Past Medical History:   Diagnosis Date    Abdominal pain, unspecified site     Acute reaction to stress     Allergic rhinitis due to pollen     Arrhythmia     afib    Arthritis     Back injury     BPH (benign prostatic hypertrophy)     Calculus of ureter     Cancer (HCC)     history of Leukemia, in remission    Carotid duplex 06/17/2015    Mild < 50% bilateral ICA stenosis.  Dizziness and giddiness     Esophageal reflux     Essential hypertension     GERD (gastroesophageal reflux disease)     Headache(784.0)     History of echocardiogram 08/13/2015    EF 55-60%. No WMA. Mild-mod LVH. Gr 1 DDfx. No evidence of intracardiac shunt.   Mild AI.      Hx: UTI (urinary tract infection)     Hypertension     Hypogonadism in male     Kidney stones     Migraine     Neck injury     Polycythemia     Polycythemia, secondary     Sciatica     Unspecified retinal detachment     Unspecified sleep apnea     resolved since gastric bypass    Vision decreased     Weight loss        Past Surgical History:   Procedure Laterality Date    ABDOMEN SURGERY PROC UNLISTED  2012    Hernia    HX CATARACT REMOVAL Left     HX CERVICAL FUSION  4/4/2016    Cervical Spine Fusion    HX CHOLECYSTECTOMY      HX GASTRIC BYPASS  2006    HX KNEE ARTHROSCOPY      both knees (right knee twice, left once)    HX ORTHOPAEDIC  1981, 1995    lumbar laminectomy    HX OTHER SURGICAL      Two back surgeries    HX RETINAL DETACHMENT REPAIR Left        Social History     Social History    Marital status:      Spouse name: N/A    Number of children: N/A    Years of education: N/A     Occupational History    Not on file. Social History Main Topics    Smoking status: Former Smoker     Quit date: 2/22/1981    Smokeless tobacco: Never Used    Alcohol use Yes      Comment: occasionally     Drug use: No    Sexual activity: Yes     Partners: Female     Other Topics Concern    Not on file     Social History Narrative    Patient lives with his wife in [place], he had 3 children by unfortunately lost 2 of them. He enjoys taking care of his yard and plays with the grandchildren. Patient does have an advanced directive on file    Visit Vitals    /76 (BP 1 Location: Left arm, BP Patient Position: Sitting)    Pulse 74    Temp 97.9 °F (36.6 °C) (Tympanic)    Resp 22    Ht 6' 5\" (1.956 m)    Wt 236 lb (107 kg)    SpO2 98%    BMI 27.99 kg/m2       Physical Exam   No Cervical Lymphadenopathy  No Supraclavicular Lymphadenopathy  Thyroid is Normal  Lungs are normal to percussion. Clear to auscultation   Heart:  S1 S2 are normal, No gallops, No mummers  No Carotid Bruits  Abdomen:  Normal Bowel Sounds. No tenderness. No masses. No Hepatomegaly or Splenomegaly  LE:  Strong Pedal Pulses.   No Edema      BMI:  [facility] Outpatient Visit on 08/28/2018   Component Date Value Ref Range Status    Ammonia 08/28/2018 31  11 - 32 UMOL/L Final    WBC 08/28/2018 11.5  4.6 - 13.2 K/uL Final    RBC 08/28/2018 3.97* 4.70 - 5.50 M/uL Final    HGB 08/28/2018 10.7* 13.0 - 16.0 g/dL Final    HCT 08/28/2018 32.6* 36.0 - 48.0 % Final    MCV 08/28/2018 82.1  74.0 - 97.0 FL Final    MCH 08/28/2018 27.0  24.0 - 34.0 PG Final    MCHC 08/28/2018 32.8  31.0 - 37.0 g/dL Final    RDW 08/28/2018 18.3* 11.6 - 14.5 % Final    PLATELET 55/82/7202 571  135 - 420 K/uL Final    MPV 08/28/2018 11.4  9.2 - 11.8 FL Final    NEUTROPHILS 08/28/2018 52  42 - 75 % Final    LYMPHOCYTES 08/28/2018 44  20 - 51 % Final    MONOCYTES 08/28/2018 3  2 - 9 % Final    EOSINOPHILS 08/28/2018 1  0 - 5 % Final    BASOPHILS 08/28/2018 0  0 - 3 % Final    ABS. NEUTROPHILS 08/28/2018 6.0  1.8 - 8.0 K/UL Final    ABS. LYMPHOCYTES 08/28/2018 5.1* 0.8 - 3.5 K/UL Final    ABS. MONOCYTES 08/28/2018 0.3  0 - 1.0 K/UL Final    ABS. EOSINOPHILS 08/28/2018 0.1  0.0 - 0.4 K/UL Final    ABS. BASOPHILS 08/28/2018 0.0  0.0 - 0.1 K/UL Final    PLATELET COMMENTS 76/88/5491 CLUMPED PLATELETS    Final    Comment: 1+  LARGE PLATELETS  1+  ADEQUATE PLATELETS      RBC COMMENTS 08/28/2018     Final                    Value:ANISOCYTOSIS  1+      RBC COMMENTS 08/28/2018     Final                    Value:POIKILOCYTOSIS  1+      RBC COMMENTS 08/28/2018     Final                    Value:STOMATOCYTES  1+      WBC COMMENTS 08/28/2018 REACTIVE LYMPHS    Final    1+    DF 08/28/2018 MANUAL    Final    Sodium 08/28/2018 143  136 - 145 mmol/L Final    Potassium 08/28/2018 4.5  3.5 - 5.5 mmol/L Final    Chloride 08/28/2018 111* 100 - 108 mmol/L Final    CO2 08/28/2018 23  21 - 32 mmol/L Final    Anion gap 08/28/2018 9  3.0 - 18 mmol/L Final    Glucose 08/28/2018 92  74 - 99 mg/dL Final    BUN 08/28/2018 21* 7.0 - 18 MG/DL Final    Creatinine 08/28/2018 1.25  0.6 - 1.3 MG/DL Final    BUN/Creatinine ratio 08/28/2018 17  12 - 20   Final    GFR est AA 08/28/2018 >60  >60 ml/min/1.73m2 Final    GFR est non-AA 08/28/2018 57* >60 ml/min/1.73m2 Final    Comment: (NOTE)  Estimated GFR is calculated using the Modification of Diet in Renal   Disease (MDRD) Study equation, reported for both  Americans   (GFRAA) and non- Americans (GFRNA), and normalized to 1.73m2   body surface area. The physician must decide which value applies to   the patient. The MDRD study equation should only be used in   individuals age 25 or older.  It has not been validated for the   following: pregnant women, patients with serious comorbid conditions,   or on certain medications, or persons with extremes of body size,   muscle mass, or nutritional status.  Calcium 08/28/2018 7.9* 8.5 - 10.1 MG/DL Final    Bilirubin, total 08/28/2018 0.5  0.2 - 1.0 MG/DL Final    ALT (SGPT) 08/28/2018 16  16 - 61 U/L Final    AST (SGOT) 08/28/2018 13* 15 - 37 U/L Final    Alk. phosphatase 08/28/2018 70  45 - 117 U/L Final    Protein, total 08/28/2018 6.9  6.4 - 8.2 g/dL Final    Albumin 08/28/2018 3.4  3.4 - 5.0 g/dL Final    Globulin 08/28/2018 3.5  2.0 - 4.0 g/dL Final    A-G Ratio 08/28/2018 1.0  0.8 - 1.7   Final   Admission on 08/19/2018, Discharged on 08/23/2018   No results displayed because visit has over 200 results. .No results found for any visits on 09/06/18. Assessment / Plan      ICD-10-CM ICD-9-CM    1. Chronic right-sided low back pain without sciatica M54.5 724.2     G89.29 338.29    2. Hx of renal failure V31.547 J25.28 METABOLIC PANEL, BASIC   3. Encounter for immunization Z23 V03.89 INFLUENZA VIRUS VACCINE, HIGH DOSE SEASONAL, PRESERVATIVE FREE      ADMIN PNEUMOCOCCAL VACCINE   4. CML in remission ([facility] [address] C92.11 205.11        BMP ordered  he was advised to continue his maintenance medications  Pneumovax ordered  Patient is due to follow up with hematology and renal    Follow-up Disposition:  Return in about 6 weeks (around 10/18/2018). I asked Jean Carlos Durán if he has any questions and I answered the questions.   Jean Carlos Record states that he understands the treatment plan and agrees with the treatment plan

## 2018-09-11 DIAGNOSIS — R79.89 LOW TESTOSTERONE IN MALE: Primary | ICD-10-CM

## 2018-09-11 DIAGNOSIS — M54.2 CERVICALGIA: ICD-10-CM

## 2018-09-11 RX ORDER — HYDROCODONE BITARTRATE AND ACETAMINOPHEN 7.5; 325 MG/1; MG/1
TABLET ORAL
Qty: 150 TAB | Refills: 0 | Status: SHIPPED | OUTPATIENT
Start: 2018-09-12 | End: 2018-10-08 | Stop reason: SDUPTHER

## 2018-09-11 RX ORDER — TESTOSTERONE CYPIONATE 200 MG/ML
100 INJECTION INTRAMUSCULAR
Qty: 1 VIAL | Refills: 0 | Status: SHIPPED | OUTPATIENT
Start: 2018-09-12 | End: 2018-10-08 | Stop reason: SDUPTHER

## 2018-09-11 NOTE — TELEPHONE ENCOUNTER
Requested Prescriptions     Pending Prescriptions Disp Refills    HYDROcodone-acetaminophen (NORCO) 7.5-325 mg per tablet 150 Tab 0     Sig: Take one tablet by mouth every four hours as needed for pain. Max five tablets daily    testosterone cypionate (DEPOTESTOTERONE CYPIONATE) 200 mg/mL injection 1 Vial 0     Sig: by IntraMUSCular route every thirty (30) days.        Last  refill 8/15/18 for both

## 2018-09-17 RX ORDER — LABETALOL 300 MG/1
TABLET, FILM COATED ORAL
Qty: 60 TAB | Refills: 2 | Status: SHIPPED | OUTPATIENT
Start: 2018-09-17 | End: 2019-04-01 | Stop reason: ALTCHOICE

## 2018-09-17 RX ORDER — AMLODIPINE AND OLMESARTAN MEDOXOMIL 5; 40 MG/1; MG/1
TABLET ORAL
Qty: 30 TAB | Refills: 0 | Status: SHIPPED | OUTPATIENT
Start: 2018-09-17 | End: 2018-10-04 | Stop reason: ALTCHOICE

## 2018-09-17 RX ORDER — GUANFACINE HYDROCHLORIDE 1 MG/1
TABLET ORAL
Qty: 30 TAB | Refills: 2 | Status: SHIPPED | OUTPATIENT
Start: 2018-09-17 | End: 2018-12-13 | Stop reason: SDUPTHER

## 2018-09-18 RX ORDER — AMLODIPINE AND BENAZEPRIL HYDROCHLORIDE 5; 40 MG/1; MG/1
1 CAPSULE ORAL DAILY
Qty: 90 CAP | Refills: 3 | Status: SHIPPED | OUTPATIENT
Start: 2018-09-18 | End: 2018-11-12

## 2018-09-26 RX ORDER — ROPINIROLE 0.5 MG/1
TABLET, FILM COATED ORAL
Qty: 60 TAB | Refills: 2 | Status: SHIPPED | OUTPATIENT
Start: 2018-09-26 | End: 2018-10-04 | Stop reason: SDUPTHER

## 2018-09-27 ENCOUNTER — PATIENT OUTREACH (OUTPATIENT)
Dept: INTERNAL MEDICINE CLINIC | Age: 68
End: 2018-09-27

## 2018-09-27 NOTE — PROGRESS NOTES
Hospital Discharge Follow-Up    
   
Patient was admitted [facility] 8/19/18 and discharged on 8/23/18 for AMS, ARF, hyperkalemia, urinary retention. Spoke with patient who stated \"I seem to be doing very well. Everything is back to normal. I'm taking all of my medications. \" 
 
Patient has graduated from the Transitions of Care Coordination  program on 9/27/18. Patient's symptoms are stable at this time. Patient/family has the ability to self-manage. Care management goals have been completed at this time. No further nurse navigator follow up scheduled. Pt has nurse navigator's contact information for any further questions, concerns, or needs. Patients upcoming visits:  Future Appointments Date Time Provider Panchito Zuluagai 10/4/2018 12:40 PM Avila Alarcon MD [address] [place] [address]  
10/15/2018 10:15 AM Tim Wells MD [address] [place] [address]  
10/16/2018 9:00 AM Yuni Montoya  Ch[place]o , [address] Ernul Goals Addressed Most Recent  Attends follow-up appointments as directed. On track (9/27/2018) Plan:  Monitor for attendance at apts and assist as needed to schedule 8/27/18-attended apt with Dr. Jean Marie Nelson 8/29/18-attended apt with Dr. Lauren Ramirez, urology  Knowledge and adherence of prescribed medication (ie. action, side effects, missed dose, etc.). On track (9/27/2018) Plan:  Reconcile medications and assess patient understanding of purpose and when to take 8/24/18-patient able to name medications, purpose and when to take

## 2018-10-01 RX ORDER — DULOXETIN HYDROCHLORIDE 30 MG/1
CAPSULE, DELAYED RELEASE ORAL
Qty: 30 CAP | Refills: 0 | Status: SHIPPED | OUTPATIENT
Start: 2018-10-01 | End: 2018-10-30 | Stop reason: SDUPTHER

## 2018-10-04 ENCOUNTER — OFFICE VISIT (OUTPATIENT)
Dept: CARDIOLOGY CLINIC | Age: 68
End: 2018-10-04

## 2018-10-04 VITALS
HEIGHT: 77 IN | BODY MASS INDEX: 27.51 KG/M2 | HEART RATE: 79 BPM | WEIGHT: 233 LBS | DIASTOLIC BLOOD PRESSURE: 70 MMHG | OXYGEN SATURATION: 96 % | SYSTOLIC BLOOD PRESSURE: 110 MMHG

## 2018-10-04 DIAGNOSIS — I10 ESSENTIAL HYPERTENSION: Primary | ICD-10-CM

## 2018-10-04 DIAGNOSIS — C95.91 LEUKEMIA IN REMISSION, UNSPECIFIED LEUKEMIA TYPE (HCC): ICD-10-CM

## 2018-10-04 DIAGNOSIS — I48.91 ATRIAL FIBRILLATION, UNSPECIFIED TYPE (HCC): ICD-10-CM

## 2018-10-04 NOTE — MR AVS SNAPSHOT
[address] [place] [address] [address] [place] [address] 
[phone] Patient: Brea Johnson MRN: [id number] RKT:[id number]/13/[id number] Visit Information Date & Time Provider Department Dept. Phone Encounter #  
 10/4/2018 12:40 PM Genoveva Moncada MD [facility] [phone] [id number] Your Appointments 10/15/2018 10:15 AM  
Office Visit with Deedee Vo MD  
[facility]) Appt Note: checkup [address] [id number] [phone] [address] [phone]  
  
    
 10/16/2018  9:00 AM  
Follow Up with Claude Brown MD  
[facility] Appt Note: 2 month f/u/discuss not sleeping well; 2 month f/u/discuss not sleeping well  
 [address] [facility], [address]  
  
   
 [address] [facility], [address] [facility] [address] Upcoming Health Maintenance Date Due Hepatitis C Screening 1950 Shingrix Vaccine Age 50> (1 of 2) 6/22/1999 GLAUCOMA SCREENING Q2Y 6/22/2014 MEDICARE YEARLY EXAM 8/4/2019 COLONOSCOPY 11/2/2020 DTaP/Tdap/Td series (2 - Td) 6/3/2024 Allergies as of 10/4/2018  Review Complete On: 9/8/2018 By: Claude Brown MD  
  
 Severity Noted Reaction Type Reactions Levofloxacin High   Nausea Only Severe nausea and dizziness Sulfa (Sulfonamide Antibiotics)  03/26/2014    Unknown (comments) Nausea, aching all over Current Immunizations  Reviewed on 9/4/2018 Name Date Influenza High Dose Vaccine PF 9/6/2018 11:28 AM, 9/15/2017, 10/18/2016  9:42 AM  
 Influenza Vaccine (Quad) PF 10/1/2015 Pneumococcal Conjugate (PCV-13) 10/1/2015 Pneumococcal Polysaccharide (PPSV-23) 6/27/2017 11:34 AM  
 Pneumococcal Vaccine (Unspecified Type) 6/18/2011 Td 6/2/2006 Tdap 6/3/2014 Zoster Vaccine, Live 12/9/2013 Not reviewed this visit Vitals BP Pulse Height(growth percentile) Weight(growth percentile) SpO2 BMI  
 110/70 79 6' 5\" (1.956 m) 233 lb (105.7 kg) 96% 27.63 kg/m2 Smoking Status Former Smoker Vitals History BMI and BSA Data Body Mass Index Body Surface Area  
 27.63 kg/m 2 2.4 m 2 Preferred Pharmacy Pharmacy Name Phone [facility] [address] [facility] [phone] Your Updated Medication List  
  
   
This list is accurate as of 10/4/18  1:19 PM.  Always use your most recent med list. amLODIPine-benazepril 5-40 mg per capsule Commonly known as:  Niles Flores Take 1 Cap by mouth daily. CALCIUM + D PO Take  by mouth. DHEA 50 mg Tab Generic drug:  prasterone (dhea) Take  by mouth. DULoxetine 30 mg capsule Commonly known as:  CYMBALTA  
take 1 capsule by mouth once daily with 60 MG CAPSULES  
  
 ELIQUIS 5 mg tablet Generic drug:  apixaban  
take 1 tablet by mouth twice a day  
  
 guanFACINE IR 1 mg IR tablet Commonly known as:  TENEX  
take 1 tablet by mouth once daily  
  
 hydroCHLOROthiazide 25 mg tablet Commonly known as:  HYDRODIURIL  
take 1 tablet by mouth once daily HYDROcodone-acetaminophen 7.5-325 mg per tablet Commonly known as:  Donavan Dodrill Take one tablet by mouth every four hours as needed for pain. Max five tablets daily  
  
 labetalol 300 mg tablet Commonly known as:  Leretha Spire  
take 1 tablet by mouth twice a day  
  
 multivitamin tablet Commonly known as:  ONE A DAY Take 1 Tab by mouth daily. rOPINIRole 0.5 mg tablet Commonly known as:  REQUIP  
take 1 tablet by mouth twice a day  
  
 tamsulosin 0.4 mg capsule Commonly known as:  FLOMAX  
take 1 capsule by mouth once daily  
  
 testosterone cypionate 200 mg/mL injection Commonly known as:  DEPOTESTOTERONE CYPIONATE 0.5 mL by IntraMUSCular route every thirty (30) days. Max Daily Amount: 100 mg.  
  
 topiramate 50 mg tablet Commonly known as:  TOPAMAX TAKE 1 TABLET BY MOUTH TWICE A DAY TO PREVENT MIGRAINE  
  
 TYLENOL PM PO Take 2 Tabs by mouth nightly. VITAMIN B-12 1,000 mcg tablet Generic drug:  cyanocobalamin Take 1,000 mcg by mouth daily. VITAMIN D3 2,000 unit Tab Generic drug:  cholecalciferol (vitamin D3) Take  by mouth.  
  
 vitamin E 400 unit capsule Commonly known as:  [address] Take  by mouth daily. Introducing [facility]! Dear Efren Dejesus: Thank you for requesting a [employer] account. Our records indicate that you already have an active [employer] account. You can access your account anytime at [email or web address] Did you know that you can access your hospital and ER discharge instructions at any time in [employer]? You can also review all of your test results from your hospital stay or ER visit. Additional Information If you have questions, please visit the Frequently Asked Questions section of the [employer] website at [email or web address] Remember, [employer] is NOT to be used for urgent needs. For medical emergencies, dial 911. Now available from your iPhone and Android! Please provide this summary of care documentation to your next provider. Your primary care clinician is listed as Orion Chowdhury. If you have any questions after today's visit, please call [phone].

## 2018-10-04 NOTE — PROGRESS NOTES
Cheryle Hess presents today for Chief Complaint Patient presents with  Irregular Heart Beat  
  4 month follow up  Shortness of Breath  
  exertion  Dizziness  
  sometimes Cheryle Hess preferred language for health care discussion is english/other. Is someone accompanying this pt? Wife Is the patient using any DME equipment during OV? No  
 
Depression Screening: PHQ over the last two weeks 8/27/2018 Little interest or pleasure in doing things Not at all Feeling down, depressed, irritable, or hopeless Not at all Total Score PHQ 2 0 Learning Assessment: 
Learning Assessment 5/3/2018 PRIMARY LEARNER Patient HIGHEST LEVEL OF EDUCATION - PRIMARY LEARNER  -  
BARRIERS PRIMARY LEARNER -  
CO-LEARNER CAREGIVER -  
PRIMARY LANGUAGE ENGLISH  NEED -  
LEARNER PREFERENCE PRIMARY DEMONSTRATION  
ANSWERED BY patient RELATIONSHIP SELF Abuse Screening: 
Abuse Screening Questionnaire 11/2/2017 Do you ever feel afraid of your partner? Abhijit Benjamin Are you in a relationship with someone who physically or mentally threatens you? Abhijit Benjamin Is it safe for you to go home? Elayne Dawn Fall Risk Fall Risk Assessment, last 12 mths 8/27/2018 Able to walk? Yes Fall in past 12 months? Yes Fall with injury? -  
Number of falls in past 12 months 1 Fall Risk Score - Pt currently taking Anticoagulant therapy? eliquis Coordination of Care: 1. Have you been to the ER, urgent care clinic since your last visit? Hospitalized since your last visit? 8/29 - 8/31 for KAREN 2. Have you seen or consulted any other health care providers outside of the [address] [place] [address] since your last visit? Include any pap smears or colon screening.  No

## 2018-10-04 NOTE — PROGRESS NOTES
History of Present Illness:  A 70 y.o. man here for follow up. He was initially referred by Ashly Adams for new diagnosis of paroxysmal atrial fibrillation which was rather an incidental finding during preop evaluation for planned surgery. He had surgery on L6, his third. It did not improve his pain as much as he had hoped. He denies chest pain, dyspnea, orthopnea, PND, edema. He does get a bit dizzy when he stands up too fast. 
 
Impression/Plan:  
Paroxysmal atrial fibrillation without symptoms, rate control only. Incidental finding during recent preop evaluation. Recent back surgery for which he has had a third. He still has residual pain. Eliquis use. 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 Hypertension. Chronic pain. At this point, I would continue with Eliquis and not pursue any further ablation or antiarrhythmic given his lack of symptoms. He is taking Labetalol for rate control. Echocardiogram previously showed normal function. I will plan to see him back annually. Past Medical History:  
Diagnosis Date  Abdominal pain, unspecified site  Acute reaction to stress  Allergic rhinitis due to pollen  Arrhythmia   
 afib  Arthritis  Back injury  BPH (benign prostatic hypertrophy)  Calculus of ureter  Cancer [facility])   
 history of Leukemia, in remission  Carotid duplex 06/17/2015 Mild < 50% bilateral ICA stenosis.  Dizziness and giddiness  Esophageal reflux  Essential hypertension  GERD (gastroesophageal reflux disease)  Headache(784.0)  History of echocardiogram 08/13/2015 EF 55-60%. No WMA. Mild-mod LVH. Gr 1 DDfx. No evidence of intracardiac shunt. Mild AI.    
 Hx: UTI (urinary tract infection)  Hypertension  Hypogonadism in male  Kidney stones  Migraine  Neck injury  Polycythemia  Polycythemia, secondary  Sciatica  Unspecified retinal detachment  Unspecified sleep apnea resolved since gastric bypass  Vision decreased  Weight loss Current Outpatient Prescriptions Medication Sig Dispense Refill  DULoxetine (CYMBALTA) 30 mg capsule take 1 capsule by mouth once daily with 60 MG CAPSULES 30 Cap 0  
 amLODIPine-benazepril (LOTREL) 5-40 mg per capsule Take 1 Cap by mouth daily. 90 Cap 3  
 guanFACINE IR (TENEX) 1 mg IR tablet take 1 tablet by mouth once daily 30 Tab 2  
 labetalol (NORMODYNE) 300 mg tablet take 1 tablet by mouth twice a day 60 Tab 2  
 HYDROcodone-acetaminophen (NORCO) 7.5-325 mg per tablet Take one tablet by mouth every four hours as needed for pain. Max five tablets daily 150 Tab 0  
 testosterone cypionate (DEPOTESTOTERONE CYPIONATE) 200 mg/mL injection 0.5 mL by IntraMUSCular route every thirty (30) days. Max Daily Amount: 100 mg. 1 Vial 0  
 hydroCHLOROthiazide (HYDRODIURIL) 25 mg tablet take 1 tablet by mouth once daily 30 Tab 1  
 ELIQUIS 5 mg tablet take 1 tablet by mouth twice a day 60 Tab 1  cyanocobalamin (VITAMIN B-12) 1,000 mcg tablet Take 1,000 mcg by mouth daily.  prasterone, dhea, (DHEA) 50 mg tab Take  by mouth.  cholecalciferol, vitamin D3, (VITAMIN D3) 2,000 unit tab Take  by mouth.  calcium carbonate/vitamin D3 (CALCIUM + D PO) Take  by mouth.  multivitamin (ONE A DAY) tablet Take 1 Tab by mouth daily.  vitamin E (AQUA GEMS) 400 unit capsule Take  by mouth daily.  tamsulosin (FLOMAX) 0.4 mg capsule take 1 capsule by mouth once daily 90 Cap 0  
 rOPINIRole (REQUIP) 0.5 mg tablet take 1 tablet by mouth twice a day 60 Tab 0  
 topiramate (TOPAMAX) 50 mg tablet TAKE 1 TABLET BY MOUTH TWICE A DAY TO PREVENT MIGRAINE 60 Tab 5  ACETAMINOPHEN/DIPHENHYDRAMINE (TYLENOL PM PO) Take 2 Tabs by mouth nightly. Social History 
 reports that he quit smoking about 37 years ago. He has never used smokeless tobacco. 
 reports that he drinks alcohol. Family History family history includes Diabetes in his father and son; Headache in his sister; Heart Attack in his mother; Hypertension in his father. Review of Systems Except as stated above include: 
Constitutional: Negative for fever, chills and malaise/fatigue. HEENT: No congestion or recent URI. Gastrointestinal: No nausea, vomiting, abdominal pain, bloody stools. Pulmonary:  Negative except as stated above. Cardiac:  Negative except as stated above. Musculoskeletal: Negative except as stated above. Neurological:  No localized symptoms. Skin:  Negative except as stated above. Psych:  Negative except as stated above. Endocrine:  Negative except as stated above. PHYSICAL EXAM 
BP Readings from Last 3 Encounters:  
10/04/18 110/70  
09/06/18 122/76  
08/28/18 106/63 Pulse Readings from Last 3 Encounters:  
10/04/18 79  
09/06/18 74  
08/28/18 70 Wt Readings from Last 3 Encounters:  
10/04/18 105.7 kg (233 lb) 09/06/18 107 kg (236 lb)  
08/28/18 109.3 kg (241 lb) General:   Well developed, well groomed. Head/Neck:   No jugular venous distention No carotid bruits. No evidence of xanthelasma. Lungs:   No respiratory distress. Clear bilaterally. Heart:    Regular rate and rhythm. Normal S1/S2. Palpation of heart with normal point of maximum impulse. No significant murmurs, rubs or gallops. Abdomen:   Soft and nontender. No palpable abdominal mass or bruits. Extremities:   Intact peripheral pulses. No significant edema. Neurological:   Alert and oriented to person, place, time. No focal neurological deficit visually. Skin:   No obvious rash Blood Pressure Metric: 
Candi Vo has been given the following recommendations today due to his elevated BP reading: stable

## 2018-10-08 DIAGNOSIS — R79.89 LOW TESTOSTERONE IN MALE: ICD-10-CM

## 2018-10-08 DIAGNOSIS — M54.2 CERVICALGIA: ICD-10-CM

## 2018-10-08 RX ORDER — TESTOSTERONE CYPIONATE 200 MG/ML
100 INJECTION INTRAMUSCULAR
Qty: 1 VIAL | Refills: 0 | Status: SHIPPED | OUTPATIENT
Start: 2018-10-08 | End: 2018-11-06 | Stop reason: SDUPTHER

## 2018-10-08 RX ORDER — DULOXETIN HYDROCHLORIDE 60 MG/1
CAPSULE, DELAYED RELEASE ORAL
Refills: 0 | COMMUNITY
Start: 2018-08-15 | End: 2018-10-08 | Stop reason: SDUPTHER

## 2018-10-08 RX ORDER — HYDROCODONE BITARTRATE AND ACETAMINOPHEN 7.5; 325 MG/1; MG/1
TABLET ORAL
Qty: 150 TAB | Refills: 0 | Status: SHIPPED | OUTPATIENT
Start: 2018-10-08 | End: 2018-11-06 | Stop reason: SDUPTHER

## 2018-10-08 RX ORDER — FLUTICASONE PROPIONATE 50 MCG
1-2 SPRAY, SUSPENSION (ML) NASAL DAILY
Refills: 1 | COMMUNITY
Start: 2018-09-19 | End: 2019-04-01 | Stop reason: ALTCHOICE

## 2018-10-08 RX ORDER — TOPIRAMATE 50 MG/1
TABLET, FILM COATED ORAL
Qty: 60 TAB | Refills: 5 | Status: SHIPPED | OUTPATIENT
Start: 2018-10-08 | End: 2019-04-01 | Stop reason: ALTCHOICE

## 2018-10-08 RX ORDER — SUMATRIPTAN 100 MG/1
TABLET, FILM COATED ORAL
Refills: 1 | COMMUNITY
Start: 2018-09-21 | End: 2018-11-12 | Stop reason: SDUPTHER

## 2018-10-08 RX ORDER — AMLODIPINE AND OLMESARTAN MEDOXOMIL 5; 40 MG/1; MG/1
TABLET ORAL
Refills: 0 | COMMUNITY
Start: 2018-09-17 | End: 2018-10-08 | Stop reason: SDUPTHER

## 2018-10-08 RX ORDER — PANTOPRAZOLE SODIUM 40 MG/1
TABLET, DELAYED RELEASE ORAL
Refills: 0 | COMMUNITY
Start: 2018-08-19 | End: 2019-04-01 | Stop reason: ALTCHOICE

## 2018-10-08 NOTE — TELEPHONE ENCOUNTER
From: Ly Wilkinson  To: Jayro Vernon MD  Sent: 10/8/2018 9:34 AM EDT  Subject: Medication Renewal Request    Original authorizing provider: MD Ly Bland would like a refill of the following medications:  HYDROcodone-acetaminophen (NORCO) 7.5-325 mg per tablet Jayro Vernon MD]  testosterone cypionate (DEPOTESTOTERONE CYPIONATE) 200 mg/mL injection Jayro Vernon MD]    Preferred pharmacy: [address]    Comment:

## 2018-10-08 NOTE — TELEPHONE ENCOUNTER
last filled 2018  Requested Prescriptions     Pending Prescriptions Disp Refills    HYDROcodone-acetaminophen (NORCO) 7.5-325 mg per tablet 150 Tab 0     Sig: Take one tablet by mouth every four hours as needed for pain. Max five tablets daily    testosterone cypionate (DEPOTESTOTERONE CYPIONATE) 200 mg/mL injection 1 Vial 0     Si.5 mL by IntraMUSCular route every thirty (30) days. Max Daily Amount: 100 mg.

## 2018-10-15 RX ORDER — PROMETHAZINE HYDROCHLORIDE 25 MG/1
TABLET ORAL
Qty: 30 TAB | Refills: 0 | Status: SHIPPED | OUTPATIENT
Start: 2018-10-15 | End: 2019-04-01 | Stop reason: ALTCHOICE

## 2018-10-16 ENCOUNTER — OFFICE VISIT (OUTPATIENT)
Dept: INTERNAL MEDICINE CLINIC | Age: 68
End: 2018-10-16

## 2018-10-16 VITALS
DIASTOLIC BLOOD PRESSURE: 80 MMHG | WEIGHT: 237 LBS | HEIGHT: 77 IN | RESPIRATION RATE: 22 BRPM | OXYGEN SATURATION: 96 % | SYSTOLIC BLOOD PRESSURE: 118 MMHG | TEMPERATURE: 98.6 F | BODY MASS INDEX: 27.98 KG/M2 | HEART RATE: 87 BPM

## 2018-10-16 DIAGNOSIS — C92.11 CML IN REMISSION (HCC): ICD-10-CM

## 2018-10-16 DIAGNOSIS — R37 SEXUAL DYSFUNCTION: ICD-10-CM

## 2018-10-16 DIAGNOSIS — N18.30 CHRONIC RENAL INSUFFICIENCY, STAGE 3 (MODERATE) (HCC): Primary | ICD-10-CM

## 2018-10-16 DIAGNOSIS — M48.9 CERVICAL SPINE DISEASE: ICD-10-CM

## 2018-10-16 DIAGNOSIS — M54.50 CHRONIC MIDLINE LOW BACK PAIN WITHOUT SCIATICA: ICD-10-CM

## 2018-10-16 DIAGNOSIS — G89.29 CHRONIC MIDLINE LOW BACK PAIN WITHOUT SCIATICA: ICD-10-CM

## 2018-10-16 DIAGNOSIS — I10 ESSENTIAL HYPERTENSION: ICD-10-CM

## 2018-10-16 NOTE — LETTER
10/16/18 RE:  Beto Lama :  1950 To whom it may concern: This is the current list of medications for Beto Lama. Current Outpatient Prescriptions Medication Sig Dispense Refill  promethazine (PHENERGAN) 25 mg tablet take 1 tablet by mouth every 6 hours if needed for nausea 30 Tab 0  
 topiramate (TOPAMAX) 50 mg tablet take 1 tablet by mouth twice a day take PREVENT MIGRANE 60 Tab 5  
 HYDROcodone-acetaminophen (NORCO) 7.5-325 mg per tablet Take one tablet by mouth every four hours as needed for pain. Max five tablets daily 150 Tab 0  
 testosterone cypionate (DEPOTESTOTERONE CYPIONATE) 200 mg/mL injection 0.5 mL by IntraMUSCular route every thirty (30) days. Max Daily Amount: 100 mg. 1 Vial 0  
 fluticasone (FLONASE) 50 mcg/actuation nasal spray   1  
 pantoprazole (PROTONIX) 40 mg tablet take 1 tablet by mouth once daily  0  
 SUMAtriptan (IMITREX) 100 mg tablet TAKE 1 TABLET BY MOUTH AT ONSET OF HEADACHE, MAY REPEAT 2 HOURS LATER. (MAX 2 PILLS IN 24 HOURS)  1  
 DULoxetine (CYMBALTA) 30 mg capsule take 1 capsule by mouth once daily with 60 MG CAPSULES 30 Cap 0  
 amLODIPine-benazepril (LOTREL) 5-40 mg per capsule Take 1 Cap by mouth daily. 90 Cap 3  
 guanFACINE IR (TENEX) 1 mg IR tablet take 1 tablet by mouth once daily 30 Tab 2  
 labetalol (NORMODYNE) 300 mg tablet take 1 tablet by mouth twice a day 60 Tab 2  
 hydroCHLOROthiazide (HYDRODIURIL) 25 mg tablet take 1 tablet by mouth once daily 30 Tab 1  
 ELIQUIS 5 mg tablet take 1 tablet by mouth twice a day 60 Tab 1  cyanocobalamin (VITAMIN B-12) 1,000 mcg tablet Take 1,000 mcg by mouth daily.  prasterone, dhea, (DHEA) 50 mg tab Take  by mouth.  cholecalciferol, vitamin D3, (VITAMIN D3) 2,000 unit tab Take  by mouth.  calcium carbonate/vitamin D3 (CALCIUM + D PO) Take  by mouth.  multivitamin (ONE A DAY) tablet Take 1 Tab by mouth daily.  vitamin E (AQUA GEMS) 400 unit capsule Take  by mouth daily.  tamsulosin (FLOMAX) 0.4 mg capsule take 1 capsule by mouth once daily 90 Cap 0  
 rOPINIRole (REQUIP) 0.5 mg tablet take 1 tablet by mouth twice a day 60 Tab 0  ACETAMINOPHEN/DIPHENHYDRAMINE (TYLENOL PM PO) Take 2 Tabs by mouth nightly. Please contact us if you have any questions 
  
 
                                                                             ____________________________

## 2018-10-16 NOTE — PROGRESS NOTES
Chief Complaint   Patient presents with    Sleep Problem     not sleeping,        Depression Screening:  PHQ over the last two weeks 8/27/2018   Little interest or pleasure in doing things Not at all   Feeling down, depressed, irritable, or hopeless Not at all   Total Score PHQ 2 0       Learning Assessment:  Learning Assessment 5/3/2018   PRIMARY LEARNER Patient   HIGHEST LEVEL OF EDUCATION - PRIMARY LEARNER  -   Aliyah 22 LEARNER -   [facility]    NEED -   LEARNER PREFERENCE PRIMARY DEMONSTRATION   ANSWERED BY patient   RELATIONSHIP SELF       Abuse Screening:  Abuse Screening Questionnaire 11/2/2017   Do you ever feel afraid of your partner? N   Are you in a relationship with someone who physically or mentally threatens you? N   Is it safe for you to go home? Y       Fall Risk  Fall Risk Assessment, last 12 mths 8/27/2018   Able to walk? Yes   Fall in past 12 months? Yes   Fall with injury? -   Number of falls in past 12 months 1   Fall Risk Score -         Advance Directive:  1. Do you have an advance directive in place? Patient Reply:on file         1. Have you been to the ER, urgent care clinic since your last visit? Hospitalized since your last visit? No    2. Have you seen or consulted any other health care providers outside of the [address] [place] [address] since your last visit? Include any pap smears or colon screening.  No

## 2018-10-16 NOTE — MR AVS SNAPSHOT
[address] [facility] 
 
 
 [address] [facility], [address] [facility] [address] 
[phone] Patient: Ann-Marie Mijares MRN: [id number] Visit Information Date & Time Provider Department Dept. Phone Encounter #  
 10/16/2018  9:00 AM Roxie Humphries MD [facility] [phone] [id number] Your Appointments 11/16/2018  9:45 AM  
Follow Up with Roxie Humphries MD  
[address] [place] [address] [address]) Appt Note: 1 month [address] [facility], [address] [address] [facility]  
[phone]  
  
   
 [address] [facility], [address] [facility] [address]  
  
    
 10/3/2019  8:00 AM  
Follow Up with José Epperson MD  
[facility] ([address]) Appt Note: 1 year follow up Luis Pollack Flow [id number] [phone] [address] [place] [address] [address] Upcoming Health Maintenance Date Due Hepatitis C Screening 1950 Shingrix Vaccine Age 50> (1 of 2) 6/22/1999 GLAUCOMA SCREENING Q2Y 6/22/2014 MEDICARE YEARLY EXAM 8/4/2019 COLONOSCOPY 11/2/2020 DTaP/Tdap/Td series (2 - Td) 6/3/2024 Allergies as of 10/16/2018  Review Complete On: 10/16/2018 By: Roxie Humphries MD  
  
 Severity Noted Reaction Type Reactions Levofloxacin High   Nausea Only Severe nausea and dizziness Sulfa (Sulfonamide Antibiotics)  03/26/2014    Unknown (comments) Nausea, aching all over Current Immunizations  Reviewed on 9/4/2018 Name Date Influenza High Dose Vaccine PF 9/6/2018 11:28 AM, 9/15/2017, 10/18/2016  9:42 AM  
 Influenza Vaccine (Quad) PF 10/1/2015 Pneumococcal Conjugate (PCV-13) 10/1/2015 Pneumococcal Polysaccharide (PPSV-23) 6/27/2017 11:34 AM  
 Pneumococcal Vaccine (Unspecified Type) 6/18/2011 Td 6/2/2006 Tdap 6/3/2014 Zoster Vaccine, Live 12/9/2013 Not reviewed this visit You Were Diagnosed With   
  
 Codes Comments Essential hypertension    -  Primary ICD-10-CM: I10 
ICD-9-CM: 401.9 CML in remission [facility])     ICD-10-CM: C92.11 ICD-9-CM: 205.11 Cervical spine disease     ICD-10-CM: M48.9 ICD-9-CM: 724.9 Chronic renal insufficiency, stage 3 (moderate) (HCC)     ICD-10-CM: N18.3 ICD-9-CM: 707. 3 Sexual dysfunction     ICD-10-CM: R37 
ICD-9-CM: 302.70 Chronic midline low back pain without sciatica     ICD-10-CM: M54.5, G89.29 ICD-9-CM: 724.2, 338.29 Vitals BP Pulse Temp Resp Height(growth percentile) Weight(growth percentile) 118/80 (BP 1 Location: Right arm, BP Patient Position: Sitting) 87 98.6 °F (37 °C) (Tympanic) 22 6' 5\" (1.956 m) 237 lb (107.5 kg) SpO2 BMI Smoking Status 96% 28.1 kg/m2 Former Smoker Vitals History BMI and BSA Data Body Mass Index Body Surface Area  
 28.1 kg/m 2 2.42 m 2 Preferred Pharmacy Pharmacy Name Phone [facility], [address] [place] [address] [phone] Your Updated Medication List  
  
   
This list is accurate as of 10/16/18 10:18 AM.  Always use your most recent med list. amLODIPine-benazepril 5-40 mg per capsule Commonly known as:  Bridget  Take 1 Cap by mouth daily. CALCIUM + D PO Take  by mouth. DHEA 50 mg Tab Generic drug:  prasterone (dhea) Take  by mouth. DULoxetine 30 mg capsule Commonly known as:  CYMBALTA  
take 1 capsule by mouth once daily with 60 MG CAPSULES  
  
 ELIQUIS 5 mg tablet Generic drug:  apixaban  
take 1 tablet by mouth twice a day  
  
 fluticasone 50 mcg/actuation nasal spray Commonly known as:  FLONASE  
  
 guanFACINE IR 1 mg IR tablet Commonly known as:  TENEX  
take 1 tablet by mouth once daily  
  
 hydroCHLOROthiazide 25 mg tablet Commonly known as:  HYDRODIURIL  
take 1 tablet by mouth once daily HYDROcodone-acetaminophen 7.5-325 mg per tablet Commonly known as:  Chidi Davidson Take one tablet by mouth every four hours as needed for pain. Max five tablets daily  
  
 labetalol 300 mg tablet Commonly known as:  Jodi Mckeon  
take 1 tablet by mouth twice a day  
  
 multivitamin tablet Commonly known as:  ONE A DAY Take 1 Tab by mouth daily. pantoprazole 40 mg tablet Commonly known as:  PROTONIX  
take 1 tablet by mouth once daily  
  
 promethazine 25 mg tablet Commonly known as:  PHENERGAN  
take 1 tablet by mouth every 6 hours if needed for nausea  
  
 rOPINIRole 0.5 mg tablet Commonly known as:  REQUIP  
take 1 tablet by mouth twice a day SUMAtriptan 100 mg tablet Commonly known as:  IMITREX  
TAKE 1 TABLET BY MOUTH AT ONSET OF HEADACHE, MAY REPEAT 2 HOURS LATER. (MAX 2 PILLS IN 24 HOURS)  
  
 tamsulosin 0.4 mg capsule Commonly known as:  FLOMAX  
take 1 capsule by mouth once daily  
  
 testosterone cypionate 200 mg/mL injection Commonly known as:  DEPOTESTOTERONE CYPIONATE  
0.5 mL by IntraMUSCular route every thirty (30) days. Max Daily Amount: 100 mg.  
  
 topiramate 50 mg tablet Commonly known as:  TOPAMAX  
take 1 tablet by mouth twice a day take PREVENT MIGRANE  
  
 TYLENOL PM PO Take 2 Tabs by mouth nightly. VITAMIN B-12 1,000 mcg tablet Generic drug:  cyanocobalamin Take 1,000 mcg by mouth daily. VITAMIN D3 2,000 unit Tab Generic drug:  cholecalciferol (vitamin D3) Take  by mouth.  
  
 vitamin E 400 unit capsule Commonly known as:  [address] Take  by mouth daily. Introducing [facility]! Dear Irene Fischer: Thank you for requesting a MyPublisher account. Our records indicate that you already have an active MyPublisher account. You can access your account anytime at [email or web address] Did you know that you can access your hospital and ER discharge instructions at any time in TixAlert? You can also review all of your test results from your hospital stay or ER visit. Additional Information If you have questions, please visit the Frequently Asked Questions section of the TixAlert website at [email or web address] Remember, TixAlert is NOT to be used for urgent needs. For medical emergencies, dial 911. Now available from your iPhone and Android! Please provide this summary of care documentation to your next provider. Your primary care clinician is listed as Tiffany Lozano. If you have any questions after today's visit, please call [phone].

## 2018-10-19 NOTE — PROGRESS NOTES
The patient presents to the office today with the chief complaint of renal insufficiency    HPI    The patient remains on medications for hypertension. His BPs are doing well. The patient has renal insufficiency. The patient had improved to a creatinine to 1.3. Now his creatinine has increased to 2.1. The patient denies any change in medications. The patient has cervical spine disease and lumbars spine disease. Currently his low back is painfrul. It is worse with any movement. The patient denies taking any NSAID. The patient remains on medications for CML. This remains in remission. Review of Systems   Respiratory: Negative for shortness of breath. Cardiovascular: Negative for chest pain and leg swelling. Musculoskeletal: Positive for back pain and neck pain. Allergies   Allergen Reactions    Levofloxacin Nausea Only     Severe nausea and dizziness    Sulfa (Sulfonamide Antibiotics) Unknown (comments)     Nausea, aching all over       Current Outpatient Medications   Medication Sig Dispense Refill    promethazine (PHENERGAN) 25 mg tablet take 1 tablet by mouth every 6 hours if needed for nausea 30 Tab 0    topiramate (TOPAMAX) 50 mg tablet take 1 tablet by mouth twice a day take PREVENT MIGRANE 60 Tab 5    HYDROcodone-acetaminophen (NORCO) 7.5-325 mg per tablet Take one tablet by mouth every four hours as needed for pain. Max five tablets daily 150 Tab 0    testosterone cypionate (DEPOTESTOTERONE CYPIONATE) 200 mg/mL injection 0.5 mL by IntraMUSCular route every thirty (30) days. Max Daily Amount: 100 mg. 1 Vial 0    fluticasone (FLONASE) 50 mcg/actuation nasal spray   1    pantoprazole (PROTONIX) 40 mg tablet take 1 tablet by mouth once daily  0    SUMAtriptan (IMITREX) 100 mg tablet TAKE 1 TABLET BY MOUTH AT ONSET OF HEADACHE, MAY REPEAT 2 HOURS LATER. (MAX 2 PILLS IN 24 HOURS)  1    DULoxetine (CYMBALTA) 30 mg capsule take 1 capsule by mouth once daily with 60 MG CAPSULES 30 Cap 0    amLODIPine-benazepril (LOTREL) 5-40 mg per capsule Take 1 Cap by mouth daily. 90 Cap 3    guanFACINE IR (TENEX) 1 mg IR tablet take 1 tablet by mouth once daily 30 Tab 2    labetalol (NORMODYNE) 300 mg tablet take 1 tablet by mouth twice a day 60 Tab 2    hydroCHLOROthiazide (HYDRODIURIL) 25 mg tablet take 1 tablet by mouth once daily 30 Tab 1    ELIQUIS 5 mg tablet take 1 tablet by mouth twice a day 60 Tab 1    cyanocobalamin (VITAMIN B-12) 1,000 mcg tablet Take 1,000 mcg by mouth daily.  prasterone, dhea, (DHEA) 50 mg tab Take  by mouth.  cholecalciferol, vitamin D3, (VITAMIN D3) 2,000 unit tab Take  by mouth.  calcium carbonate/vitamin D3 (CALCIUM + D PO) Take  by mouth.  multivitamin (ONE A DAY) tablet Take 1 Tab by mouth daily.  vitamin E (AQUA GEMS) 400 unit capsule Take  by mouth daily.  tamsulosin (FLOMAX) 0.4 mg capsule take 1 capsule by mouth once daily 90 Cap 0    rOPINIRole (REQUIP) 0.5 mg tablet take 1 tablet by mouth twice a day 60 Tab 0    ACETAMINOPHEN/DIPHENHYDRAMINE (TYLENOL PM PO) Take 2 Tabs by mouth nightly. Past Medical History:   Diagnosis Date    Abdominal pain, unspecified site     Acute reaction to stress     Allergic rhinitis due to pollen     Arrhythmia     afib    Arthritis     Back injury     BPH (benign prostatic hypertrophy)     Calculus of ureter     Cancer (HCC)     history of Leukemia, in remission    Carotid duplex 06/17/2015    Mild < 50% bilateral ICA stenosis.  Dizziness and giddiness     Esophageal reflux     Essential hypertension     GERD (gastroesophageal reflux disease)     Headache(784.0)     History of echocardiogram 08/13/2015    EF 55-60%. No WMA. Mild-mod LVH. Gr 1 DDfx. No evidence of intracardiac shunt.   Mild AI.      Hx: UTI (urinary tract infection)     Hypertension     Hypogonadism in male     Kidney stones     Migraine     Neck injury     Polycythemia     Polycythemia, secondary     Sciatica     Unspecified retinal detachment     Unspecified sleep apnea     resolved since gastric bypass    Vision decreased     Weight loss        Past Surgical History:   Procedure Laterality Date    ABDOMEN SURGERY PROC UNLISTED      Hernia    HX CATARACT REMOVAL Left     HX CERVICAL FUSION  2016    Cervical Spine Fusion    HX CHOLECYSTECTOMY      HX GASTRIC BYPASS  2006    HX KNEE ARTHROSCOPY      both knees (right knee twice, left once)    HX ORTHOPAEDIC  ,     lumbar laminectomy    HX OTHER SURGICAL      Two back surgeries    HX RETINAL DETACHMENT REPAIR Left        Social History     Socioeconomic History    Marital status:      Spouse name: Not on file    Number of children: Not on file    Years of education: Not on file    Highest education level: Not on file   Social Needs    Financial resource strain: Not on file    Food insecurity - worry: Not on file    Food insecurity - inability: Not on file   [employer] needs - medical: Not on file   [employer] needs - non-medical: Not on file   Occupational History    Not on file   Tobacco Use    Smoking status: Former Smoker     Last attempt to quit: 1981     Years since quittin.6    Smokeless tobacco: Never Used   Substance and Sexual Activity    Alcohol use: Yes     Comment: occasionally     Drug use: No    Sexual activity: Yes     Partners: Female   Other Topics Concern    Not on file   Social History Narrative    Patient lives with his wife in [place], he had 3 children by unfortunately lost 2 of them. He enjoys taking care of his yard and plays with the grandchildren.        Patient does have an advanced directive on file    Visit Vitals  /80 (BP 1 Location: Right arm, BP Patient Position: Sitting)   Pulse 87   Temp 98.6 °F (37 °C) (Tympanic)   Resp 22   Ht 6' 5\" (1.956 m)   Wt 237 lb (107.5 kg)   SpO2 96%   BMI 28.10 kg/m²       Physical Exam No Cervical Lymphadenopathy  No Supraclavicular Lymphadenopathy  Thyroid is Normal  Lungs are normal to percussion. Clear to auscultation   Heart:  S1 S2 are normal, No gallops, No mummers  No Carotid Bruits  Abdomen:  Normal Bowel Sounds. No tenderness. No masses. No Hepatomegaly or Splenomegaly  LE:  Strong Pedal Pulses. No Edema  Neck:  Marked limitation of rotation of his neck. Lumbar spine with limited flexion      BMI:  [facility] Outpatient Visit on 08/28/2018   Component Date Value Ref Range Status    Ammonia 08/28/2018 31  11 - 32 UMOL/L Final    WBC 08/28/2018 11.5  4.6 - 13.2 K/uL Final    RBC 08/28/2018 3.97* 4.70 - 5.50 M/uL Final    HGB 08/28/2018 10.7* 13.0 - 16.0 g/dL Final    HCT 08/28/2018 32.6* 36.0 - 48.0 % Final    MCV 08/28/2018 82.1  74.0 - 97.0 FL Final    MCH 08/28/2018 27.0  24.0 - 34.0 PG Final    MCHC 08/28/2018 32.8  31.0 - 37.0 g/dL Final    RDW 08/28/2018 18.3* 11.6 - 14.5 % Final    PLATELET 11/51/2712 085  135 - 420 K/uL Final    MPV 08/28/2018 11.4  9.2 - 11.8 FL Final    NEUTROPHILS 08/28/2018 52  42 - 75 % Final    LYMPHOCYTES 08/28/2018 44  20 - 51 % Final    MONOCYTES 08/28/2018 3  2 - 9 % Final    EOSINOPHILS 08/28/2018 1  0 - 5 % Final    BASOPHILS 08/28/2018 0  0 - 3 % Final    ABS. NEUTROPHILS 08/28/2018 6.0  1.8 - 8.0 K/UL Final    ABS. LYMPHOCYTES 08/28/2018 5.1* 0.8 - 3.5 K/UL Final    ABS. MONOCYTES 08/28/2018 0.3  0 - 1.0 K/UL Final    ABS. EOSINOPHILS 08/28/2018 0.1  0.0 - 0.4 K/UL Final    ABS.  BASOPHILS 08/28/2018 0.0  0.0 - 0.1 K/UL Final    PLATELET COMMENTS 21/04/0240 CLUMPED PLATELETS    Final    Comment: 1+  LARGE PLATELETS  1+  ADEQUATE PLATELETS      RBC COMMENTS 08/28/2018     Final                    Value:ANISOCYTOSIS  1+      RBC COMMENTS 08/28/2018     Final                    Value:POIKILOCYTOSIS  1+      RBC COMMENTS 08/28/2018     Final                    Value:STOMATOCYTES  1+      WBC COMMENTS 08/28/2018 REACTIVE LYMPHS Final    1+    DF 08/28/2018 MANUAL    Final    Sodium 08/28/2018 143  136 - 145 mmol/L Final    Potassium 08/28/2018 4.5  3.5 - 5.5 mmol/L Final    Chloride 08/28/2018 111* 100 - 108 mmol/L Final    CO2 08/28/2018 23  21 - 32 mmol/L Final    Anion gap 08/28/2018 9  3.0 - 18 mmol/L Final    Glucose 08/28/2018 92  74 - 99 mg/dL Final    BUN 08/28/2018 21* 7.0 - 18 MG/DL Final    Creatinine 08/28/2018 1.25  0.6 - 1.3 MG/DL Final    BUN/Creatinine ratio 08/28/2018 17  12 - 20   Final    GFR est AA 08/28/2018 >60  >60 ml/min/1.73m2 Final    GFR est non-AA 08/28/2018 57* >60 ml/min/1.73m2 Final    Comment: (NOTE)  Estimated GFR is calculated using the Modification of Diet in Renal   Disease (MDRD) Study equation, reported for both  Americans   (GFRAA) and non- Americans (GFRNA), and normalized to 1.73m2   body surface area. The physician must decide which value applies to   the patient. The MDRD study equation should only be used in   individuals age 25 or older. It has not been validated for the   following: pregnant women, patients with serious comorbid conditions,   or on certain medications, or persons with extremes of body size,   muscle mass, or nutritional status.  Calcium 08/28/2018 7.9* 8.5 - 10.1 MG/DL Final    Bilirubin, total 08/28/2018 0.5  0.2 - 1.0 MG/DL Final    ALT (SGPT) 08/28/2018 16  16 - 61 U/L Final    AST (SGOT) 08/28/2018 13* 15 - 37 U/L Final    Alk. phosphatase 08/28/2018 70  45 - 117 U/L Final    Protein, total 08/28/2018 6.9  6.4 - 8.2 g/dL Final    Albumin 08/28/2018 3.4  3.4 - 5.0 g/dL Final    Globulin 08/28/2018 3.5  2.0 - 4.0 g/dL Final    A-G Ratio 08/28/2018 1.0  0.8 - 1.7   Final   Admission on 08/19/2018, Discharged on 08/23/2018   No results displayed because visit has over 200 results. .No results found for any visits on 10/16/18. Assessment / Plan      ICD-10-CM ICD-9-CM    1.  Chronic renal insufficiency, stage 3 (moderate) (HCC) N18.3 585.3    2. Essential hypertension I10 401.9    3. CML in remission ([facility] [address] C92.11 205.11    4. Cervical spine disease M48.9 724.9    5. Sexual dysfunction R37 302.70    6. Chronic midline low back pain without sciatica M54.5 724.2     G89.29 338.29        To Nephrology  he was advised to continue his maintenance medications - for now      Follow-up Disposition:  Return in about 4 weeks (around 11/13/2018). I asked Stanton Soler if he has any questions and I answered the questions.   Stanton Soler states that he understands the treatment plan and agrees with the treatment plan

## 2018-10-22 RX ORDER — SUMATRIPTAN 100 MG/1
TABLET, FILM COATED ORAL
Qty: 12 TAB | Refills: 1 | Status: SHIPPED | OUTPATIENT
Start: 2018-10-22 | End: 2018-12-18 | Stop reason: SDUPTHER

## 2018-10-25 ENCOUNTER — OFFICE VISIT (OUTPATIENT)
Dept: UROLOGY | Age: 68
End: 2018-10-25

## 2018-10-25 VITALS
HEART RATE: 80 BPM | WEIGHT: 234 LBS | SYSTOLIC BLOOD PRESSURE: 121 MMHG | DIASTOLIC BLOOD PRESSURE: 75 MMHG | BODY MASS INDEX: 27.07 KG/M2 | HEIGHT: 78 IN | OXYGEN SATURATION: 98 %

## 2018-10-25 DIAGNOSIS — N13.8 BPH WITH URINARY OBSTRUCTION: ICD-10-CM

## 2018-10-25 DIAGNOSIS — R33.9 URINARY RETENTION: Primary | ICD-10-CM

## 2018-10-25 DIAGNOSIS — N40.1 BPH WITH URINARY OBSTRUCTION: ICD-10-CM

## 2018-10-25 LAB
BILIRUB UR QL STRIP: NEGATIVE
GLUCOSE UR-MCNC: NEGATIVE MG/DL
KETONES P FAST UR STRIP-MCNC: NEGATIVE MG/DL
PH UR STRIP: 5.5 [PH] (ref 4.6–8)
PROT UR QL STRIP: NEGATIVE
SP GR UR STRIP: 1.02 (ref 1–1.03)
UA UROBILINOGEN AMB POC: NORMAL (ref 0.2–1)
URINALYSIS CLARITY POC: CLEAR
URINALYSIS COLOR POC: YELLOW
URINE BLOOD POC: NORMAL
URINE LEUKOCYTES POC: NORMAL
URINE NITRITES POC: NEGATIVE

## 2018-10-25 RX ORDER — DUTASTERIDE 0.5 MG/1
0.5 CAPSULE, LIQUID FILLED ORAL DAILY
Qty: 90 CAP | Refills: 3 | Status: SHIPPED | OUTPATIENT
Start: 2018-10-25 | End: 2019-04-01 | Stop reason: ALTCHOICE

## 2018-10-25 RX ORDER — AMLODIPINE BESYLATE 5 MG/1
5 TABLET ORAL DAILY
Refills: 0 | COMMUNITY
Start: 2018-10-18

## 2018-10-25 NOTE — PROGRESS NOTES
Mr. Bassam Michel has a reminder for a \"due or due soon\" health maintenance. I have asked that he contact his primary care provider for follow-up on this health maintenance. RBV Per Dr. Medhat Lylesws 0.5 mg one daily #90 with 3 refills sent to pharmacy.

## 2018-10-25 NOTE — PROGRESS NOTES
Chief Complaint   Patient presents with    Urinary Retention       HISTORY OF PRESENT ILLNESS:  Mt Palumbo is a 70 y.o. male who presents today in follow-up to the episode of urinary retention while he was hospitalized about 2 months ago. In summary, he does have bladder outlet obstruction and has been taking Flomax for a number of years. Currently he is still getting up 4 and 5 times at night and 19 was here last time for catheter removal I discussed with him the option of switching over or at least adding to his bladder outlet medication Avodart. He and I discussed that again today and I am going to start him on generic Avodart and see him back in 6 months. In the meantime I would like for him to continue the Flomax. ROS documented on the chart  Past Medical History:   Diagnosis Date    Abdominal pain, unspecified site     Acute reaction to stress     Allergic rhinitis due to pollen     Arrhythmia     afib    Arthritis     Back injury     BPH (benign prostatic hypertrophy)     Calculus of ureter     Cancer (HCC)     history of Leukemia, in remission    Carotid duplex 06/17/2015    Mild < 50% bilateral ICA stenosis.  Dizziness and giddiness     Esophageal reflux     Essential hypertension     GERD (gastroesophageal reflux disease)     Headache(784.0)     History of echocardiogram 08/13/2015    EF 55-60%. No WMA. Mild-mod LVH. Gr 1 DDfx. No evidence of intracardiac shunt.   Mild AI.      Hx: UTI (urinary tract infection)     Hypertension     Hypogonadism in male     Kidney stones     Migraine     Neck injury     Polycythemia     Polycythemia, secondary     Sciatica     Unspecified retinal detachment     Unspecified sleep apnea     resolved since gastric bypass    Vision decreased     Weight loss        Past Surgical History:   Procedure Laterality Date    ABDOMEN SURGERY PROC UNLISTED  2012    Hernia    HX CATARACT REMOVAL Left     HX CERVICAL FUSION 2016    Cervical Spine Fusion    HX CHOLECYSTECTOMY      HX GASTRIC BYPASS  2006    HX KNEE ARTHROSCOPY      both knees (right knee twice, left once)    HX ORTHOPAEDIC  1995    lumbar laminectomy    HX OTHER SURGICAL      Two back surgeries    HX RETINAL DETACHMENT REPAIR Left        Social History     Tobacco Use    Smoking status: Former Smoker     Last attempt to quit: 1981     Years since quittin.6    Smokeless tobacco: Never Used   Substance Use Topics    Alcohol use: Yes     Comment: occasionally     Drug use: No       Allergies   Allergen Reactions    Levofloxacin Nausea Only     Severe nausea and dizziness    Sulfa (Sulfonamide Antibiotics) Unknown (comments)     Nausea, aching all over       Family History   Problem Relation Age of Onset    Hypertension Father     Diabetes Father     Heart Attack Mother     Headache Sister     Diabetes Son        Current Outpatient Medications   Medication Sig Dispense Refill    amLODIPine (NORVASC) 5 mg tablet take 1 tablet by mouth once daily  0    SUMAtriptan (IMITREX) 100 mg tablet TAKE 1 TABLET BY MOUTH AT ONSET OF HEADACHE, MAY REPEAT 2 HOURS LATER. (MAX 2 PILLS IN 24 HOURS) 12 Tab 1    promethazine (PHENERGAN) 25 mg tablet take 1 tablet by mouth every 6 hours if needed for nausea 30 Tab 0    topiramate (TOPAMAX) 50 mg tablet take 1 tablet by mouth twice a day take PREVENT MIGRANE 60 Tab 5    HYDROcodone-acetaminophen (NORCO) 7.5-325 mg per tablet Take one tablet by mouth every four hours as needed for pain. Max five tablets daily 150 Tab 0    testosterone cypionate (DEPOTESTOTERONE CYPIONATE) 200 mg/mL injection 0.5 mL by IntraMUSCular route every thirty (30) days.  Max Daily Amount: 100 mg. 1 Vial 0    fluticasone (FLONASE) 50 mcg/actuation nasal spray   1    pantoprazole (PROTONIX) 40 mg tablet take 1 tablet by mouth once daily  0    SUMAtriptan (IMITREX) 100 mg tablet TAKE 1 TABLET BY MOUTH AT ONSET OF HEADACHE, MAY REPEAT 2 HOURS LATER. (MAX 2 PILLS IN 24 HOURS)  1    DULoxetine (CYMBALTA) 30 mg capsule take 1 capsule by mouth once daily with 60 MG CAPSULES 30 Cap 0    guanFACINE IR (TENEX) 1 mg IR tablet take 1 tablet by mouth once daily 30 Tab 2    labetalol (NORMODYNE) 300 mg tablet take 1 tablet by mouth twice a day 60 Tab 2    hydroCHLOROthiazide (HYDRODIURIL) 25 mg tablet take 1 tablet by mouth once daily 30 Tab 1    ELIQUIS 5 mg tablet take 1 tablet by mouth twice a day 60 Tab 1    cyanocobalamin (VITAMIN B-12) 1,000 mcg tablet Take 1,000 mcg by mouth daily.  prasterone, dhea, (DHEA) 50 mg tab Take  by mouth.  cholecalciferol, vitamin D3, (VITAMIN D3) 2,000 unit tab Take  by mouth.  calcium carbonate/vitamin D3 (CALCIUM + D PO) Take  by mouth.  multivitamin (ONE A DAY) tablet Take 1 Tab by mouth daily.  vitamin E (AQUA GEMS) 400 unit capsule Take  by mouth daily.  tamsulosin (FLOMAX) 0.4 mg capsule take 1 capsule by mouth once daily 90 Cap 0    rOPINIRole (REQUIP) 0.5 mg tablet take 1 tablet by mouth twice a day 60 Tab 0    ACETAMINOPHEN/DIPHENHYDRAMINE (TYLENOL PM PO) Take 2 Tabs by mouth nightly.  amLODIPine-benazepril (LOTREL) 5-40 mg per capsule Take 1 Cap by mouth daily. 90 Cap 3         PHYSICAL EXAMINATION:   Visit Vitals  /75 (BP 1 Location: Right arm, BP Patient Position: Sitting)   Pulse 80   Ht 6' 6\" (1.981 m)   Wt 234 lb (106.1 kg)   SpO2 98%   BMI 27.04 kg/m²     Constitutional: WDWN, Pleasant and appropriate affect, No acute distress. CV:  No peripheral swelling noted  Respiratory: No respiratory distress or difficulties  Abdomen:  No abdominal masses or tenderness. No CVA tenderness. No inguinal hernias noted.  Male:    JORJE this is deferred today. On previous examination his prostate was about 40-50 g or so but felt benign in nature. Skin: No jaundice. Neuro/Psych:  Alert and oriented x 3, affect appropriate.    Lymphatic:   No enlarged inguinal lymph nodes. Results for orders placed or performed in visit on 10/25/18   AMB POC URINALYSIS DIP STICK AUTO W/O MICRO   Result Value Ref Range    Color (UA POC) Yellow     Clarity (UA POC) Clear     Glucose (UA POC) Negative Negative    Bilirubin (UA POC) Negative Negative    Ketones (UA POC) Negative Negative    Specific gravity (UA POC) 1.025 1.001 - 1.035    Blood (UA POC) 1+ Negative    pH (UA POC) 5.5 4.6 - 8.0    Protein (UA POC) Negative Negative    Urobilinogen (UA POC) 1 mg/dL 0.2 - 1    Nitrites (UA POC) Negative Negative    Leukocyte esterase (UA POC) Trace Negative     A bladder scan for postvoid residual shows slightly less than 10 cc. REVIEW OF LABS AND IMAGING:     Imaging Report Reviewed? YES     Images Reviewed? YES           Other Lab Data Reviewed? YES         ASSESSMENT:     ICD-10-CM ICD-9-CM    1. Urinary retention R33.9 788.20 AMB POC URINALYSIS DIP STICK AUTO W/O MICRO      MN LUCI,POST-VOID RES,US,NON-IMAGING   2. BPH with urinary obstruction N40.1 600.01     N13.8 599.69             PLAN / DISCUSSION: : I am going to go ahead and start him on Avodart 0.5 mg daily and see him back in 6 months. Patient voices understanding and agreement discussion and plan. Fabiano Gillette MD on 10/25/2018         Please note: This document has been produced using voice recognition software. Unrecognized errors in transcription may be present.

## 2018-10-25 NOTE — PATIENT INSTRUCTIONS
Urinary Retention: Care Instructions  Your Care Instructions    Urinary retention means that you aren't able to urinate. In men, it is often caused by a blockage of the urinary tract from an enlarged prostate gland. In men and women, it can also be caused by an infection or nerve damage. Or it may be a side effect of a medicine. The doctor may have drained the urine from your bladder. If you still have problems passing urine, you may need to use a catheter at home. This is used to empty your bladder until the problem can be fixed. Your doctor may put a catheter in your bladder before you go home. If so, he or she will tell you when to come back to have the catheter removed. The doctor has checked you closely. But problems can develop later. If you notice any problems or new symptoms, get medical treatment right away. Follow-up care is a key part of your treatment and safety. Be sure to make and go to all appointments, and call your doctor if you are having problems. It's also a good idea to know your test results and keep a list of the medicines you take. How can you care for yourself at home? · Take your medicines exactly as prescribed. Call your doctor if you think you are having a problem with your medicine. You will get more details on the specific medicines your doctor prescribes. · Check with your doctor before you use any over-the-counter medicines. Many cold and allergy medicines, for example, can make this problem worse. Make sure your doctor knows all of the medicines, vitamins, supplements, and herbal remedies you take. · Spread out through the day the amount of fluid you drink. Do not drink a lot at bedtime. · Avoid alcohol and caffeine. · If you have been given a catheter, or if one is already in place, follow the instructions you were given. Always wash your hands before and after you handle the catheter. When should you call for help?   Call your doctor now or seek immediate medical care if:    · You cannot urinate at all, or it is getting harder to urinate.     · You have symptoms of a urinary tract infection. These may include:  ? Pain or burning when you urinate. ? A frequent need to urinate without being able to pass much urine. ? Pain in the flank, which is just below the rib cage and above the waist on either side of the back. ? Blood in your urine. ? A fever.    Watch closely for changes in your health, and be sure to contact your doctor if:    · You have any problems with your catheter.     · You do not get better as expected. Where can you learn more? Go to http://shamar-keegan.info/. Enter M244 in the search box to learn more about \"Urinary Retention: Care Instructions. \"  Current as of: March 21, 2018  Content Version: 11.8  © 6829-6844 [employer]. Care instructions adapted under license by [employer] (which disclaims liability or warranty for this information). If you have questions about a medical condition or this instruction, always ask your healthcare professional. [place] [address] any warranty or liability for your use of this information.

## 2018-10-28 RX ORDER — TAMSULOSIN HYDROCHLORIDE 0.4 MG/1
CAPSULE ORAL
Qty: 90 CAP | Refills: 0 | Status: SHIPPED | OUTPATIENT
Start: 2018-10-28 | End: 2019-01-25 | Stop reason: SDUPTHER

## 2018-10-29 ENCOUNTER — HOSPITAL ENCOUNTER (OUTPATIENT)
Dept: ULTRASOUND IMAGING | Age: 68
Discharge: HOME | End: 2018-10-29
Attending: INTERNAL MEDICINE
Payer: MEDICARE

## 2018-10-29 ENCOUNTER — HOSPITAL ENCOUNTER (OUTPATIENT)
Dept: LAB | Age: 68
Discharge: HOME | End: 2018-10-29
Attending: INTERNAL MEDICINE
Payer: MEDICARE

## 2018-10-29 DIAGNOSIS — N18.30 CKD (CHRONIC KIDNEY DISEASE), STAGE III (HCC): ICD-10-CM

## 2018-10-29 LAB
25(OH)D3 SERPL-MCNC: 31.9 NG/ML (ref 30–100)
ALBUMIN SERPL-MCNC: 3.6 G/DL (ref 3.4–5)
ANION GAP SERPL CALC-SCNC: 7 MMOL/L (ref 3–18)
APPEARANCE UR: CLEAR
BACTERIA URNS QL MICRO: NEGATIVE /HPF
BASOPHILS # BLD: 0 K/UL (ref 0–0.06)
BASOPHILS NFR BLD: 0 % (ref 0–3)
BILIRUB UR QL: NEGATIVE
BUN SERPL-MCNC: 13 MG/DL (ref 7–18)
BUN/CREAT SERPL: 12 (ref 12–20)
CALCIUM SERPL-MCNC: 8.3 MG/DL (ref 8.5–10.1)
CALCIUM SERPL-MCNC: 8.6 MG/DL (ref 8.5–10.1)
CHLORIDE SERPL-SCNC: 113 MMOL/L (ref 100–108)
CO2 SERPL-SCNC: 23 MMOL/L (ref 21–32)
COLOR UR: YELLOW
CREAT SERPL-MCNC: 1.05 MG/DL (ref 0.6–1.3)
CREAT UR-MCNC: 68 MG/DL (ref 30–125)
CREAT UR-MCNC: 70.2 MG/DL (ref 30–125)
DIFFERENTIAL METHOD BLD: ABNORMAL
EOSINOPHIL # BLD: 0.1 K/UL (ref 0–0.4)
EOSINOPHIL NFR BLD: 1 % (ref 0–5)
EPITH CASTS URNS QL MICRO: NEGATIVE /LPF (ref 0–5)
ERYTHROCYTE [DISTWIDTH] IN BLOOD BY AUTOMATED COUNT: 17 % (ref 11.6–14.5)
GLUCOSE SERPL-MCNC: 101 MG/DL (ref 74–99)
GLUCOSE UR STRIP.AUTO-MCNC: NEGATIVE MG/DL
HCT VFR BLD AUTO: 34.8 % (ref 36–48)
HGB BLD-MCNC: 11.3 G/DL (ref 13–16)
HGB UR QL STRIP: ABNORMAL
KETONES UR QL STRIP.AUTO: NEGATIVE MG/DL
LEUKOCYTE ESTERASE UR QL STRIP.AUTO: ABNORMAL
LYMPHOCYTES # BLD: 5.6 K/UL (ref 0.8–3.5)
LYMPHOCYTES NFR BLD: 54 % (ref 20–51)
MCH RBC QN AUTO: 25.8 PG (ref 24–34)
MCHC RBC AUTO-ENTMCNC: 32.5 G/DL (ref 31–37)
MCV RBC AUTO: 79.5 FL (ref 74–97)
MICROALBUMIN UR-MCNC: 1.49 MG/DL (ref 0–3)
MICROALBUMIN/CREAT UR-RTO: 22 MG/G (ref 0–30)
MONOCYTES # BLD: 0.3 K/UL (ref 0–1)
MONOCYTES NFR BLD: 3 % (ref 2–9)
NEUTS BAND NFR BLD MANUAL: 2 % (ref 0–5)
NEUTS SEG # BLD: 4.3 K/UL (ref 1.8–8)
NEUTS SEG NFR BLD: 40 % (ref 42–75)
NITRITE UR QL STRIP.AUTO: NEGATIVE
PH UR STRIP: 6.5 [PH] (ref 5–8)
PHOSPHATE SERPL-MCNC: 3.4 MG/DL (ref 2.5–4.9)
PLATELET # BLD AUTO: 308 K/UL (ref 135–420)
PLATELET COMMENTS,PCOM: ABNORMAL
PMV BLD AUTO: 12.1 FL (ref 9.2–11.8)
POTASSIUM SERPL-SCNC: 4.4 MMOL/L (ref 3.5–5.5)
PROT UR STRIP-MCNC: NEGATIVE MG/DL
PROT UR-MCNC: 12 MG/DL
PTH-INTACT SERPL-MCNC: 78.6 PG/ML (ref 18.4–88)
RBC # BLD AUTO: 4.38 M/UL (ref 4.7–5.5)
RBC #/AREA URNS HPF: NORMAL /HPF (ref 0–5)
RBC MORPH BLD: ABNORMAL
SODIUM SERPL-SCNC: 143 MMOL/L (ref 136–145)
SP GR UR REFRACTOMETRY: 1.01 (ref 1–1.03)
URATE SERPL-MCNC: 6.7 MG/DL (ref 2.6–7.2)
UROBILINOGEN UR QL STRIP.AUTO: 1 EU/DL (ref 0.2–1)
WBC # BLD AUTO: 10.3 K/UL (ref 4.6–13.2)
WBC URNS QL MICRO: NORMAL /HPF (ref 0–4)

## 2018-10-29 PROCEDURE — 82043 UR ALBUMIN QUANTITATIVE: CPT | Performed by: INTERNAL MEDICINE

## 2018-10-29 PROCEDURE — 83970 ASSAY OF PARATHORMONE: CPT | Performed by: INTERNAL MEDICINE

## 2018-10-29 PROCEDURE — 84550 ASSAY OF BLOOD/URIC ACID: CPT | Performed by: INTERNAL MEDICINE

## 2018-10-29 PROCEDURE — 76770 US EXAM ABDO BACK WALL COMP: CPT

## 2018-10-29 PROCEDURE — 82306 VITAMIN D 25 HYDROXY: CPT | Performed by: INTERNAL MEDICINE

## 2018-10-29 PROCEDURE — 82570 ASSAY OF URINE CREATININE: CPT | Performed by: INTERNAL MEDICINE

## 2018-10-29 PROCEDURE — 80069 RENAL FUNCTION PANEL: CPT | Performed by: INTERNAL MEDICINE

## 2018-10-29 PROCEDURE — 84156 ASSAY OF PROTEIN URINE: CPT | Performed by: INTERNAL MEDICINE

## 2018-10-29 PROCEDURE — 85025 COMPLETE CBC W/AUTO DIFF WBC: CPT | Performed by: INTERNAL MEDICINE

## 2018-10-29 PROCEDURE — 81001 URINALYSIS AUTO W/SCOPE: CPT | Performed by: INTERNAL MEDICINE

## 2018-10-29 PROCEDURE — 36415 COLL VENOUS BLD VENIPUNCTURE: CPT | Performed by: INTERNAL MEDICINE

## 2018-10-29 RX ORDER — HYDROCHLOROTHIAZIDE 25 MG/1
TABLET ORAL
Qty: 30 TAB | Refills: 1 | Status: SHIPPED | OUTPATIENT
Start: 2018-10-29 | End: 2018-11-12

## 2018-10-30 RX ORDER — DULOXETIN HYDROCHLORIDE 30 MG/1
CAPSULE, DELAYED RELEASE ORAL
Qty: 30 CAP | Refills: 0 | Status: SHIPPED | OUTPATIENT
Start: 2018-10-30 | End: 2018-12-13 | Stop reason: SDUPTHER

## 2018-11-06 DIAGNOSIS — R79.89 LOW TESTOSTERONE IN MALE: ICD-10-CM

## 2018-11-06 DIAGNOSIS — M54.2 CERVICALGIA: ICD-10-CM

## 2018-11-07 RX ORDER — TESTOSTERONE CYPIONATE 200 MG/ML
100 INJECTION INTRAMUSCULAR
Qty: 1 VIAL | Refills: 0 | Status: SHIPPED | OUTPATIENT
Start: 2018-11-07 | End: 2018-12-03 | Stop reason: SDUPTHER

## 2018-11-07 RX ORDER — HYDROCODONE BITARTRATE AND ACETAMINOPHEN 7.5; 325 MG/1; MG/1
TABLET ORAL
Qty: 150 TAB | Refills: 0 | Status: SHIPPED | OUTPATIENT
Start: 2018-11-07 | End: 2018-12-03 | Stop reason: SDUPTHER

## 2018-11-12 ENCOUNTER — OFFICE VISIT (OUTPATIENT)
Dept: INTERNAL MEDICINE CLINIC | Age: 68
End: 2018-11-12

## 2018-11-12 VITALS
DIASTOLIC BLOOD PRESSURE: 80 MMHG | TEMPERATURE: 97.3 F | BODY MASS INDEX: 27.07 KG/M2 | SYSTOLIC BLOOD PRESSURE: 120 MMHG | OXYGEN SATURATION: 97 % | WEIGHT: 234 LBS | HEIGHT: 78 IN | HEART RATE: 64 BPM

## 2018-11-12 DIAGNOSIS — F51.01 PRIMARY INSOMNIA: ICD-10-CM

## 2018-11-12 DIAGNOSIS — I10 ESSENTIAL HYPERTENSION: Primary | ICD-10-CM

## 2018-11-12 DIAGNOSIS — Z87.448 HX OF RENAL FAILURE: ICD-10-CM

## 2018-11-12 DIAGNOSIS — D47.1 CHRONIC MYELOPROLIFERATIVE DISEASE (HCC): ICD-10-CM

## 2018-11-12 RX ORDER — DULOXETIN HYDROCHLORIDE 60 MG/1
60 CAPSULE, DELAYED RELEASE ORAL DAILY
COMMUNITY
End: 2019-01-11 | Stop reason: SDUPTHER

## 2018-11-12 RX ORDER — MONTELUKAST SODIUM 10 MG/1
10 TABLET ORAL DAILY
COMMUNITY
End: 2019-08-05 | Stop reason: SDUPTHER

## 2018-11-12 RX ORDER — TRAZODONE HYDROCHLORIDE 50 MG/1
50 TABLET ORAL
Qty: 30 TAB | Refills: 3 | Status: SHIPPED | OUTPATIENT
Start: 2018-11-12 | End: 2019-03-08 | Stop reason: SDUPTHER

## 2018-11-12 RX ORDER — IBUPROFEN 200 MG
200 CAPSULE ORAL DAILY
COMMUNITY
End: 2019-04-01 | Stop reason: ALTCHOICE

## 2018-11-12 RX ORDER — MECLIZINE HYDROCHLORIDE 25 MG/1
25 TABLET ORAL
COMMUNITY
End: 2019-04-01 | Stop reason: ALTCHOICE

## 2018-11-12 NOTE — PROGRESS NOTES
Medication reconciliation completed by pharmacist at patient's request.  Patient with CKD and recent KAREN/ARF attributed to NSAID use, diuretics, and exacerbated by ACEI/ARB. Nephrology changed BP medications per patient - no longer on HCTZ, ACEI combo, or ARB combo and patient was advised to avoid NSAIDs during hospitalization. Patient brought all prescription bottles except Norco (kept in locked safe) to appointment for reconciliation. Paul Beltran was notified of these changes. Allergies Allergen Reactions  Levofloxacin Nausea Only Severe nausea and dizziness  Sulfa (Sulfonamide Antibiotics) Unknown (comments) Nausea, aching all over Medications Discontinued / Updated During Visit:  
Medications Discontinued During This Encounter Medication Reason  amLODIPine-benazepril (LOTREL) 5-40 mg per capsule Not A Current Medication  hydroCHLOROthiazide (HYDRODIURIL) 25 mg tablet Not A Current Medication  ACETAMINOPHEN/DIPHENHYDRAMINE (TYLENOL PM PO) Not A Current Medication  calcium carbonate/vitamin D3 (CALCIUM + D PO) Formulary Change  prasterone, dhea, (DHEA) 50 mg tab Not A Current Medication  SUMAtriptan (IMITREX) 880 mg tablet Duplicate Order Updated Medication List  
Current Outpatient Medications Medication Sig  
 montelukast (SINGULAIR) 10 mg tablet Take 10 mg by mouth daily.  DULoxetine (CYMBALTA) 60 mg capsule Take 60 mg by mouth daily. Take one capsule by mouth daily along with 30 mg capsule daily for a total daily dose 90 mg.  
 meclizine (ANTIVERT) 25 mg tablet Take 25 mg by mouth three (3) times daily as needed.  calcium citrate 200 mg (950 mg) tablet Take 200 mg by mouth daily.  calcium carbonate (TUMS PO) Take 2 Tabs by mouth daily.  HYDROcodone-acetaminophen (NORCO) 7.5-325 mg per tablet Take one tablet by mouth every four hours as needed for pain. Max five tablets daily  testosterone cypionate (DEPOTESTOTERONE CYPIONATE) 200 mg/mL injection 0.5 mL by IntraMUSCular route every thirty (30) days. Max Daily Amount: 100 mg.  
 DULoxetine (CYMBALTA) 30 mg capsule take 1 capsule by mouth once daily WITH 60 MG CAPSULE  tamsulosin (FLOMAX) 0.4 mg capsule take 1 capsule by mouth once daily  amLODIPine (NORVASC) 5 mg tablet take 1 tablet by mouth once daily  dutasteride (AVODART) 0.5 mg capsule Take 1 Cap by mouth daily.  SUMAtriptan (IMITREX) 100 mg tablet TAKE 1 TABLET BY MOUTH AT ONSET OF HEADACHE, MAY REPEAT 2 HOURS LATER. (MAX 2 PILLS IN 24 HOURS)  promethazine (PHENERGAN) 25 mg tablet take 1 tablet by mouth every 6 hours if needed for nausea  topiramate (TOPAMAX) 50 mg tablet take 1 tablet by mouth twice a day take PREVENT MIGRANE  fluticasone (FLONASE) 50 mcg/actuation nasal spray 1-2 Sprays by Both Nostrils route daily.  pantoprazole (PROTONIX) 40 mg tablet take 1 tablet by mouth once daily  guanFACINE IR (TENEX) 1 mg IR tablet take 1 tablet by mouth once daily  labetalol (NORMODYNE) 300 mg tablet take 1 tablet by mouth twice a day  ELIQUIS 5 mg tablet take 1 tablet by mouth twice a day  cyanocobalamin (VITAMIN B-12) 1,000 mcg tablet Take 1,000 mcg by mouth daily.  cholecalciferol, vitamin D3, (VITAMIN D3) 2,000 unit tab Take 1 Tab by mouth daily.  multivitamin (ONE A DAY) tablet Take 1 Tab by mouth daily.  vitamin E (AQUA GEMS) 400 unit capsule Take 400 Units by mouth daily.  rOPINIRole (REQUIP) 0.5 mg tablet take 1 tablet by mouth twice a day No current facility-administered medications for this visit.    
 
 
Leyla Starks, PharmD, BCACP

## 2018-11-12 NOTE — PROGRESS NOTES
Constanza Murphy presents today for Chief Complaint Patient presents with  Well Male Constanza Murphy preferred language for health care discussion is english. Is someone accompanying this pt? Yes wife Is the patient using any DME equipment during OV? no 
 
Depression Screening: PHQ over the last two weeks 11/12/2018 Little interest or pleasure in doing things Not at all Feeling down, depressed, irritable, or hopeless Not at all Total Score PHQ 2 0 Learning Assessment: 
Learning Assessment 5/3/2018 PRIMARY LEARNER Patient HIGHEST LEVEL OF EDUCATION - PRIMARY LEARNER  -  
BARRIERS PRIMARY LEARNER -  
CO-LEARNER CAREGIVER -  
PRIMARY LANGUAGE ENGLISH  NEED -  
LEARNER PREFERENCE PRIMARY DEMONSTRATION  
ANSWERED BY patient RELATIONSHIP SELF Abuse Screening: 
Abuse Screening Questionnaire 11/2/2017 Do you ever feel afraid of your partner? Alphonse Chen Are you in a relationship with someone who physically or mentally threatens you? Alphonse Chen Is it safe for you to go home? Miriam Gr Fall Risk Fall Risk Assessment, last 12 mths 11/12/2018 Able to walk? Yes Fall in past 12 months? No  
Fall with injury? -  
Number of falls in past 12 months - Fall Risk Score - Health Maintenance reviewed and discussed and ordered per Provider. Health Maintenance Due Topic Date Due  
 Hepatitis C Screening  1950  Shingrix Vaccine Age 50> (1 of 2) 06/22/1999  GLAUCOMA SCREENING Q2Y  06/22/2014 Stephen Cleveland Pt currently taking Antiplatelet therapy? Yes Eliquis Coordination of Care: 1. Have you been to the ER, urgent care clinic since your last visit? no Hospitalized since your last visit? no 
 
2. Have you seen or consulted any other health care providers outside of the [facility] since your last visit? no Include any pap smears or colon screening.  no

## 2018-11-20 ENCOUNTER — TELEPHONE (OUTPATIENT)
Dept: INTERNAL MEDICINE CLINIC | Age: 68
End: 2018-11-20

## 2018-11-20 RX ORDER — AZITHROMYCIN 250 MG/1
TABLET, FILM COATED ORAL
Qty: 6 TAB | Refills: 0 | Status: SHIPPED | OUTPATIENT
Start: 2018-11-20 | End: 2018-11-25

## 2018-11-20 RX ORDER — BENZONATATE 200 MG/1
200 CAPSULE ORAL
Qty: 21 CAP | Refills: 1 | Status: SHIPPED | OUTPATIENT
Start: 2018-11-20 | End: 2018-11-27

## 2018-11-20 NOTE — TELEPHONE ENCOUNTER
Spoke to Dr Franco Cotto regarding the patient and his symptoms. Dr. Luna Coffey stated to send over to the pharmacy [facility] and Tessalon Pearls 200 mg #21 Take 1 po tid x 7 days for cough. Medication sent in and patient was notified.

## 2018-11-30 RX ORDER — APIXABAN 5 MG/1
TABLET, FILM COATED ORAL
Qty: 60 TAB | Refills: 1 | Status: SHIPPED | OUTPATIENT
Start: 2018-11-30 | End: 2019-03-21 | Stop reason: SDUPTHER

## 2018-12-03 DIAGNOSIS — M54.2 CERVICALGIA: ICD-10-CM

## 2018-12-03 DIAGNOSIS — R79.89 LOW TESTOSTERONE IN MALE: ICD-10-CM

## 2018-12-03 RX ORDER — HYDROCODONE BITARTRATE AND ACETAMINOPHEN 7.5; 325 MG/1; MG/1
TABLET ORAL
Qty: 150 TAB | Refills: 0 | Status: SHIPPED | OUTPATIENT
Start: 2018-12-03 | End: 2018-12-31 | Stop reason: SDUPTHER

## 2018-12-03 RX ORDER — TESTOSTERONE CYPIONATE 200 MG/ML
100 INJECTION INTRAMUSCULAR
Qty: 1 VIAL | Refills: 0 | Status: SHIPPED | OUTPATIENT
Start: 2018-12-03 | End: 2018-12-31 | Stop reason: SDUPTHER

## 2018-12-05 DIAGNOSIS — M48.9 CERVICAL SPINE DISEASE: Primary | ICD-10-CM

## 2018-12-05 DIAGNOSIS — M54.50 CHRONIC MIDLINE LOW BACK PAIN WITHOUT SCIATICA: ICD-10-CM

## 2018-12-05 DIAGNOSIS — G89.29 CHRONIC MIDLINE LOW BACK PAIN WITHOUT SCIATICA: ICD-10-CM

## 2018-12-13 RX ORDER — GUANFACINE HYDROCHLORIDE 1 MG/1
TABLET ORAL
Qty: 30 TAB | Refills: 2 | Status: SHIPPED | OUTPATIENT
Start: 2018-12-13 | End: 2019-03-15 | Stop reason: SDUPTHER

## 2018-12-13 RX ORDER — DULOXETIN HYDROCHLORIDE 30 MG/1
CAPSULE, DELAYED RELEASE ORAL
Qty: 30 CAP | Refills: 0 | Status: SHIPPED | OUTPATIENT
Start: 2018-12-13 | End: 2019-01-11 | Stop reason: SDUPTHER

## 2018-12-14 ENCOUNTER — HOSPITAL ENCOUNTER (OUTPATIENT)
Dept: LAB | Age: 68
Discharge: HOME | End: 2018-12-14
Payer: MEDICARE

## 2018-12-14 DIAGNOSIS — Z79.891 LONG TERM PRESCRIPTION OPIATE USE: ICD-10-CM

## 2018-12-14 DIAGNOSIS — I49.3 PVCS (PREMATURE VENTRICULAR CONTRACTIONS): ICD-10-CM

## 2018-12-14 DIAGNOSIS — I10 ESSENTIAL HYPERTENSION: ICD-10-CM

## 2018-12-14 LAB
ALBUMIN SERPL-MCNC: 4 G/DL (ref 3.4–5)
ALBUMIN/GLOB SERPL: 1.3 {RATIO} (ref 0.8–1.7)
ALP SERPL-CCNC: 84 U/L (ref 45–117)
ALT SERPL-CCNC: 25 U/L (ref 16–61)
ANION GAP SERPL CALC-SCNC: 7 MMOL/L (ref 3–18)
AST SERPL-CCNC: 17 U/L (ref 15–37)
BASOPHILS # BLD: 0.1 K/UL (ref 0–0.1)
BASOPHILS NFR BLD: 1 % (ref 0–2)
BILIRUB SERPL-MCNC: 0.9 MG/DL (ref 0.2–1)
BUN SERPL-MCNC: 16 MG/DL (ref 7–18)
BUN/CREAT SERPL: 15 (ref 12–20)
CALCIUM SERPL-MCNC: 8.8 MG/DL (ref 8.5–10.1)
CHLORIDE SERPL-SCNC: 114 MMOL/L (ref 100–108)
CO2 SERPL-SCNC: 22 MMOL/L (ref 21–32)
CREAT SERPL-MCNC: 1.09 MG/DL (ref 0.6–1.3)
DIFFERENTIAL METHOD BLD: ABNORMAL
EOSINOPHIL # BLD: 0.1 K/UL (ref 0–0.4)
EOSINOPHIL NFR BLD: 1 % (ref 0–5)
ERYTHROCYTE [DISTWIDTH] IN BLOOD BY AUTOMATED COUNT: 18.5 % (ref 11.6–14.5)
GLOBULIN SER CALC-MCNC: 3.2 G/DL (ref 2–4)
GLUCOSE SERPL-MCNC: 69 MG/DL (ref 74–99)
HCT VFR BLD AUTO: 35.2 % (ref 36–48)
HGB BLD-MCNC: 11.1 G/DL (ref 13–16)
LYMPHOCYTES # BLD: 4.7 K/UL (ref 0.9–3.6)
LYMPHOCYTES NFR BLD: 49 % (ref 21–52)
MCH RBC QN AUTO: 24.6 PG (ref 24–34)
MCHC RBC AUTO-ENTMCNC: 31.5 G/DL (ref 31–37)
MCV RBC AUTO: 77.9 FL (ref 74–97)
MONOCYTES # BLD: 0.6 K/UL (ref 0.05–1.2)
MONOCYTES NFR BLD: 7 % (ref 3–10)
NEUTS SEG # BLD: 3.9 K/UL (ref 1.8–8)
NEUTS SEG NFR BLD: 42 % (ref 40–73)
PLATELET # BLD AUTO: 427 K/UL (ref 135–420)
PMV BLD AUTO: 10.4 FL (ref 9.2–11.8)
POTASSIUM SERPL-SCNC: 4.2 MMOL/L (ref 3.5–5.5)
PROT SERPL-MCNC: 7.2 G/DL (ref 6.4–8.2)
RBC # BLD AUTO: 4.52 M/UL (ref 4.7–5.5)
SODIUM SERPL-SCNC: 143 MMOL/L (ref 136–145)
WBC # BLD AUTO: 9.3 K/UL (ref 4.6–13.2)

## 2018-12-14 PROCEDURE — 85025 COMPLETE CBC W/AUTO DIFF WBC: CPT

## 2018-12-14 PROCEDURE — 80307 DRUG TEST PRSMV CHEM ANLYZR: CPT

## 2018-12-14 PROCEDURE — 36415 COLL VENOUS BLD VENIPUNCTURE: CPT

## 2018-12-14 PROCEDURE — 80053 COMPREHEN METABOLIC PANEL: CPT

## 2018-12-17 ENCOUNTER — OFFICE VISIT (OUTPATIENT)
Dept: INTERNAL MEDICINE CLINIC | Age: 68
End: 2018-12-17

## 2018-12-17 VITALS
DIASTOLIC BLOOD PRESSURE: 78 MMHG | HEIGHT: 78 IN | SYSTOLIC BLOOD PRESSURE: 120 MMHG | TEMPERATURE: 98.8 F | OXYGEN SATURATION: 98 % | BODY MASS INDEX: 27.31 KG/M2 | HEART RATE: 80 BPM | WEIGHT: 236 LBS

## 2018-12-17 DIAGNOSIS — I48.0 PAROXYSMAL ATRIAL FIBRILLATION (HCC): ICD-10-CM

## 2018-12-17 DIAGNOSIS — M54.50 CHRONIC MIDLINE LOW BACK PAIN WITHOUT SCIATICA: Primary | ICD-10-CM

## 2018-12-17 DIAGNOSIS — Z87.448 HX OF RENAL FAILURE: ICD-10-CM

## 2018-12-17 DIAGNOSIS — I10 ESSENTIAL HYPERTENSION: ICD-10-CM

## 2018-12-17 DIAGNOSIS — G89.29 CHRONIC MIDLINE LOW BACK PAIN WITHOUT SCIATICA: Primary | ICD-10-CM

## 2018-12-17 RX ORDER — ROPINIROLE 0.5 MG/1
TABLET, FILM COATED ORAL
Qty: 60 TAB | Refills: 1 | Status: SHIPPED | OUTPATIENT
Start: 2018-12-17 | End: 2019-02-05 | Stop reason: SDUPTHER

## 2018-12-17 NOTE — PROGRESS NOTES
Nieves Salgado presents today for   Chief Complaint   Patient presents with    Well Male       Nieves Salgado preferred language for health care discussion is english. Is someone accompanying this pt? Yes wife    Is the patient using any DME equipment during OV? no    Depression Screening:  PHQ over the last two weeks 12/17/2018   Little interest or pleasure in doing things Not at all   Feeling down, depressed, irritable, or hopeless Not at all   Total Score PHQ 2 0       Learning Assessment:  Learning Assessment 5/3/2018   PRIMARY LEARNER Patient   HIGHEST LEVEL OF EDUCATION - PRIMARY LEARNER  -   [facility] LEARNER -   [address] [place] [address]    NEED -   LEARNER PREFERENCE PRIMARY DEMONSTRATION   ANSWERED BY patient   RELATIONSHIP SELF       Abuse Screening:  Abuse Screening Questionnaire 11/2/2017   Do you ever feel afraid of your partner? N   Are you in a relationship with someone who physically or mentally threatens you? N   Is it safe for you to go home? Y       Fall Risk  Fall Risk Assessment, last 12 mths 12/17/2018   Able to walk? Yes   Fall in past 12 months? No   Fall with injury? -   Number of falls in past 12 months -   Fall Risk Score -       Health Maintenance reviewed and discussed and ordered per Provider. Health Maintenance Due   Topic Date Due    Hepatitis C Screening  1950    Shingrix Vaccine Age 50> (1 of 2) 06/22/1999    GLAUCOMA SCREENING Q2Y  06/22/2014   . Nieves Salgado is updated on all     Pt currently taking Antiplatelet therapy? no    Coordination of Care:  1. Have you been to the ER, urgent care clinic since your last visit? no Hospitalized since your last visit? no    2. Have you seen or consulted any other health care providers outside of the [facility] since your last visit? no Include any pap smears or colon screening.  no

## 2018-12-19 RX ORDER — SUMATRIPTAN 100 MG/1
TABLET, FILM COATED ORAL
Qty: 12 TAB | Refills: 1 | Status: SHIPPED | OUTPATIENT
Start: 2018-12-19 | End: 2019-02-16 | Stop reason: SDUPTHER

## 2018-12-19 NOTE — PROGRESS NOTES
The patient presents to the office today with the chief complaint of low back pain    HPI    The patient persists with chronic low back pain which remains a problem. The pain is in fair control on narcotic pain medications without a patten of abuse. The patient remains on Eliquis due to a preivous episode of atrial fibrillation which has remained quiet. The patient has a history of renal failure which has resolved. The patient remains on medications for hypertension. He is remaining off ACE inhibitors or ARBs. Review of Systems   Respiratory: Negative for shortness of breath. Cardiovascular: Negative for chest pain, palpitations and leg swelling. Musculoskeletal: Positive for back pain. Allergies   Allergen Reactions    Ace Inhibitors Other (comments)     Renal Failure    Arb-Angiotensin Receptor Antagonist Other (comments)     Renal Failure    Levofloxacin Nausea Only     Severe nausea and dizziness    Sulfa (Sulfonamide Antibiotics) Unknown (comments)     Nausea, aching all over       Current Outpatient Medications   Medication Sig Dispense Refill    rOPINIRole (REQUIP) 0.5 mg tablet take 1 tablet by mouth twice a day 60 Tab 1    guanFACINE IR (TENEX) 1 mg IR tablet take 1 tablet by mouth once daily 30 Tab 2    DULoxetine (CYMBALTA) 30 mg capsule take 1 capsule by mouth once daily WITH 60 MG CAPSULE 30 Cap 0    HYDROcodone-acetaminophen (NORCO) 7.5-325 mg per tablet Take one tablet by mouth every four hours as needed for pain. Max five tablets daily 150 Tab 0    testosterone cypionate (DEPOTESTOTERONE CYPIONATE) 200 mg/mL injection 0.5 mL by IntraMUSCular route every thirty (30) days. Max Daily Amount: 100 mg. 1 Vial 0    ELIQUIS 5 mg tablet take 1 tablet by mouth twice a day 60 Tab 1    montelukast (SINGULAIR) 10 mg tablet Take 10 mg by mouth daily.  DULoxetine (CYMBALTA) 60 mg capsule Take 60 mg by mouth daily.  Take one capsule by mouth daily along with 30 mg capsule daily for a total daily dose 90 mg.      meclizine (ANTIVERT) 25 mg tablet Take 25 mg by mouth three (3) times daily as needed.  calcium citrate 200 mg (950 mg) tablet Take 200 mg by mouth daily.  calcium carbonate (TUMS PO) Take 2 Tabs by mouth daily.  traZODone (DESYREL) 50 mg tablet Take 1 Tab by mouth nightly. 30 Tab 3    tamsulosin (FLOMAX) 0.4 mg capsule take 1 capsule by mouth once daily 90 Cap 0    amLODIPine (NORVASC) 5 mg tablet take 1 tablet by mouth once daily  0    dutasteride (AVODART) 0.5 mg capsule Take 1 Cap by mouth daily. 90 Cap 3    SUMAtriptan (IMITREX) 100 mg tablet TAKE 1 TABLET BY MOUTH AT ONSET OF HEADACHE, MAY REPEAT 2 HOURS LATER. (MAX 2 PILLS IN 24 HOURS) 12 Tab 1    promethazine (PHENERGAN) 25 mg tablet take 1 tablet by mouth every 6 hours if needed for nausea 30 Tab 0    topiramate (TOPAMAX) 50 mg tablet take 1 tablet by mouth twice a day take PREVENT MIGRANE 60 Tab 5    fluticasone (FLONASE) 50 mcg/actuation nasal spray 1-2 Sprays by Both Nostrils route daily. 1    pantoprazole (PROTONIX) 40 mg tablet take 1 tablet by mouth once daily  0    labetalol (NORMODYNE) 300 mg tablet take 1 tablet by mouth twice a day 60 Tab 2    cyanocobalamin (VITAMIN B-12) 1,000 mcg tablet Take 1,000 mcg by mouth daily.  cholecalciferol, vitamin D3, (VITAMIN D3) 2,000 unit tab Take 1 Tab by mouth daily.  multivitamin (ONE A DAY) tablet Take 1 Tab by mouth daily.  vitamin E (AQUA GEMS) 400 unit capsule Take 400 Units by mouth daily. Past Medical History:   Diagnosis Date    Abdominal pain, unspecified site     Acute reaction to stress     Allergic rhinitis due to pollen     Arrhythmia     afib    Arthritis     Back injury     BPH (benign prostatic hypertrophy)     Calculus of ureter     Cancer (HCC)     history of Leukemia, in remission    Carotid duplex 06/17/2015    Mild < 50% bilateral ICA stenosis.       Dizziness and giddiness     Esophageal reflux  Essential hypertension     GERD (gastroesophageal reflux disease)     Headache(784.0)     History of echocardiogram 2015    EF 55-60%. No WMA. Mild-mod LVH. Gr 1 DDfx. No evidence of intracardiac shunt.   Mild AI.      Hx: UTI (urinary tract infection)     Hypertension     Hypogonadism in male     Kidney stones     Migraine     Neck injury     Polycythemia     Polycythemia, secondary     Sciatica     Unspecified retinal detachment     Unspecified sleep apnea     resolved since gastric bypass    Vision decreased     Weight loss        Past Surgical History:   Procedure Laterality Date    ABDOMEN SURGERY PROC UNLISTED      Hernia    HX CATARACT REMOVAL Left     HX CERVICAL FUSION  2016    Cervical Spine Fusion    HX CHOLECYSTECTOMY      HX GASTRIC BYPASS  2006    HX KNEE ARTHROSCOPY      both knees (right knee twice, left once)    HX ORTHOPAEDIC  ,     lumbar laminectomy    HX OTHER SURGICAL      Two back surgeries    HX RETINAL DETACHMENT REPAIR Left        Social History     Socioeconomic History    Marital status:      Spouse name: Not on file    Number of children: Not on file    Years of education: Not on file    Highest education level: Not on file   Social Needs    Financial resource strain: Not on file    Food insecurity - worry: Not on file    Food insecurity - inability: Not on file   [employer] needs - medical: Not on file   [employer] needs - non-medical: Not on file   Occupational History    Not on file   Tobacco Use    Smoking status: Former Smoker     Last attempt to quit: 1981     Years since quittin.8    Smokeless tobacco: Never Used   Substance and Sexual Activity    Alcohol use: Yes     Comment: occasionally     Drug use: No    Sexual activity: Yes     Partners: Female   Other Topics Concern    Not on file   Social History Narrative    Patient lives with his wife in [place], he had 3 children by unfortunately lost 2 of them. He enjoys taking care of his yard and plays with the grandchildren. Patient does have an advanced directive on file    Visit Vitals  /78 (BP 1 Location: Left arm, BP Patient Position: Sitting)   Pulse 80   Temp 98.8 °F (37.1 °C) (Tympanic)   Ht 6' 6\" (1.981 m)   Wt 236 lb (107 kg)   SpO2 98%   BMI 27.27 kg/m²       Physical Exam   No Cervical Lymphadenopathy  No Supraclavicular Lymphadenopathy  Thyroid is Normal  Lungs are normal to percussion. Clear to auscultation   Heart:  S1 S2 are normal, No gallops, No murmurs  No Carotid Bruits  Abdomen:  Normal Bowel Sounds. No tenderness. No masses. No Hepatomegaly or Splenomegaly  LE:  Strong Pedal Pulses. No Edema      BMI:  [facility] Outpatient Visit on 12/14/2018   Component Date Value Ref Range Status    WBC 12/14/2018 9.3  4.6 - 13.2 K/uL Final    RBC 12/14/2018 4.52* 4.70 - 5.50 M/uL Final    HGB 12/14/2018 11.1* 13.0 - 16.0 g/dL Final    HCT 12/14/2018 35.2* 36.0 - 48.0 % Final    MCV 12/14/2018 77.9  74.0 - 97.0 FL Final    MCH 12/14/2018 24.6  24.0 - 34.0 PG Final    MCHC 12/14/2018 31.5  31.0 - 37.0 g/dL Final    RDW 12/14/2018 18.5* 11.6 - 14.5 % Final    PLATELET 89/94/9207 011* 135 - 420 K/uL Final    MPV 12/14/2018 10.4  9.2 - 11.8 FL Final    NEUTROPHILS 12/14/2018 42  40 - 73 % Final    LYMPHOCYTES 12/14/2018 49  21 - 52 % Final    MONOCYTES 12/14/2018 7  3 - 10 % Final    EOSINOPHILS 12/14/2018 1  0 - 5 % Final    BASOPHILS 12/14/2018 1  0 - 2 % Final    ABS. NEUTROPHILS 12/14/2018 3.9  1.8 - 8.0 K/UL Final    ABS. LYMPHOCYTES 12/14/2018 4.7* 0.9 - 3.6 K/UL Final    ABS. MONOCYTES 12/14/2018 0.6  0.05 - 1.2 K/UL Final    ABS. EOSINOPHILS 12/14/2018 0.1  0.0 - 0.4 K/UL Final    ABS.  BASOPHILS 12/14/2018 0.1  0.0 - 0.1 K/UL Final    DF 12/14/2018 AUTOMATED    Final    Sodium 12/14/2018 143  136 - 145 mmol/L Final    Potassium 12/14/2018 4.2  3.5 - 5.5 mmol/L Final    Chloride 12/14/2018 114* 100 - 108 mmol/L Final    CO2 12/14/2018 22  21 - 32 mmol/L Final    Anion gap 12/14/2018 7  3.0 - 18 mmol/L Final    Glucose 12/14/2018 69* 74 - 99 mg/dL Final    BUN 12/14/2018 16  7.0 - 18 MG/DL Final    Creatinine 12/14/2018 1.09  0.6 - 1.3 MG/DL Final    BUN/Creatinine ratio 12/14/2018 15  12 - 20   Final    GFR est AA 12/14/2018 >60  >60 ml/min/1.73m2 Final    GFR est non-AA 12/14/2018 >60  >60 ml/min/1.73m2 Final    Comment: (NOTE)  Estimated GFR is calculated using the Modification of Diet in Renal   Disease (MDRD) Study equation, reported for both  Americans   (GFRAA) and non- Americans (GFRNA), and normalized to 1.73m2   body surface area. The physician must decide which value applies to   the patient. The MDRD study equation should only be used in   individuals age 25 or older. It has not been validated for the   following: pregnant women, patients with serious comorbid conditions,   or on certain medications, or persons with extremes of body size,   muscle mass, or nutritional status.  Calcium 12/14/2018 8.8  8.5 - 10.1 MG/DL Final    Bilirubin, total 12/14/2018 0.9  0.2 - 1.0 MG/DL Final    ALT (SGPT) 12/14/2018 25  16 - 61 U/L Final    AST (SGOT) 12/14/2018 17  15 - 37 U/L Final    Alk.  phosphatase 12/14/2018 84  45 - 117 U/L Final    Protein, total 12/14/2018 7.2  6.4 - 8.2 g/dL Final    Albumin 12/14/2018 4.0  3.4 - 5.0 g/dL Final    Globulin 12/14/2018 3.2  2.0 - 4.0 g/dL Final    A-G Ratio 12/14/2018 1.3  0.8 - 1.7   Final   Hospital Outpatient Visit on 10/29/2018   Component Date Value Ref Range Status    WBC 10/29/2018 10.3  4.6 - 13.2 K/uL Final    RBC 10/29/2018 4.38* 4.70 - 5.50 M/uL Final    HGB 10/29/2018 11.3* 13.0 - 16.0 g/dL Final    HCT 10/29/2018 34.8* 36.0 - 48.0 % Final    MCV 10/29/2018 79.5  74.0 - 97.0 FL Final    MCH 10/29/2018 25.8  24.0 - 34.0 PG Final    MCHC 10/29/2018 32.5  31.0 - 37.0 g/dL Final    RDW 10/29/2018 17.0* 11.6 - 14.5 % Final    PLATELET 23/07/0387 111  135 - 420 K/uL Final    MPV 10/29/2018 12.1* 9.2 - 11.8 FL Final    NEUTROPHILS 10/29/2018 40* 42 - 75 % Final    BAND NEUTROPHILS 10/29/2018 2  0 - 5 % Final    LYMPHOCYTES 10/29/2018 54* 20 - 51 % Final    MONOCYTES 10/29/2018 3  2 - 9 % Final    EOSINOPHILS 10/29/2018 1  0 - 5 % Final    BASOPHILS 10/29/2018 0  0 - 3 % Final    ABS. NEUTROPHILS 10/29/2018 4.3  1.8 - 8.0 K/UL Final    ABS. LYMPHOCYTES 10/29/2018 5.6* 0.8 - 3.5 K/UL Final    ABS. MONOCYTES 10/29/2018 0.3  0 - 1.0 K/UL Final    ABS. EOSINOPHILS 10/29/2018 0.1  0.0 - 0.4 K/UL Final    ABS. BASOPHILS 10/29/2018 0.0  0.0 - 0.06 K/UL Final    DF 10/29/2018 MANUAL    Final    PLATELET COMMENTS 76/40/4043 ADEQUATE PLATELETS    Final    RBC COMMENTS 10/29/2018     Final                    Value:ANISOCYTOSIS  1+      RBC COMMENTS 10/29/2018     Final                    Value:OVALOCYTES  2+      RBC COMMENTS 10/29/2018     Final                    Value:STOMATOCYTES  1+      Sodium 10/29/2018 143  136 - 145 mmol/L Final    Potassium 10/29/2018 4.4  3.5 - 5.5 mmol/L Final    Chloride 10/29/2018 113* 100 - 108 mmol/L Final    CO2 10/29/2018 23  21 - 32 mmol/L Final    Anion gap 10/29/2018 7  3.0 - 18 mmol/L Final    Glucose 10/29/2018 101* 74 - 99 mg/dL Final    BUN 10/29/2018 13  7.0 - 18 MG/DL Final    Creatinine 10/29/2018 1.05  0.6 - 1.3 MG/DL Final    BUN/Creatinine ratio 10/29/2018 12  12 - 20   Final    GFR est AA 10/29/2018 >60  >60 ml/min/1.73m2 Final    GFR est non-AA 10/29/2018 >60  >60 ml/min/1.73m2 Final    Comment: (NOTE)  Estimated GFR is calculated using the Modification of Diet in Renal   Disease (MDRD) Study equation, reported for both  Americans   (GFRAA) and non- Americans (GFRNA), and normalized to 1.73m2   body surface area. The physician must decide which value applies to   the patient.  The MDRD study equation should only be used in individuals age 25 or older. It has not been validated for the   following: pregnant women, patients with serious comorbid conditions,   or on certain medications, or persons with extremes of body size,   muscle mass, or nutritional status.  Calcium 10/29/2018 8.3* 8.5 - 10.1 MG/DL Final    Phosphorus 10/29/2018 3.4  2.5 - 4.9 MG/DL Final    Albumin 10/29/2018 3.6  3.4 - 5.0 g/dL Final    Color 10/29/2018 YELLOW    Final    Appearance 10/29/2018 CLEAR    Final    Specific gravity 10/29/2018 1.014  1.005 - 1.030   Final    pH (UA) 10/29/2018 6.5  5.0 - 8.0   Final    Protein 10/29/2018 NEGATIVE   NEG mg/dL Final    Glucose 10/29/2018 NEGATIVE   NEG mg/dL Final    Ketone 10/29/2018 NEGATIVE   NEG mg/dL Final    Bilirubin 10/29/2018 NEGATIVE   NEG   Final    Blood 10/29/2018 TRACE* NEG   Final    Urobilinogen 10/29/2018 1.0  0.2 - 1.0 EU/dL Final    Nitrites 10/29/2018 NEGATIVE   NEG   Final    Leukocyte Esterase 10/29/2018 TRACE* NEG   Final    Uric acid 10/29/2018 6.7  2.6 - 7.2 MG/DL Final    Comment: The drugs N-acetylcysteine (NAC) and  Metamiszole have been found to cause falsely  low results in this chemical assay. Please  be sure to submit blood samples obtained  BEFORE administration of either of these  drugs to assure correct results.       Microalbumin,urine random 10/29/2018 1.49  0 - 3.0 MG/DL Final    Creatinine, urine 10/29/2018 68.00  30 - 125 mg/dL Final    Microalbumin/Creat ratio (mg/g cre* 10/29/2018 22  0 - 30 mg/g Final    Calcium 10/29/2018 8.6  8.5 - 10.1 MG/DL Final    PTH, Intact 10/29/2018 78.6  18.4 - 88.0 pg/mL Final    Protein, urine random 10/29/2018 12* <11.9 mg/dL Final    Creatinine, urine 10/29/2018 70.20  30 - 125 mg/dL Final    Vitamin D 25-Hydroxy 10/29/2018 31.9  30 - 100 ng/mL Final    Comment: (NOTE)  Deficiency               <20 ng/mL  Insufficiency          20-30 ng/mL  Sufficient             ng/mL  Possible toxicity       >100 ng/mL    The Method used is [employer] currently standardized to a   Center of Disease Control and Prevention (CDC) certified reference   [address]. Samples containing fluorescein dye can produce falsely   elevated values when tested with the [employer] Centaur Vitamin D Assay. It is recommended that results in the toxic range, >100 ng/mL, be   retested 72 hours post fluorescein exposure.  WBC 10/29/2018 0 to 3  0 - 4 /hpf Final    RBC 10/29/2018 0 to 3  0 - 5 /hpf Final    Epithelial cells 10/29/2018 NEGATIVE   0 - 5 /lpf Final    Bacteria 10/29/2018 NEGATIVE   NEG /hpf Final   Office Visit on 10/25/2018   Component Date Value Ref Range Status    Color (UA POC) 10/25/2018 Yellow   Final    Clarity (UA POC) 10/25/2018 Clear   Final    Glucose (UA POC) 10/25/2018 Negative  Negative Final    Bilirubin (UA POC) 10/25/2018 Negative  Negative Final    Ketones (UA POC) 10/25/2018 Negative  Negative Final    Specific gravity (UA POC) 10/25/2018 1.025  1.001 - 1.035 Final    Blood (UA POC) 10/25/2018 1+  Negative Final    pH (UA POC) 10/25/2018 5.5  4.6 - 8.0 Final    Protein (UA POC) 10/25/2018 Negative  Negative Final    Urobilinogen (UA POC) 10/25/2018 1 mg/dL  0.2 - 1 Final    Nitrites (UA POC) 10/25/2018 Negative  Negative Final    Leukocyte esterase (UA POC) 10/25/2018 Trace  Negative Final       .No results found for any visits on 12/17/18. Assessment / Plan      ICD-10-CM ICD-9-CM    1. Chronic midline low back pain without sciatica M54.5 724.2     G89.29 338.29    2. Essential hypertension I10 401.9    3. Hx of renal failure Z87.448 V13.09    4. Paroxysmal atrial fibrillation (HCC) I48.0 427.31        To Physiatry  he was advised to continue his maintenance medications      Follow-up Disposition:  Return in about 3 months (around 3/17/2019). I asked Mame Rivera if he has any questions and I answered the questions.   Mame Rivera states that he understands the treatment plan and agrees with the treatment plan

## 2018-12-20 LAB — DRUGS UR: NORMAL

## 2018-12-31 DIAGNOSIS — M54.2 CERVICALGIA: ICD-10-CM

## 2018-12-31 DIAGNOSIS — R79.89 LOW TESTOSTERONE IN MALE: ICD-10-CM

## 2018-12-31 RX ORDER — TESTOSTERONE CYPIONATE 200 MG/ML
100 INJECTION INTRAMUSCULAR
Qty: 1 VIAL | Refills: 0 | Status: SHIPPED | OUTPATIENT
Start: 2018-12-31 | End: 2019-01-24 | Stop reason: SDUPTHER

## 2018-12-31 RX ORDER — HYDROCODONE BITARTRATE AND ACETAMINOPHEN 7.5; 325 MG/1; MG/1
TABLET ORAL
Qty: 150 TAB | Refills: 0 | Status: SHIPPED | OUTPATIENT
Start: 2018-12-31 | End: 2019-01-24 | Stop reason: SDUPTHER

## 2019-01-11 RX ORDER — DULOXETIN HYDROCHLORIDE 30 MG/1
CAPSULE, DELAYED RELEASE ORAL
Qty: 90 CAP | Refills: 1 | Status: SHIPPED | OUTPATIENT
Start: 2019-01-11 | End: 2019-07-29 | Stop reason: SDUPTHER

## 2019-01-11 RX ORDER — DULOXETIN HYDROCHLORIDE 60 MG/1
60 CAPSULE, DELAYED RELEASE ORAL DAILY
Qty: 90 CAP | Refills: 1 | Status: SHIPPED | OUTPATIENT
Start: 2019-01-11 | End: 2019-11-06 | Stop reason: SDUPTHER

## 2019-01-11 NOTE — TELEPHONE ENCOUNTER
Requested Prescriptions     Pending Prescriptions Disp Refills    DULoxetine (CYMBALTA) 30 mg capsule 90 Cap 1     Sig: take 1 capsule by mouth once daily WITH 60 MG CAPSULE    DULoxetine (CYMBALTA) 60 mg capsule 90 Cap 1     Sig: Take 1 Cap by mouth daily.  Take one capsule by mouth daily along with 30 mg capsule daily for a total daily dose 90 mg.     Verbal given to send to pharmacy per Dr Darwin Montgomery

## 2019-01-17 ENCOUNTER — TELEPHONE (OUTPATIENT)
Dept: INTERNAL MEDICINE CLINIC | Age: 69
End: 2019-01-17

## 2019-01-17 DIAGNOSIS — M19.90 ARTHRITIS: Primary | ICD-10-CM

## 2019-01-24 DIAGNOSIS — R79.89 LOW TESTOSTERONE IN MALE: ICD-10-CM

## 2019-01-24 DIAGNOSIS — M54.2 CERVICALGIA: ICD-10-CM

## 2019-01-25 RX ORDER — TESTOSTERONE CYPIONATE 200 MG/ML
100 INJECTION INTRAMUSCULAR
Qty: 1 VIAL | Refills: 0 | Status: SHIPPED | OUTPATIENT
Start: 2019-01-25 | End: 2019-03-25 | Stop reason: SDUPTHER

## 2019-01-25 RX ORDER — HYDROCODONE BITARTRATE AND ACETAMINOPHEN 7.5; 325 MG/1; MG/1
TABLET ORAL
Qty: 150 TAB | Refills: 0 | Status: SHIPPED | OUTPATIENT
Start: 2019-01-25 | End: 2019-02-25 | Stop reason: SDUPTHER

## 2019-01-27 RX ORDER — TAMSULOSIN HYDROCHLORIDE 0.4 MG/1
CAPSULE ORAL
Qty: 90 CAP | Refills: 0 | Status: SHIPPED | OUTPATIENT
Start: 2019-01-27 | End: 2019-05-08 | Stop reason: SDUPTHER

## 2019-02-05 RX ORDER — ROPINIROLE 0.5 MG/1
TABLET, FILM COATED ORAL
Qty: 60 TAB | Refills: 1 | Status: SHIPPED | OUTPATIENT
Start: 2019-02-05 | End: 2019-04-08 | Stop reason: SDUPTHER

## 2019-02-18 RX ORDER — SUMATRIPTAN 100 MG/1
TABLET, FILM COATED ORAL
Qty: 12 TAB | Refills: 1 | Status: SHIPPED | OUTPATIENT
Start: 2019-02-18 | End: 2019-04-10 | Stop reason: SDUPTHER

## 2019-02-21 RX ORDER — CEFUROXIME AXETIL 500 MG/1
TABLET ORAL
Qty: 14 TAB | Refills: 0 | Status: SHIPPED | OUTPATIENT
Start: 2019-02-21 | End: 2019-04-30

## 2019-02-21 RX ORDER — BENZONATATE 200 MG/1
200 CAPSULE ORAL
Qty: 20 CAP | Refills: 0 | Status: SHIPPED | OUTPATIENT
Start: 2019-02-21 | End: 2019-02-28

## 2019-02-25 DIAGNOSIS — M54.2 CERVICALGIA: ICD-10-CM

## 2019-02-25 RX ORDER — HYDROCODONE BITARTRATE AND ACETAMINOPHEN 7.5; 325 MG/1; MG/1
TABLET ORAL
Qty: 150 TAB | Refills: 0 | Status: SHIPPED | OUTPATIENT
Start: 2019-02-28 | End: 2019-03-24 | Stop reason: SDUPTHER

## 2019-03-07 ENCOUNTER — TELEPHONE (OUTPATIENT)
Dept: INTERNAL MEDICINE CLINIC | Age: 69
End: 2019-03-07

## 2019-03-07 RX ORDER — AZITHROMYCIN 250 MG/1
TABLET, FILM COATED ORAL
Qty: 6 TAB | Refills: 0 | Status: SHIPPED | OUTPATIENT
Start: 2019-03-07 | End: 2019-03-12

## 2019-03-09 RX ORDER — TRAZODONE HYDROCHLORIDE 50 MG/1
TABLET ORAL
Qty: 30 TAB | Refills: 3 | Status: SHIPPED | OUTPATIENT
Start: 2019-03-09 | End: 2019-03-19 | Stop reason: SDUPTHER

## 2019-03-16 RX ORDER — GUANFACINE HYDROCHLORIDE 1 MG/1
TABLET ORAL
Qty: 30 TAB | Refills: 2 | Status: SHIPPED | OUTPATIENT
Start: 2019-03-16 | End: 2019-05-07 | Stop reason: SDUPTHER

## 2019-03-19 RX ORDER — TRAZODONE HYDROCHLORIDE 50 MG/1
TABLET ORAL
Qty: 30 TAB | Refills: 3 | Status: SHIPPED | OUTPATIENT
Start: 2019-03-19 | End: 2019-04-01 | Stop reason: ALTCHOICE

## 2019-03-22 RX ORDER — APIXABAN 5 MG/1
TABLET, FILM COATED ORAL
Qty: 60 TAB | Refills: 1 | Status: SHIPPED | OUTPATIENT
Start: 2019-03-22 | End: 2019-06-14 | Stop reason: SDUPTHER

## 2019-03-24 DIAGNOSIS — M54.2 CERVICALGIA: ICD-10-CM

## 2019-03-25 DIAGNOSIS — R79.89 LOW TESTOSTERONE IN MALE: ICD-10-CM

## 2019-03-25 DIAGNOSIS — M54.2 CERVICALGIA: ICD-10-CM

## 2019-03-25 RX ORDER — TESTOSTERONE CYPIONATE 200 MG/ML
100 INJECTION INTRAMUSCULAR
Qty: 1 VIAL | Refills: 0 | Status: SHIPPED | OUTPATIENT
Start: 2019-03-25 | End: 2019-04-01 | Stop reason: ALTCHOICE

## 2019-03-25 RX ORDER — HYDROCODONE BITARTRATE AND ACETAMINOPHEN 7.5; 325 MG/1; MG/1
1 TABLET ORAL
Qty: 150 TAB | Refills: 0 | Status: SHIPPED | OUTPATIENT
Start: 2019-03-25 | End: 2019-03-25 | Stop reason: SDUPTHER

## 2019-03-25 RX ORDER — HYDROCODONE BITARTRATE AND ACETAMINOPHEN 7.5; 325 MG/1; MG/1
1 TABLET ORAL
Qty: 150 TAB | Refills: 0 | Status: SHIPPED | OUTPATIENT
Start: 2019-03-25 | End: 2019-04-22 | Stop reason: SDUPTHER

## 2019-04-01 ENCOUNTER — HOSPITAL ENCOUNTER (OUTPATIENT)
Dept: LAB | Age: 69
Discharge: HOME | End: 2019-04-01
Payer: MEDICARE

## 2019-04-01 ENCOUNTER — OFFICE VISIT (OUTPATIENT)
Dept: FAMILY MEDICINE CLINIC | Facility: CLINIC | Age: 69
End: 2019-04-01

## 2019-04-01 VITALS
HEIGHT: 78 IN | DIASTOLIC BLOOD PRESSURE: 82 MMHG | WEIGHT: 219 LBS | RESPIRATION RATE: 18 BRPM | SYSTOLIC BLOOD PRESSURE: 120 MMHG | BODY MASS INDEX: 25.34 KG/M2 | HEART RATE: 65 BPM | TEMPERATURE: 98.5 F | OXYGEN SATURATION: 99 %

## 2019-04-01 DIAGNOSIS — K90.89 OTHER SPECIFIED INTESTINAL MALABSORPTION: ICD-10-CM

## 2019-04-01 DIAGNOSIS — R63.4 WEIGHT LOSS: ICD-10-CM

## 2019-04-01 DIAGNOSIS — R63.4 WEIGHT LOSS: Primary | ICD-10-CM

## 2019-04-01 DIAGNOSIS — I10 ESSENTIAL HYPERTENSION: ICD-10-CM

## 2019-04-01 DIAGNOSIS — R41.3 MEMORY LOSS: ICD-10-CM

## 2019-04-01 LAB
FERRITIN SERPL-MCNC: 4 NG/ML (ref 8–388)
TSH SERPL DL<=0.05 MIU/L-ACNC: 0.38 UIU/ML (ref 0.36–3.74)

## 2019-04-01 PROCEDURE — 84443 ASSAY THYROID STIM HORMONE: CPT

## 2019-04-01 PROCEDURE — 82728 ASSAY OF FERRITIN: CPT

## 2019-04-01 PROCEDURE — 83921 ORGANIC ACID SINGLE QUANT: CPT

## 2019-04-01 PROCEDURE — 36415 COLL VENOUS BLD VENIPUNCTURE: CPT

## 2019-04-01 PROCEDURE — 84425 ASSAY OF VITAMIN B-1: CPT

## 2019-04-01 NOTE — PROGRESS NOTES
The patient presents to the office today with the chief complaint of weight loss HPI The patient complains of weight loss - 17 pounds loss documented since December. The patient is maintaining a fair oral intake. He notes that his taste and \"drive\" for eating is not as great. The patient and his family have noticed problems with his short term memory. The patient remains on Norvasc and Tenex for hypertension. He is tolerating the medications well. The patient is status post gastric bypass surgery years ago. Review of Systems Constitutional: Positive for weight loss. Cardiovascular: Negative for leg swelling. Neurological:  
     Memory Loss Allergies Allergen Reactions  Ace Inhibitors Other (comments) Renal Failure  Arb-Angiotensin Receptor Antagonist Other (comments) Renal Failure  Levofloxacin Nausea Only Severe nausea and dizziness  Sulfa (Sulfonamide Antibiotics) Unknown (comments) Nausea, aching all over Current Outpatient Medications Medication Sig Dispense Refill  
 HYDROcodone-acetaminophen (NORCO) 7.5-325 mg per tablet Take 1 Tab by mouth every four (4) hours as needed for Pain for up to 30 days. Max Daily Amount: 5 Tabs. 150 Tab 0  
 ELIQUIS 5 mg tablet take 1 tablet by mouth twice a day 60 Tab 1  
 guanFACINE IR (TENEX) 1 mg IR tablet take 1 tablet by mouth once daily 30 Tab 2  cefUROXime (CEFTIN) 500 mg tablet Take one tab by mouth twice a day for seven days 14 Tab 0  
 SUMAtriptan (IMITREX) 100 mg tablet TAKE 1 TABLET BY MOUTH AT ONSET OF HEADACHE, MAY REPEAT 2 HOURS LATER. (MAX 2 PILLS IN 24 HOURS) 12 Tab 1  
 rOPINIRole (REQUIP) 0.5 mg tablet take 1 tablet by mouth twice a day 60 Tab 1  
 tamsulosin (FLOMAX) 0.4 mg capsule take 1 capsule by mouth once daily 90 Cap 0  
 DULoxetine (CYMBALTA) 30 mg capsule take 1 capsule by mouth once daily WITH 60 MG CAPSULE 90 Cap 1  
  DULoxetine (CYMBALTA) 60 mg capsule Take 1 Cap by mouth daily. Take one capsule by mouth daily along with 30 mg capsule daily for a total daily dose 90 mg. 90 Cap 1  
 montelukast (SINGULAIR) 10 mg tablet Take 10 mg by mouth daily.  amLODIPine (NORVASC) 5 mg tablet take 1 tablet by mouth once daily  0  
 multivitamin (ONE A DAY) tablet Take 1 Tab by mouth daily.  vitamin E (AQUA GEMS) 400 unit capsule Take 400 Units by mouth daily. Past Medical History:  
Diagnosis Date  Abdominal pain, unspecified site  Acute reaction to stress  Allergic rhinitis due to pollen  Arrhythmia   
 afib  Arthritis  Back injury  BPH (benign prostatic hypertrophy)  Calculus of ureter  Cancer [facility])   
 history of Leukemia, in remission  Carotid duplex 06/17/2015 Mild < 50% bilateral ICA stenosis.  Dizziness and giddiness  Esophageal reflux  Essential hypertension  GERD (gastroesophageal reflux disease)  Headache(784.0)  History of echocardiogram 08/13/2015 EF 55-60%. No WMA. Mild-mod LVH. Gr 1 DDfx. No evidence of intracardiac shunt. Mild AI.    
 Hx: UTI (urinary tract infection)  Hypertension  Hypogonadism in male  Kidney stones  Migraine  Neck injury  Polycythemia  Polycythemia, secondary  Sciatica  Unspecified retinal detachment  Unspecified sleep apnea   
 resolved since gastric bypass  Vision decreased  Weight loss Past Surgical History:  
Procedure Laterality Date [address] UNLISTED  2012 Hernia  HX CATARACT REMOVAL Left  HX CERVICAL FUSION  4/4/2016 Cervical Spine Fusion  HX CHOLECYSTECTOMY  HX GASTRIC BYPASS  2006  HX KNEE ARTHROSCOPY    
 both knees (right knee twice, left once) [facility]  
 lumbar laminectomy  HX OTHER SURGICAL Two back surgeries  HX RETINAL DETACHMENT REPAIR Left Social History Socioeconomic History  Marital status:  Spouse name: Not on file  Number of children: Not on file  Years of education: Not on file  Highest education level: Not on file Occupational History  Not on file Social Needs  Financial resource strain: Not on file  Food insecurity:  
  Worry: Not on file Inability: Not on file  Transportation needs:  
  Medical: Not on file Non-medical: Not on file Tobacco Use  Smoking status: Former Smoker Last attempt to quit: 1981 Years since quittin.1  Smokeless tobacco: Never Used Substance and Sexual Activity  Alcohol use: Yes Comment: occasionally  Drug use: No  
 Sexual activity: Yes  
  Partners: Female Lifestyle  Physical activity:  
  Days per week: Not on file Minutes per session: Not on file  Stress: Not on file Relationships  Social connections:  
  Talks on phone: Not on file Gets together: Not on file Attends Zoroastrianism service: Not on file Active member of club or organization: Not on file Attends meetings of clubs or organizations: Not on file Relationship status: Not on file  Intimate partner violence:  
  Fear of current or ex partner: Not on file Emotionally abused: Not on file Physically abused: Not on file Forced sexual activity: Not on file Other Topics Concern  Not on file Social History Narrative Patient lives with his wife in [place], he had 3 children by unfortunately lost 2 of them. He enjoys taking care of his yard and plays with the grandchildren. Patient does have an advanced directive on file Visit Vitals /82 Pulse 65 Temp 98.5 °F (36.9 °C) (Tympanic) Resp 18 Ht 6' 6\" (1.981 m) Wt 219 lb (99.3 kg) SpO2 99% BMI 25.31 kg/m² Physical Exam  
No Cervical Lymphadenopathy No Supraclavicular Lymphadenopathy Thyroid is Normal 
Lungs are normal to percussion. Clear to auscultation Heart:  S1 S2 are normal, No gallops, No murmurs No Carotid Bruits Abdomen:  Normal Bowel Sounds. No tenderness. No masses. No Hepatomegaly or Splenomegaly LE:  Strong Pedal Pulses. No Edema BMI:  [facility] Outpatient Visit on 04/01/2019 Component Date Value Ref Range Status  TSH 04/01/2019 0.38  0.36 - 3.74 uIU/mL Final  
 Ferritin 04/01/2019 4* 8 - 388 NG/ML Final  
 
 
. Results for orders placed or performed during the hospital encounter of 04/01/19 TSH 3RD GENERATION Result Value Ref Range TSH 0.38 0.36 - 3.74 uIU/mL FERRITIN Result Value Ref Range Ferritin 4 (L) 8 - 388 NG/ML Assessment / Plan ICD-10-CM ICD-9-CM 1. Weight loss R63.4 783.21 METHYLMALONIC ACID  
   TSH 3RD GENERATION FERRITIN  
   VITAMIN B1, WHOLE BLOOD 2. Essential hypertension I10 401.9 3. Memory loss R41.3 780.93 METHYLMALONIC ACID  
   TSH 3RD GENERATION FERRITIN  
   VITAMIN B1, WHOLE BLOOD 4. Other specified intestinal malabsorption K90.89 579.8 FERRITIN  
   VITAMIN B1, WHOLE BLOOD Labs ordered 
he was advised to continue his maintenance medications Further plans will be based on results of the ordered tests Follow-up and Dispositions · Return in about 2 months (around 6/1/2019). I asked Maria Isabel Reid if he has any questions and I answered the questions. Maria Isabel Reid states that he understands the treatment plan and agrees with the treatment plan

## 2019-04-03 LAB — VIT B1 BLD-SCNC: 157.7 NMOL/L (ref 66.5–200)

## 2019-04-04 LAB
Lab: NORMAL
METHYLMALONATE SERPL-SCNC: 199 NMOL/L (ref 0–378)

## 2019-04-08 RX ORDER — ROPINIROLE 0.5 MG/1
TABLET, FILM COATED ORAL
Qty: 60 TAB | Refills: 1 | Status: SHIPPED | OUTPATIENT
Start: 2019-04-08 | End: 2019-05-15 | Stop reason: SDUPTHER

## 2019-04-10 RX ORDER — SUMATRIPTAN 100 MG/1
TABLET, FILM COATED ORAL
Qty: 12 TAB | Refills: 1 | Status: SHIPPED | OUTPATIENT
Start: 2019-04-10 | End: 2019-06-03 | Stop reason: SDUPTHER

## 2019-04-11 ENCOUNTER — HOSPITAL ENCOUNTER (OUTPATIENT)
Dept: INFUSION THERAPY | Age: 69
Discharge: HOME | End: 2019-04-11
Payer: MEDICARE

## 2019-04-11 VITALS
SYSTOLIC BLOOD PRESSURE: 128 MMHG | TEMPERATURE: 98.7 F | HEIGHT: 76 IN | WEIGHT: 219.7 LBS | OXYGEN SATURATION: 99 % | BODY MASS INDEX: 26.75 KG/M2 | HEART RATE: 87 BPM | DIASTOLIC BLOOD PRESSURE: 65 MMHG | RESPIRATION RATE: 18 BRPM

## 2019-04-11 PROCEDURE — 74011250636 HC RX REV CODE- 250/636: Performed by: INTERNAL MEDICINE

## 2019-04-11 PROCEDURE — 74011000258 HC RX REV CODE- 258: Performed by: INTERNAL MEDICINE

## 2019-04-11 PROCEDURE — 96366 THER/PROPH/DIAG IV INF ADDON: CPT

## 2019-04-11 PROCEDURE — 96365 THER/PROPH/DIAG IV INF INIT: CPT

## 2019-04-11 PROCEDURE — 74011250637 HC RX REV CODE- 250/637: Performed by: INTERNAL MEDICINE

## 2019-04-11 RX ORDER — DIPHENHYDRAMINE HCL 25 MG
25 CAPSULE ORAL ONCE
Status: COMPLETED | OUTPATIENT
Start: 2019-04-11 | End: 2019-04-11

## 2019-04-11 RX ORDER — ACETAMINOPHEN 325 MG/1
650 TABLET ORAL ONCE
Status: COMPLETED | OUTPATIENT
Start: 2019-04-11 | End: 2019-04-11

## 2019-04-11 RX ORDER — SODIUM CHLORIDE 9 MG/ML
250 INJECTION, SOLUTION INTRAVENOUS CONTINUOUS
Status: DISCONTINUED | OUTPATIENT
Start: 2019-04-11 | End: 2019-04-12 | Stop reason: HOSPADM

## 2019-04-11 RX ORDER — SODIUM CHLORIDE 0.9 % (FLUSH) 0.9 %
10-40 SYRINGE (ML) INJECTION AS NEEDED
Status: DISCONTINUED | OUTPATIENT
Start: 2019-04-11 | End: 2019-04-15 | Stop reason: HOSPADM

## 2019-04-11 RX ADMIN — SODIUM CHLORIDE 25 MG: 900 INJECTION, SOLUTION INTRAVENOUS at 08:55

## 2019-04-11 RX ADMIN — Medication 10 ML: at 08:20

## 2019-04-11 RX ADMIN — SODIUM CHLORIDE 250 ML: 900 INJECTION, SOLUTION INTRAVENOUS at 08:22

## 2019-04-11 RX ADMIN — ACETAMINOPHEN 650 MG: 325 TABLET ORAL at 08:20

## 2019-04-11 RX ADMIN — DIPHENHYDRAMINE HYDROCHLORIDE 25 MG: 25 CAPSULE ORAL at 08:20

## 2019-04-11 RX ADMIN — SODIUM CHLORIDE 975 MG: 900 INJECTION, SOLUTION INTRAVENOUS at 10:50

## 2019-04-11 NOTE — PROGRESS NOTES
[facility] Progress Note Date: 2019 Name: Germán Langley MRN: [id number] : 1950 Infed infusion Mr. Andrew Meier arrived to [facility] at [address] [facility] ambulatory. Back and neck pain 5/10. Took medication this am for pain. Positioned for comfort Mr. Andrew Meier was assessed and education was provided. Care notes given. Patient verbalized understanding of actions, route, side effects, possible reaction, and management if reaction occurs. Mr. Kellie Sosa vitals were reviewed. Visit Vitals /65 (BP 1 Location: Right arm, BP Patient Position: At rest) Pulse 87 Temp 98.7 °F (37.1 °C) Resp 18 Ht 6' 4\" (1.93 m) Wt 99.7 kg (219 lb 11.2 oz) SpO2 99% BMI 26.74 kg/m² #22 g IV inserted in patient's LEFT AC x1 attempt. Positive for blood return/ flushes without difficulty. Pre meds of tylenol and benadryl PO given N.S flush bag hung Test dose Infed of 25 mg given over 30 minutes and tolerated well. Will monitor patient for 1 hour per protocol Patient without complaints or reaction one hour post test dose Remainder infed of 975 mg given over 4 hours without event. Will monitor for 30-45 minutes. Patient without complaints or reaction 45 minutes post infed infusion Patient Vitals for the past 12 hrs: 
 Temp Pulse Resp BP SpO2  
19 1546 98.7 °F (37.1 °C) 87 18 128/65 99 % 19 1510 98 °F (36.7 °C) 80 18 142/81 98 % 19 1315 99 °F (37.2 °C) 75 18 120/70   
19 1045 97.8 °F (36.6 °C) 68 18 124/72   
19 0925 97.9 °F (36.6 °C) 69 18 111/71   
19 0855   18    
19 0805 97.9 °F (36.6 °C) 72 18 110/73 99 % PIV flushed and removed. Site without redness, swelling, bleeding or tenderness. Gauze/coban applied Reviewed discharge instructions with patient. He verbalized understanding Mr. Andrew Meier was discharged from [place] [address] in stable condition at 1550. He  Has no further appointment with [facility]. Guido Chua, RN April 11, 2019

## 2019-04-12 ENCOUNTER — OFFICE VISIT (OUTPATIENT)
Dept: FAMILY MEDICINE CLINIC | Facility: CLINIC | Age: 69
End: 2019-04-12

## 2019-04-12 VITALS
SYSTOLIC BLOOD PRESSURE: 123 MMHG | HEIGHT: 76 IN | RESPIRATION RATE: 16 BRPM | WEIGHT: 218 LBS | TEMPERATURE: 97.8 F | HEART RATE: 73 BPM | BODY MASS INDEX: 26.55 KG/M2 | OXYGEN SATURATION: 98 % | DIASTOLIC BLOOD PRESSURE: 70 MMHG

## 2019-04-12 DIAGNOSIS — C92.10 CHRONIC MYELOCYTIC LEUKEMIA (HCC): ICD-10-CM

## 2019-04-12 DIAGNOSIS — M48.062 LUMBAR STENOSIS WITH NEUROGENIC CLAUDICATION: ICD-10-CM

## 2019-04-12 DIAGNOSIS — I10 ESSENTIAL HYPERTENSION: ICD-10-CM

## 2019-04-12 DIAGNOSIS — E61.1 IRON DEFICIENCY: Primary | ICD-10-CM

## 2019-04-12 NOTE — PROGRESS NOTES
The patient presents to the office today with the chief complaint of iron deficiency HPI The patient has been on iron for iron deficiency anemia. The patient feels better since he has been on iron. He is more alert. He appears much sharper. The paient continues with chronic pain in his low back and neck. This is unchanged. The pain is well controlled on the pain medication. There is no pattern of abuse. The patient remains on Norvasc and Tenex for hypertension. He is doing well on the medications. Review of Systems Respiratory: Negative for shortness of breath. Cardiovascular: Negative for chest pain and leg swelling. Musculoskeletal: Positive for back pain. Allergies Allergen Reactions  Ace Inhibitors Other (comments) Renal Failure  Arb-Angiotensin Receptor Antagonist Other (comments) Renal Failure  Levofloxacin Nausea Only Severe nausea and dizziness  Sulfa (Sulfonamide Antibiotics) Unknown (comments) Nausea, aching all over Current Outpatient Medications Medication Sig Dispense Refill  SUMAtriptan (IMITREX) 100 mg tablet TAKE 1 TABLET BY MOUTH AT ONSET OF HEADACHE, MAY REPEAT 2 HOURS LATER. (MAX 2 PILLS IN 24 HOURS) 12 Tab 1  
 rOPINIRole (REQUIP) 0.5 mg tablet take 1 tablet by mouth twice a day 60 Tab 1  
 HYDROcodone-acetaminophen (NORCO) 7.5-325 mg per tablet Take 1 Tab by mouth every four (4) hours as needed for Pain for up to 30 days. Max Daily Amount: 5 Tabs. 150 Tab 0  
 ELIQUIS 5 mg tablet take 1 tablet by mouth twice a day 60 Tab 1  
 guanFACINE IR (TENEX) 1 mg IR tablet take 1 tablet by mouth once daily 30 Tab 2  cefUROXime (CEFTIN) 500 mg tablet Take one tab by mouth twice a day for seven days 14 Tab 0  
 tamsulosin (FLOMAX) 0.4 mg capsule take 1 capsule by mouth once daily 90 Cap 0  
 DULoxetine (CYMBALTA) 30 mg capsule take 1 capsule by mouth once daily WITH 60 MG CAPSULE 90 Cap 1  
  DULoxetine (CYMBALTA) 60 mg capsule Take 1 Cap by mouth daily. Take one capsule by mouth daily along with 30 mg capsule daily for a total daily dose 90 mg. 90 Cap 1  
 montelukast (SINGULAIR) 10 mg tablet Take 10 mg by mouth daily.  amLODIPine (NORVASC) 5 mg tablet take 1 tablet by mouth once daily  0  
 multivitamin (ONE A DAY) tablet Take 1 Tab by mouth daily.  vitamin E (AQUA GEMS) 400 unit capsule Take 400 Units by mouth daily. Past Medical History:  
Diagnosis Date  Abdominal pain, unspecified site  Acute reaction to stress  Allergic rhinitis due to pollen  Arrhythmia   
 afib  Arthritis  Back injury  BPH (benign prostatic hypertrophy)  Calculus of ureter  Cancer [facility])   
 history of Leukemia, in remission  Carotid duplex 06/17/2015 Mild < 50% bilateral ICA stenosis.  Dizziness and giddiness  Esophageal reflux  Essential hypertension  GERD (gastroesophageal reflux disease)  Headache(784.0)  History of echocardiogram 08/13/2015 EF 55-60%. No WMA. Mild-mod LVH. Gr 1 DDfx. No evidence of intracardiac shunt. Mild AI.    
 Hx: UTI (urinary tract infection)  Hypertension  Hypogonadism in male  Kidney stones  Migraine  Neck injury  Polycythemia  Polycythemia, secondary  Sciatica  Unspecified retinal detachment  Unspecified sleep apnea   
 resolved since gastric bypass  Vision decreased  Weight loss Past Surgical History:  
Procedure Laterality Date [address] UNLISTED  2012 Hernia  HX CATARACT REMOVAL Left  HX CERVICAL FUSION  4/4/2016 Cervical Spine Fusion  HX CHOLECYSTECTOMY  HX GASTRIC BYPASS  2006  HX KNEE ARTHROSCOPY    
 both knees (right knee twice, left once) [facility]  
 lumbar laminectomy  HX OTHER SURGICAL Two back surgeries  HX RETINAL DETACHMENT REPAIR Left Social History Socioeconomic History  Marital status:  Spouse name: Not on file  Number of children: Not on file  Years of education: Not on file  Highest education level: Not on file Occupational History  Not on file Social Needs  Financial resource strain: Not on file  Food insecurity:  
  Worry: Not on file Inability: Not on file  Transportation needs:  
  Medical: Not on file Non-medical: Not on file Tobacco Use  Smoking status: Former Smoker Last attempt to quit: 1981 Years since quittin.1  Smokeless tobacco: Never Used Substance and Sexual Activity  Alcohol use: Yes Comment: occasionally  Drug use: No  
 Sexual activity: Yes  
  Partners: Female Lifestyle  Physical activity:  
  Days per week: Not on file Minutes per session: Not on file  Stress: Not on file Relationships  Social connections:  
  Talks on phone: Not on file Gets together: Not on file Attends Zoroastrianism service: Not on file Active member of club or organization: Not on file Attends meetings of clubs or organizations: Not on file Relationship status: Not on file  Intimate partner violence:  
  Fear of current or ex partner: Not on file Emotionally abused: Not on file Physically abused: Not on file Forced sexual activity: Not on file Other Topics Concern  Not on file Social History Narrative Patient lives with his wife in [place], he had 3 children by unfortunately lost 2 of them. He enjoys taking care of his yard and plays with the grandchildren. Patient does have an advanced directive on file Visit Vitals /70 Pulse 73 Temp 97.8 °F (36.6 °C) (Tympanic) Resp 16 Ht 6' 4\" (1.93 m) Wt 218 lb (98.9 kg) SpO2 98% BMI 26.54 kg/m² Physical Exam 
No Cervical Lymphadenopathy No Supraclavicular Lymphadenopathy Thyroid is Normal 
Lungs are normal to percussion. Clear to auscultation Heart:  S1 S2 are normal, No gallops, No murmurs No Carotid Bruits Abdomen:  Normal Bowel Sounds. No tenderness. No masses. No Hepatomegaly or Splenomegaly LE:  Strong Pedal Pulses. No Edema BMI:  [facility] Outpatient Visit on 04/01/2019 Component Date Value Ref Range Status  Methylmalonic acid 04/01/2019 199  0 - 378 nmol/L Final  
 Disclaimer 04/01/2019 Comment    Final  
 Comment: (NOTE) This test was developed and its performance characteristics 
determined by [facility]. It has not been cleared or approved 
by the Food and Drug Administration. Performed At: [address] [id number] Jeff Marino MD [id number] 
  
 TSH 04/01/2019 0.38  0.36 - 3.74 uIU/mL Final  
 Ferritin 04/01/2019 4* 8 - 388 NG/ML Final  
 Vitamin B1 04/01/2019 157.7  66.5 - 200.0 nmol/L Final  
 Comment: (NOTE) This test was developed and its performance characteristics 
determined by [facility]. It has not been cleared or approved 
by the Food and Drug Administration. Performed At: [address] [id number] Jeff Marino MD [id number] Delfino Rush No results found for any visits on 04/12/19. Assessment / Plan ICD-10-CM ICD-9-CM 1. Iron deficiency E61.1 280.9 FERRITIN  
   CBC WITH AUTOMATED DIFF 2. Essential hypertension I10 401.9 3. Lumbar stenosis with neurogenic claudication M48.062 724.03   
4. Chronic myelocytic leukemia (HCC) C92.10 205.10 Labs ordered 
he was advised to continue his maintenance medications Follow-up and Dispositions · Return in about 2 months (around 6/20/2019). I asked Catalina Alexander if he has any questions and I answered the questions. Catalina Alexander states that he understands the treatment plan and agrees with the treatment plan

## 2019-04-18 ENCOUNTER — HOSPITAL ENCOUNTER (OUTPATIENT)
Dept: LAB | Age: 69
Discharge: HOME | End: 2019-04-18
Payer: MEDICARE

## 2019-04-18 DIAGNOSIS — E61.1 IRON DEFICIENCY: ICD-10-CM

## 2019-04-18 LAB
BASOPHILS # BLD: 0 K/UL (ref 0–0.1)
BASOPHILS NFR BLD: 0 % (ref 0–2)
DIFFERENTIAL METHOD BLD: ABNORMAL
EOSINOPHIL # BLD: 0.1 K/UL (ref 0–0.4)
EOSINOPHIL NFR BLD: 1 % (ref 0–5)
ERYTHROCYTE [DISTWIDTH] IN BLOOD BY AUTOMATED COUNT: 23.2 % (ref 11.6–14.5)
FERRITIN SERPL-MCNC: 168 NG/ML (ref 8–388)
HCT VFR BLD AUTO: 37.7 % (ref 36–48)
HGB BLD-MCNC: 12.1 G/DL (ref 13–16)
LYMPHOCYTES # BLD: 4.2 K/UL (ref 0.9–3.6)
LYMPHOCYTES NFR BLD: 38 % (ref 21–52)
MCH RBC QN AUTO: 26.2 PG (ref 24–34)
MCHC RBC AUTO-ENTMCNC: 32.1 G/DL (ref 31–37)
MCV RBC AUTO: 81.8 FL (ref 74–97)
MONOCYTES # BLD: 0.9 K/UL (ref 0.05–1.2)
MONOCYTES NFR BLD: 8 % (ref 3–10)
NEUTS SEG # BLD: 5.9 K/UL (ref 1.8–8)
NEUTS SEG NFR BLD: 53 % (ref 40–73)
PLATELET # BLD AUTO: 175 K/UL (ref 135–420)
PLATELET COMMENTS,PCOM: ABNORMAL
RBC # BLD AUTO: 4.61 M/UL (ref 4.7–5.5)
RBC MORPH BLD: ABNORMAL
WBC # BLD AUTO: 11.1 K/UL (ref 4.6–13.2)

## 2019-04-18 PROCEDURE — 82728 ASSAY OF FERRITIN: CPT

## 2019-04-18 PROCEDURE — 85025 COMPLETE CBC W/AUTO DIFF WBC: CPT

## 2019-04-18 PROCEDURE — 36415 COLL VENOUS BLD VENIPUNCTURE: CPT

## 2019-04-22 DIAGNOSIS — M54.2 CERVICALGIA: ICD-10-CM

## 2019-04-22 RX ORDER — LABETALOL 300 MG/1
300 TABLET, FILM COATED ORAL 2 TIMES DAILY
Qty: 60 TAB | Refills: 2 | Status: SHIPPED | OUTPATIENT
Start: 2019-04-22 | End: 2019-04-22 | Stop reason: SDUPTHER

## 2019-04-22 RX ORDER — LABETALOL 300 MG/1
TABLET, FILM COATED ORAL
Qty: 180 TAB | Refills: 2 | Status: SHIPPED | OUTPATIENT
Start: 2019-04-22 | End: 2020-01-21

## 2019-04-23 ENCOUNTER — TELEPHONE (OUTPATIENT)
Dept: FAMILY MEDICINE CLINIC | Facility: CLINIC | Age: 69
End: 2019-04-23

## 2019-04-23 NOTE — TELEPHONE ENCOUNTER
Patient saw Dr. Minette Rubinstein recently and when he told him about the recent iron infusion, he said Dr. Minette Rubinstein suggested Dr. Geeta Sandoval may want to check his B-12 level.

## 2019-04-24 NOTE — TELEPHONE ENCOUNTER
Per Dr. Blaire Guerrero patient would need a methylmalonic acid lab for this and he had one on 4/1/19 and it was normal . So he will not need the b12 level. Patient was notified.

## 2019-04-25 RX ORDER — HYDROCODONE BITARTRATE AND ACETAMINOPHEN 7.5; 325 MG/1; MG/1
1 TABLET ORAL
Qty: 150 TAB | Refills: 0 | Status: SHIPPED | OUTPATIENT
Start: 2019-04-25 | End: 2019-05-22 | Stop reason: SDUPTHER

## 2019-04-30 ENCOUNTER — OFFICE VISIT (OUTPATIENT)
Dept: UROLOGY | Age: 69
End: 2019-04-30

## 2019-04-30 ENCOUNTER — HOSPITAL ENCOUNTER (OUTPATIENT)
Dept: LAB | Age: 69
Discharge: HOME | End: 2019-04-30
Payer: MEDICARE

## 2019-04-30 VITALS
DIASTOLIC BLOOD PRESSURE: 77 MMHG | BODY MASS INDEX: 27.16 KG/M2 | SYSTOLIC BLOOD PRESSURE: 130 MMHG | OXYGEN SATURATION: 95 % | HEART RATE: 84 BPM | WEIGHT: 223 LBS | HEIGHT: 76 IN

## 2019-04-30 DIAGNOSIS — N13.8 BENIGN PROSTATIC HYPERPLASIA WITH URINARY OBSTRUCTION: Primary | ICD-10-CM

## 2019-04-30 DIAGNOSIS — N13.8 BENIGN PROSTATIC HYPERPLASIA WITH URINARY OBSTRUCTION: ICD-10-CM

## 2019-04-30 DIAGNOSIS — N40.1 BENIGN PROSTATIC HYPERPLASIA WITH URINARY OBSTRUCTION: Primary | ICD-10-CM

## 2019-04-30 DIAGNOSIS — N40.1 BENIGN PROSTATIC HYPERPLASIA WITH URINARY OBSTRUCTION: ICD-10-CM

## 2019-04-30 LAB
BILIRUB UR QL STRIP: NEGATIVE
GLUCOSE UR-MCNC: NEGATIVE MG/DL
KETONES P FAST UR STRIP-MCNC: NEGATIVE MG/DL
PH UR STRIP: 5 [PH] (ref 4.6–8)
PROT UR QL STRIP: NORMAL
PSA SERPL-MCNC: 0.8 NG/ML (ref 0–4)
SP GR UR STRIP: 1.02 (ref 1–1.03)
UA UROBILINOGEN AMB POC: NORMAL (ref 0.2–1)
URINALYSIS CLARITY POC: CLEAR
URINALYSIS COLOR POC: YELLOW
URINE BLOOD POC: NORMAL
URINE LEUKOCYTES POC: NEGATIVE
URINE NITRITES POC: NEGATIVE

## 2019-04-30 PROCEDURE — 84153 ASSAY OF PSA TOTAL: CPT

## 2019-04-30 RX ORDER — DUTASTERIDE 0.5 MG/1
0.5 CAPSULE, LIQUID FILLED ORAL DAILY
COMMUNITY
End: 2019-08-09 | Stop reason: SDUPTHER

## 2019-04-30 RX ORDER — TOPIRAMATE 50 MG/1
TABLET, FILM COATED ORAL 2 TIMES DAILY
COMMUNITY
End: 2019-07-18 | Stop reason: SDUPTHER

## 2019-04-30 NOTE — PATIENT INSTRUCTIONS
Benign Prostatic Hyperplasia: Care Instructions  Your Care Instructions    Benign prostatic hyperplasia, or BPH, is an enlarged prostate gland. The prostate is a small gland that makes some of the fluid in semen. Prostate enlargement happens to almost all men as they age. It is usually not serious. BPH does not cause prostate cancer. As the prostate gets bigger, it may partly block the flow of urine. You may have a hard time getting a urine stream started or completely stopped. BPH can cause dribbling. You may have a weak urine stream, or you may have to urinate more often than you used to, especially at night. Most men find these problems easy to manage. You do not need treatment unless your symptoms bother you a lot or you have other problems, such as bladder infections or stones. In these cases, medicines may help. Surgery is not needed unless the urine flow is blocked or the symptoms do not get better with medicine. Follow-up care is a key part of your treatment and safety. Be sure to make and go to all appointments, and call your doctor if you are having problems. It's also a good idea to know your test results and keep a list of the medicines you take. How can you care for yourself at home? · Take plenty of time to urinate. Try to relax. · Try \"double voiding. \" Urinate as much you can, relax for a few moments, and then try to urinate again. · Sit on the toilet to urinate. · Read or think of other things while you are waiting. · Turn on a faucet, or try to picture running water. Some men find that this helps get their urine flowing. · If dribbling is a problem, wash your penis daily to avoid skin irritation and infection. · Avoid caffeine and alcohol. These drinks will increase how often you need to urinate. Spread your fluid intake throughout the day. If the urge to urinate often wakes you at night, limit your fluid intake in the evening. Urinate right before you go to bed.   · Many over-the-counter cold and allergy medicines can make the symptoms of BPH worse. Avoid antihistamines, decongestants, and allergy pills, if you can. Read the warnings on the package. · If you take any prescription medicines, especially tranquilizers or antidepressants, ask your doctor or pharmacist whether they can cause urination problems. There may be other medicines you can use that do not cause urinary problems. · Be safe with medicines. Take your medicines exactly as prescribed. Call your doctor if you think you are having a problem with your medicine. When should you call for help? Call your doctor now or seek immediate medical care if:    · You cannot urinate at all.     · You have symptoms of a urinary infection. For example:  ? You have blood or pus in your urine. ? You have pain in your back just below your rib cage. This is called flank pain. ? You have a fever, chills, or body aches. ? It hurts to urinate. ? You have groin or belly pain.    Watch closely for changes in your health, and be sure to contact your doctor if:    · It hurts when you ejaculate.     · Your urinary problems get a lot worse or bother you a lot. Where can you learn more? Go to http://shamar-keegan.info/. Enter K291 in the search box to learn more about \"Benign Prostatic Hyperplasia: Care Instructions. \"  Current as of: September 26, 2018  Content Version: 11.9  © 0975-9139 [employer]. Care instructions adapted under license by [employer] (which disclaims liability or warranty for this information). If you have questions about a medical condition or this instruction, always ask your healthcare professional. [address] any warranty or liability for your use of this information.

## 2019-04-30 NOTE — PROGRESS NOTES
Mr. Lesvia Zavaleta has a reminder for a \"due or due soon\" health maintenance. I have asked that he contact his primary care provider for follow-up on this health maintenance.

## 2019-04-30 NOTE — LETTER
4/30/19 Patient: Eliot Mckeon YOB: 1950 Date of Visit: 4/30/2019 Ab Summers MD 
[address] [place] [address] VIA In Basket Dear Ab Summers MD, Thank you for referring Mr. Ascencion Jara to [facility] for evaluation. My notes for this consultation are attached. If you have questions, please do not hesitate to call me. I look forward to following your patient along with you. Sincerely, Dean Samuels MD

## 2019-04-30 NOTE — PROGRESS NOTES
Chief Complaint   Patient presents with    Benign Prostatic Hypertrophy    Nocturia       HISTORY OF PRESENT ILLNESS:  Nany Schneider is a 70 y.o. male who presents today in follow-up to the urinary retention he suffered about 6 months ago. I placed him on Flomax which he had been on and added dutasteride. He is still getting up and down at night but he perhaps is a little better than he wants. At this point in time he has been on the dutasteride for only 6 months and I would like to keep him on a little bit more. I like to see him back in about 3 months this time and if he is still not having his good response as I think, I will probably go ahead with cystoscopy. PSA is repeated today. His bladder scan does show increased postvoid residual.           ROS documented    Past Medical History:   Diagnosis Date    Abdominal pain, unspecified site     Acute reaction to stress     Allergic rhinitis due to pollen     Arrhythmia     afib    Arthritis     Back injury     BPH (benign prostatic hypertrophy)     Calculus of ureter     Cancer (HCC)     history of Leukemia, in remission    Carotid duplex 06/17/2015    Mild < 50% bilateral ICA stenosis.  Dizziness and giddiness     Esophageal reflux     Essential hypertension     GERD (gastroesophageal reflux disease)     Headache(784.0)     History of echocardiogram 08/13/2015    EF 55-60%. No WMA. Mild-mod LVH. Gr 1 DDfx. No evidence of intracardiac shunt.   Mild AI.      Hx: UTI (urinary tract infection)     Hypertension     Hypogonadism in male     Kidney stones     Migraine     Neck injury     Polycythemia     Polycythemia, secondary     Sciatica     Unspecified retinal detachment     Unspecified sleep apnea     resolved since gastric bypass    Vision decreased     Weight loss        Past Surgical History:   Procedure Laterality Date    ABDOMEN SURGERY PROC UNLISTED  2012    Hernia    HX CATARACT REMOVAL Left     HX CERVICAL FUSION  2016    Cervical Spine Fusion    HX CHOLECYSTECTOMY      HX GASTRIC BYPASS      HX KNEE ARTHROSCOPY      both knees (right knee twice, left once)    HX ORTHOPAEDIC  1995    lumbar laminectomy    HX OTHER SURGICAL      Two back surgeries    HX RETINAL DETACHMENT REPAIR Left        Social History     Tobacco Use    Smoking status: Former Smoker     Last attempt to quit: 1981     Years since quittin.2    Smokeless tobacco: Never Used   Substance Use Topics    Alcohol use: Yes     Comment: occasionally     Drug use: No       Allergies   Allergen Reactions    Ace Inhibitors Other (comments)     Renal Failure    Arb-Angiotensin Receptor Antagonist Other (comments)     Renal Failure    Levofloxacin Nausea Only     Severe nausea and dizziness    Sulfa (Sulfonamide Antibiotics) Unknown (comments)     Nausea, aching all over       Family History   Problem Relation Age of Onset    Hypertension Father     Diabetes Father     Heart Attack Mother     Headache Sister     Diabetes Son        Current Outpatient Medications   Medication Sig Dispense Refill    topiramate (TOPAMAX) 50 mg tablet Take  by mouth two (2) times a day.  dutasteride (AVODART) 0.5 mg capsule Take 0.5 mg by mouth daily.  POTASSIUM PO Take 1,620 mg by mouth two (2) times a day.  HYDROcodone-acetaminophen (NORCO) 7.5-325 mg per tablet Take 1 Tab by mouth every four (4) hours as needed for Pain for up to 30 days. Max Daily Amount: 5 Tabs. 150 Tab 0    labetalol (NORMODYNE) 300 mg tablet TAKE 1 TABLET BY MOUTH TWICE DAILY 180 Tab 2    SUMAtriptan (IMITREX) 100 mg tablet TAKE 1 TABLET BY MOUTH AT ONSET OF HEADACHE, MAY REPEAT 2 HOURS LATER. (MAX 2 PILLS IN 24 HOURS) 12 Tab 1    rOPINIRole (REQUIP) 0.5 mg tablet take 1 tablet by mouth twice a day 60 Tab 1    ELIQUIS 5 mg tablet take 1 tablet by mouth twice a day 60 Tab 1    guanFACINE IR (TENEX) 1 mg IR tablet take 1 tablet by mouth once daily 30 Tab 2    tamsulosin (FLOMAX) 0.4 mg capsule take 1 capsule by mouth once daily 90 Cap 0    DULoxetine (CYMBALTA) 30 mg capsule take 1 capsule by mouth once daily WITH 60 MG CAPSULE 90 Cap 1    DULoxetine (CYMBALTA) 60 mg capsule Take 1 Cap by mouth daily. Take one capsule by mouth daily along with 30 mg capsule daily for a total daily dose 90 mg. 90 Cap 1    montelukast (SINGULAIR) 10 mg tablet Take 10 mg by mouth daily.  amLODIPine (NORVASC) 5 mg tablet take 1 tablet by mouth once daily  0    multivitamin (ONE A DAY) tablet Take 1 Tab by mouth daily.  vitamin E (AQUA GEMS) 400 unit capsule Take 400 Units by mouth daily. Lab Results   Component Value Date/Time    Prostate Specific Ag 2.8 04/07/2017 02:52 PM              PHYSICAL EXAMINATION:   Visit Vitals  /77 (BP 1 Location: Left arm, BP Patient Position: Sitting)   Pulse 84   Ht 6' 4\" (1.93 m)   Wt 223 lb (101.2 kg)   SpO2 95%   BMI 27.14 kg/m²     Constitutional: WDWN, Pleasant and appropriate affect, No acute distress. CV:  No peripheral swelling noted  Respiratory: No respiratory distress or difficulties  Abdomen:  No abdominal masses or tenderness. No CVA tenderness. No inguinal hernias noted.  Male:    JORJE: Not repeated today but his original JORJE showed about a 40 g prostate with no evidence of malignancy. SCROTUM:  No scrotal rash or lesions noticed. Normal bilateral testes and epididymis. PENIS: Urethral meatus normal in location and size. No urethral discharge. Skin: No jaundice. Neuro/Psych:  Alert and oriented x 3, affect appropriate. Lymphatic:   No enlarged inguinal lymph nodes.          Results for orders placed or performed during the hospital encounter of 04/18/19   FERRITIN   Result Value Ref Range    Ferritin 168 8 - 388 NG/ML   CBC WITH AUTOMATED DIFF   Result Value Ref Range    WBC 11.1 4.6 - 13.2 K/uL    RBC 4.61 (L) 4.70 - 5.50 M/uL    HGB 12.1 (L) 13.0 - 16.0 g/dL    HCT 37.7 36.0 - 48.0 %    MCV 81.8 74.0 - 97.0 FL    MCH 26.2 24.0 - 34.0 PG    MCHC 32.1 31.0 - 37.0 g/dL    RDW 23.2 (H) 11.6 - 14.5 %    PLATELET 421 612 - 802 K/uL    NEUTROPHILS 53 40 - 73 %    LYMPHOCYTES 38 21 - 52 %    MONOCYTES 8 3 - 10 %    EOSINOPHILS 1 0 - 5 %    BASOPHILS 0 0 - 2 %    ABS. NEUTROPHILS 5.9 1.8 - 8.0 K/UL    ABS. LYMPHOCYTES 4.2 (H) 0.9 - 3.6 K/UL    ABS. MONOCYTES 0.9 0.05 - 1.2 K/UL    ABS. EOSINOPHILS 0.1 0.0 - 0.4 K/UL    ABS. BASOPHILS 0.0 0.0 - 0.1 K/UL    PLATELET COMMENTS ADEQUATE PLATELETS      RBC COMMENTS ANISOCYTOSIS  1+        DF MANUAL       . Bladder scan shows a little over 90 cc of post void residual.  The last time he was here  the residual was about 10 cc    REVIEW OF LABS AND IMAGING:     Imaging Report Reviewed? YES     Images Reviewed? YES           Other Lab Data Reviewed? YES         ASSESSMENT:     ICD-10-CM ICD-9-CM    1. Benign prostatic hyperplasia with urinary obstruction N40.1 600.01 AMB POC URINALYSIS DIP STICK AUTO W/O MICRO    N13.8 599.69 COLLECTION VENOUS BLOOD,VENIPUNCTURE      PSA, DIAGNOSTIC (PROSTATE SPECIFIC AG)      NE LUCI,POST-VOID RES,US,NON-IMAGING            PLAN / DISCUSSION: : Continue the medical regimen as I have ordered. I would like to see him back in 3 months and unless he shows some significant improvement we will go ahead with cystoscopy. The patient expresses understanding and agreement of the discussion and plan. Suan Goodell, MD on 4/30/2019         Please note:  Voice recognition was used to generate this report, which may have resulted in some phonetic based errors in grammar and contents. Even though attempts were made to correct all the mistakes, some may have been missed, and remained in the body of the document.

## 2019-05-02 NOTE — PROGRESS NOTES
PSA has decreased primarily because of dutasteride. Continue that and see back at his next appointment.

## 2019-05-08 RX ORDER — GUANFACINE HYDROCHLORIDE 1 MG/1
TABLET ORAL
Qty: 30 TAB | Refills: 2 | Status: SHIPPED | OUTPATIENT
Start: 2019-05-08 | End: 2019-05-08 | Stop reason: SDUPTHER

## 2019-05-08 RX ORDER — TAMSULOSIN HYDROCHLORIDE 0.4 MG/1
CAPSULE ORAL
Qty: 90 CAP | Refills: 1 | Status: SHIPPED | OUTPATIENT
Start: 2019-05-08 | End: 2020-01-21

## 2019-05-10 ENCOUNTER — DOCUMENTATION ONLY (OUTPATIENT)
Dept: FAMILY MEDICINE CLINIC | Facility: CLINIC | Age: 69
End: 2019-05-10

## 2019-05-10 NOTE — PROGRESS NOTES
Prior authorization for Imitrex was approved until 11/6/19 through the patients insurance .  I spoke with Rocio at [phone]

## 2019-05-12 DIAGNOSIS — E29.1 HYPOGONADISM IN MALE: Primary | ICD-10-CM

## 2019-05-13 ENCOUNTER — TELEPHONE (OUTPATIENT)
Dept: INTERNAL MEDICINE CLINIC | Age: 69
End: 2019-05-13

## 2019-05-13 RX ORDER — GUANFACINE HYDROCHLORIDE 1 MG/1
TABLET ORAL
Qty: 90 TAB | Refills: 2 | Status: SHIPPED | OUTPATIENT
Start: 2019-05-13 | End: 2021-09-17

## 2019-05-13 NOTE — TELEPHONE ENCOUNTER
Patient is  Asking for a referral to see Dr Theodore Flores  He seen him back in January  But needs a new one please .

## 2019-05-14 RX ORDER — TESTOSTERONE CYPIONATE 200 MG/ML
INJECTION INTRAMUSCULAR
Qty: 1 ML | Refills: 0 | Status: SHIPPED | OUTPATIENT
Start: 2019-05-14 | End: 2019-05-22 | Stop reason: SDUPTHER

## 2019-05-15 RX ORDER — ROPINIROLE 0.5 MG/1
TABLET, FILM COATED ORAL
Qty: 180 TAB | Refills: 0 | Status: SHIPPED | OUTPATIENT
Start: 2019-05-15 | End: 2019-06-10 | Stop reason: SDUPTHER

## 2019-05-22 DIAGNOSIS — E29.1 HYPOGONADISM IN MALE: ICD-10-CM

## 2019-05-22 DIAGNOSIS — M54.2 CERVICALGIA: ICD-10-CM

## 2019-05-23 RX ORDER — HYDROCODONE BITARTRATE AND ACETAMINOPHEN 7.5; 325 MG/1; MG/1
1 TABLET ORAL
Qty: 150 TAB | Refills: 0 | Status: SHIPPED | OUTPATIENT
Start: 2019-05-23 | End: 2019-06-19 | Stop reason: SDUPTHER

## 2019-05-23 RX ORDER — TESTOSTERONE CYPIONATE 200 MG/ML
INJECTION INTRAMUSCULAR
Qty: 1 ML | Refills: 3 | Status: SHIPPED | OUTPATIENT
Start: 2019-05-23 | End: 2019-06-23 | Stop reason: SDUPTHER

## 2019-05-23 NOTE — TELEPHONE ENCOUNTER
Last   Norco    4/25/19      Last   Testosterone  3/25/19    Last OV        4/12/19    Pain Contract    12/27/18    UDS                   12/14/18

## 2019-06-03 ENCOUNTER — TELEPHONE (OUTPATIENT)
Dept: FAMILY MEDICINE CLINIC | Facility: CLINIC | Age: 69
End: 2019-06-03

## 2019-06-03 RX ORDER — SUMATRIPTAN 100 MG/1
TABLET, FILM COATED ORAL
Qty: 12 TAB | Refills: 1 | Status: SHIPPED | OUTPATIENT
Start: 2019-06-03 | End: 2019-08-06 | Stop reason: SDUPTHER

## 2019-06-10 ENCOUNTER — HOSPITAL ENCOUNTER (OUTPATIENT)
Dept: PHYSICAL THERAPY | Age: 69
Discharge: HOME | End: 2019-06-10
Payer: MEDICARE

## 2019-06-10 PROCEDURE — 97110 THERAPEUTIC EXERCISES: CPT

## 2019-06-10 PROCEDURE — 97165 OT EVAL LOW COMPLEX 30 MIN: CPT

## 2019-06-10 PROCEDURE — 97018 PARAFFIN BATH THERAPY: CPT

## 2019-06-10 RX ORDER — ROPINIROLE 0.5 MG/1
TABLET, FILM COATED ORAL
Qty: 60 TAB | Refills: 0 | Status: SHIPPED | OUTPATIENT
Start: 2019-06-10 | End: 2019-07-06 | Stop reason: SDUPTHER

## 2019-06-10 NOTE — PROGRESS NOTES
[facility] [employer]  [place]  [address] [facility]  [phone] fax    Plan of Care/Statement of Necessity for Occupational Therapy Services    Patient name: Eliot Mckeon Start of Care: 6/10/2019   Referral source: Jonelle Buitrago MD : 1950    Medical Diagnosis: Osteoarthritis of both hands [M19.041, M19.042]  Hand pain [M79.643]  Payor: [place] MEDICARE / Plan: [place] MEDICARE PART A & B / Product Type: Medicare /  Onset Date:6 months    Treatment Diagnosis: pain both hands   Prior Hospitalization: see medical history Provider#: [id number]   Medications: Verified on Patient summary List    Comorbidities: Leukemia, HTN, thyroid, heart disease, back and neck surgeries   Prior Level of 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 Assessment/ key information: 70year old RHD male with history of cramping and stiffness in both hands which has recently worsened. Patient reports no pain today, but reports 10/10 pain when cramps come. He states these occur more at night and after use. He has history significant for chemotherapy. He Has ability to achieve tight fist but has less than full PIP extension and difficulty achieving hook fist.   is right 80, left 62. Pinches are 3 point right 18 ( left 14), lateral essentially equal, and tip right 14 ( left 8). His fine motor skills are lslowed at 24 seconds each. He reports difficulty with tight  and opening things. His FOTO is 79/100 reflecting slight impairment in function. He will benefit from skilled occupational therapy to improve ROM and reduce cramping.       Evaluation Complexity: History LOW Complexity : Brief history review  Examination LOW Complexity : 1-3 performance deficits relating to physical, cognitive , or psychosocial skils that result in activity limitations and / or participation restrictions  Clinical Decision Making LOW Complexity : No comorbidities that affect functional and no verbal or physical assistance needed to complete eval tasks   Overall Complexity Rating: LOW     Problem List: Pain effecting function, Decreased range of motion, Decreased coordination/prehension, Decreased ADL/functional abilities  and Decreased activity tolerance     Treatment Plan may include any combination of the following: Therapeutic exercise, Therapeutic activities, Physical agent/modality, Manual therapy, Patient education and ADLs/IADLs    Patient / Family readiness to learn indicated by: asking questions, trying to perform skills and interest    Persons(s) to be included in education:   patient (P)    Barriers to Learning/Limitations: None    Patient Goal (s): Overcome locked finger    Patient Self Reported Health Status: good    Rehabilitation Potential: excellent    Short Term Goals: To be accomplished in 2 weeks:  1. Patient will be independent in home exercise program for digit ROM. 2.  Patient will be familiar with ligament release to reduce thumb cramping. 3.  Patient will report reduced episodes of hand cramping by 25%. Long Term Goals: To be accomplished in 4 weeks:   1. Patient will report rediuced cramping of digits by at least 75% due to improved flexibility and strength in wrists and intrinsics. 2.  Patient will report hand pain 0-4/10 at worst during brushing dogs and other repetitive grasping tasks. 3.  Patient will achieve improved ability to flex IPs of digits in order to perform hook fist for holding items. 4.  Patient will be able to wrist for over 15 minutes without hand cramping with use of adaptive pen.      Frequency / Duration: Patient to be seen 2-3 times per week for 4 weeks:    Patient/ Caregiver education and instruction: Diagnosis, prognosis, self care, activity modification and exercises   [x]  Plan of care has been reviewed with LIZETTE    Certification period: 6/10/19-7/9/19    Arnold Gutierrez, OTR/L 6/10/2019 12:37 PM    _____________________________________________________________________    I certify that the above Therapy Services are being furnished while the patient is under my care. I agree with the treatment plan and certify that this therapy is necessary.     Physician's Signature:____________Date:_________TIME:________    ** Signature, Date and Time must be completed for valid certification **    Please sign and return to [facility] - [facility] [employer]   [address] [place] [address]  [phone] fax

## 2019-06-10 NOTE — PROGRESS NOTES
OT DAILY TREATMENT NOTE 10-18    Patient Name: Martir Triana  Date:6/10/2019  : 1950  [x]  Patient  Verified  Payor: VA MEDICARE / Plan: VA MEDICARE PART A & B / Product Type: Medicare /    In time:855  Out time:940  Total Treatment Time (min): 45  Visit #: 1 of     Medicare/BCBS Only   Total Timed Codes (min):13 1:1 Treatment Time:  45     Treatment Area: Osteoarthritis of both hands [M19.041, M19.042]    SUBJECTIVE  Pain Level (0-10 scale): 0/10  Any medication changes, allergies to medications, adverse drug reactions, diagnosis change, or new procedure performed?: [x] No    [] Yes (see summary sheet for update)  Subjective functional status/changes:   [] No changes reported  Cramping, worst at nights    OBJECTIVE    Modality rationale: decrease pain and increase tissue extensibility to improve the patients ability to grasp   Min Type Additional Details    [] Estim:  []Unatt       []IFC  []Premod                        []Other:  []w/ice   []w/heat  Position:  Location:    [] Estim: []Att    []TENS instruct  []NMES                    []Other:  []w/US   []w/ice   []w/heat  Position:  Location:    []  Traction: [] Cervical       []Lumbar                       [] Prone          []Supine                       []Intermittent   []Continuous Lbs:  [] before manual  [] after manual    []  Ultrasound: []Continuous   [] Pulsed                           []1MHz   []3MHz W/cm2:  Location:    []  Iontophoresis with dexamethasone         Location: [] Take home patch   [] In clinic    []  Ice     []  heat  []  Ice massage  []  Laser   []  Anodyne Position:  Location:     10 []  Laser with stim  [x]  Other: Paraffin Position:  Location:both hands    []  Vasopneumatic Device Pressure:       [] lo [] med [] hi   Temperature: [] lo [] med [] hi       [] Skin assessment post-treatment:  []intact []redness- no adverse reaction    []redness - adverse reaction:     15 min []Eval                  []Re-Eval       13 min Therapeutic Exercise:  [] See flow sheet :   Rationale: increase ROM to improve the patients ability to grasp  Tendon glides both hands      With   [] TE   [] TA   [] neuro   [] other: Patient Education: [x] Review HEP    [] Progressed/Changed HEP based on: tendon glides  [] positioning   [] body mechanics   [] transfers   [] heat/ice application   [] Splint wear/care   [] Sensory re-education   [] scar management      [] other:            Other Objective/Functional Measures:   Subjective: pt is a right hand dominant, 70 y.o.y/o, male whohas had multi year history of cramping in hands. Hands cramp up . after use, worse tat night     Prior level of function: Yard work, retired shipyard pipe shop, ccoking, home care, driving  Pain ZRBYX:(4-LF pain 10-debilitating pain) no    Description/Location: bilateral hand with c/o transient relief    Worst pain10/10 Least pain 0/10   Activities which aggravate pain: overuse   Activities which ease pain: hot water  Current functional limitations/living situation: Wife, home repair    Medical hx: Neck and back surgeries, arthritis, HTN, leukemia remission    Medications: See the written copy of this report in the patient's paper medical record.        Objective:  Hand ROM Both hands full extension except at PIPs, lacks wrist extension with full digit extension  R/L   Index Middle Ring Small Thumb    MP 0 0 0 0  CMC   PIP 10 15 15 10 35 MP   DIP     30 IP        28/40 Cai Abd        35/50 Radial Abd     Hand Strength:   Gross Grasp 3pt Pinch Lateral Pinch Tip Pinch   Right  80 18 20 14   Left 62 14 19 8     Nine-Hole Peg Test:  Left= __24___seconds  Right=_24____seconds  Finger Opposition:WFL      Palpation: Possible nodule MF right palm    ADLs I with cramping  Pain Level (0-10 scale) post treatment: 0/10    ASSESSMENT/Changes in Function:    [x]  See Plan of Care  []  See progress note/recertification  []  See Discharge Summary             PLAN  []  Upgrade activities as tolerated     []  Continue plan of care  []  Update interventions per flow sheet       []  Discharge due to:_  []  Other:_      Arnold Gutierrez, OTR/L 6/10/2019  8:46 AM    Future Appointments   Date Time Provider Panchito Chau   6/28/2019  8:00 AM MD KAILEE Wagner   8/5/2019 10:00 AM MD YUNIOR Ruiz SCHED   10/3/2019  8:00 AM Janki Cross MD [address] [place] [address]

## 2019-06-12 DIAGNOSIS — M54.2 CERVICALGIA: ICD-10-CM

## 2019-06-12 RX ORDER — HYDROCODONE BITARTRATE AND ACETAMINOPHEN 7.5; 325 MG/1; MG/1
1 TABLET ORAL
Qty: 150 TAB | Refills: 0 | Status: CANCELLED | OUTPATIENT
Start: 2019-06-12 | End: 2019-07-12

## 2019-06-14 ENCOUNTER — HOSPITAL ENCOUNTER (OUTPATIENT)
Dept: PHYSICAL THERAPY | Age: 69
Discharge: HOME | End: 2019-06-14
Payer: MEDICARE

## 2019-06-14 PROCEDURE — 97018 PARAFFIN BATH THERAPY: CPT

## 2019-06-14 PROCEDURE — 97140 MANUAL THERAPY 1/> REGIONS: CPT

## 2019-06-14 PROCEDURE — 97110 THERAPEUTIC EXERCISES: CPT

## 2019-06-14 RX ORDER — APIXABAN 5 MG/1
TABLET, FILM COATED ORAL
Qty: 60 TAB | Refills: 0 | Status: SHIPPED | OUTPATIENT
Start: 2019-06-14 | End: 2019-07-10 | Stop reason: SDUPTHER

## 2019-06-14 NOTE — PROGRESS NOTES
OT DAILY TREATMENT NOTE 10-18    Patient Name: Maryann Guillen  Date:2019  : 1950  [x]  Patient  Verified  Payor: VA MEDICARE / Plan: VA MEDICARE PART A & B / Product Type: Medicare /    In time:825  Out time:903  Total Treatment Time (min): 38  Visit #: 2 of     Medicare/BCBS Only   Total Timed Codes (min):  28 1:1 Treatment Time:  38     Treatment Area: Osteoarthritis of both hands [M19.041, M19.042]    SUBJECTIVE  Pain Level (0-10 scale): 0/10  Any medication changes, allergies to medications, adverse drug reactions, diagnosis change, or new procedure performed?: [x] No    [] Yes (see summary sheet for update)  Subjective functional status/changes:   [] No changes reported  Only pains when it cramps    OBJECTIVE    Modality rationale: decrease pain and increase tissue extensibility to improve the patients ability to grasp   Min Type Additional Details    [] Estim:  []Unatt       []IFC  []Premod                        []Other:  []w/ice   []w/heat  Position:  Location:    [] Estim: []Att    []TENS instruct  []NMES                    []Other:  []w/US   []w/ice   []w/heat  Position:  Location:    []  Traction: [] Cervical       []Lumbar                       [] Prone          []Supine                       []Intermittent   []Continuous Lbs:  [] before manual  [] after manual    []  Ultrasound: []Continuous   [] Pulsed                           []1MHz   []3MHz W/cm2:  Location:    []  Iontophoresis with dexamethasone         Location: [] Take home patch   [] In clinic    []  Ice     []  heat  []  Ice massage  []  Laser   []  Anodyne Position:  Location:     10 []  Laser with stim  [x]  Other:Paraffin  Position:  Location:both hands    []  Vasopneumatic Device Pressure:       [] lo [] med [] hi   Temperature: [] lo [] med [] hi       [x] Skin assessment post-treatment:  [x]intact []redness- no adverse reaction    []redness - adverse reaction:       18 min Therapeutic Exercise:  [] See flow sheet :   Rationale: increase ROM to improve the patients ability to grasp  MP self stretch in ext on edge of table both  Tendon glides x 10 both  Prayer stretch for MP extension both  Thumb rad abd after release x 10     10 min Manual Therapy:  Ligament release   Rationale: decrease pain and increase ROM to improve thumb mobility  Ligmaent release to beak ligament both hands      With   [x] TE   [] TA   [] neuro   [] other: Patient Education: [x] Review HEP    [x] Progressed/Changed HEP based on: beak lig self release, MP extension stretch  [] positioning   [] body mechanics   [] transfers   [] heat/ice application   [] Splint wear/care   [] Sensory re-education   [] scar management      [] other:            Other Objective/Functional Measures:   Unabel to get palms flat aginst each other     Pain Level (0-10 scale) post treatment: 0/10    ASSESSMENT/Changes in Function: Difficulty extending digits at MP    Patient will continue to benefit from skilled OT services to modify and progress therapeutic interventions, address ROM deficits, address strength deficits, analyze and address soft tissue restrictions and instruct in home and community integration to attain remaining goals. []  See Plan of Care  []  See progress note/recertification  []  See Discharge Summary         Progress towards goals / Updated goals:  *1.  Patient will be independent in home exercise program for digit ROM. progressing with tendon glides and MP self stretch 6/14/19  2. Patient will be familiar with ligament release to reduce thumb cramping. reviewed 6/14/19  3. Patient will report reduced episodes of hand cramping by 25%.     Long Term Goals: To be accomplished in 4 weeks:          1. Patient will report rediuced cramping of digits by at least 75% due to improved flexibility and strength in wrists and intrinsics. 2.  Patient will report hand pain 0-4/10 at worst during brushing dogs and other repetitive grasping tasks.   3.  Patient will achieve improved ability to flex IPs of digits in order to perform hook fist for holding items. 4.  Patient will be able to wrist for over 15 minutes without hand cramping with use of adaptive pen.      PLAN  []  Upgrade activities as tolerated     [x]  Continue plan of care  []  Update interventions per flow sheet       []  Discharge due to:_  []  Other:_      Arnold Gutierrez OTR/L 6/14/2019  8:24 AM    Future Appointments   Date Time Provider Panchito Chau   6/18/2019  8:15 AM Odalis Nguyen OTR/L [id number] [facility]   6/20/2019 12:00 PM Rebeca Mantle [id number] [facility]   6/25/2019  9:45 AM Rebeca Mantle [id number] [facility]   6/28/2019  8:00 AM MD CONSUELO Wagner-LAUREN BISHOP SCHED   6/28/2019  9:45 AM Rebeca Mantle [id number] [facility]   7/2/2019  9:45 AM Rebeca Mantle [id number] [facility]   7/3/2019 10:15 AM Odalis Nguyen OTR/L [id number] [facility]   8/5/2019 10:00 AM MD YUNIOR Ruiz SCHED   10/3/2019  8:00 AM Janki Cross MD [address] [place] [address]

## 2019-06-18 ENCOUNTER — HOSPITAL ENCOUNTER (OUTPATIENT)
Dept: PHYSICAL THERAPY | Age: 69
Discharge: HOME | End: 2019-06-18
Payer: MEDICARE

## 2019-06-18 PROCEDURE — 97110 THERAPEUTIC EXERCISES: CPT

## 2019-06-18 PROCEDURE — 97018 PARAFFIN BATH THERAPY: CPT

## 2019-06-19 DIAGNOSIS — M54.2 CERVICALGIA: ICD-10-CM

## 2019-06-20 ENCOUNTER — HOSPITAL ENCOUNTER (OUTPATIENT)
Dept: PHYSICAL THERAPY | Age: 69
Discharge: HOME | End: 2019-06-20
Payer: MEDICARE

## 2019-06-20 PROCEDURE — 97110 THERAPEUTIC EXERCISES: CPT

## 2019-06-20 PROCEDURE — 97018 PARAFFIN BATH THERAPY: CPT

## 2019-06-20 RX ORDER — HYDROCODONE BITARTRATE AND ACETAMINOPHEN 7.5; 325 MG/1; MG/1
1 TABLET ORAL
Qty: 150 TAB | Refills: 0 | Status: SHIPPED | OUTPATIENT
Start: 2019-06-20 | End: 2019-07-22 | Stop reason: SDUPTHER

## 2019-06-20 RX ORDER — FLUTICASONE PROPIONATE 50 MCG
1-2 SPRAY, SUSPENSION (ML) NASAL DAILY
Qty: 1 BOTTLE | Refills: 1 | Status: SHIPPED | OUTPATIENT
Start: 2019-06-20 | End: 2019-08-18 | Stop reason: SDUPTHER

## 2019-06-20 NOTE — PROGRESS NOTES
OT DAILY TREATMENT NOTE 10-18    Patient Name: Kal Ventura  Date:2019  : 1950  [x]  Patient  Verified  Payor: VA MEDICARE / Plan: VA MEDICARE PART A & B / Product Type: Medicare /    In time:1207  Out time:1246  Total Treatment Time (min): 39  Visit #: 4 of     Medicare/BCBS Only   Total Timed Codes (min):  29 1:1 Treatment Time:  39     Treatment Area: Osteoarthritis of both hands [M19.041, M19.042]    SUBJECTIVE  Pain Level (0-10 scale): 0/10  Any medication changes, allergies to medications, adverse drug reactions, diagnosis change, or new procedure performed?: [x] No    [] Yes (see summary sheet for update)  Subjective functional status/changes:   [] No changes reported  It is the worst at night     OBJECTIVE            Modality rationale: decrease pain and increase tissue extensibility to improve the patients ability to grasp   Min Type Additional Details      [] Estim:  []Unatt       []IFC  []Premod                        []Other:  []w/ice   []w/heat  Position:  Location:      [] Estim: []Att    []TENS instruct  []NMES                    []Other:  []w/US   []w/ice   []w/heat  Position:  Location:      []  Traction: [] Cervical       []Lumbar                       [] Prone          []Supine                       []Intermittent   []Continuous Lbs:  [] before manual  [] after manual      []  Ultrasound: []Continuous   [] Pulsed                           []1MHz   []3MHz W/cm2:  Location:      []  Iontophoresis with dexamethasone         Location: [] Take home patch   [] In clinic      []  Ice     []  heat  []  Ice massage  []  Laser   []  Anodyne Position:  Location:       10 []  Laser with stim  [x]  Other: Paraffin Position:  Location:both hands      []  Vasopneumatic Device Pressure:       [] lo [] med [] hi   Temperature: [] lo [] med [] hi       [x] Skin assessment post-treatment:  [x]intact []redness- no adverse reaction  []redness - adverse reaction:     29 min Therapeutic Exercise:  [] See flow sheet :   Rationale: increase ROM and increase strength to improve the patients ability to grasp     Medium Firm Theraputty: 10/10 each hand  Tendon Glides:B Hands, 10x  Prayer Stretch: B Hands 10x with hold   Radial Thumb Abduction 10x each hand  Palmar Stretch, B Hands         With   [] TE   [] TA   [] neuro   [] other: Patient Education: [x] Review HEP    [] Progressed/Changed HEP based on:   [] positioning   [] body mechanics   [] transfers   [] heat/ice application   [] Splint wear/care   [] Sensory re-education   [] scar management      [] other:            Other Objective/Functional Measures:     Cueing to breathe during prayer stretch        Pain Level (0-10 scale) post treatment: 0/10    ASSESSMENT/Changes in Function: tightness decreased by end of session     Patient will continue to benefit from skilled OT services to modify and progress therapeutic interventions, address ROM deficits, address strength deficits and instruct in home and community integration to attain remaining goals. []  See Plan of Care  []  See progress note/recertification  []  See Discharge Summary         Progress towards goals / Updated goals:  1.  Patient will be independent in home exercise program for digit ROM. progressing with tendon glides and MP self stretch 6/14/19, added wall basedmedian nerve glide 6/18/19  2.  Patient will be familiar with ligament release to reduce thumb cramping. reviewed 6/14/19  3.  Patient will report reduced episodes of hand cramping by 25%. no change 6/18/19     Long Term Goals: To be accomplished in 4 weeks:          1.  Patient will report rediuced cramping of digits by at least 75% due to improved flexibility and strength in wrists and intrinsics. 2.  Patient will report hand pain 0-4/10 at worst during brushing dogs and other repetitive grasping tasks. 3.  Patient will achieve improved ability to flex IPs of digits in order to perform hook fist for holding items.   4.  Patient will be able to wrist for over 15 minutes without hand cramping with use of adaptive pen.         PLAN  []  Upgrade activities as tolerated     [x]  Continue plan of care  []  Update interventions per flow sheet       []  Discharge due to:_  []  Other:_      ZAIRA Love 6/20/2019  10:20 AM    Future Appointments   Date Time Provider Panchito Zuluagai   6/20/2019 12:00 PM Ezio Calix [id number] [address]   6/25/2019  9:45 AM Ezio Calix [id number] [address]   6/28/2019  8:00 AM MD KAILEE Lala EDNA SCHED   6/28/2019  9:45 AM Ezio Aliciazing [id number] [address]   7/2/2019  9:45 AM Ezio Aliciazing [id number] [address]   7/3/2019 10:15 AM RAFAEL Joseph/L [id number] [address]   8/5/2019 10:00 AM MD YUNIOR Ríos EDNA SCHED   10/3/2019  8:00 AM Brian Cain MD [address] [place] [address]

## 2019-06-20 NOTE — TELEPHONE ENCOUNTER
Controlled Substance Refill Request Information    Medication: Norco 7.5/325    Last fill on : 5/23/2019    Last OV: 4/12/2019    Next appointment: 6/25/2019    Last UDS: 12/14/2018    Concurrent Benzodiazepine: No    Controlled Substance Agreement on File: Yes  Date Signed: 12/2018

## 2019-06-23 DIAGNOSIS — E29.1 HYPOGONADISM IN MALE: ICD-10-CM

## 2019-06-24 ENCOUNTER — HOSPITAL ENCOUNTER (OUTPATIENT)
Dept: LAB | Age: 69
Discharge: HOME | End: 2019-06-24
Payer: MEDICARE

## 2019-06-24 DIAGNOSIS — R79.89 LOW TESTOSTERONE IN MALE: ICD-10-CM

## 2019-06-24 DIAGNOSIS — I10 ESSENTIAL HYPERTENSION: ICD-10-CM

## 2019-06-24 LAB
ALBUMIN SERPL-MCNC: 3.8 G/DL (ref 3.4–5)
ALBUMIN/GLOB SERPL: 1.4 {RATIO} (ref 0.8–1.7)
ALP SERPL-CCNC: 82 U/L (ref 45–117)
ALT SERPL-CCNC: 23 U/L (ref 16–61)
ANION GAP SERPL CALC-SCNC: 7 MMOL/L (ref 3–18)
AST SERPL-CCNC: 18 U/L (ref 15–37)
BASOPHILS # BLD: 0.1 K/UL (ref 0–0.1)
BASOPHILS NFR BLD: 1 % (ref 0–2)
BILIRUB SERPL-MCNC: 0.5 MG/DL (ref 0.2–1)
BUN SERPL-MCNC: 29 MG/DL (ref 7–18)
BUN/CREAT SERPL: 23 (ref 12–20)
CALCIUM SERPL-MCNC: 8.6 MG/DL (ref 8.5–10.1)
CHLORIDE SERPL-SCNC: 111 MMOL/L (ref 100–108)
CO2 SERPL-SCNC: 23 MMOL/L (ref 21–32)
CREAT SERPL-MCNC: 1.25 MG/DL (ref 0.6–1.3)
DIFFERENTIAL METHOD BLD: ABNORMAL
EOSINOPHIL # BLD: 0.1 K/UL (ref 0–0.4)
EOSINOPHIL NFR BLD: 1 % (ref 0–5)
ERYTHROCYTE [DISTWIDTH] IN BLOOD BY AUTOMATED COUNT: 17.1 % (ref 11.6–14.5)
GLOBULIN SER CALC-MCNC: 2.8 G/DL (ref 2–4)
GLUCOSE SERPL-MCNC: 79 MG/DL (ref 74–99)
HCT VFR BLD AUTO: 40 % (ref 36–48)
HGB BLD-MCNC: 12.9 G/DL (ref 13–16)
LYMPHOCYTES # BLD: 4.6 K/UL (ref 0.9–3.6)
LYMPHOCYTES NFR BLD: 52 % (ref 21–52)
MCH RBC QN AUTO: 27.9 PG (ref 24–34)
MCHC RBC AUTO-ENTMCNC: 32.3 G/DL (ref 31–37)
MCV RBC AUTO: 86.4 FL (ref 74–97)
MONOCYTES # BLD: 0.6 K/UL (ref 0.05–1.2)
MONOCYTES NFR BLD: 7 % (ref 3–10)
NEUTS SEG # BLD: 3.4 K/UL (ref 1.8–8)
NEUTS SEG NFR BLD: 39 % (ref 40–73)
PLATELET # BLD AUTO: 301 K/UL (ref 135–420)
PMV BLD AUTO: 10.8 FL (ref 9.2–11.8)
POTASSIUM SERPL-SCNC: 4.6 MMOL/L (ref 3.5–5.5)
PROT SERPL-MCNC: 6.6 G/DL (ref 6.4–8.2)
RBC # BLD AUTO: 4.63 M/UL (ref 4.7–5.5)
SODIUM SERPL-SCNC: 141 MMOL/L (ref 136–145)
WBC # BLD AUTO: 8.8 K/UL (ref 4.6–13.2)

## 2019-06-24 PROCEDURE — 36415 COLL VENOUS BLD VENIPUNCTURE: CPT

## 2019-06-24 PROCEDURE — 80053 COMPREHEN METABOLIC PANEL: CPT

## 2019-06-24 PROCEDURE — 85025 COMPLETE CBC W/AUTO DIFF WBC: CPT

## 2019-06-24 PROCEDURE — 84403 ASSAY OF TOTAL TESTOSTERONE: CPT

## 2019-06-25 ENCOUNTER — HOSPITAL ENCOUNTER (OUTPATIENT)
Dept: PHYSICAL THERAPY | Age: 69
Discharge: HOME | End: 2019-06-25
Payer: MEDICARE

## 2019-06-25 PROCEDURE — 97018 PARAFFIN BATH THERAPY: CPT

## 2019-06-25 PROCEDURE — 97110 THERAPEUTIC EXERCISES: CPT

## 2019-06-25 RX ORDER — TESTOSTERONE CYPIONATE 200 MG/ML
INJECTION INTRAMUSCULAR
Qty: 1 ML | Refills: 0 | Status: SHIPPED | OUTPATIENT
Start: 2019-06-25 | End: 2019-06-26 | Stop reason: SDUPTHER

## 2019-06-25 NOTE — PROGRESS NOTES
OT DAILY TREATMENT NOTE 10-18    Patient Name: Ajit Xavier  Date:2019  : 1950  [x]  Patient  Verified  Payor: Prashant Solders / Plan: [facility] MEDIC[facility] PART A & B / Product Type: Medicare /    In time:945  Out time:1024  Total Treatment Time (min): 39  Visit #: 5 of     Medicare/BCBS Only   Total Timed Codes (min):  29 1:1 Treatment Time:  29     Treatment Area: Osteoarthritis of both hands [M19.041, M19.042]    SUBJECTIVE  Pain Level (0-10 scale): 0/10  Any medication changes, allergies to medications, adverse drug reactions, diagnosis change, or new procedure performed?: [x] No    [] Yes (see summary sheet for update)  Subjective functional status/changes:   [] No changes reported  Unless they cramp up they don't hurt really     OBJECTIVE            Modality rationale: decrease pain and increase tissue extensibility to improve the patients ability to grasp   Min Type Additional Details       [] Estim:  []Unatt       []IFC  []Premod                        []Other:  []w/ice   []w/heat  Position:  Location:       [] Estim: []Att    []TENS instruct  []NMES                    []Other:  []w/US   []w/ice   []w/heat  Position:  Location:       []  Traction: [] Cervical       []Lumbar                       [] Prone          []Supine                       []Intermittent   []Continuous Lbs:  [] before manual  [] after manual       []  Ultrasound: []Continuous   [] Pulsed                           []1MHz   []3MHz W/cm2:  Location:       []  Iontophoresis with dexamethasone         Location: [] Take home patch   [] In clinic       []  Ice     []  heat  []  Ice massage  []  Laser   []  Anodyne Position:  Location:        10 []  Laser with stim  [x]  Other: Paraffin Position:  Location:both hands       []  Vasopneumatic Device Pressure:       [] lo [] med [] hi   Temperature: [] lo [] med [] hi        [x] Skin assessment post-treatment:  [x]intact []redness- no adverse reaction  []redness - adverse reaction:       29 min Therapeutic Exercise:  [] See flow sheet :   Rationale: increase ROM and increase strength to improve the patients ability to     Med Firm Putty: 10/10 each hand   Tendon Glides, B Hands   velcro roller- 2 in  Flexion/extension 10x each hand   Prayer stretch  Wall Median nerve glides             With   [] TE   [] TA   [] neuro   [] other: Patient Education: [x] Review HEP    [] Progressed/Changed HEP based on:   [] positioning   [] body mechanics   [] transfers   [] heat/ice application   [] Splint wear/care   [] Sensory re-education   [] scar management      [] other:            Other Objective/Functional Measures:     Difficulty with median glides on wall     Pain Level (0-10 scale) post treatment: 0/10    ASSESSMENT/Changes in Function: cramping improving per patient reports     Patient will continue to benefit from skilled OT services to modify and progress therapeutic interventions, address ROM deficits, address strength deficits and instruct in home and community integration to attain remaining goals. []  See Plan of Care  []  See progress note/recertification  []  See Discharge Summary         Progress towards goals / Updated goals:  1.  Patient will be independent in home exercise program for digit ROM. progressing with tendon glides and MP self stretch 6/14/19, added wall basedmedian nerve glide 6/18/19  2.  Patient will be familiar with ligament release to reduce thumb cramping. reviewed 6/14/19  3.  Patient will report reduced episodes of hand cramping by 25%. Slight decrease in episodes per Pt report 6/25/19     Long Term Goals: To be accomplished in 4 weeks:          1.  Patient will report rediuced cramping of digits by at least 75% due to improved flexibility and strength in wrists and intrinsics. 2.  Patient will report hand pain 0-4/10 at worst during brushing dogs and other repetitive grasping tasks.   3.  Patient will achieve improved ability to flex IPs of digits in order to perform hook fist for holding items.   4.  Patient will be able to wrist for over 15 minutes without hand cramping with use of adaptive pen.             PLAN  []  Upgrade activities as tolerated     [x]  Continue plan of care  []  Update interventions per flow sheet       []  Discharge due to:_  []  Other:_      Cuconoreen Alycias BAUER/L 6/25/2019  9:44 AM    Future Appointments   Date Time Provider Panchito Kandi   6/25/2019  9:45 AM [place] Late GMDNEEO [facility]   6/28/2019  8:00 AM MD KAILEE Kendrick SCHED   6/28/2019  9:45 AM [place] Late DYEMVVH [facility]   7/2/2019  9:45 AM [place] Late UKUICON [facility]   7/5/2019  9:45 AM [place] Late ZYJGTJJ [facility]   7/8/2019  8:00 AM Kevon Carrion OTR/L MMCPTPB [facility]   7/10/2019  8:00 AM [place] Late GUEHMKT [facility]   7/15/2019  8:00 AM [place] Late GFBFTRC [facility]   7/17/2019  8:45 AM Kevon Carrion OTR/L MMCPTPB [facility]   8/5/2019 10:00 AM MD YUNIOR Dillon SCHED   10/3/2019  8:00 AM Liliana Humphries MD [address] [place] [address]

## 2019-06-26 DIAGNOSIS — E29.1 HYPOGONADISM IN MALE: ICD-10-CM

## 2019-06-26 LAB
TESTOST FREE SERPL-MCNC: 1 PG/ML (ref 6.6–18.1)
TESTOST SERPL-MCNC: 120 NG/DL (ref 264–916)

## 2019-06-26 RX ORDER — TESTOSTERONE CYPIONATE 200 MG/ML
INJECTION INTRAMUSCULAR
Qty: 1 ML | Refills: 5 | Status: SHIPPED | OUTPATIENT
Start: 2019-06-26 | End: 2019-09-23 | Stop reason: SDUPTHER

## 2019-06-28 ENCOUNTER — OFFICE VISIT (OUTPATIENT)
Dept: FAMILY MEDICINE CLINIC | Facility: CLINIC | Age: 69
End: 2019-06-28

## 2019-06-28 ENCOUNTER — HOSPITAL ENCOUNTER (OUTPATIENT)
Dept: PHYSICAL THERAPY | Age: 69
End: 2019-06-28
Payer: MEDICARE

## 2019-06-28 VITALS
SYSTOLIC BLOOD PRESSURE: 135 MMHG | TEMPERATURE: 97.8 F | OXYGEN SATURATION: 98 % | BODY MASS INDEX: 27.89 KG/M2 | HEIGHT: 76 IN | HEART RATE: 64 BPM | DIASTOLIC BLOOD PRESSURE: 78 MMHG | RESPIRATION RATE: 18 BRPM | WEIGHT: 229 LBS

## 2019-06-28 DIAGNOSIS — J01.00 ACUTE NON-RECURRENT MAXILLARY SINUSITIS: ICD-10-CM

## 2019-06-28 DIAGNOSIS — E29.1 TESTICULAR HYPOFUNCTION: Primary | ICD-10-CM

## 2019-06-28 RX ORDER — CEFUROXIME AXETIL 500 MG/1
TABLET ORAL
Qty: 14 TAB | Refills: 0 | Status: SHIPPED | OUTPATIENT
Start: 2019-06-28 | End: 2019-07-29 | Stop reason: SDUPTHER

## 2019-06-28 RX ORDER — TESTOSTERONE CYPIONATE 200 MG/ML
200 INJECTION INTRAMUSCULAR ONCE
Qty: 1 ML | Refills: 0 | Status: SHIPPED | COMMUNITY
Start: 2019-06-28 | End: 2019-06-28

## 2019-06-28 NOTE — PATIENT INSTRUCTIONS
Patient presented to office for Testosterone 200mg/ml (1ml) injection. Allergies noted. Patient well and consenting to injection. Injection given intramuscular in right gluteus. Patient tolerated injection well and left office ambulatory.

## 2019-06-28 NOTE — PROGRESS NOTES
The patient presents to the office today with the chief complaint of sinus congestion    HPI    The patient complains of 3 to 4 days of worsening sinus congestion appointment on her sinuses feel pack. Patient is uncomfortable in his sinuses. The patient denies chest congestion. Patient denies fever. Patient remains on Flonase. The patient's wife mentions that his mental status does not seem sharp making her question of the iron levels again low. The iron in mid April was normal at 135. The patient has hypogonadism which may be secondary to long-term Norco use. Recent testosterone levels quite low which may help explain the apparent mental fogginess    ROS  Sinus congestion as above  Negative for chest pain or shortness of breath    Allergies   Allergen Reactions    Ace Inhibitors Other (comments)     Renal Failure    Arb-Angiotensin Receptor Antagonist Other (comments)     Renal Failure    Levofloxacin Nausea Only     Severe nausea and dizziness    Sulfa (Sulfonamide Antibiotics) Unknown (comments)     Nausea, aching all over       Current Outpatient Medications   Medication Sig Dispense Refill    cefUROXime (CEFTIN) 500 mg tablet 1 tab twice per day 14 Tab 0    testosterone cypionate (DEPOTESTOTERONE CYPIONATE) 200 mg/mL injection 1 mL by IntraMUSCular route once for 1 dose. Max Daily Amount: 200 mg. 1 mL 0    testosterone cypionate (DEPOTESTOTERONE CYPIONATE) 200 mg/mL injection INJECT 0.5 MLS IN THE MUSCLE EVERY 30 DAYS 1 mL 5    HYDROcodone-acetaminophen (NORCO) 7.5-325 mg per tablet Take 1 Tab by mouth every four (4) hours as needed for Pain for up to 30 days. Max Daily Amount: 5 Tabs. 150 Tab 0    fluticasone propionate (FLONASE) 50 mcg/actuation nasal spray 1-2 Sprays by Both Nostrils route daily.  1 Bottle 1    ELIQUIS 5 mg tablet TAKE 1 TABLET BY MOUTH TWICE DAILY 60 Tab 0    rOPINIRole (REQUIP) 0.5 mg tablet TAKE 1 TABLET BY MOUTH TWICE DAILY 60 Tab 0    SUMAtriptan (IMITREX) 100 mg tablet TAKE 1 TABLET BY MOUTH AT ONSET OF HEADACHE, MAY REPEAT 2 HOURS LATER. (MAX 2 PILLS IN 24 HOURS) 12 Tab 1    guanFACINE IR (TENEX) 1 mg IR tablet TAKE 1 TABLET BY MOUTH AT BEDTIME 90 Tab 2    tamsulosin (FLOMAX) 0.4 mg capsule 1 tab daily 90 Cap 1    topiramate (TOPAMAX) 50 mg tablet Take  by mouth two (2) times a day.  dutasteride (AVODART) 0.5 mg capsule Take 0.5 mg by mouth daily.  POTASSIUM PO Take 1,620 mg by mouth two (2) times a day.  labetalol (NORMODYNE) 300 mg tablet TAKE 1 TABLET BY MOUTH TWICE DAILY 180 Tab 2    DULoxetine (CYMBALTA) 30 mg capsule take 1 capsule by mouth once daily WITH 60 MG CAPSULE 90 Cap 1    DULoxetine (CYMBALTA) 60 mg capsule Take 1 Cap by mouth daily. Take one capsule by mouth daily along with 30 mg capsule daily for a total daily dose 90 mg. 90 Cap 1    montelukast (SINGULAIR) 10 mg tablet Take 10 mg by mouth daily.  amLODIPine (NORVASC) 5 mg tablet take 1 tablet by mouth once daily  0    multivitamin (ONE A DAY) tablet Take 1 Tab by mouth daily.  vitamin E (AQUA GEMS) 400 unit capsule Take 400 Units by mouth daily. Past Medical History:   Diagnosis Date    Abdominal pain, unspecified site     Acute reaction to stress     Allergic rhinitis due to pollen     Arrhythmia     afib    Arthritis     Back injury     BPH (benign prostatic hypertrophy)     Calculus of ureter     Cancer (HCC)     history of Leukemia, in remission    Carotid duplex 06/17/2015    Mild < 50% bilateral ICA stenosis.  Dizziness and giddiness     Esophageal reflux     Essential hypertension     GERD (gastroesophageal reflux disease)     Headache(784.0)     History of echocardiogram 08/13/2015    EF 55-60%. No WMA. Mild-mod LVH. Gr 1 DDfx. No evidence of intracardiac shunt.   Mild AI.      Hx: UTI (urinary tract infection)     Hypertension     Hypogonadism in male     Kidney stones     Migraine     Neck injury     Polycythemia     Polycythemia, secondary     Sciatica     Unspecified retinal detachment     Unspecified sleep apnea     resolved since gastric bypass    Vision decreased     Weight loss        Past Surgical History:   Procedure Laterality Date    ABDOMEN SURGERY PROC UNLISTED      Hernia    HX CATARACT REMOVAL Left     HX CERVICAL FUSION  2016    Cervical Spine Fusion    HX CHOLECYSTECTOMY      HX GASTRIC BYPASS  2006    HX KNEE ARTHROSCOPY      both knees (right knee twice, left once)    HX ORTHOPAEDIC  1995    lumbar laminectomy    HX OTHER SURGICAL      Two back surgeries    HX RETINAL DETACHMENT REPAIR Left        Social History     Socioeconomic History    Marital status:      Spouse name: Not on file    Number of children: Not on file    Years of education: Not on file    Highest education level: Not on file   Occupational History    Not on file   Social Needs    Financial resource strain: Not on file    Food insecurity:     Worry: Not on file     Inability: Not on file    Transportation needs:     Medical: Not on file     Non-medical: Not on file   Tobacco Use    Smoking status: Former Smoker     Last attempt to quit: 1981     Years since quittin.3    Smokeless tobacco: Never Used   Substance and Sexual Activity    Alcohol use: Yes     Comment: occasionally     Drug use: No    Sexual activity: Yes     Partners: Female   Lifestyle    Physical activity:     Days per week: Not on file     Minutes per session: Not on file    Stress: Not on file   Relationships    Social connections:     Talks on phone: Not on file     Gets together: Not on file     Attends [employer] service: Not on file     Active member of club or organization: Not on file     Attends meetings of clubs or organizations: Not on file     Relationship status: Not on file    Intimate partner violence:     Fear of current or ex partner: Not on file     Emotionally abused: Not on file     Physically abused: Not on file     Forced sexual activity: Not on file   Other Topics Concern    Not on file   Social History Narrative    Patient lives with his wife in [place], he had 3 children by unfortunately lost 2 of them. He enjoys taking care of his yard and plays with the grandchildren. Patient does have an advanced directive on file    Visit Vitals  /78 (BP 1 Location: Left arm, BP Patient Position: Sitting)   Pulse 64   Temp 97.8 °F (36.6 °C) (Oral)   Resp 18   Ht 6' 4\" (1.93 m)   Wt 229 lb (103.9 kg)   SpO2 98%   BMI 27.87 kg/m²       Physical Exam  No Cervical Lymphadenopathy  No Supraclavicular Lymphadenopathy  Thyroid is Normal  Lungs are normal to percussion. Clear to auscultation   Heart:  S1 S2 are normal, No gallops, No murmurs  No Carotid Bruits  Abdomen:  Normal Bowel Sounds. No tenderness. No masses. No Hepatomegaly or Splenomegaly  LE:  Strong Pedal Pulses. No Edema      BMI: [facility] Outpatient Visit on 06/24/2019   Component Date Value Ref Range Status    WBC 06/24/2019 8.8  4.6 - 13.2 K/uL Final    RBC 06/24/2019 4.63* 4.70 - 5.50 M/uL Final    HGB 06/24/2019 12.9* 13.0 - 16.0 g/dL Final    HCT 06/24/2019 40.0  36.0 - 48.0 % Final    MCV 06/24/2019 86.4  74.0 - 97.0 FL Final    MCH 06/24/2019 27.9  24.0 - 34.0 PG Final    MCHC 06/24/2019 32.3  31.0 - 37.0 g/dL Final    RDW 06/24/2019 17.1* 11.6 - 14.5 % Final    PLATELET 27/98/8661 431  135 - 420 K/uL Final    MPV 06/24/2019 10.8  9.2 - 11.8 FL Final    NEUTROPHILS 06/24/2019 39* 40 - 73 % Final    LYMPHOCYTES 06/24/2019 52  21 - 52 % Final    MONOCYTES 06/24/2019 7  3 - 10 % Final    EOSINOPHILS 06/24/2019 1  0 - 5 % Final    BASOPHILS 06/24/2019 1  0 - 2 % Final    ABS. NEUTROPHILS 06/24/2019 3.4  1.8 - 8.0 K/UL Final    ABS. LYMPHOCYTES 06/24/2019 4.6* 0.9 - 3.6 K/UL Final    ABS. MONOCYTES 06/24/2019 0.6  0.05 - 1.2 K/UL Final    ABS. EOSINOPHILS 06/24/2019 0.1  0.0 - 0.4 K/UL Final    ABS. BASOPHILS 06/24/2019 0.1  0.0 - 0.1 K/UL Final    DF 06/24/2019 AUTOMATED    Final    Sodium 06/24/2019 141  136 - 145 mmol/L Final    Potassium 06/24/2019 4.6  3.5 - 5.5 mmol/L Final    Chloride 06/24/2019 111* 100 - 108 mmol/L Final    CO2 06/24/2019 23  21 - 32 mmol/L Final    Anion gap 06/24/2019 7  3.0 - 18 mmol/L Final    Glucose 06/24/2019 79  74 - 99 mg/dL Final    BUN 06/24/2019 29* 7.0 - 18 MG/DL Final    Creatinine 06/24/2019 1.25  0.6 - 1.3 MG/DL Final    BUN/Creatinine ratio 06/24/2019 23* 12 - 20   Final    GFR est AA 06/24/2019 >60  >60 ml/min/1.73m2 Final    GFR est non-AA 06/24/2019 57* >60 ml/min/1.73m2 Final    Comment: (NOTE)  Estimated GFR is calculated using the Modification of Diet in Renal   Disease (MDRD) Study equation, reported for both  Americans   (GFRAA) and non- Americans (GFRNA), and normalized to 1.73m2   body surface area. The physician must decide which value applies to   the patient. The MDRD study equation should only be used in   individuals age 25 or older. It has not been validated for the   following: pregnant women, patients with serious comorbid conditions,   or on certain medications, or persons with extremes of body size,   muscle mass, or nutritional status.  Calcium 06/24/2019 8.6  8.5 - 10.1 MG/DL Final    Bilirubin, total 06/24/2019 0.5  0.2 - 1.0 MG/DL Final    ALT (SGPT) 06/24/2019 23  16 - 61 U/L Final    AST (SGOT) 06/24/2019 18  15 - 37 U/L Final    Alk. phosphatase 06/24/2019 82  45 - 117 U/L Final    Protein, total 06/24/2019 6.6  6.4 - 8.2 g/dL Final    Albumin 06/24/2019 3.8  3.4 - 5.0 g/dL Final    Globulin 06/24/2019 2.8  2.0 - 4.0 g/dL Final    A-G Ratio 06/24/2019 1.4  0.8 - 1.7   Final    Testosterone 06/24/2019 120* 264 - 916 ng/dL Final    Comment: (NOTE)  Adult male reference interval is based on a population of  healthy nonobese males (BMI <30) between 23and 44years old.   Ruy Parrish 2017,102;[id number]. PMID: [id number].   Performed At: [facility] [address] [place] [id number]  Jeff Marino MD [id number]      Free testosterone (Direct) 06/24/2019 1.0* 6.6 - 18.1 pg/mL Final    Comment: (NOTE)  Performed At: [address] [id number]  Jeff Marino MD PI:[id number]     Hospital Outpatient Visit on 04/30/2019   Component Date Value Ref Range Status    Prostate Specific Ag 04/30/2019 0.8  0.0 - 4.0 ng/mL Final   Office Visit on 04/30/2019   Component Date Value Ref Range Status    Color (UA POC) 04/30/2019 Yellow   Final    Clarity (UA POC) 04/30/2019 Clear   Final    Glucose (UA POC) 04/30/2019 Negative  Negative Final    Bilirubin (UA POC) 04/30/2019 Negative  Negative Final    Ketones (UA POC) 04/30/2019 Negative  Negative Final    Specific gravity (UA POC) 04/30/2019 1.025  1.001 - 1.035 Final    Blood (UA POC) 04/30/2019 2+  Negative Final    pH (UA POC) 04/30/2019 5.0  4.6 - 8.0 Final    Protein (UA POC) 04/30/2019 1+  Negative Final    Urobilinogen (UA POC) 04/30/2019 0.2 mg/dL  0.2 - 1 Final    Nitrites (UA POC) 04/30/2019 Negative  Negative Final    Leukocyte esterase (UA POC) 04/30/2019 Negative  Negative Final   Hospital Outpatient Visit on 04/18/2019   Component Date Value Ref Range Status    Ferritin 04/18/2019 168  8 - 388 NG/ML Final    WBC 04/18/2019 11.1  4.6 - 13.2 K/uL Final    RBC 04/18/2019 4.61* 4.70 - 5.50 M/uL Final    HGB 04/18/2019 12.1* 13.0 - 16.0 g/dL Final    HCT 04/18/2019 37.7  36.0 - 48.0 % Final    MCV 04/18/2019 81.8  74.0 - 97.0 FL Final    MCH 04/18/2019 26.2  24.0 - 34.0 PG Final    MCHC 04/18/2019 32.1  31.0 - 37.0 g/dL Final    RDW 04/18/2019 23.2* 11.6 - 14.5 % Final    PLATELET 21/23/4582 305  135 - 420 K/uL Final    NEUTROPHILS 04/18/2019 53  40 - 73 % Final    LYMPHOCYTES 04/18/2019 38  21 - 52 % Final    MONOCYTES 04/18/2019 8  3 - 10 % Final    EOSINOPHILS 04/18/2019 1  0 - 5 % Final    BASOPHILS 04/18/2019 0  0 - 2 % Final    ABS. NEUTROPHILS 04/18/2019 5.9  1.8 - 8.0 K/UL Final    ABS. LYMPHOCYTES 04/18/2019 4.2* 0.9 - 3.6 K/UL Final    ABS. MONOCYTES 04/18/2019 0.9  0.05 - 1.2 K/UL Final    ABS. EOSINOPHILS 04/18/2019 0.1  0.0 - 0.4 K/UL Final    ABS. BASOPHILS 04/18/2019 0.0  0.0 - 0.1 K/UL Final    PLATELET COMMENTS 97/23/9564 ADEQUATE PLATELETS    Final    RBC COMMENTS 04/18/2019     Final                    Value:ANISOCYTOSIS  1+      DF 04/18/2019 MANUAL    Final   Hospital Outpatient Visit on 04/01/2019   Component Date Value Ref Range Status    Methylmalonic acid 04/01/2019 199  0 - 378 nmol/L Final    Disclaimer 04/01/2019 Comment    Final    Comment: (NOTE)  This test was developed and its performance characteristics  determined by [facility]. It has not been cleared or approved  by the Food and Drug Administration. Performed At: [address] [id number]  Maryanne Logan MD [id number]      TSH 04/01/2019 0.38  0.36 - 3.74 uIU/mL Final    Ferritin 04/01/2019 4* 8 - 388 NG/ML Final    Vitamin B1 04/01/2019 157.7  66.5 - 200.0 nmol/L Final    Comment: (NOTE)  This test was developed and its performance characteristics  determined by [facility]. It has not been cleared or approved  by the Food and Drug Administration. Performed At: [address] [id number]  Maryanne Logan MD [id number]         . No results found for any visits on 06/28/19. Assessment / Plan      ICD-10-CM ICD-9-CM    1. Testicular hypofunction E29.1 257.2 RI THER/PROPH/DIAG INJECTION, SUBCUT/IM      testosterone cypionate (DEPOTESTOTERONE CYPIONATE) 200 mg/mL injection   2.  Acute non-recurrent maxillary sinusitis J01.00 461.0        Sinusitis          --- Ceftin         --- Flonase  Testosterone shot given 200mg/mL --- 1 mL given the plan will be to recheck testosterone level in 2 months  he was advised to continue his maintenance medications  The patient is to call if the sinuses are not better in 5 days         Follow-up and Dispositions    · Return in about 3 months (around 9/28/2019). Insert patient follow-up        THIS DOCUMENT WAS CREATED WITH A VOICE ACTIVATED DICTATION SYSTEM.   IT MAY CONTAIN TRANSCRIPTION ERRORS

## 2019-06-28 NOTE — PROGRESS NOTES
Chief Complaint   Patient presents with    Nasal Congestion    Follow-up     2 month      1. Have you been to the ER, urgent care clinic since your last visit? Hospitalized since your last visit? No    2. Have you seen or consulted any other health care providers outside of the [address] [place] [address] since your last visit? Include any pap smears or colon screening.  Yes Where: ENT and Dr. Lennie Hawk

## 2019-07-02 ENCOUNTER — TELEPHONE (OUTPATIENT)
Dept: FAMILY MEDICINE CLINIC | Facility: CLINIC | Age: 69
End: 2019-07-02

## 2019-07-02 ENCOUNTER — HOSPITAL ENCOUNTER (OUTPATIENT)
Dept: PHYSICAL THERAPY | Age: 69
End: 2019-07-02
Payer: MEDICARE

## 2019-07-02 RX ORDER — CLINDAMYCIN HYDROCHLORIDE 300 MG/1
300 CAPSULE ORAL 3 TIMES DAILY
Qty: 30 CAP | Refills: 0 | Status: SHIPPED | OUTPATIENT
Start: 2019-07-02 | End: 2019-07-12

## 2019-07-02 RX ORDER — PREDNISONE 20 MG/1
TABLET ORAL
Qty: 20 TAB | Refills: 0 | Status: SHIPPED | OUTPATIENT
Start: 2019-07-02 | End: 2019-07-29 | Stop reason: SDUPTHER

## 2019-07-03 ENCOUNTER — APPOINTMENT (OUTPATIENT)
Dept: PHYSICAL THERAPY | Age: 69
End: 2019-07-03
Payer: MEDICARE

## 2019-07-05 ENCOUNTER — HOSPITAL ENCOUNTER (OUTPATIENT)
Dept: PHYSICAL THERAPY | Age: 69
Discharge: HOME | End: 2019-07-05
Payer: MEDICARE

## 2019-07-05 PROCEDURE — 97110 THERAPEUTIC EXERCISES: CPT

## 2019-07-05 PROCEDURE — 97018 PARAFFIN BATH THERAPY: CPT

## 2019-07-05 NOTE — PROGRESS NOTES
OT DAILY TREATMENT NOTE 10-18    Patient Name: Sherrill Willett  Date:2019  : 1950  [x]  Patient  Verified  Payor: [place] MEDICARE / Plan: [place] MEDICARE PART A & B / Product Type: Medicare /    In time:953  Out time:1030  Total Treatment Time (min): 37  Visit #: 6 of     Medicare/BCBS Only   Total Timed Codes (min):  27 1:1 Treatment Time:  37     Treatment Area: Osteoarthritis of both hands [M19.041, M19.042]    SUBJECTIVE  Pain Level (0-10 scale): 0/10  Any medication changes, allergies to medications, adverse drug reactions, diagnosis change, or new procedure performed?: [x] No    [] Yes (see summary sheet for update)  Subjective functional status/changes:   [] No changes reported  They don't hurt unless they are cramping up, and when they cramp they hurt bad.    Shortened session due to Patient late arrival  Recently missed appointments due to sinus infection     OBJECTIVE                Modality rationale: decrease pain and increase tissue extensibility to improve the patients ability to grasp   Min Type Additional Details       [] Estim:  []Unatt       []IFC  []Premod                        []Other:  []w/ice   []w/heat  Position:  Location:       [] Estim: []Att    []TENS instruct  []NMES                    []Other:  []w/US   []w/ice   []w/heat  Position:  Location:       []  Traction: [] Cervical       []Lumbar                       [] Prone          []Supine                       []Intermittent   []Continuous Lbs:  [] before manual  [] after manual       []  Ultrasound: []Continuous   [] Pulsed                           []1MHz   []3MHz W/cm2:  Location:       []  Iontophoresis with dexamethasone         Location: [] Take home patch   [] In clinic       []  Ice     []  heat  []  Ice massage  []  Laser   []  Anodyne Position:  Location:        10 []  Laser with stim  [x]  Other: Paraffin Position:  Location:both hands       []  Vasopneumatic Device Pressure:       [] lo [] med [] hi Temperature: [] lo [] med [] hi        [x] Skin assessment post-treatment:  [x]intact []redness- no adverse reaction  []redness - adverse reaction:        27 min Therapeutic Exercise:  [] See flow sheet :   Rationale: increase ROM and increase strength to improve the patients ability to      Med Firm Putty: 10/10 each hand   Tendon Glides, B Hands   Prayer stretch  Wall Median nerve glides -review  Hook fist with marker 10x each hand   Velcro Roller: digit flexion/extension 10x each               With   [] TE   [] TA   [] neuro   [] other: Patient Education: [x] Review HEP    [] Progressed/Changed HEP based on:   [] positioning   [] body mechanics   [] transfers   [] heat/ice application   [] Splint wear/care   [] Sensory re-education   [] scar management      [] other:            Other Objective/Functional Measures:     Increased stiffness of BUE     Pain Level (0-10 scale) post treatment: 0/10    ASSESSMENT/Changes in Function: ROM decreased on this date due to recent illness hindering ability to perform HEP    Patient will continue to benefit from skilled OT services to modify and progress therapeutic interventions, address ROM deficits, address strength deficits and instruct in home and community integration to attain remaining goals. []  See Plan of Care  []  See progress note/recertification  []  See Discharge Summary         Progress towards goals / Updated goals:  1.  Patient will be independent in home exercise program for digit ROM. progressing with tendon glides and MP self stretch 6/14/19, added wall basedmedian nerve glide 6/18/19  2.  Patient will be familiar with ligament release to reduce thumb cramping. reviewed 6/14/19  3.  Patient will report reduced episodes of hand cramping by 25%.  Slight decrease in episodes per Pt report 6/25/19     Long Term Goals: To be accomplished in 4 weeks:          1.  Patient will report rediuced cramping of digits by at least 75% due to improved flexibility and strength in wrists and intrinsics. 2.  Patient will report hand pain 0-4/10 at worst during brushing dogs and other repetitive grasping tasks. Improving per Pt report 7/5/19   3.  Patient will achieve improved ability to flex IPs of digits in order to perform hook fist for holding items. Progressing with in clinic activities 7/5/19  4.  Patient will be able to wrist for over 15 minutes without hand cramping with use of adaptive pen.         PLAN  []  Upgrade activities as tolerated     [x]  Continue plan of care  []  Update interventions per flow sheet       []  Discharge due to:_  []  Other:_      RICHA RobertsA/L 7/5/2019  8:00 AM    Future Appointments   Date Time Provider Panchito Kandi   7/5/2019  9:45 AM Cristal Oneil [id number] [facility]   7/8/2019  8:00 AM Jefry Lozano OTR/L [id number] [facility]   7/10/2019  8:00 AM Cristal Oneil [id number] [facility]   7/15/2019  8:00 AM Cristal Oneil [id number] [facility]   7/17/2019  8:45 AM Jefry Lozano OTR/L MMCPTPB [facility]   7/22/2019  8:45 AM Jefry Lozano OTR/L MMCPTPB [facility]   8/5/2019 10:00 AM Wilber Alpers, MD [facility]   10/3/2019  8:00 AM Radha Bower MD [address] [place] [address]

## 2019-07-07 RX ORDER — ROPINIROLE 0.5 MG/1
TABLET, FILM COATED ORAL
Qty: 60 TAB | Refills: 0 | Status: SHIPPED | OUTPATIENT
Start: 2019-07-07 | End: 2019-08-09 | Stop reason: SDUPTHER

## 2019-07-08 ENCOUNTER — HOSPITAL ENCOUNTER (OUTPATIENT)
Dept: PHYSICAL THERAPY | Age: 69
Discharge: HOME | End: 2019-07-08
Payer: MEDICARE

## 2019-07-08 PROCEDURE — 97110 THERAPEUTIC EXERCISES: CPT

## 2019-07-08 NOTE — PROGRESS NOTES
OT DAILY TREATMENT NOTE 10-18    Patient Name: Hector Chaidez  Date:2019  : 1950  [x]  Patient  Verified  Payor: [place] MEDICARE / Plan: [place] MEDICARE PART A & B / Product Type: Medicare /    In time:800  Out time:840  Total Treatment Time (min): 40  Visit #: 7 of     Medicare/BCBS Only   Total Timed Codes (min):  30 1:1 Treatment Time:  40     Treatment Area: Osteoarthritis of both hands [M19.041, M19.042]    SUBJECTIVE  Pain Level (0-10 scale): 0/10  Any medication changes, allergies to medications, adverse drug reactions, diagnosis change, or new procedure performed?: [x] No    [] Yes (see summary sheet for update)  Subjective functional status/changes:   [] No changes reported  Just stiff  Cramping still the same, but stiffness is much better    OBJECTIVE    Modality rationale: increase tissue extensibility to improve the patients ability to grasp   Min Type Additional Details    [] Estim:  []Unatt       []IFC  []Premod                        []Other:  []w/ice   []w/heat  Position:  Location:    [] Estim: []Att    []TENS instruct  []NMES                    []Other:  []w/US   []w/ice   []w/heat  Position:  Location:    []  Traction: [] Cervical       []Lumbar                       [] Prone          []Supine                       []Intermittent   []Continuous Lbs:  [] before manual  [] after manual    []  Ultrasound: []Continuous   [] Pulsed                           []1MHz   []3MHz W/cm2:  Location:    []  Iontophoresis with dexamethasone         Location: [] Take home patch   [] In clinic   10 []  Ice     [x]  heat  []  Ice massage  []  Laser   []  Anodyne Position:  Location:both hands    []  Laser with stim  []  Other:  Position:  Location:    []  Vasopneumatic Device Pressure:       [] lo [] med [] hi   Temperature: [] lo [] med [] hi       [x] Skin assessment post-treatment:  [x]intact []redness- no adverse reaction    []redness - adverse reaction:       30 min Therapeutic Exercise:  [] See flow sheet :   Rationale: increase ROM to improve the patients ability to grasp  Hook flat and full fist with med firm putty,   Flattening med firm x 10 pegs both hands  Lumb position med firm both hands  Review HEP  Wall push ups for forearm stretch      With   [] TE   [] TA   [] neuro   [] other: Patient Education: [x] Review HEP    [] Progressed/Changed HEP based on: final HEP  [] positioning   [] body mechanics   [] transfers   [] heat/ice application   [] Splint wear/care   [] Sensory re-education   [] scar management      [] other:            Other Objective/Functional Measures:   Better able to achieve hook fist     Pain Level (0-10 scale) post treatment: 0/10    ASSESSMENT/Changes in Function: Improved digit ROM     []  See Plan of Care  []  See progress note/recertification  [x]  See Discharge Summary         Progress towards goals / Updated goals:  1..  Patient will be independent in home exercise program for digit ROM. progressing with tendon glides and MP self stretch 6/14/19, added wall basedmedian nerve glide 6/18/19  2.  Patient will be familiar with ligament release to reduce thumb cramping. reviewed 6/14/19 met   3.  Patient will report reduced episodes of hand cramping by 25%. Slight decrease in episodes per Pt report 6/25/19     Long Term Goals: To be accomplished in 4 weeks:          1.  Patient will report rediuced cramping of digits by at least 75% due to improved flexibility and strength in wrists and intrinsics. reports improved flexibility but cramps unchanged  2.  Patient will report hand pain 0-4/10 at worst during brushing dogs and other repetitive grasping tasks. Improving per Pt report 7/5/19, cramping unchanged 7/8/19   3.  Patient will achieve improved ability to flex IPs of digits in order to perform hook fist for holding items. Progressing with in clinic activities 7/5/19  4.  Patient will be able to write for over 15 minutes without hand cramping with use of adaptive pen. not met, shown adapted grasp on pen 7/8/19  **    PLAN  []  Upgrade activities as tolerated     []  Continue plan of care  []  Update interventions per flow sheet       [x]  Discharge due to:lack of appreciable improvement_  []  Other:_      Frannie Stone, OTR/L 7/8/2019  8:05 AM    Future Appointments   Date Time Provider Panchito Kandi   7/10/2019  8:00 AM Burnadette Broccoli [id number] [facility]   7/15/2019  8:00 AM Burnadettkrista Hintonccoli [id number] [facility]   7/17/2019  8:45 AM Elfida Ora, OTR/L [id number] [facility]   7/22/2019  8:45 AM Elfida Ora, OTR/L MMCPTPB [facility]   8/5/2019 10:00 AM Misty Rosales MD [facility]   10/3/2019  8:00 AM Rosalva Petersen MD [address] [place] [address]

## 2019-07-09 NOTE — PROGRESS NOTES
[facility]  [place]  [address] [place] [address]  [phone] fax    Occupational Therapy Discharge Summary  Patient name: Tamika Arciniega Start of Care: 6/10/19   Referral source: Irina Buitrago MD : 1950   Medical/Treatment Diagnosis: Osteoarthritis of both hands [M19.041, M19.042]  Payor: [place] MEDICARE / Plan: [place] MEDICARE PART A & B / Product Type: Medicare /  Onset Date:6 months     Prior Hospitalization: see medical history Provider#: [id number]   Medications: Verified on Patient Summary List    Comorbidities: Back and neck surgeries, HTN, heart disease, leukemia, thyroid  Prior Level of Function:I self care yard care driving, retired ship[employer]    [facility]: 7    Missed Visits: 1    Reporting Period : 6/10/19 to 19    Summary of Care:Patient was seen for modalities, therapeutic exercises and activities to improve hand function. He has HEP. Goal:1..  Patient will be independent in home exercise program for digit ROM  Status at last note/certification:did not have  Status at discharge: met    Rajinder Son will be familiar with ligament release to reduce thumb cramping  Status at last note/certification:unaware  Status at discharge: met    Rajinder Son will report reduced episodes of hand cramping by 25%. Status at last note/certification:Frequent daily  Status at discharge: not met, slight decrease only    LTGs  Goal:1.  Patient will report rediuced cramping of digits by at least 75% due to improved flexibility and strength in wrists and intrinsics  Status at last note/certification:Frequent cramping  Status at discharge: not met stiffness reduced, cramping continues    Rajinder Son will report hand pain 0-4/10 at worst during brushing dogs and other repetitive grasping tasks  Status at last note/certification:Pain and cramping with repetitive tasks  Status at discharge: not met, ongoing cramping    Goal:3.  Patient will achieve improved ability to flex IPs of digits in order to perform hook fist for holding items  Status at last note/certification:Unable  Status at discharge: met    Nancy Singletonter will be able to write for over 15 minutes without hand cramping with use of adaptive pen  Status at last note/certification:Cramping with writing  Status at discharge: not met progressing, shown adapted grasp on pen to reduce cramping    ASSESSMENT/Changes in Function: Improved digit and wrist ROM and reduced stiffness. Cramping continues.       ASSESSMENT/RECOMMENDATIONS:  [x]Discontinue therapy: [x]Patient has reached or is progressing toward set goals      []Patient is non-compliant or has abdicated      []Due to lack of appreciable progress towards set goals    RAFAEL Wilson/L 7/9/2019 11:08 AM

## 2019-07-10 ENCOUNTER — APPOINTMENT (OUTPATIENT)
Dept: PHYSICAL THERAPY | Age: 69
End: 2019-07-10
Payer: MEDICARE

## 2019-07-10 RX ORDER — APIXABAN 5 MG/1
TABLET, FILM COATED ORAL
Qty: 60 TAB | Refills: 0 | Status: SHIPPED | OUTPATIENT
Start: 2019-07-10 | End: 2019-08-09 | Stop reason: SDUPTHER

## 2019-07-12 RX ORDER — TRAZODONE HYDROCHLORIDE 50 MG/1
TABLET ORAL
Qty: 30 TAB | Refills: 0 | Status: SHIPPED | OUTPATIENT
Start: 2019-07-12 | End: 2019-08-08 | Stop reason: SDUPTHER

## 2019-07-15 ENCOUNTER — APPOINTMENT (OUTPATIENT)
Dept: PHYSICAL THERAPY | Age: 69
End: 2019-07-15
Payer: MEDICARE

## 2019-07-15 RX ORDER — POTASSIUM CITRATE 15 MEQ/1
15 TABLET, EXTENDED RELEASE ORAL DAILY
Qty: 30 TAB | Refills: 3 | Status: SHIPPED | OUTPATIENT
Start: 2019-07-15 | End: 2019-07-15 | Stop reason: SDUPTHER

## 2019-07-15 RX ORDER — POTASSIUM CITRATE 15 MEQ/1
TABLET, EXTENDED RELEASE ORAL
Qty: 90 TAB | Refills: 3 | Status: SHIPPED | OUTPATIENT
Start: 2019-07-15

## 2019-07-15 RX ORDER — LABETALOL 300 MG/1
TABLET, FILM COATED ORAL
Qty: 60 TAB | Refills: 0 | Status: SHIPPED | OUTPATIENT
Start: 2019-07-15 | End: 2019-08-05 | Stop reason: SDUPTHER

## 2019-07-16 RX ORDER — MONTELUKAST SODIUM 10 MG/1
TABLET ORAL
Qty: 90 TAB | Refills: 3 | Status: SHIPPED | OUTPATIENT
Start: 2019-07-16

## 2019-07-17 ENCOUNTER — APPOINTMENT (OUTPATIENT)
Dept: PHYSICAL THERAPY | Age: 69
End: 2019-07-17
Payer: MEDICARE

## 2019-07-18 RX ORDER — TOPIRAMATE 50 MG/1
50 TABLET, FILM COATED ORAL 2 TIMES DAILY
Qty: 60 TAB | Refills: 5 | Status: SHIPPED | OUTPATIENT
Start: 2019-07-18 | End: 2019-07-18 | Stop reason: SDUPTHER

## 2019-07-19 RX ORDER — TOPIRAMATE 50 MG/1
TABLET, FILM COATED ORAL
Qty: 180 TAB | Refills: 5 | Status: SHIPPED | OUTPATIENT
Start: 2019-07-19

## 2019-07-22 ENCOUNTER — APPOINTMENT (OUTPATIENT)
Dept: PHYSICAL THERAPY | Age: 69
End: 2019-07-22
Payer: MEDICARE

## 2019-07-22 DIAGNOSIS — M54.2 CERVICALGIA: ICD-10-CM

## 2019-07-22 NOTE — TELEPHONE ENCOUNTER
Controlled Substance Refill Request Information    Medication: Norco 7.5/325 mg    Last fill on : #150 on 6/21/2019    Last OV: 6/28/2019    Next appointment: 9/30/2019    Last UDS: 12/14/2018    Other Controlled Substances: Yes (testosterone last filled on 6/21/2019 on )    Naloxone ordered: No    Controlled Substance Agreement on File: Yes  Date Signed: 12/2018

## 2019-07-23 DIAGNOSIS — E29.1 TESTICULAR HYPOFUNCTION: Primary | ICD-10-CM

## 2019-07-23 RX ORDER — HYDROCODONE BITARTRATE AND ACETAMINOPHEN 7.5; 325 MG/1; MG/1
1 TABLET ORAL
Qty: 150 TAB | Refills: 0 | Status: SHIPPED | OUTPATIENT
Start: 2019-07-23 | End: 2019-08-20 | Stop reason: SDUPTHER

## 2019-07-24 ENCOUNTER — APPOINTMENT (OUTPATIENT)
Dept: PHYSICAL THERAPY | Age: 69
End: 2019-07-24
Payer: MEDICARE

## 2019-07-29 ENCOUNTER — HOSPITAL ENCOUNTER (OUTPATIENT)
Dept: LAB | Age: 69
Discharge: HOME | End: 2019-07-29
Payer: MEDICARE

## 2019-07-29 ENCOUNTER — OFFICE VISIT (OUTPATIENT)
Dept: FAMILY MEDICINE CLINIC | Facility: CLINIC | Age: 69
End: 2019-07-29

## 2019-07-29 ENCOUNTER — APPOINTMENT (OUTPATIENT)
Dept: PHYSICAL THERAPY | Age: 69
End: 2019-07-29
Payer: MEDICARE

## 2019-07-29 VITALS
TEMPERATURE: 97.1 F | SYSTOLIC BLOOD PRESSURE: 140 MMHG | BODY MASS INDEX: 29.22 KG/M2 | RESPIRATION RATE: 18 BRPM | WEIGHT: 240 LBS | DIASTOLIC BLOOD PRESSURE: 88 MMHG | OXYGEN SATURATION: 97 % | HEIGHT: 76 IN | HEART RATE: 60 BPM

## 2019-07-29 DIAGNOSIS — E29.1 TESTICULAR HYPOFUNCTION: ICD-10-CM

## 2019-07-29 DIAGNOSIS — I10 ESSENTIAL HYPERTENSION: ICD-10-CM

## 2019-07-29 DIAGNOSIS — J01.01 ACUTE RECURRENT MAXILLARY SINUSITIS: Primary | ICD-10-CM

## 2019-07-29 PROCEDURE — 36415 COLL VENOUS BLD VENIPUNCTURE: CPT

## 2019-07-29 PROCEDURE — 84403 ASSAY OF TOTAL TESTOSTERONE: CPT

## 2019-07-29 RX ORDER — CEFUROXIME AXETIL 500 MG/1
TABLET ORAL
Qty: 20 TAB | Refills: 0 | Status: SHIPPED | OUTPATIENT
Start: 2019-07-29 | End: 2019-10-01 | Stop reason: ALTCHOICE

## 2019-07-29 RX ORDER — PREDNISONE 20 MG/1
TABLET ORAL
Qty: 20 TAB | Refills: 0 | Status: SHIPPED | OUTPATIENT
Start: 2019-07-29 | End: 2019-10-01 | Stop reason: ALTCHOICE

## 2019-07-29 RX ORDER — DULOXETIN HYDROCHLORIDE 30 MG/1
CAPSULE, DELAYED RELEASE ORAL
Qty: 90 CAP | Refills: 0 | Status: SHIPPED | OUTPATIENT
Start: 2019-07-29 | End: 2019-10-24 | Stop reason: SDUPTHER

## 2019-07-29 NOTE — PROGRESS NOTES
Miguel Fitch is a 78 y.o. male presents in office for    Chief Complaint   Patient presents with    Cough     productive cough; grayish yellow mucous    Chest Congestion        Visit Vitals  /88 (BP 1 Location: Left arm, BP Patient Position: Sitting)   Pulse 60   Temp 97.1 °F (36.2 °C) (Tympanic)   Resp 18   Ht 6' 4\" (1.93 m)   Wt 240 lb (108.9 kg)   SpO2 97%   BMI 29.21 kg/m²         Health Maintenance Due   Topic Date Due    Hepatitis C Screening  1950    Shingrix Vaccine Age 50> (1 of 2) 06/22/1999    GLAUCOMA SCREENING Q2Y  06/22/2014    MEDICARE YEARLY EXAM  08/04/2019         1. Have you been to the ER, urgent care clinic since your last visit? Hospitalized since your last visit? No    2. Have you seen or consulted any other health care providers outside of the [address] [place] [address] since your last visit? Include any pap smears or colon screening.  No     Learning Assessment 5/3/2018   PRIMARY LEARNER Patient   HIGHEST LEVEL OF EDUCATION - PRIMARY LEARNER  -   BARRIERS PRIMARY LEARNER -   CO-LEARNER CAREGIVER -   PRIMARY LANGUAGE ENGLISH    NEED -   LEARNER PREFERENCE PRIMARY DEMONSTRATION   ANSWERED BY patient   RELATIONSHIP SELF

## 2019-07-30 LAB
TESTOST FREE SERPL-MCNC: 1.8 PG/ML (ref 6.6–18.1)
TESTOST SERPL-MCNC: 184 NG/DL (ref 264–916)

## 2019-07-30 NOTE — PROGRESS NOTES
The patient presents to the office today with the chief complaint of Sinus Congestion    HPI    The patient complains of 3 days of sinus congestion with facial fullness. The patient has previous sinus infections. The last was several weeks ago. After therapy the patient's symptoms had resolved. He was \"good\" for about one week but the symptoms have returned. he denies fever. The patient remains on Singulair for allergic rhinitisThe patient remains on Labetalol, Norvasc, and Tenex for hypertension. He is doing well on the medications. Review of Systems   Respiratory: Negative for shortness of breath. Cardiovascular: Negative for chest pain and leg swelling. Allergies   Allergen Reactions    Ace Inhibitors Other (comments)     Renal Failure    Arb-Angiotensin Receptor Antagonist Other (comments)     Renal Failure    Levofloxacin Nausea Only     Severe nausea and dizziness    Sulfa (Sulfonamide Antibiotics) Unknown (comments)     Nausea, aching all over       Current Outpatient Medications   Medication Sig Dispense Refill    DULoxetine (CYMBALTA) 30 mg capsule TAKE 1 CAPSULE BY MOUTH EVERY DAY 90 Cap 0    cefUROXime (CEFTIN) 500 mg tablet 1 tab twice per day 20 Tab 0    predniSONE (DELTASONE) 20 mg tablet 3 tabs daily x 3 days, 2 tabs daily x 3 days, 1 tab daily x 3 days, 1/2 tab daily x 3 days 20 Tab 0    HYDROcodone-acetaminophen (NORCO) 7.5-325 mg per tablet Take 1 Tab by mouth every four (4) hours as needed for Pain for up to 30 days. Max Daily Amount: 5 Tabs. 150 Tab 0    topiramate (TOPAMAX) 50 mg tablet TAKE 1 TABLET BY MOUTH TWICE DAILY FOR MIGRAINE PREVENTION OR HEADACHES 180 Tab 5    montelukast (SINGULAIR) 10 mg tablet TAKE 1 TABLET BY MOUTH ONCE DAILY 90 Tab 3    labetalol (NORMODYNE) 300 mg tablet TAKE 1 TABLET BY MOUTH TWICE DAILY 60 Tab 0    potassium citrate (UROCIT-K) 15 mEq TbER tablet TAKE 1 TABLET BY MOUTH DAILY WITH FOOD.  SWALLOW TABLETS WHOLE WITH A FULL GLASS OF WATER 90 Tab 3    traZODone (DESYREL) 50 mg tablet TAKE 1 TABLET BY MOUTH EVERY NIGHT 30 Tab 0    ELIQUIS 5 mg tablet TAKE 1 TABLET BY MOUTH TWICE DAILY 60 Tab 0    rOPINIRole (REQUIP) 0.5 mg tablet TAKE 1 TABLET BY MOUTH TWICE DAILY 60 Tab 0    testosterone cypionate (DEPOTESTOTERONE CYPIONATE) 200 mg/mL injection INJECT 0.5 MLS IN THE MUSCLE EVERY 30 DAYS 1 mL 5    fluticasone propionate (FLONASE) 50 mcg/actuation nasal spray 1-2 Sprays by Both Nostrils route daily. 1 Bottle 1    SUMAtriptan (IMITREX) 100 mg tablet TAKE 1 TABLET BY MOUTH AT ONSET OF HEADACHE, MAY REPEAT 2 HOURS LATER. (MAX 2 PILLS IN 24 HOURS) 12 Tab 1    guanFACINE IR (TENEX) 1 mg IR tablet TAKE 1 TABLET BY MOUTH AT BEDTIME 90 Tab 2    tamsulosin (FLOMAX) 0.4 mg capsule 1 tab daily 90 Cap 1    dutasteride (AVODART) 0.5 mg capsule Take 0.5 mg by mouth daily.  labetalol (NORMODYNE) 300 mg tablet TAKE 1 TABLET BY MOUTH TWICE DAILY 180 Tab 2    DULoxetine (CYMBALTA) 60 mg capsule Take 1 Cap by mouth daily. Take one capsule by mouth daily along with 30 mg capsule daily for a total daily dose 90 mg. 90 Cap 1    montelukast (SINGULAIR) 10 mg tablet Take 10 mg by mouth daily.  amLODIPine (NORVASC) 5 mg tablet take 1 tablet by mouth once daily  0    multivitamin (ONE A DAY) tablet Take 1 Tab by mouth daily.  vitamin E (AQUA GEMS) 400 unit capsule Take 400 Units by mouth daily. Past Medical History:   Diagnosis Date    Abdominal pain, unspecified site     Acute reaction to stress     Allergic rhinitis due to pollen     Arrhythmia     afib    Arthritis     Back injury     BPH (benign prostatic hypertrophy)     Calculus of ureter     Cancer (HCC)     history of Leukemia, in remission    Carotid duplex 06/17/2015    Mild < 50% bilateral ICA stenosis.       Dizziness and giddiness     Esophageal reflux     Essential hypertension     GERD (gastroesophageal reflux disease)     Headache(784.0)     History of echocardiogram 2015    EF 55-60%. No WMA. Mild-mod LVH. Gr 1 DDfx. No evidence of intracardiac shunt.   Mild AI.      Hx: UTI (urinary tract infection)     Hypertension     Hypogonadism in male     Kidney stones     Migraine     Neck injury     Polycythemia     Polycythemia, secondary     Sciatica     Unspecified retinal detachment     Unspecified sleep apnea     resolved since gastric bypass    Vision decreased     Weight loss        Past Surgical History:   Procedure Laterality Date    ABDOMEN SURGERY PROC UNLISTED      Hernia    HX CATARACT REMOVAL Left     HX CERVICAL FUSION  2016    Cervical Spine Fusion    HX CHOLECYSTECTOMY      HX GASTRIC BYPASS      HX KNEE ARTHROSCOPY      both knees (right knee twice, left once)    HX ORTHOPAEDIC  1995    lumbar laminectomy    HX OTHER SURGICAL      Two back surgeries    HX RETINAL DETACHMENT REPAIR Left        Social History     Socioeconomic History    Marital status:      Spouse name: Not on file    Number of children: Not on file    Years of education: Not on file    Highest education level: Not on file   Occupational History    Not on file   Social Needs    Financial resource strain: Not on file    Food insecurity:     Worry: Not on file     Inability: Not on file    Transportation needs:     Medical: Not on file     Non-medical: Not on file   Tobacco Use    Smoking status: Former Smoker     Last attempt to quit: 1981     Years since quittin.4    Smokeless tobacco: Never Used   Substance and Sexual Activity    Alcohol use: Yes     Comment: occasionally     Drug use: No    Sexual activity: Yes     Partners: Female   Lifestyle    Physical activity:     Days per week: Not on file     Minutes per session: Not on file    Stress: Not on file   Relationships    Social connections:     Talks on phone: Not on file     Gets together: Not on file     Attends Zoroastrianism service: Not on file Active member of club or organization: Not on file     Attends meetings of clubs or organizations: Not on file     Relationship status: Not on file    Intimate partner violence:     Fear of current or ex partner: Not on file     Emotionally abused: Not on file     Physically abused: Not on file     Forced sexual activity: Not on file   Other Topics Concern    Not on file   Social History Narrative    Patient lives with his wife in [place], he had 3 children by unfortunately lost 2 of them. He enjoys taking care of his yard and plays with the grandchildren. Patient does have an advanced directive on file    Visit Vitals  /88 (BP 1 Location: Left arm, BP Patient Position: Sitting)   Pulse 60   Temp 97.1 °F (36.2 °C) (Tympanic)   Resp 18   Ht 6' 4\" (1.93 m)   Wt 240 lb (108.9 kg)   SpO2 97%   BMI 29.21 kg/m²       Physical Exam   HENT:   Ears:  Normal bilaterally  Oral pharynx:  Normal   Neck: Carotid bruit is not present. Cardiovascular: Normal rate and regular rhythm. Exam reveals no gallop. No murmur heard. Pulmonary/Chest: He has no wheezes. He has no rales. Hospital Outpatient Visit on 06/24/2019   Component Date Value Ref Range Status    WBC 06/24/2019 8.8  4.6 - 13.2 K/uL Final    RBC 06/24/2019 4.63* 4.70 - 5.50 M/uL Final    HGB 06/24/2019 12.9* 13.0 - 16.0 g/dL Final    HCT 06/24/2019 40.0  36.0 - 48.0 % Final    MCV 06/24/2019 86.4  74.0 - 97.0 FL Final    MCH 06/24/2019 27.9  24.0 - 34.0 PG Final    MCHC 06/24/2019 32.3  31.0 - 37.0 g/dL Final    RDW 06/24/2019 17.1* 11.6 - 14.5 % Final    PLATELET 56/08/3903 115  135 - 420 K/uL Final    MPV 06/24/2019 10.8  9.2 - 11.8 FL Final    NEUTROPHILS 06/24/2019 39* 40 - 73 % Final    LYMPHOCYTES 06/24/2019 52  21 - 52 % Final    MONOCYTES 06/24/2019 7  3 - 10 % Final    EOSINOPHILS 06/24/2019 1  0 - 5 % Final    BASOPHILS 06/24/2019 1  0 - 2 % Final    ABS. NEUTROPHILS 06/24/2019 3.4  1.8 - 8.0 K/UL Final    ABS. LYMPHOCYTES 06/24/2019 4.6* 0.9 - 3.6 K/UL Final    ABS. MONOCYTES 06/24/2019 0.6  0.05 - 1.2 K/UL Final    ABS. EOSINOPHILS 06/24/2019 0.1  0.0 - 0.4 K/UL Final    ABS. BASOPHILS 06/24/2019 0.1  0.0 - 0.1 K/UL Final    DF 06/24/2019 AUTOMATED    Final    Sodium 06/24/2019 141  136 - 145 mmol/L Final    Potassium 06/24/2019 4.6  3.5 - 5.5 mmol/L Final    Chloride 06/24/2019 111* 100 - 108 mmol/L Final    CO2 06/24/2019 23  21 - 32 mmol/L Final    Anion gap 06/24/2019 7  3.0 - 18 mmol/L Final    Glucose 06/24/2019 79  74 - 99 mg/dL Final    BUN 06/24/2019 29* 7.0 - 18 MG/DL Final    Creatinine 06/24/2019 1.25  0.6 - 1.3 MG/DL Final    BUN/Creatinine ratio 06/24/2019 23* 12 - 20   Final    GFR est AA 06/24/2019 >60  >60 ml/min/1.73m2 Final    GFR est non-AA 06/24/2019 57* >60 ml/min/1.73m2 Final    Comment: (NOTE)  Estimated GFR is calculated using the Modification of Diet in Renal   Disease (MDRD) Study equation, reported for both  Americans   (GFRAA) and non- Americans (GFRNA), and normalized to 1.73m2   body surface area. The physician must decide which value applies to   the patient. The MDRD study equation should only be used in   individuals age 25 or older. It has not been validated for the   following: pregnant women, patients with serious comorbid conditions,   or on certain medications, or persons with extremes of body size,   muscle mass, or nutritional status.  Calcium 06/24/2019 8.6  8.5 - 10.1 MG/DL Final    Bilirubin, total 06/24/2019 0.5  0.2 - 1.0 MG/DL Final    ALT (SGPT) 06/24/2019 23  16 - 61 U/L Final    AST (SGOT) 06/24/2019 18  15 - 37 U/L Final    Alk.  phosphatase 06/24/2019 82  45 - 117 U/L Final    Protein, total 06/24/2019 6.6  6.4 - 8.2 g/dL Final    Albumin 06/24/2019 3.8  3.4 - 5.0 g/dL Final    Globulin 06/24/2019 2.8  2.0 - 4.0 g/dL Final    A-G Ratio 06/24/2019 1.4  0.8 - 1.7   Final    Testosterone 06/24/2019 120* 264 - 916 ng/dL Final Comment: (NOTE)  Adult male reference interval is based on a population of  healthy nonobese males (BMI <30) between 23and 44years old. [address] [place] [address], [facility] [id number]. PMID: [id number]. Performed At: [address] [id number]  Nessa Poole MD MS:[id number]      Free testosterone (Direct) 06/24/2019 1.0* 6.6 - 18.1 pg/mL Final    Comment: (NOTE)  Performed At: [address] [id number]  Nessa Poole MD PB:[id number]     Hospital Outpatient Visit on 04/30/2019   Component Date Value Ref Range Status    Prostate Specific Ag 04/30/2019 0.8  0.0 - 4.0 ng/mL Final   Office Visit on 04/30/2019   Component Date Value Ref Range Status    Color (UA POC) 04/30/2019 Yellow   Final    Clarity (UA POC) 04/30/2019 Clear   Final    Glucose (UA POC) 04/30/2019 Negative  Negative Final    Bilirubin (UA POC) 04/30/2019 Negative  Negative Final    Ketones (UA POC) 04/30/2019 Negative  Negative Final    Specific gravity (UA POC) 04/30/2019 1.025  1.001 - 1.035 Final    Blood (UA POC) 04/30/2019 2+  Negative Final    pH (UA POC) 04/30/2019 5.0  4.6 - 8.0 Final    Protein (UA POC) 04/30/2019 1+  Negative Final    Urobilinogen (UA POC) 04/30/2019 0.2 mg/dL  0.2 - 1 Final    Nitrites (UA POC) 04/30/2019 Negative  Negative Final    Leukocyte esterase (UA POC) 04/30/2019 Negative  Negative Final       .No results found for any visits on 07/29/19. Assessment / Plan      ICD-10-CM ICD-9-CM    1. Acute recurrent maxillary sinusitis J01.01 461.0    2. Essential hypertension I10 401.9        Ceftin x 10 days  Prednisone  he was advised to continue his maintenance medications  he is to call if symptoms persist over five days or if it reoccurs        Follow-up and Dispositions    · Return in about 3 months (around 10/29/2019). I asked Rosina Valentino if he has any questions and I answered the questions.   Rosina Valentino states that he understands the treatment plan and agrees with the treatment plan

## 2019-07-31 ENCOUNTER — APPOINTMENT (OUTPATIENT)
Dept: PHYSICAL THERAPY | Age: 69
End: 2019-07-31
Payer: MEDICARE

## 2019-08-04 RX ORDER — GUANFACINE HYDROCHLORIDE 1 MG/1
TABLET ORAL
Qty: 30 TAB | Refills: 0 | Status: SHIPPED | OUTPATIENT
Start: 2019-08-04 | End: 2019-08-05 | Stop reason: SDUPTHER

## 2019-08-05 ENCOUNTER — OFFICE VISIT (OUTPATIENT)
Dept: UROLOGY | Age: 69
End: 2019-08-05

## 2019-08-05 VITALS
BODY MASS INDEX: 29.22 KG/M2 | TEMPERATURE: 97.7 F | RESPIRATION RATE: 12 BRPM | HEIGHT: 76 IN | HEART RATE: 67 BPM | WEIGHT: 240 LBS | OXYGEN SATURATION: 95 %

## 2019-08-05 DIAGNOSIS — N13.8 BENIGN PROSTATIC HYPERPLASIA WITH URINARY OBSTRUCTION: Primary | ICD-10-CM

## 2019-08-05 DIAGNOSIS — N40.1 BENIGN PROSTATIC HYPERPLASIA WITH URINARY OBSTRUCTION: Primary | ICD-10-CM

## 2019-08-05 LAB
BILIRUB UR QL STRIP: NEGATIVE
GLUCOSE UR-MCNC: NEGATIVE MG/DL
KETONES P FAST UR STRIP-MCNC: NEGATIVE MG/DL
PH UR STRIP: 6.5 [PH] (ref 4.6–8)
PROT UR QL STRIP: NEGATIVE
SP GR UR STRIP: 1.02 (ref 1–1.03)
UA UROBILINOGEN AMB POC: NORMAL (ref 0.2–1)
URINALYSIS CLARITY POC: CLEAR
URINALYSIS COLOR POC: YELLOW
URINE BLOOD POC: NORMAL
URINE LEUKOCYTES POC: NEGATIVE
URINE NITRITES POC: NEGATIVE

## 2019-08-05 NOTE — PROGRESS NOTES
Chief Complaint   Patient presents with    Benign Prostatic Hypertrophy       HISTORY OF PRESENT ILLNESS:  Patrick Ricks is a 78 y.o. male who presents today in follow-up to medical treatment for his bladder outlet obstruction and urinary retention. I started him on dutasteride and he is doing much better now he was here in April and only had minimal benefits at that time. Today he is urinating noticeably better with nocturia 0-1 and he is quite happy with the results. His PSA back in April after he had been on the dutasteride for 6 months was 0.8. I would recommend now that he stop the Flomax for about 2 weeks to see if he can get off of it and if he can we will just leave it off. Continue the dutasteride indefinitely though. ROS documented on the chart    Past Medical History:   Diagnosis Date    Abdominal pain, unspecified site     Acute reaction to stress     Allergic rhinitis due to pollen     Arrhythmia     afib    Arthritis     Back injury     BPH (benign prostatic hypertrophy)     Calculus of ureter     Cancer (HCC)     history of Leukemia, in remission    Carotid duplex 06/17/2015    Mild < 50% bilateral ICA stenosis.  Dizziness and giddiness     Esophageal reflux     Essential hypertension     GERD (gastroesophageal reflux disease)     Headache(784.0)     History of echocardiogram 08/13/2015    EF 55-60%. No WMA. Mild-mod LVH. Gr 1 DDfx. No evidence of intracardiac shunt.   Mild AI.      Hx: UTI (urinary tract infection)     Hypertension     Hypogonadism in male     Kidney stones     Migraine     Neck injury     Polycythemia     Polycythemia, secondary     Sciatica     Unspecified retinal detachment     Unspecified sleep apnea     resolved since gastric bypass    Vision decreased     Weight loss        Past Surgical History:   Procedure Laterality Date    ABDOMEN SURGERY PROC UNLISTED  2012    Hernia    HX CATARACT REMOVAL Left     HX CERVICAL FUSION  2016    Cervical Spine Fusion    HX CHOLECYSTECTOMY      HX GASTRIC BYPASS  2006    HX KNEE ARTHROSCOPY      both knees (right knee twice, left once)    HX ORTHOPAEDIC  1995    lumbar laminectomy    HX OTHER SURGICAL      Two back surgeries    HX RETINAL DETACHMENT REPAIR Left        Social History     Tobacco Use    Smoking status: Former Smoker     Last attempt to quit: 1981     Years since quittin.4    Smokeless tobacco: Never Used   Substance Use Topics    Alcohol use: Yes     Comment: occasionally     Drug use: No       Allergies   Allergen Reactions    Ace Inhibitors Other (comments)     Renal Failure    Arb-Angiotensin Receptor Antagonist Other (comments)     Renal Failure    Levofloxacin Nausea Only     Severe nausea and dizziness    Sulfa (Sulfonamide Antibiotics) Unknown (comments)     Nausea, aching all over       Family History   Problem Relation Age of Onset    Hypertension Father     Diabetes Father     Heart Attack Mother     Headache Sister     Diabetes Son        Current Outpatient Medications   Medication Sig Dispense Refill    DULoxetine (CYMBALTA) 30 mg capsule TAKE 1 CAPSULE BY MOUTH EVERY DAY 90 Cap 0    cefUROXime (CEFTIN) 500 mg tablet 1 tab twice per day 20 Tab 0    predniSONE (DELTASONE) 20 mg tablet 3 tabs daily x 3 days, 2 tabs daily x 3 days, 1 tab daily x 3 days, 1/2 tab daily x 3 days 20 Tab 0    HYDROcodone-acetaminophen (NORCO) 7.5-325 mg per tablet Take 1 Tab by mouth every four (4) hours as needed for Pain for up to 30 days. Max Daily Amount: 5 Tabs. 150 Tab 0    topiramate (TOPAMAX) 50 mg tablet TAKE 1 TABLET BY MOUTH TWICE DAILY FOR MIGRAINE PREVENTION OR HEADACHES 180 Tab 5    montelukast (SINGULAIR) 10 mg tablet TAKE 1 TABLET BY MOUTH ONCE DAILY 90 Tab 3    potassium citrate (UROCIT-K) 15 mEq TbER tablet TAKE 1 TABLET BY MOUTH DAILY WITH FOOD.  SWALLOW TABLETS WHOLE WITH A FULL GLASS OF WATER 90 Tab 3    traZODone (DESYREL) 50 mg tablet TAKE 1 TABLET BY MOUTH EVERY NIGHT 30 Tab 0    ELIQUIS 5 mg tablet TAKE 1 TABLET BY MOUTH TWICE DAILY 60 Tab 0    rOPINIRole (REQUIP) 0.5 mg tablet TAKE 1 TABLET BY MOUTH TWICE DAILY 60 Tab 0    testosterone cypionate (DEPOTESTOTERONE CYPIONATE) 200 mg/mL injection INJECT 0.5 MLS IN THE MUSCLE EVERY 30 DAYS 1 mL 5    fluticasone propionate (FLONASE) 50 mcg/actuation nasal spray 1-2 Sprays by Both Nostrils route daily. 1 Bottle 1    SUMAtriptan (IMITREX) 100 mg tablet TAKE 1 TABLET BY MOUTH AT ONSET OF HEADACHE, MAY REPEAT 2 HOURS LATER. (MAX 2 PILLS IN 24 HOURS) 12 Tab 1    guanFACINE IR (TENEX) 1 mg IR tablet TAKE 1 TABLET BY MOUTH AT BEDTIME 90 Tab 2    tamsulosin (FLOMAX) 0.4 mg capsule 1 tab daily 90 Cap 1    dutasteride (AVODART) 0.5 mg capsule Take 0.5 mg by mouth daily.  labetalol (NORMODYNE) 300 mg tablet TAKE 1 TABLET BY MOUTH TWICE DAILY 180 Tab 2    DULoxetine (CYMBALTA) 60 mg capsule Take 1 Cap by mouth daily. Take one capsule by mouth daily along with 30 mg capsule daily for a total daily dose 90 mg. 90 Cap 1    amLODIPine (NORVASC) 5 mg tablet take 1 tablet by mouth once daily  0    multivitamin (ONE A DAY) tablet Take 1 Tab by mouth daily.  vitamin E (AQUA GEMS) 400 unit capsule Take 400 Units by mouth daily. Lab Results   Component Value Date/Time    Prostate Specific Ag 0.8 04/30/2019 10:22 AM    Prostate Specific Ag 2.8 04/07/2017 02:52 PM              PHYSICAL EXAMINATION:   Visit Vitals  Pulse 67   Temp 97.7 °F (36.5 °C) (Oral)   Resp 12   Ht 6' 4\" (1.93 m)   Wt 240 lb (108.9 kg)   SpO2 95%   BMI 29.21 kg/m²     Constitutional: WDWN, Pleasant and appropriate affect, No acute distress. CV:  No peripheral swelling noted  Respiratory: No respiratory distress or difficulties  Abdomen:  No abdominal masses or tenderness. No CVA tenderness. No inguinal hernias noted.  Male:    Deferred today.   His last examination showed a moderately large prostate with no nodularity or induration. Skin: No jaundice. Neuro/Psych:  Alert and oriented x 3, affect appropriate. Lymphatic:   No enlarged inguinal lymph nodes. Results for orders placed or performed in visit on 08/05/19   AMB POC URINALYSIS DIP STICK AUTO W/O MICRO   Result Value Ref Range    Color (UA POC) Yellow     Clarity (UA POC) Clear     Glucose (UA POC) Negative Negative    Bilirubin (UA POC) Negative Negative    Ketones (UA POC) Negative Negative    Specific gravity (UA POC) 1.020 1.001 - 1.035    Blood (UA POC) Trace Negative    pH (UA POC) 6.5 4.6 - 8.0    Protein (UA POC) Negative Negative    Urobilinogen (UA POC) 0.2 mg/dL 0.2 - 1    Nitrites (UA POC) Negative Negative    Leukocyte esterase (UA POC) Negative Negative         REVIEW OF LABS AND IMAGING:     Imaging Report Reviewed? NO     Images Reviewed? NO           Other Lab Data Reviewed? YES         ASSESSMENT:     ICD-10-CM ICD-9-CM    1. Benign prostatic hyperplasia with urinary obstruction N40.1 600.01 AMB POC URINALYSIS DIP STICK AUTO W/O MICRO    N13.8 599.69             PLAN / DISCUSSION: : He is voiding much better with dutasteride and hopefully this will be a permanent fix. His PSA has dropped down to less than 1.0 and will be repeated in 1 year. I am going to start him on a trial of stopping the Flomax and hopefully get off of that is a medication. The patient expresses understanding and agreement of the discussion and plan. Clifford Sampson MD on 8/5/2019         Please note:  Voice recognition was used to generate this report, which may have resulted in some phonetic based errors in grammar and contents. Even though attempts were made to correct all the mistakes, some may have been missed, and remained in the body of the document.

## 2019-08-06 RX ORDER — SUMATRIPTAN 100 MG/1
TABLET, FILM COATED ORAL
Qty: 12 TAB | Refills: 1 | Status: SHIPPED | OUTPATIENT
Start: 2019-08-06 | End: 2019-09-30 | Stop reason: SDUPTHER

## 2019-08-09 RX ORDER — DUTASTERIDE 0.5 MG/1
CAPSULE, LIQUID FILLED ORAL
Qty: 90 CAP | Refills: 0 | Status: SHIPPED | OUTPATIENT
Start: 2019-08-09 | End: 2020-03-02

## 2019-08-09 RX ORDER — ROPINIROLE 0.5 MG/1
TABLET, FILM COATED ORAL
Qty: 60 TAB | Refills: 0 | Status: SHIPPED | OUTPATIENT
Start: 2019-08-09 | End: 2019-09-03 | Stop reason: SDUPTHER

## 2019-08-09 RX ORDER — APIXABAN 5 MG/1
TABLET, FILM COATED ORAL
Qty: 60 TAB | Refills: 0 | Status: SHIPPED | OUTPATIENT
Start: 2019-08-09 | End: 2019-09-03 | Stop reason: SDUPTHER

## 2019-08-09 RX ORDER — TRAZODONE HYDROCHLORIDE 50 MG/1
TABLET ORAL
Qty: 30 TAB | Refills: 0 | Status: SHIPPED | OUTPATIENT
Start: 2019-08-09 | End: 2019-09-05 | Stop reason: SDUPTHER

## 2019-08-12 RX ORDER — LABETALOL 300 MG/1
TABLET, FILM COATED ORAL
Qty: 60 TAB | Refills: 0 | Status: SHIPPED | OUTPATIENT
Start: 2019-08-12 | End: 2019-10-14 | Stop reason: SDUPTHER

## 2019-08-18 RX ORDER — FLUTICASONE PROPIONATE 50 MCG
SPRAY, SUSPENSION (ML) NASAL
Qty: 1 BOTTLE | Refills: 3 | Status: SHIPPED | OUTPATIENT
Start: 2019-08-18 | End: 2019-08-18 | Stop reason: SDUPTHER

## 2019-08-19 RX ORDER — FLUTICASONE PROPIONATE 50 MCG
SPRAY, SUSPENSION (ML) NASAL
Qty: 1 BOTTLE | Refills: 3 | Status: SHIPPED | OUTPATIENT
Start: 2019-08-19

## 2019-08-20 DIAGNOSIS — M54.2 CERVICALGIA: ICD-10-CM

## 2019-08-22 RX ORDER — HYDROCODONE BITARTRATE AND ACETAMINOPHEN 7.5; 325 MG/1; MG/1
1 TABLET ORAL
Qty: 150 TAB | Refills: 0 | Status: SHIPPED | OUTPATIENT
Start: 2019-08-22 | End: 2019-09-18 | Stop reason: SDUPTHER

## 2019-08-30 RX ORDER — GUANFACINE HYDROCHLORIDE 1 MG/1
TABLET ORAL
Qty: 30 TAB | Refills: 0 | Status: SHIPPED | OUTPATIENT
Start: 2019-08-30 | End: 2019-10-01 | Stop reason: SDUPTHER

## 2019-09-03 RX ORDER — APIXABAN 5 MG/1
TABLET, FILM COATED ORAL
Qty: 60 TAB | Refills: 0 | Status: SHIPPED | OUTPATIENT
Start: 2019-09-03 | End: 2019-11-02 | Stop reason: SDUPTHER

## 2019-09-03 RX ORDER — ROPINIROLE 0.5 MG/1
TABLET, FILM COATED ORAL
Qty: 60 TAB | Refills: 0 | Status: SHIPPED | OUTPATIENT
Start: 2019-09-03 | End: 2019-10-01 | Stop reason: SDUPTHER

## 2019-09-05 RX ORDER — TRAZODONE HYDROCHLORIDE 50 MG/1
TABLET ORAL
Qty: 30 TAB | Refills: 0 | Status: SHIPPED | OUTPATIENT
Start: 2019-09-05 | End: 2019-10-04 | Stop reason: SDUPTHER

## 2019-09-16 RX ORDER — PANTOPRAZOLE SODIUM 40 MG/1
TABLET, DELAYED RELEASE ORAL
Qty: 90 TAB | Refills: 0 | Status: SHIPPED | OUTPATIENT
Start: 2019-09-16 | End: 2019-12-13 | Stop reason: SDUPTHER

## 2019-09-18 DIAGNOSIS — M54.2 CERVICALGIA: ICD-10-CM

## 2019-09-19 RX ORDER — HYDROCODONE BITARTRATE AND ACETAMINOPHEN 7.5; 325 MG/1; MG/1
1 TABLET ORAL
Qty: 150 TAB | Refills: 0 | Status: SHIPPED | OUTPATIENT
Start: 2019-09-19 | End: 2019-10-17 | Stop reason: SDUPTHER

## 2019-09-23 DIAGNOSIS — E29.1 HYPOGONADISM IN MALE: ICD-10-CM

## 2019-09-23 RX ORDER — TESTOSTERONE CYPIONATE 200 MG/ML
INJECTION INTRAMUSCULAR
Qty: 1 ML | Refills: 5 | Status: SHIPPED | OUTPATIENT
Start: 2019-09-23 | End: 2019-10-01

## 2019-09-25 DIAGNOSIS — I10 ESSENTIAL HYPERTENSION: Primary | ICD-10-CM

## 2019-09-25 DIAGNOSIS — Z12.5 PROSTATE CANCER SCREENING: ICD-10-CM

## 2019-09-25 DIAGNOSIS — Z13.6 ENCOUNTER FOR LIPID SCREENING FOR CARDIOVASCULAR DISEASE: ICD-10-CM

## 2019-09-25 DIAGNOSIS — Z13.220 ENCOUNTER FOR LIPID SCREENING FOR CARDIOVASCULAR DISEASE: ICD-10-CM

## 2019-09-25 DIAGNOSIS — C92.10 CHRONIC MYELOCYTIC LEUKEMIA (HCC): ICD-10-CM

## 2019-09-26 ENCOUNTER — HOSPITAL ENCOUNTER (OUTPATIENT)
Dept: LAB | Age: 69
Discharge: HOME | End: 2019-09-26
Payer: MEDICARE

## 2019-09-26 DIAGNOSIS — Z13.220 ENCOUNTER FOR LIPID SCREENING FOR CARDIOVASCULAR DISEASE: ICD-10-CM

## 2019-09-26 DIAGNOSIS — Z12.5 PROSTATE CANCER SCREENING: ICD-10-CM

## 2019-09-26 DIAGNOSIS — I10 ESSENTIAL HYPERTENSION: ICD-10-CM

## 2019-09-26 DIAGNOSIS — C92.10 CHRONIC MYELOCYTIC LEUKEMIA (HCC): ICD-10-CM

## 2019-09-26 DIAGNOSIS — Z13.6 ENCOUNTER FOR LIPID SCREENING FOR CARDIOVASCULAR DISEASE: ICD-10-CM

## 2019-09-26 LAB
ALBUMIN SERPL-MCNC: 3.6 G/DL (ref 3.4–5)
ALBUMIN/GLOB SERPL: 1.3 {RATIO} (ref 0.8–1.7)
ALP SERPL-CCNC: 50 U/L (ref 45–117)
ALT SERPL-CCNC: 24 U/L (ref 16–61)
ANION GAP SERPL CALC-SCNC: 8 MMOL/L (ref 3–18)
AST SERPL-CCNC: 16 U/L (ref 10–38)
BASOPHILS # BLD: 0.1 K/UL (ref 0–0.1)
BASOPHILS NFR BLD: 1 % (ref 0–2)
BILIRUB SERPL-MCNC: 0.7 MG/DL (ref 0.2–1)
BUN SERPL-MCNC: 21 MG/DL (ref 7–18)
BUN/CREAT SERPL: 21 (ref 12–20)
CALCIUM SERPL-MCNC: 9.1 MG/DL (ref 8.5–10.1)
CHLORIDE SERPL-SCNC: 112 MMOL/L (ref 100–111)
CO2 SERPL-SCNC: 25 MMOL/L (ref 21–32)
CREAT SERPL-MCNC: 1.02 MG/DL (ref 0.6–1.3)
DIFFERENTIAL METHOD BLD: ABNORMAL
EOSINOPHIL # BLD: 0.2 K/UL (ref 0–0.4)
EOSINOPHIL NFR BLD: 2 % (ref 0–5)
ERYTHROCYTE [DISTWIDTH] IN BLOOD BY AUTOMATED COUNT: 17 % (ref 11.6–14.5)
GLOBULIN SER CALC-MCNC: 2.7 G/DL (ref 2–4)
GLUCOSE SERPL-MCNC: 97 MG/DL (ref 74–99)
HCT VFR BLD AUTO: 41.9 % (ref 36–48)
HGB BLD-MCNC: 13.1 G/DL (ref 13–16)
LYMPHOCYTES # BLD: 5 K/UL (ref 0.9–3.6)
LYMPHOCYTES NFR BLD: 51 % (ref 21–52)
MCH RBC QN AUTO: 26.7 PG (ref 24–34)
MCHC RBC AUTO-ENTMCNC: 31.3 G/DL (ref 31–37)
MCV RBC AUTO: 85.5 FL (ref 74–97)
MONOCYTES # BLD: 0.7 K/UL (ref 0.05–1.2)
MONOCYTES NFR BLD: 7 % (ref 3–10)
NEUTS SEG # BLD: 3.9 K/UL (ref 1.8–8)
NEUTS SEG NFR BLD: 39 % (ref 40–73)
PLATELET # BLD AUTO: 303 K/UL (ref 135–420)
PMV BLD AUTO: 10.8 FL (ref 9.2–11.8)
POTASSIUM SERPL-SCNC: 4.1 MMOL/L (ref 3.5–5.5)
PROT SERPL-MCNC: 6.3 G/DL (ref 6.4–8.2)
RBC # BLD AUTO: 4.9 M/UL (ref 4.7–5.5)
SODIUM SERPL-SCNC: 145 MMOL/L (ref 136–145)
WBC # BLD AUTO: 9.9 K/UL (ref 4.6–13.2)

## 2019-09-26 PROCEDURE — 80061 LIPID PANEL: CPT

## 2019-09-26 PROCEDURE — 85025 COMPLETE CBC W/AUTO DIFF WBC: CPT

## 2019-09-26 PROCEDURE — 84153 ASSAY OF PSA TOTAL: CPT

## 2019-09-26 PROCEDURE — 36415 COLL VENOUS BLD VENIPUNCTURE: CPT

## 2019-09-26 PROCEDURE — 80053 COMPREHEN METABOLIC PANEL: CPT

## 2019-09-27 LAB
CHOLEST SERPL-MCNC: 157 MG/DL
HDLC SERPL-MCNC: 44 MG/DL (ref 40–60)
HDLC SERPL: 3.6 {RATIO} (ref 0–5)
LDLC SERPL CALC-MCNC: 90 MG/DL (ref 0–100)
LIPID PROFILE,FLP: NORMAL
PSA SERPL-MCNC: 2.7 NG/ML (ref 0–4)
TRIGL SERPL-MCNC: 115 MG/DL (ref ?–150)
VLDLC SERPL CALC-MCNC: 23 MG/DL

## 2019-09-30 ENCOUNTER — HOSPITAL ENCOUNTER (OUTPATIENT)
Dept: LAB | Age: 69
Discharge: HOME | End: 2019-09-30
Payer: MEDICARE

## 2019-09-30 LAB
25(OH)D3 SERPL-MCNC: 56.9 NG/ML (ref 30–100)
ALBUMIN SERPL-MCNC: 3.6 G/DL (ref 3.4–5)
ANION GAP SERPL CALC-SCNC: 7 MMOL/L (ref 3–18)
BUN SERPL-MCNC: 13 MG/DL (ref 7–18)
BUN/CREAT SERPL: 13 (ref 12–20)
CALCIUM SERPL-MCNC: 8.4 MG/DL (ref 8.5–10.1)
CALCIUM SERPL-MCNC: 8.7 MG/DL (ref 8.5–10.1)
CHLORIDE SERPL-SCNC: 111 MMOL/L (ref 100–111)
CO2 SERPL-SCNC: 25 MMOL/L (ref 21–32)
CREAT SERPL-MCNC: 1.03 MG/DL (ref 0.6–1.3)
CREAT UR-MCNC: 52 MG/DL (ref 30–125)
ERYTHROCYTE [DISTWIDTH] IN BLOOD BY AUTOMATED COUNT: 16.1 % (ref 11.6–14.5)
GLUCOSE SERPL-MCNC: 107 MG/DL (ref 74–99)
HCT VFR BLD AUTO: 39.3 % (ref 36–48)
HGB BLD-MCNC: 12.9 G/DL (ref 13–16)
MCH RBC QN AUTO: 26.7 PG (ref 24–34)
MCHC RBC AUTO-ENTMCNC: 32.8 G/DL (ref 31–37)
MCV RBC AUTO: 81.2 FL (ref 74–97)
PHOSPHATE SERPL-MCNC: 2.6 MG/DL (ref 2.5–4.9)
PLATELET # BLD AUTO: 274 K/UL (ref 135–420)
PMV BLD AUTO: 11.7 FL (ref 9.2–11.8)
POTASSIUM SERPL-SCNC: 4.1 MMOL/L (ref 3.5–5.5)
PROT UR-MCNC: 15 MG/DL
PTH-INTACT SERPL-MCNC: 61.2 PG/ML (ref 18.4–88)
RBC # BLD AUTO: 4.84 M/UL (ref 4.7–5.5)
SODIUM SERPL-SCNC: 143 MMOL/L (ref 136–145)
URATE SERPL-MCNC: 5.2 MG/DL (ref 2.6–7.2)
WBC # BLD AUTO: 12.5 K/UL (ref 4.6–13.2)

## 2019-09-30 PROCEDURE — 84156 ASSAY OF PROTEIN URINE: CPT

## 2019-09-30 PROCEDURE — 85027 COMPLETE CBC AUTOMATED: CPT

## 2019-09-30 PROCEDURE — 84550 ASSAY OF BLOOD/URIC ACID: CPT

## 2019-09-30 PROCEDURE — 83970 ASSAY OF PARATHORMONE: CPT

## 2019-09-30 PROCEDURE — 82306 VITAMIN D 25 HYDROXY: CPT

## 2019-09-30 PROCEDURE — 82570 ASSAY OF URINE CREATININE: CPT

## 2019-09-30 PROCEDURE — 36415 COLL VENOUS BLD VENIPUNCTURE: CPT

## 2019-09-30 PROCEDURE — 80069 RENAL FUNCTION PANEL: CPT

## 2019-09-30 RX ORDER — SUMATRIPTAN 100 MG/1
TABLET, FILM COATED ORAL
Qty: 12 TAB | Refills: 1 | Status: SHIPPED | OUTPATIENT
Start: 2019-09-30 | End: 2019-11-11 | Stop reason: SDUPTHER

## 2019-10-01 ENCOUNTER — OFFICE VISIT (OUTPATIENT)
Dept: FAMILY MEDICINE CLINIC | Facility: CLINIC | Age: 69
End: 2019-10-01

## 2019-10-01 ENCOUNTER — HOSPITAL ENCOUNTER (OUTPATIENT)
Dept: LAB | Age: 69
Discharge: HOME | End: 2019-10-01
Payer: MEDICARE

## 2019-10-01 VITALS
SYSTOLIC BLOOD PRESSURE: 132 MMHG | BODY MASS INDEX: 29.09 KG/M2 | TEMPERATURE: 97.6 F | DIASTOLIC BLOOD PRESSURE: 78 MMHG | OXYGEN SATURATION: 96 % | RESPIRATION RATE: 14 BRPM | WEIGHT: 234 LBS | HEART RATE: 69 BPM | HEIGHT: 75 IN

## 2019-10-01 DIAGNOSIS — M54.2 CERVICALGIA: ICD-10-CM

## 2019-10-01 DIAGNOSIS — G25.81 RESTLESS LEG SYNDROME: Primary | ICD-10-CM

## 2019-10-01 DIAGNOSIS — Z23 ENCOUNTER FOR IMMUNIZATION: ICD-10-CM

## 2019-10-01 DIAGNOSIS — N18.2 CHRONIC RENAL INSUFFICIENCY, STAGE 2 (MILD): ICD-10-CM

## 2019-10-01 DIAGNOSIS — E29.1 HYPOGONADISM IN MALE: ICD-10-CM

## 2019-10-01 DIAGNOSIS — M48.062 LUMBAR STENOSIS WITH NEUROGENIC CLAUDICATION: ICD-10-CM

## 2019-10-01 PROCEDURE — 80307 DRUG TEST PRSMV CHEM ANLYZR: CPT

## 2019-10-01 RX ORDER — TESTOSTERONE CYPIONATE 200 MG/ML
INJECTION INTRAMUSCULAR
Qty: 1 ML | Refills: 5 | Status: SHIPPED | OUTPATIENT
Start: 2019-10-01 | End: 2019-10-19 | Stop reason: SDUPTHER

## 2019-10-01 NOTE — PROGRESS NOTES
Puneet Jiankrista presents today for   Chief Complaint   Patient presents with    Well Male       Puneet Husain preferred language for health care discussion is english. Is someone accompanying this pt? no    Is the patient using any DME equipment during [address]? no    Depression Screening:  3 most recent PHQ Screens 10/1/2019   Little interest or pleasure in doing things Not at all   Feeling down, depressed, irritable, or hopeless Not at all   Total Score PHQ 2 0       Learning Assessment:  Learning Assessment 5/3/2018   PRIMARY LEARNER Patient   HIGHEST LEVEL OF EDUCATION - PRIMARY LEARNER  -   BARRIERS PRIMARY LEARNER -   [facility]    NEED -   LEARNER PREFERENCE PRIMARY DEMONSTRATION   ANSWERED BY patient   RELATIONSHIP SELF       Abuse Screening:  Abuse Screening Questionnaire 4/30/2019   Do you ever feel afraid of your partner? N   Are you in a relationship with someone who physically or mentally threatens you? N   Is it safe for you to go home? Y       Fall Risk  Fall Risk Assessment, last 12 mths 10/1/2019   Able to walk? Yes   Fall in past 12 months? No   Fall with injury? -   Number of falls in past 12 months -   Fall Risk Score -       Health Maintenance reviewed and discussed and ordered per Provider. Health Maintenance Due   Topic Date Due    Hepatitis C Screening  1950    Shingrix Vaccine Age 50> (1 of 2) 06/22/1999    GLAUCOMA SCREENING Q2Y  06/22/2014    Influenza Age 9 to Adult  08/01/2019    MEDICARE YEARLY EXAM  08/04/2019   . Puneet Villarkrista is updated on all     Pt currently taking Antiplatelet therapy? Yes Eliquis    Coordination of Care:  1. Have you been to the ER, urgent care clinic since your last visit? no Hospitalized since your last visit? no    2.  Have you seen or consulted any other health care providers outside of the [address] [place] [address] since your last visit? no Include any pap smears or colon screening.  no

## 2019-10-01 NOTE — PATIENT INSTRUCTIONS
Vaccine Information Statement Influenza (Flu) Vaccine (Inactivated or Recombinant): What You Need to Know Many Vaccine Information Statements are available in Turkmen and other languages. See www.immunize.org/vis Hojas de información sobre vacunas están disponibles en español y en muchos otros idiomas. Visite www.immunize.org/vis 1. Why get vaccinated? Influenza vaccine can prevent influenza (flu). Flu is a contagious disease that spreads around the [facility] every year, usually between October and May. Anyone can get the flu, but it is more dangerous for some people. Infants and young children, people 72years of age and older, pregnant women, and people with certain health conditions or a weakened immune system are at greatest risk of flu complications. Pneumonia, bronchitis, sinus infections and ear infections are examples of flu-related complications. If you have a medical condition, such as heart disease, cancer or diabetes, flu can make it worse. Flu can cause fever and chills, sore throat, muscle aches, fatigue, cough, headache, and runny or stuffy nose. Some people may have vomiting and diarrhea, though this is more common in children than adults. Each year thousands of people in the [facility] die from flu, and many more are hospitalized. Flu vaccine prevents millions of illnesses and flu-related visits to the doctor each year. 2. Influenza vaccines CDC recommends everyone 10months of age and older get vaccinated every flu season. Children 6 months through 6years of age may need 2 doses during a single flu season. Everyone else needs only 1 dose each flu season. It takes about 2 weeks for protection to develop after vaccination. There are many flu viruses, and they are always changing. Each year a new flu vaccine is made to protect against three or four viruses that are likely to cause disease in the upcoming flu season.  Even when the vaccine doesnt exactly match these viruses, it may still provide some protection. Influenza vaccine does not cause flu. Influenza vaccine may be given at the same time as other vaccines. 3. Talk with your health care provider Tell your vaccine provider if the person getting the vaccine: 
 Has had an allergic reaction after a previous dose of influenza vaccine, or has any severe, life-threatening allergies.  Has ever had Guillain-Barré Syndrome (also called GBS). In some cases, your health care provider may decide to postpone influenza vaccination to a future visit. People with minor illnesses, such as a cold, may be vaccinated. People who are moderately or severely ill should usually wait until they recover before getting influenza vaccine. Your health care provider can give you more information. 4. Risks of a reaction  Soreness, redness, and swelling where shot is given, fever, muscle aches, and headache can happen after influenza vaccine.  There may be a very small increased risk of Guillain-Barré Syndrome (GBS) after inactivated influenza vaccine (the flu shot). [facility] children who get the flu shot along with pneumococcal vaccine (PCV13), and/or DTaP vaccine at the same time might be slightly more likely to have a seizure caused by fever. Tell your health care provider if a child who is getting flu vaccine has ever had a seizure. People sometimes faint after medical procedures, including vaccination. Tell your provider if you feel dizzy or have vision changes or ringing in the ears. As with any medicine, there is a very remote chance of a vaccine causing a severe allergic reaction, other serious injury, or death. 5. What if there is a serious problem? An allergic reaction could occur after the vaccinated person leaves the clinic.  If you see signs of a severe allergic reaction (hives, swelling of the face and throat, difficulty breathing, a fast heartbeat, dizziness, or weakness), call 9-1-1 and get the person to the nearest hospital. 
 
For other signs that concern you, call your health care provider. Adverse reactions should be reported to the Vaccine Adverse Event Reporting System (VAERS). Your health care provider will usually file this report, or you can do it yourself. Visit the VAERS website at www.vaers. hhs.gov or call 3-832.492.2095. VAERS is only for reporting reactions, and VAERS staff do not give medical advice. 6. The National Vaccine Injury Compensation Program 
 
The [facility] Vaccine Injury Compensation Program (VICP) is a federal program that was created to compensate people who may have been injured by certain vaccines. Visit the VICP website at www.hrsa.gov/vaccinecompensation or call 6-309.413.1660 to learn about the program and about filing a claim. There is a time limit to file a claim for compensation. 7. How can I learn more?  Ask your health care provider.  Call your local or state health department.  Contact the Centers for Disease Control and Prevention (CDC): 
- Call 6-300.654.8189 (1-800-CDC-INFO) or 
- Visit CDCs influenza website at www.cdc.gov/flu Vaccine Information Statement (Interim) Inactivated Influenza Vaccine 8/15/2019 
42 [place] [id number] Department of Health and [place] [facility] Centers for Disease Control and Prevention Office Use Only Patient presented to office for influenza adjuvanted 0.5 ml injection. Allergies noted. Patient well and consenting to injection. Injection given intramuscular in right deltoid. Patient tolerated injection well and left office ambulatory.

## 2019-10-01 NOTE — PROGRESS NOTES
The patient presents to the office today with the chief complaint of low back pain    HPI    The patient complains of pain in his neck and his low back. The patient is status post surgeries on both. The neck has limited range of motion but only mild pain. The patient complains of continued problems with the pain in his low back. The patient is status post injections in the lumbar spine. This is resulted in resolution of the sciatica. The low back pain however persists. Patient remains on narcotics for this. There is a fair response to Norco.  There is no pattern of abuse. Patient is due for urine for drug compliance testing. The patient has CML. The patient is been followed by oncology. Patient is currently not on therapy for this. The patient has a new complaint of discomfort in his legs requiring movement during the night. This problem has been quite aggravating. Patient remains on testosterone. Recently the dose of been decreased to 100 mg. This was not enough. The patient is status post a hospitalization with significant decline in renal function. Since hospitalization renal function has improved and is now at worst stage II. Creatinine done September 30 was 1.03.      Review of Systems   Constitutional: Positive for malaise/fatigue. Respiratory: Positive for shortness of breath. Cardiovascular: Negative for chest pain and leg swelling. Musculoskeletal: Positive for back pain.        Allergies   Allergen Reactions    Ace Inhibitors Other (comments)     Renal Failure    Arb-Angiotensin Receptor Antagonist Other (comments)     Renal Failure    Levofloxacin Nausea Only     Severe nausea and dizziness    Sulfa (Sulfonamide Antibiotics) Unknown (comments)     Nausea, aching all over       Current Outpatient Medications   Medication Sig Dispense Refill    testosterone cypionate (DEPOTESTOTERONE CYPIONATE) 200 mg/mL injection INJECT 1 ml IN THE MUSCLE EVERY 30 DAYS 1 mL 5    SUMAtriptan (IMITREX) 100 mg tablet TAKE 1 TABLET BY MOUTH AT ONSET OF HEADACHE, MAY REPEAT 2 HOURS LATER. (MAX 2 PILLS IN 24 HOURS) 12 Tab 1    traZODone (DESYREL) 50 mg tablet TAKE 1 TABLET BY MOUTH EVERY NIGHT 30 Tab 0    ELIQUIS 5 mg tablet TAKE 1 TABLET BY MOUTH TWICE DAILY 60 Tab 0    fluticasone propionate (FLONASE) 50 mcg/actuation nasal spray SHAKE LIQUID AND USE 1 TO 2 SPRAYS IN EACH NOSTRIL DAILY 1 Bottle 3    labetalol (NORMODYNE) 300 mg tablet TAKE 1 TABLET BY MOUTH TWICE DAILY 60 Tab 0    dutasteride (AVODART) 0.5 mg capsule TAKE 1 CAPSULE BY MOUTH DAILY 90 Cap 0    DULoxetine (CYMBALTA) 30 mg capsule TAKE 1 CAPSULE BY MOUTH EVERY DAY 90 Cap 0    topiramate (TOPAMAX) 50 mg tablet TAKE 1 TABLET BY MOUTH TWICE DAILY FOR MIGRAINE PREVENTION OR HEADACHES 180 Tab 5    montelukast (SINGULAIR) 10 mg tablet TAKE 1 TABLET BY MOUTH ONCE DAILY 90 Tab 3    potassium citrate (UROCIT-K) 15 mEq TbER tablet TAKE 1 TABLET BY MOUTH DAILY WITH FOOD. SWALLOW TABLETS WHOLE WITH A FULL GLASS OF WATER 90 Tab 3    guanFACINE IR (TENEX) 1 mg IR tablet TAKE 1 TABLET BY MOUTH AT BEDTIME 90 Tab 2    tamsulosin (FLOMAX) 0.4 mg capsule 1 tab daily 90 Cap 1    DULoxetine (CYMBALTA) 60 mg capsule Take 1 Cap by mouth daily. Take one capsule by mouth daily along with 30 mg capsule daily for a total daily dose 90 mg. 90 Cap 1    amLODIPine (NORVASC) 5 mg tablet take 1 tablet by mouth once daily  0    multivitamin (ONE A DAY) tablet Take 1 Tab by mouth daily.  vitamin E (AQUA GEMS) 400 unit capsule Take 400 Units by mouth daily.  rOPINIRole (REQUIP) 0.5 mg tablet TAKE 1 TABLET BY MOUTH TWICE DAILY 60 Tab 0    guanFACINE IR (TENEX) 1 mg IR tablet TAKE 1 TABLET BY MOUTH AT BEDTIME 30 Tab 0    HYDROcodone-acetaminophen (NORCO) 7.5-325 mg per tablet Take 1 Tab by mouth every four (4) hours as needed for Pain for up to 30 days. Max Daily Amount: 5 Tabs.  150 Tab 0    pantoprazole (PROTONIX) 40 mg tablet TAKE 1 TABLET BY MOUTH EVERY DAY 90 Tab 0    labetalol (NORMODYNE) 300 mg tablet TAKE 1 TABLET BY MOUTH TWICE DAILY 180 Tab 2       Past Medical History:   Diagnosis Date    Abdominal pain, unspecified site     Acute reaction to stress     Allergic rhinitis due to pollen     Arrhythmia     afib    Arthritis     Back injury     BPH (benign prostatic hypertrophy)     Calculus of ureter     Cancer (HCC)     history of Leukemia, in remission    Carotid duplex 06/17/2015    Mild < 50% bilateral ICA stenosis.  Dizziness and giddiness     Esophageal reflux     Essential hypertension     GERD (gastroesophageal reflux disease)     Headache(784.0)     History of echocardiogram 08/13/2015    EF 55-60%. No WMA. Mild-mod LVH. Gr 1 DDfx. No evidence of intracardiac shunt.   Mild AI.      Hx: UTI (urinary tract infection)     Hypertension     Hypogonadism in male     Kidney stones     Migraine     Neck injury     Polycythemia     Polycythemia, secondary     Sciatica     Unspecified retinal detachment     Unspecified sleep apnea     resolved since gastric bypass    Vision decreased     Weight loss        Past Surgical History:   Procedure Laterality Date    ABDOMEN SURGERY PROC UNLISTED  2012    Hernia    HX CATARACT REMOVAL Left     HX CERVICAL FUSION  4/4/2016    Cervical Spine Fusion    HX CHOLECYSTECTOMY      HX GASTRIC BYPASS  2006    HX KNEE ARTHROSCOPY      both knees (right knee twice, left once)    HX ORTHOPAEDIC  1981, 1995    lumbar laminectomy    HX OTHER SURGICAL      Two back surgeries    HX RETINAL DETACHMENT REPAIR Left        Social History     Socioeconomic History    Marital status:      Spouse name: Not on file    Number of children: Not on file    Years of education: Not on file    Highest education level: Not on file   Occupational History    Not on file   Social Needs    Financial resource strain: Not on file    Food insecurity:     Worry: Not on file Inability: Not on file    Transportation needs:     Medical: Not on file     Non-medical: Not on file   Tobacco Use    Smoking status: Former Smoker     Last attempt to quit: 1981     Years since quittin.6    Smokeless tobacco: Never Used   Substance and Sexual Activity    Alcohol use: Yes     Comment: occasionally     Drug use: No    Sexual activity: Yes     Partners: Female   Lifestyle    Physical activity:     Days per week: Not on file     Minutes per session: Not on file    Stress: Not on file   Relationships    Social connections:     Talks on phone: Not on file     Gets together: Not on file     Attends Hindu service: Not on file     Active member of club or organization: Not on file     Attends meetings of clubs or organizations: Not on file     Relationship status: Not on file    Intimate partner violence:     Fear of current or ex partner: Not on file     Emotionally abused: Not on file     Physically abused: Not on file     Forced sexual activity: Not on file   Other Topics Concern    Not on file   Social History Narrative    Patient lives with his wife in [place], he had 3 children by unfortunately lost 2 of them. He enjoys taking care of his yard and plays with the grandchildren. Patient does have an advanced directive on file    Visit Vitals  /78 (BP 1 Location: Right arm, BP Patient Position: Sitting)   Pulse 69   Temp 97.6 °F (36.4 °C) (Tympanic)   Resp 14   Ht 6' 3\" (1.905 m)   Wt 234 lb (106.1 kg)   SpO2 96%   BMI 29.25 kg/m²       Physical Exam   Neck with greatly restricted range of motion. No Cervical Lymphadenopathy  No Supraclavicular Lymphadenopathy  Thyroid is Normal  Lungs are normal to percussion. Clear to auscultation   Heart:  S1 S2 are normal, No gallops, No murmurs  No Carotid Bruits  Abdomen:  Normal Bowel Sounds. No tenderness. No masses. No Hepatomegaly or Splenomegaly  Back with slight decrease flexion. LE:  Strong Pedal Pulses.   No Edema      BMI:  BMI is high but it was not addressed during this visit due to multiple medical problems      Hospital Outpatient Visit on 09/30/2019   Component Date Value Ref Range Status    WBC 09/30/2019 12.5  4.6 - 13.2 K/uL Final    RBC 09/30/2019 4.84  4.70 - 5.50 M/uL Final    HGB 09/30/2019 12.9* 13.0 - 16.0 g/dL Final    HCT 09/30/2019 39.3  36.0 - 48.0 % Final    MCV 09/30/2019 81.2  74.0 - 97.0 FL Final    MCH 09/30/2019 26.7  24.0 - 34.0 PG Final    MCHC 09/30/2019 32.8  31.0 - 37.0 g/dL Final    RDW 09/30/2019 16.1* 11.6 - 14.5 % Final    PLATELET 82/75/4460 920  135 - 420 K/uL Final    MPV 09/30/2019 11.7  9.2 - 11.8 FL Final    Sodium 09/30/2019 143  136 - 145 mmol/L Final    Potassium 09/30/2019 4.1  3.5 - 5.5 mmol/L Final    Chloride 09/30/2019 111  100 - 111 mmol/L Final    CO2 09/30/2019 25  21 - 32 mmol/L Final    Anion gap 09/30/2019 7  3.0 - 18 mmol/L Final    Glucose 09/30/2019 107* 74 - 99 mg/dL Final    BUN 09/30/2019 13  7.0 - 18 MG/DL Final    Creatinine 09/30/2019 1.03  0.6 - 1.3 MG/DL Final    BUN/Creatinine ratio 09/30/2019 13  12 - 20   Final    GFR est AA 09/30/2019 >60  >60 ml/min/1.73m2 Final    GFR est non-AA 09/30/2019 >60  >60 ml/min/1.73m2 Final    Comment: (NOTE)  Estimated GFR is calculated using the Modification of Diet in Renal   Disease (MDRD) Study equation, reported for both  Americans   (GFRAA) and non- Americans (GFRNA), and normalized to 1.73m2   body surface area. The physician must decide which value applies to   the patient. The MDRD study equation should only be used in   individuals age 25 or older. It has not been validated for the   following: pregnant women, patients with serious comorbid conditions,   or on certain medications, or persons with extremes of body size,   muscle mass, or nutritional status.       Calcium 09/30/2019 8.7  8.5 - 10.1 MG/DL Final    Phosphorus 09/30/2019 2.6  2.5 - 4.9 MG/DL Final    Albumin 09/30/2019 3.6  3.4 - 5.0 g/dL Final    Uric acid 09/30/2019 5.2  2.6 - 7.2 MG/DL Final    Comment: The drugs N-acetylcysteine (NAC) and  Metamiszole have been found to cause falsely  low results in this chemical assay. Please  be sure to submit blood samples obtained  BEFORE administration of either of these  drugs to assure correct results.  Calcium 09/30/2019 8.4* 8.5 - 10.1 MG/DL Final    PTH, Intact 09/30/2019 61.2  18.4 - 88.0 pg/mL Final    Vitamin D 25-Hydroxy 09/30/2019 56.9  30 - 100 ng/mL Final    Comment: (NOTE)  Deficiency               <20 ng/mL  Insufficiency          20-30 ng/mL  Sufficient             ng/mL  Possible toxicity       >100 ng/mL    The Method used is Siemens Advia Centaur currently standardized to a   Center of Disease Control and Prevention (CDC) certified reference   [facility]. Samples containing fluorescein dye can produce falsely   elevated values when tested with the ADVIA Centaur Vitamin D Assay. It is recommended that results in the toxic range, >100 ng/mL, be   retested 72 hours post fluorescein exposure.       Protein, urine random 09/30/2019 15* <11.9 mg/dL Final    Creatinine, urine 09/30/2019 52.00  30 - 125 mg/dL Final   Hospital Outpatient Visit on 09/26/2019   Component Date Value Ref Range Status    WBC 09/26/2019 9.9  4.6 - 13.2 K/uL Final    RBC 09/26/2019 4.90  4.70 - 5.50 M/uL Final    HGB 09/26/2019 13.1  13.0 - 16.0 g/dL Final    HCT 09/26/2019 41.9  36.0 - 48.0 % Final    MCV 09/26/2019 85.5  74.0 - 97.0 FL Final    MCH 09/26/2019 26.7  24.0 - 34.0 PG Final    MCHC 09/26/2019 31.3  31.0 - 37.0 g/dL Final    RDW 09/26/2019 17.0* 11.6 - 14.5 % Final    PLATELET 11/92/1353 863  135 - 420 K/uL Final    MPV 09/26/2019 10.8  9.2 - 11.8 FL Final    NEUTROPHILS 09/26/2019 39* 40 - 73 % Final    LYMPHOCYTES 09/26/2019 51  21 - 52 % Final    MONOCYTES 09/26/2019 7  3 - 10 % Final    EOSINOPHILS 09/26/2019 2  0 - 5 % Final    BASOPHILS 09/26/2019 1  0 - 2 % Final    ABS. NEUTROPHILS 09/26/2019 3.9  1.8 - 8.0 K/UL Final    ABS. LYMPHOCYTES 09/26/2019 5.0* 0.9 - 3.6 K/UL Final    ABS. MONOCYTES 09/26/2019 0.7  0.05 - 1.2 K/UL Final    ABS. EOSINOPHILS 09/26/2019 0.2  0.0 - 0.4 K/UL Final    ABS. BASOPHILS 09/26/2019 0.1  0.0 - 0.1 K/UL Final    DF 09/26/2019 AUTOMATED    Final    Sodium 09/26/2019 145  136 - 145 mmol/L Final    Potassium 09/26/2019 4.1  3.5 - 5.5 mmol/L Final    Chloride 09/26/2019 112* 100 - 111 mmol/L Final    CO2 09/26/2019 25  21 - 32 mmol/L Final    Anion gap 09/26/2019 8  3.0 - 18 mmol/L Final    Glucose 09/26/2019 97  74 - 99 mg/dL Final    BUN 09/26/2019 21* 7.0 - 18 MG/DL Final    Creatinine 09/26/2019 1.02  0.6 - 1.3 MG/DL Final    BUN/Creatinine ratio 09/26/2019 21* 12 - 20   Final    GFR est AA 09/26/2019 >60  >60 ml/min/1.73m2 Final    GFR est non-AA 09/26/2019 >60  >60 ml/min/1.73m2 Final    Comment: (NOTE)  Estimated GFR is calculated using the Modification of Diet in Renal   Disease (MDRD) Study equation, reported for both  Americans   (GFRAA) and non- Americans (GFRNA), and normalized to 1.73m2   body surface area. The physician must decide which value applies to   the patient. The MDRD study equation should only be used in   individuals age 25 or older. It has not been validated for the   following: pregnant women, patients with serious comorbid conditions,   or on certain medications, or persons with extremes of body size,   muscle mass, or nutritional status.  Calcium 09/26/2019 9.1  8.5 - 10.1 MG/DL Final    Bilirubin, total 09/26/2019 0.7  0.2 - 1.0 MG/DL Final    ALT (SGPT) 09/26/2019 24  16 - 61 U/L Final    AST (SGOT) 09/26/2019 16  10 - 38 U/L Final    Alk.  phosphatase 09/26/2019 50  45 - 117 U/L Final    Protein, total 09/26/2019 6.3* 6.4 - 8.2 g/dL Final    Albumin 09/26/2019 3.6  3.4 - 5.0 g/dL Final    Globulin 09/26/2019 2.7  2.0 - 4.0 g/dL Final    A-G Ratio 09/26/2019 1.3  0.8 - 1.7   Final    LIPID PROFILE 09/26/2019        Final    Cholesterol, total 09/26/2019 157  <200 MG/DL Final    Triglyceride 09/26/2019 115  <150 MG/DL Final    Comment: The drugs N-acetylcysteine (NAC) and  Metamiszole have been found to cause falsely  low results in this chemical assay. Please  be sure to submit blood samples obtained  BEFORE administration of either of these  drugs to assure correct results.  HDL Cholesterol 09/26/2019 44  40 - 60 MG/DL Final    LDL, calculated 09/26/2019 90  0 - 100 MG/DL Final    VLDL, calculated 09/26/2019 23  MG/DL Final    CHOL/HDL Ratio 09/26/2019 3.6  0 - 5.0   Final    Prostate Specific Ag 09/26/2019 2.7  0.0 - 4.0 ng/mL Final   Office Visit on 08/05/2019   Component Date Value Ref Range Status    Color (UA POC) 08/05/2019 Yellow   Final    Clarity (UA POC) 08/05/2019 Clear   Final    Glucose (UA POC) 08/05/2019 Negative  Negative Final    Bilirubin (UA POC) 08/05/2019 Negative  Negative Final    Ketones (UA POC) 08/05/2019 Negative  Negative Final    Specific gravity (UA POC) 08/05/2019 1.020  1.001 - 1.035 Final    Blood (UA POC) 08/05/2019 Trace  Negative Final    pH (UA POC) 08/05/2019 6.5  4.6 - 8.0 Final    Protein (UA POC) 08/05/2019 Negative  Negative Final    Urobilinogen (UA POC) 08/05/2019 0.2 mg/dL  0.2 - 1 Final    Nitrites (UA POC) 08/05/2019 Negative  Negative Final    Leukocyte esterase (UA POC) 08/05/2019 Negative  Negative Final   Hospital Outpatient Visit on 07/29/2019   Component Date Value Ref Range Status    Testosterone 07/29/2019 184* 264 - 916 ng/dL Final    Comment: (NOTE)  Adult male reference interval is based on a population of  healthy nonobese males (BMI <30) between 23and 44years old. [address] [place] [address], [address] [place] [address] [id number]. PMID: [id number].   Performed At: [address] [id number]  Azael Castro MD [id number]      Free testosterone (Direct) 07/29/2019 1.8* 6.6 - 18.1 pg/mL Final    Comment: (NOTE)  Performed At: [address] [id number]  Franco Cote MD [id number]         . No results found for any visits on 10/01/19. Assessment / Plan      ICD-10-CM ICD-9-CM    1. Restless leg syndrome G25.81 333.94    2. Hypogonadism in male E29.1 257.2 testosterone cypionate (DEPOTESTOTERONE CYPIONATE) 200 mg/mL injection   3. Encounter for immunization Z23 V03.89 INFLUENZA VACCINE INACTIVATED (IIV), SUBUNIT, ADJUVANTED, IM      ADMIN INFLUENZA VIRUS VAC   4. Cervicalgia M54.2 723.1 COMPLIANCE DRUG SCREEN/PRESCRIPTION MONITORING   5. Lumbar stenosis with neurogenic claudication M48.062 724.03 COMPLIANCE DRUG SCREEN/PRESCRIPTION MONITORING   6. Chronic renal insufficiency, stage 2 (mild) N18.2 585.2        Urine for urine drug compliance ordered  Add Requip for restless leg  he was advised to continue his maintenance medications      Follow-up and Dispositions    · Return in about 3 months (around 1/1/2020). I asked Ester Ranjeet if he has any questions and I answered the questions. Ester Pollack states that he understands the treatment plan and agrees with the treatment plan          THIS DOCUMENT WAS CREATED WITH A VOICE ACTIVATED DICTATION SYSTEM.   IT MAY CONTAIN TRANSCRIPTION ERRORS

## 2019-10-02 RX ORDER — ROPINIROLE 0.5 MG/1
TABLET, FILM COATED ORAL
Qty: 60 TAB | Refills: 0 | Status: SHIPPED | OUTPATIENT
Start: 2019-10-02 | End: 2019-10-08 | Stop reason: ALTCHOICE

## 2019-10-02 RX ORDER — GUANFACINE HYDROCHLORIDE 1 MG/1
TABLET ORAL
Qty: 30 TAB | Refills: 0 | Status: SHIPPED | OUTPATIENT
Start: 2019-10-02 | End: 2019-10-28 | Stop reason: SDUPTHER

## 2019-10-04 RX ORDER — TRAZODONE HYDROCHLORIDE 50 MG/1
TABLET ORAL
Qty: 30 TAB | Refills: 0 | Status: SHIPPED | OUTPATIENT
Start: 2019-10-04 | End: 2019-10-31 | Stop reason: SDUPTHER

## 2019-10-08 RX ORDER — ROPINIROLE 1 MG/1
TABLET, FILM COATED ORAL
Qty: 180 TAB | Refills: 1 | Status: SHIPPED | OUTPATIENT
Start: 2019-10-08 | End: 2020-04-03 | Stop reason: ALTCHOICE

## 2019-10-09 LAB — DRUGS UR: NORMAL

## 2019-10-14 RX ORDER — LABETALOL 300 MG/1
TABLET, FILM COATED ORAL
Qty: 60 TAB | Refills: 0 | Status: SHIPPED | OUTPATIENT
Start: 2019-10-14 | End: 2019-11-11 | Stop reason: SDUPTHER

## 2019-10-17 DIAGNOSIS — M54.2 CERVICALGIA: ICD-10-CM

## 2019-10-17 RX ORDER — HYDROCODONE BITARTRATE AND ACETAMINOPHEN 7.5; 325 MG/1; MG/1
1 TABLET ORAL
Qty: 150 TAB | Refills: 0 | Status: SHIPPED | OUTPATIENT
Start: 2019-10-19 | End: 2019-11-13 | Stop reason: SDUPTHER

## 2019-10-19 DIAGNOSIS — E29.1 HYPOGONADISM IN MALE: ICD-10-CM

## 2019-10-22 RX ORDER — TESTOSTERONE CYPIONATE 200 MG/ML
INJECTION INTRAMUSCULAR
Qty: 1 ML | Refills: 5 | Status: SHIPPED | OUTPATIENT
Start: 2019-10-22

## 2019-10-25 RX ORDER — DULOXETIN HYDROCHLORIDE 30 MG/1
CAPSULE, DELAYED RELEASE ORAL
Qty: 90 CAP | Refills: 0 | Status: SHIPPED | OUTPATIENT
Start: 2019-10-25 | End: 2019-11-06 | Stop reason: SDUPTHER

## 2019-10-28 RX ORDER — ROPINIROLE 0.5 MG/1
TABLET, FILM COATED ORAL
Qty: 60 TAB | Refills: 0 | Status: SHIPPED | OUTPATIENT
Start: 2019-10-28 | End: 2019-11-28 | Stop reason: SDUPTHER

## 2019-10-28 RX ORDER — GUANFACINE HYDROCHLORIDE 1 MG/1
TABLET ORAL
Qty: 30 TAB | Refills: 0 | Status: SHIPPED | OUTPATIENT
Start: 2019-10-28 | End: 2019-11-28 | Stop reason: SDUPTHER

## 2019-10-31 RX ORDER — TRAZODONE HYDROCHLORIDE 50 MG/1
TABLET ORAL
Qty: 30 TAB | Refills: 0 | Status: SHIPPED | OUTPATIENT
Start: 2019-10-31 | End: 2019-11-28 | Stop reason: SDUPTHER

## 2019-11-04 RX ORDER — APIXABAN 5 MG/1
TABLET, FILM COATED ORAL
Qty: 60 TAB | Refills: 0 | Status: SHIPPED | OUTPATIENT
Start: 2019-11-04 | End: 2021-01-06 | Stop reason: SDUPTHER

## 2019-11-06 NOTE — TELEPHONE ENCOUNTER
Requested Prescriptions     Pending Prescriptions Disp Refills    DULoxetine (CYMBALTA) 30 mg capsule 90 Cap 0    DULoxetine (CYMBALTA) 60 mg capsule 90 Cap 1     Sig: Take 1 Cap by mouth daily. Take one capsule by mouth daily along with 30 mg capsule daily for a total daily dose 90 mg.

## 2019-11-08 RX ORDER — DULOXETIN HYDROCHLORIDE 60 MG/1
60 CAPSULE, DELAYED RELEASE ORAL DAILY
Qty: 90 CAP | Refills: 1 | Status: SHIPPED | OUTPATIENT
Start: 2019-11-08 | End: 2020-02-04 | Stop reason: SDUPTHER

## 2019-11-08 RX ORDER — DULOXETIN HYDROCHLORIDE 30 MG/1
CAPSULE, DELAYED RELEASE ORAL
Qty: 90 CAP | Refills: 0 | Status: SHIPPED | OUTPATIENT
Start: 2019-11-08 | End: 2020-02-04 | Stop reason: SDUPTHER

## 2019-11-11 RX ORDER — SUMATRIPTAN 100 MG/1
TABLET, FILM COATED ORAL
Qty: 12 TAB | Refills: 1 | Status: SHIPPED | OUTPATIENT
Start: 2019-11-11

## 2019-11-11 RX ORDER — LABETALOL 300 MG/1
TABLET, FILM COATED ORAL
Qty: 60 TAB | Refills: 0 | Status: SHIPPED | OUTPATIENT
Start: 2019-11-11 | End: 2019-12-11 | Stop reason: SDUPTHER

## 2019-11-12 ENCOUNTER — HOSPITAL ENCOUNTER (OUTPATIENT)
Dept: LAB | Age: 69
Discharge: HOME | End: 2019-11-12
Payer: MEDICARE

## 2019-11-12 DIAGNOSIS — E61.1 IRON DEFICIENCY: ICD-10-CM

## 2019-11-12 DIAGNOSIS — K90.89 OTHER SPECIFIED INTESTINAL MALABSORPTION: ICD-10-CM

## 2019-11-12 DIAGNOSIS — I10 ESSENTIAL HYPERTENSION: ICD-10-CM

## 2019-11-12 LAB
ALBUMIN SERPL-MCNC: 3.8 G/DL (ref 3.4–5)
ALBUMIN/GLOB SERPL: 1.2 {RATIO} (ref 0.8–1.7)
ALP SERPL-CCNC: 65 U/L (ref 45–117)
ALT SERPL-CCNC: 17 U/L (ref 16–61)
ANION GAP SERPL CALC-SCNC: 6 MMOL/L (ref 3–18)
AST SERPL-CCNC: 12 U/L (ref 10–38)
BASOPHILS # BLD: 0.1 K/UL (ref 0–0.1)
BASOPHILS NFR BLD: 1 % (ref 0–2)
BILIRUB SERPL-MCNC: 1 MG/DL (ref 0.2–1)
BUN SERPL-MCNC: 21 MG/DL (ref 7–18)
BUN/CREAT SERPL: 18 (ref 12–20)
CALCIUM SERPL-MCNC: 8.8 MG/DL (ref 8.5–10.1)
CHLORIDE SERPL-SCNC: 113 MMOL/L (ref 100–111)
CO2 SERPL-SCNC: 24 MMOL/L (ref 21–32)
CREAT SERPL-MCNC: 1.17 MG/DL (ref 0.6–1.3)
DIFFERENTIAL METHOD BLD: ABNORMAL
EOSINOPHIL # BLD: 0.1 K/UL (ref 0–0.4)
EOSINOPHIL NFR BLD: 1 % (ref 0–5)
ERYTHROCYTE [DISTWIDTH] IN BLOOD BY AUTOMATED COUNT: 18.1 % (ref 11.6–14.5)
GLOBULIN SER CALC-MCNC: 3.1 G/DL (ref 2–4)
GLUCOSE SERPL-MCNC: 153 MG/DL (ref 74–99)
HCT VFR BLD AUTO: 43.1 % (ref 36–48)
HGB BLD-MCNC: 13.3 G/DL (ref 13–16)
LYMPHOCYTES # BLD: 4.5 K/UL (ref 0.9–3.6)
LYMPHOCYTES NFR BLD: 50 % (ref 21–52)
MCH RBC QN AUTO: 25.8 PG (ref 24–34)
MCHC RBC AUTO-ENTMCNC: 30.9 G/DL (ref 31–37)
MCV RBC AUTO: 83.5 FL (ref 74–97)
MONOCYTES # BLD: 0.5 K/UL (ref 0.05–1.2)
MONOCYTES NFR BLD: 5 % (ref 3–10)
NEUTS SEG # BLD: 3.8 K/UL (ref 1.8–8)
NEUTS SEG NFR BLD: 43 % (ref 40–73)
PLATELET # BLD AUTO: 289 K/UL (ref 135–420)
PMV BLD AUTO: 11.8 FL (ref 9.2–11.8)
POTASSIUM SERPL-SCNC: 3.9 MMOL/L (ref 3.5–5.5)
PROT SERPL-MCNC: 6.9 G/DL (ref 6.4–8.2)
RBC # BLD AUTO: 5.16 M/UL (ref 4.7–5.5)
SODIUM SERPL-SCNC: 143 MMOL/L (ref 136–145)
WBC # BLD AUTO: 8.9 K/UL (ref 4.6–13.2)

## 2019-11-12 PROCEDURE — 83921 ORGANIC ACID SINGLE QUANT: CPT

## 2019-11-12 PROCEDURE — 80053 COMPREHEN METABOLIC PANEL: CPT

## 2019-11-12 PROCEDURE — 82728 ASSAY OF FERRITIN: CPT

## 2019-11-12 PROCEDURE — 85025 COMPLETE CBC W/AUTO DIFF WBC: CPT

## 2019-11-12 PROCEDURE — 36415 COLL VENOUS BLD VENIPUNCTURE: CPT

## 2019-11-12 RX ORDER — SILDENAFIL 100 MG/1
TABLET, FILM COATED ORAL
Qty: 30 TAB | Refills: 1 | Status: SHIPPED | OUTPATIENT
Start: 2019-11-12

## 2019-11-13 DIAGNOSIS — M54.2 CERVICALGIA: ICD-10-CM

## 2019-11-13 LAB — FERRITIN SERPL-MCNC: 4 NG/ML (ref 8–388)

## 2019-11-13 RX ORDER — HYDROCODONE BITARTRATE AND ACETAMINOPHEN 7.5; 325 MG/1; MG/1
1 TABLET ORAL
Qty: 150 TAB | Refills: 0 | Status: SHIPPED | OUTPATIENT
Start: 2019-11-13 | End: 2019-12-11 | Stop reason: SDUPTHER

## 2019-11-15 LAB
Lab: NORMAL
METHYLMALONATE SERPL-SCNC: 183 NMOL/L (ref 0–378)

## 2019-11-19 ENCOUNTER — HOSPITAL ENCOUNTER (OUTPATIENT)
Dept: PHYSICAL THERAPY | Age: 69
Discharge: HOME | End: 2019-11-19
Payer: MEDICARE

## 2019-11-19 PROCEDURE — 97110 THERAPEUTIC EXERCISES: CPT

## 2019-11-19 PROCEDURE — 97162 PT EVAL MOD COMPLEX 30 MIN: CPT

## 2019-11-19 NOTE — PROGRESS NOTES
PT DAILY TREATMENT NOTE 10-18    Patient Name: Bob Baker  Date:2019  : 1950  [x]  Patient  Verified  Payor: [place] MEDICARE / Plan: [place] MED[place]ARE PART A & B / Product Type: Medicare /    In time:935  Out time:1010  Total Treatment Time (min): 35  Visit #: 1 of 10    Medicare/BCBS Only   Total Timed Codes (min):  15 1:1 Treatment Time:  35       Treatment Area: Lower back pain [M54.5]    SUBJECTIVE  Pain Level (0-10 scale): 6  Any medication changes, allergies to medications, adverse drug reactions, diagnosis change, or new procedure performed?: [x] No    [] Yes (see summary sheet for update)  Subjective functional status/changes:   [] No changes reported      OBJECTIVE    Modality rationale:    Min Type Additional Details    [] Estim:  []Unatt       []IFC  []Premod                        []Other:  []w/ice   []w/heat  Position:  Location:    [] Estim: []Att    []TENS instruct  []NMES                    []Other:  []w/US   []w/ice   []w/heat  Position:  Location:    []  Traction: [] Cervical       []Lumbar                       [] Prone          []Supine                       []Intermittent   []Continuous Lbs:  [] before manual  [] after manual    []  Ultrasound: []Continuous   [] Pulsed                           []1MHz   []3MHz W/cm2:  Location:    []  Iontophoresis with dexamethasone         Location: [] Take home patch   [] In clinic    []  Ice     []  heat  []  Ice massage  []  Laser   []  Anodyne Position:  Location:    []  Laser with stim  []  Other:  Position:  Location:    []  Vasopneumatic Device Pressure:       [] lo [] med [] hi   Temperature: [] lo [] med [] hi   [] Skin assessment post-treatment:  []intact []redness- no adverse reaction    []redness - adverse reaction:     20 min [x]Eval                  []Re-Eval       15 min Therapeutic Exercise:  [] See flow sheet :HEP, Patient education on diagnosis and prognosis, dry needling   Rationale: increase ROM and increase strength to improve the patients ability to perform ADL      With   [] TE   [] TA   [] neuro   [] other: Patient Education: [x] Review HEP    [] Progressed/Changed HEP based on:   [] positioning   [] body mechanics   [] transfers   [] heat/ice application    [] other:      Other Objective/Functional Measures:      Pain Level (0-10 scale) post treatment: 6    ASSESSMENT/Changes in Function:     Patient will continue to benefit from skilled PT services to modify and progress therapeutic interventions, address functional mobility deficits, address ROM deficits, address strength deficits, analyze and address soft tissue restrictions, analyze and cue movement patterns, analyze and modify body mechanics/ergonomics, assess and modify postural abnormalities, address imbalance/dizziness and instruct in home and community integration to attain remaining goals.      [x]  See Plan of Care  []  See progress note/recertification  []  See Discharge Summary         Progress towards goals / Updated goals:  See POC    PLAN  []  Upgrade activities as tolerated     [x]  Continue plan of care  []  Update interventions per flow sheet       []  Discharge due to:_  []  Other:_      Star Moscoso, PT 11/19/2019  10:14 AM    Future Appointments   Date Time Provider Panchito Chau   11/22/2019 11:15 AM Sebastián, [address]   11/25/2019  9:00 AM Neel Preciado, PT [facility]   11/26/2019  8:00 AM [facility] INFUSION NURSE 2 [facility]   1/2/2020  3:40 PM Harriett Bear  [facility]   7/31/2020  9:00 AM China German MD [address] [place] [address]

## 2019-11-19 NOTE — PROGRESS NOTES
[facility] - [place] Se[place] [address]  [phone] fax  Plan of Care/ Statement of Necessity for Physical Therapy Services    Patient name: Cesar Yang Start of Care: 2019   Referral source: Naomi Marshall NP : 1950    Medical Diagnosis: Lower back pain [M54.5]  Payor: Lakshmi Mcarthur / Plan: [place] MEDICARE PART A & B / Product Type: Medicare /  Onset Date:19    Treatment Diagnosis: low back pain   Prior Hospitalization: see medical history Provider#: [id number]   Medications: Verified on Patient summary List    Comorbidities: history of multiple neck and back surgeries, Right humeral fracture and repair, HTN, arthritis, visual impairment   Prior Level of Function: Functionally independent, retired from [employer], lives with wife in single level home with steps to enter, active      The [address] [place] [address] and following information is based on the information from the initial evaluation. Assessment/ key information: Patient is a pleasant 78year old male who presents with complaints of long term back pain that has been treated surgically (discectomies and most recently a laminectomy in 2017) as well as with injections. He reports that interventions have helped to lessen pain in his Right buttocks/leg, but Left sided pain persists with standing/walking, limiting activity tolerance to no more than 10 minutes. At evaluation he demonstrates lumbar AROM impaired in extension, and impaired core and glute strength leading to increased compensations and back strain with functional mobility. Additionally, Patient ambulates without AD with increasing trunk flexion. He is tender to palpation in the Left glutes and quadratus lumborum. Overall he is a good rehab candidate based on premorbid status, and will benefit from skilled physical therapy in order to address the above deficits.      Evaluation Complexity History HIGH Complexity :3+ comorbidities / personal factors will impact the outcome/ POC ; Examination MEDIUM Complexity : 3 Standardized tests and measures addressing body structure, function, activity limitation and / or participation in recreation  ;Presentation LOW Complexity : Stable, uncomplicated  ;Clinical Decision Making MEDIUM Complexity : FOTO score of 26-74  Overall Complexity Rating: MEDIUM  Problem List: pain affecting function, decrease ROM, decrease strength, edema affecting function, impaired gait/ balance, decrease ADL/ functional abilitiies, decrease activity tolerance, decrease flexibility/ joint mobility and decrease transfer abilities   Treatment Plan may include any combination of the following: Therapeutic exercise, Therapeutic activities, Neuromuscular re-education, Physical agent/modality, Gait/balance training, Manual therapy, Aquatic therapy, Patient education, Self Care training, Functional mobility training, Home safety training and Stair training, dry needling per script  Patient / Family readiness to learn indicated by: asking questions, trying to perform skills and interest  Persons(s) to be included in education: patient (P)  Barriers to Learning/Limitations: None  Patient Goal (s): my life back  Patient Self Reported Health Status: fair  Rehabilitation Potential: good    Short Term Goals: To be accomplished in 1 weeks:  Goal: Patient will be independent and compliant with HEP in order to progress toward long term goals. Status at last note/certification: issued and reviewed  Long Term Goals: To be accomplished in 10 treatments:  Goal: Patient will improve FOTO assessment score to 53 pts in order to indicate improved functional abilities. Status at last note/certification: 47 pts  Goal: Patient will be able to stand/ambulate for at least 20 minutes before having to stop and sit due to increased back pain, in order to report no difficulty with attending social events and interacting with his grandchildren.   Status at last note/certification: no more than 10 minutes  Goal: Patient will report worst overall back/leg pain as 5/10 or less in order to more easily perform functional ADLs. Status at last note/certification: 65/35  Goal: Patient will report no increased pain with supine bridging in order to indicate improved core and glute activation and reduced lumbar strain. Status at last note/certification: painful    Frequency / Duration: Patient to be seen 2 to 3 times per week for 10 treatments. Patient/ Caregiver education and instruction: Diagnosis, prognosis, exercises   [x]  Plan of care has been reviewed with PTA    Certification Period: 11/19/19 to 12/18/19  Brodie Alvraado, PT 11/19/2019 10:16 AM  _____________________________________________________________________  I certify that the above Therapy Services are being furnished while the patient is under my care. I agree with the treatment plan and certify that this therapy is necessary.     Physician's Signature:____________Date:_________TIME:________    ** Signature, Date and Time must be completed for valid certification **    Please sign and return to [facility] - [address]  [phone] fax

## 2019-11-22 ENCOUNTER — APPOINTMENT (OUTPATIENT)
Dept: PHYSICAL THERAPY | Age: 69
End: 2019-11-22
Payer: MEDICARE

## 2019-11-25 ENCOUNTER — HOSPITAL ENCOUNTER (OUTPATIENT)
Dept: PHYSICAL THERAPY | Age: 69
Discharge: HOME | End: 2019-11-25
Payer: MEDICARE

## 2019-11-25 PROCEDURE — 97110 THERAPEUTIC EXERCISES: CPT

## 2019-11-25 PROCEDURE — 97530 THERAPEUTIC ACTIVITIES: CPT

## 2019-11-25 PROCEDURE — 97140 MANUAL THERAPY 1/> REGIONS: CPT

## 2019-11-25 NOTE — PROGRESS NOTES
PT DAILY TREATMENT NOTE 10-18    Patient Name: Bob Baker  Date:2019  : 1950  [x]  Patient  Verified  Payor: [place] MEDICARE / Plan: [place] MEDICARE PART A & B / Product Type: Medicare /    In time:904  Out time:949  Total Treatment Time (min): 45  Visit #: 2 of 10    Medicare/BCBS Only   Total Timed Codes (min):  45 1:1 Treatment Time:  45       Treatment Area: Lower back pain [M54.5]    SUBJECTIVE  Pain Level (0-10 scale): 3  Any medication changes, allergies to medications, adverse drug reactions, diagnosis change, or new procedure performed?: [x] No    [] Yes (see summary sheet for update)  Subjective functional status/changes:   [] No changes reported  Not too much pain in my back right now.      OBJECTIVE    Modality rationale: Patient declined   Min Type Additional Details    [] Estim:  []Unatt       []IFC  []Premod                        []Other:  []w/ice   []w/heat  Position:  Location:    [] Estim: []Att    []TENS instruct  []NMES                    []Other:  []w/US   []w/ice   []w/heat  Position:  Location:    []  Traction: [] Cervical       []Lumbar                       [] Prone          []Supine                       []Intermittent   []Continuous Lbs:  [] before manual  [] after manual    []  Ultrasound: []Continuous   [] Pulsed                           []1MHz   []3MHz W/cm2:  Location:    []  Iontophoresis with dexamethasone         Location: [] Take home patch   [] In clinic    []  Ice     []  heat  []  Ice massage  []  Laser   []  Anodyne Position:  Location:    []  Laser with stim  []  Other:  Position:  Location:    []  Vasopneumatic Device Pressure:       [] lo [] med [] hi   Temperature: [] lo [] med [] hi   [] Skin assessment post-treatment:  []intact []redness- no adverse reaction    []redness - adverse reaction:     14 min Therapeutic Exercise:  [x] See flow sheet :   Rationale: increase ROM and increase strength to improve the patients ability to improve ease of functional ADLs    8 min Therapeutic Activity:  [x]  See flow sheet :   Rationale: Patient education on dry needling purposes, techniques, contraindications, and expected outcomes  to improve the patients ability to improve therapy compliance and results     23 min Manual Therapy:  STM/TrP release to Left glutes/piriformis/quadratus lumborum, gentle effleurage after needling   Rationale: decrease pain, increase ROM, increase tissue extensibility and decrease trigger points to reduce radicular symptoms  *not billed for time of needle insertion    Dry Needling Procedure Note    Procedure: An intramuscular manual therapy (dry needling) and a neuro-muscular re-education treatment was done to deactivate myofascial trigger points with a 30 gauge filament needle under aseptic technique. Indications:  [x] Myofascial pain and dysfunction [] Muscled spasms  [x] Myalgia/myositis   [] Muscle cramps  [x] Muscle imbalances  [] Other:    Chart reviewed for the following:  IHolly PT, have reviewed the medical history, summary list and precautions/contraindications for Puneet Husain.   TIME OUT performed immediately prior to start of procedure:  Holly HARRISON PT, have performed the following reviews on Puneet Husain prior to the start of the session:      [x] Verified patient identification by name and date of birth    [x] Agreement on all muscles being treated was verified   [x] Purpose of dry needling, side effects, possible complications, risks and benefits were explained to the patient   [x] Procedure site(s) verified  [x] Patient was positioned for comfort and draped for privacy  [x] Informed Consent was signed (initial visit) and verified verbally (subsequent visits)  [x] Patient was instructed on the need to report the use of blood thinners and/or immunosuppressant medications  [x] How to respond to possible adverse effects of treatment  [x] Self treatment of post needling soreness: ice, heat (moist heat, heat wraps) and stretching  [x] Opportunity was given to ask any questions, all questions were answered            Time: 912  Date of procedure: 11/25/2019    Treatment: The following muscles were treated today with intramuscular dry needling  [] Left [] Right Abdominals: Ant Rectus Abdominis  [] Left [] Right External Oblique / Internal Oblique / Transverse Abdominis  [] Left [] Right Thoracic Multifidi / Dominic Murdockk  [] Left [] Right Iliocostalis Thoracis / Nia Candy  [] Left [] Right Longissimus Thoracis / Nia Candy  [] Left [] Right Lumbar Multifidi  [] Left [] Right Serratus Posterior Inferior  [] Left [] Right Quadratus Lumborum  [] Left [] Right Psoas  [] Left [] Right Iliacus  [] Left [] Right Iliopsoas (inguinal)  [] Left [] Right Piriformis  [] Left [] Right Quadratus Femoris  [] Left [] Right Leonardo Aarti / Jordy Goods / Michail Catena  [] Left [] Right Obturator Internus  [] Left [] Right Obturator Externus / Suzette Nicks / Inferior Gemellus    [] Left [] Right Tensor Fasciae Hali  [] Left [] Right Iliotibial Band  [] Left [] Right Pectineus  [] Left [] Right Sartorius  [] Left [] Right Gracilis  [] Left [] Right Adductor Brevis / Adductor Longus / Adductor Reese  [] Left [] Right Rectus Femoris / Vastus Lateralis / Vastus Intermedius / Vastus Medialis Obliquus  [] Left [] Right Tibialis Anterior / Posterior  [] Left [] Right Extensor Digitorum Longus / Brevis  [] Left [] Right Extensor Hallucis Longus / Brevis  [] Left [] Right Hamstrings: Biceps Femoris / Diantha Nick / Semimembranosis  [] Left [] Right Popliteus / Planteris  [] Left [] Right Gastrocnemius Medial / Lateral  [] Left [] Right Soleus  [] Left [] Right Peronei: Longus / Royal Flick / Tertius  [] Left [] Right Flexor Digitorum Longus / Brevis  [] Left [] Right Flexor Hallucis Longus / Brevis  [] Left [] Right Quadratus Plantae  [] Left [] Right Abductor Digiti Minimi  [] Left [] Right Foot Interossei  [] Left [] Right Other:    Patient's response to today's treatment:  [x] Latent Twitch Response  [] Muscle relaxation [x] Pain Relief  [] Post needling soreness [x] without complications  [] Increased Range of Motion  [] Other:     Performed by: Dariel Lima, PT        With   [] TE   [] TA   [] neuro   [] other: Patient Education: [x] Review HEP    [] Progressed/Changed HEP based on:   [] positioning   [] body mechanics   [] transfers   [] heat/ice application    [] other:      Other Objective/Functional Measures: initiated treatment per flow sheet     Pain Level (0-10 scale) post treatment: 2    ASSESSMENT/Changes in Function: Patient reports the biggest referred pain pattern during dry needling of the Left quadratus lumborum, with pain referred into the mid back, where he reports his biggest discomfort is. Despite blood thinners, no blood was drawn today during first needling session. Discussed post-needling management (stay mobile, drink water) with Patient and he demonstrates understanding. Decreased pain following session. Patient will continue to benefit from skilled PT services to modify and progress therapeutic interventions, address functional mobility deficits, address ROM deficits, address strength deficits, analyze and address soft tissue restrictions, analyze and cue movement patterns, analyze and modify body mechanics/ergonomics, assess and modify postural abnormalities, address imbalance/dizziness and instruct in home and community integration to attain remaining goals. []  See Plan of Care  []  See progress note/recertification  []  See Discharge Summary         Progress towards goals / Updated goals:  Short Term Goals: To be accomplished in 1 weeks:  Goal: Patient will be independent and compliant with HEP in order to progress toward long term goals. Status at last note/certification: issued and reviewed  Long Term Goals:  To be accomplished in 10 treatments:  Goal: Patient will improve FOTO assessment score to 53 pts in order to indicate improved functional abilities. Status at last note/certification: 47 pts  Goal: Patient will be able to stand/ambulate for at least 20 minutes before having to stop and sit due to increased back pain, in order to report no difficulty with attending social events and interacting with his grandchildren. Status at last note/certification: no more than 10 minutes  Goal: Patient will report worst overall back/leg pain as 5/10 or less in order to more easily perform functional ADLs. Status at last note/certification: 98/31  Goal: Patient will report no increased pain with supine bridging in order to indicate improved core and glute activation and reduced lumbar strain.   Status at last note/certification: painful    PLAN  []  Upgrade activities as tolerated     [x]  Continue plan of care  []  Update interventions per flow sheet       []  Discharge due to:_  []  Other:_      Oni Wynne, PT 11/25/2019  8:56 AM    Future Appointments   Date Time Provider Panchito Chau   11/25/2019  9:00 AM Fredo Bass PT [facility]   11/26/2019  8:00 AM [facility] INFUSION NURSE 2 [facility]   1/2/2020  3:40 PM Aurora Real  [facility]   7/31/2020  9:00 AM Cristina Barfield MD [address] [facility]

## 2019-11-26 ENCOUNTER — HOSPITAL ENCOUNTER (OUTPATIENT)
Dept: INFUSION THERAPY | Age: 69
Discharge: HOME | End: 2019-11-26
Payer: MEDICARE

## 2019-11-26 VITALS
TEMPERATURE: 97 F | SYSTOLIC BLOOD PRESSURE: 130 MMHG | DIASTOLIC BLOOD PRESSURE: 83 MMHG | OXYGEN SATURATION: 98 % | HEART RATE: 74 BPM | RESPIRATION RATE: 18 BRPM

## 2019-11-26 PROCEDURE — 96365 THER/PROPH/DIAG IV INF INIT: CPT

## 2019-11-26 PROCEDURE — 74011000258 HC RX REV CODE- 258: Performed by: INTERNAL MEDICINE

## 2019-11-26 PROCEDURE — 74011250637 HC RX REV CODE- 250/637: Performed by: INTERNAL MEDICINE

## 2019-11-26 PROCEDURE — 74011250636 HC RX REV CODE- 250/636: Performed by: INTERNAL MEDICINE

## 2019-11-26 PROCEDURE — 96366 THER/PROPH/DIAG IV INF ADDON: CPT

## 2019-11-26 RX ORDER — ACETAMINOPHEN 325 MG/1
650 TABLET ORAL ONCE
Status: COMPLETED | OUTPATIENT
Start: 2019-11-26 | End: 2019-11-26

## 2019-11-26 RX ORDER — DIPHENHYDRAMINE HCL 25 MG
25 CAPSULE ORAL ONCE
Status: COMPLETED | OUTPATIENT
Start: 2019-11-26 | End: 2019-11-26

## 2019-11-26 RX ADMIN — SODIUM CHLORIDE 975 MG: 0.9 INJECTION, SOLUTION INTRAVENOUS at 10:55

## 2019-11-26 RX ADMIN — SODIUM CHLORIDE 25 MG: 900 INJECTION, SOLUTION INTRAVENOUS at 09:10

## 2019-11-26 RX ADMIN — DIPHENHYDRAMINE HYDROCHLORIDE 25 MG: 25 CAPSULE ORAL at 08:34

## 2019-11-26 RX ADMIN — ACETAMINOPHEN 650 MG: 325 TABLET ORAL at 08:34

## 2019-11-26 NOTE — PROGRESS NOTES
Problem: Anemia Care Plan (Adult and Pediatric)  Goal: *Labs within defined limits  Outcome: Progressing Towards Goal  Goal: *Tolerates increased activity  Outcome: Progressing Towards Goal     Problem: Knowledge Deficit  Goal: *Verbalizes understanding of procedures and medications  Outcome: Progressing Towards Goal     Problem: Patient Education:  Go to Education Activity  Goal: Patient/Family Education  Outcome: Progressing Towards Goal

## 2019-11-26 NOTE — PROGRESS NOTES
[facility] Progress Note Date: 2019 Name: Olivia Menon MRN: [id number] : 1950 Infed Infusion Mr. Brennen Resendez to [facility], ambulatory, at 65. Pt was assessed and education was provided. Mr. Tereza Dodge vitals were reviewed and patient was observed for 5 minutes prior to treatment. Visit Vitals /83 (BP 1 Location: Right arm, BP Patient Position: At rest) Pulse 74 Temp 97 °F (36.1 °C) Resp 18 SpO2 98% 18g PIV placed in right AC x 1 attempt. PIV flushed easily and had brisk blood return. Pre-medications (tylenol 650 mg and benadryl 25 mg ) were administered as ordered  30 mins prior to test Infed. NS at 25 ml/hr. Infed 25 mg in 50 ml NS was administer over 30 mins VS stable and pt denied complaints of itching, lip/tongue/facial swelling, SOB, CP or other complaints. Mr. Brennen Resendez tolerated the infusion, and had no complaints. VS remained stable. PIV flushed with NS 10 ml and removed. No bleeding or hematoma noted at site. Sj and coban applied. Reviewed discharge instructions with patient, including expected side effects (abdominal cramping, nausea, changes in color of urine or feces) and signs of allergic reaction requiring medical attention (itching/hives/rashes, SOB, chest pain, lip/tongue/facial swelling). Patient given printed copy to take home. Patient verbalized understanding of discharge instructions. Patient {ARMBANDS:}. Mr. Brennen Resendez was discharged from [place] [address] in stable condition at ***. He is to follow up with *** as needed. Patience Gordon RN 2019 
4:11 PM

## 2019-11-27 ENCOUNTER — HOSPITAL ENCOUNTER (OUTPATIENT)
Dept: PHYSICAL THERAPY | Age: 69
Discharge: HOME | End: 2019-11-27
Payer: MEDICARE

## 2019-11-27 PROCEDURE — 97530 THERAPEUTIC ACTIVITIES: CPT

## 2019-11-27 NOTE — PROGRESS NOTES
PT DAILY TREATMENT NOTE 10-18    Patient Name: Jack Thakur  Date:2019  : 1950  [x]  Patient  Verified  Payor: [place] MEDICARE / Plan: [place] MEDICARE PART A & B / Product Type: Medicare /    In time:330  Out time:340  Total Treatment Time (min): 10  Visit #: 3 of 10    Medicare/BCBS Only   Total Timed Codes (min):  10 1:1 Treatment Time:  10       Treatment Area: Lower back pain [M54.5]    SUBJECTIVE  Pain Level (0-10 scale): 8  Any medication changes, allergies to medications, adverse drug reactions, diagnosis change, or new procedure performed?: [x] No    [] Yes (see summary sheet for update)  Subjective functional status/changes:   [] No changes reported  I need to talk to you. I'm hurting more than I was.      OBJECTIVE    Modality rationale:    Min Type Additional Details    [] Estim:  []Unatt       []IFC  []Premod                        []Other:  []w/ice   []w/heat  Position:  Location:    [] Estim: []Att    []TENS instruct  []NMES                    []Other:  []w/US   []w/ice   []w/heat  Position:  Location:    []  Traction: [] Cervical       []Lumbar                       [] Prone          []Supine                       []Intermittent   []Continuous Lbs:  [] before manual  [] after manual    []  Ultrasound: []Continuous   [] Pulsed                           []1MHz   []3MHz W/cm2:  Location:    []  Iontophoresis with dexamethasone         Location: [] Take home patch   [] In clinic    []  Ice     []  heat  []  Ice massage  []  Laser   []  Anodyne Position:  Location:    []  Laser with stim  []  Other:  Position:  Location:    []  Vasopneumatic Device Pressure:       [] lo [] med [] hi   Temperature: [] lo [] med [] hi   [] Skin assessment post-treatment:  []intact []redness- no adverse reaction    []redness - adverse reaction:     10 min Therapeutic Activity:  [x]  See flow sheet :   Rationale: Patient education on dry needling expected symptoms and recovery times, muscle referral patterns  to improve the patients ability to improve management of pain     With   [] TE   [] TA   [] neuro   [] other: Patient Education: [x] Review HEP    [] Progressed/Changed HEP based on:   [] positioning   [] body mechanics   [] transfers   [] heat/ice application    [] other:      Other Objective/Functional Measures: held exercises due to increased pain following dry needling     Pain Level (0-10 scale) post treatment: 8    ASSESSMENT/Changes in Function: Patient came in today concerned with increased low back pain and now Right buttocks pain and Left anterior thigh pain following dry needling last visit. He demonstrates no redness or bruising where he was needled, and is ambulating without AD with some increased trunk flexion. Patient is following up with MD 12/5/19, and would like to hold off on therapy until that time. Explained that the muscles that were needled do refer pain in the areas where he is having pain today. Patient okay with having been needled, and will let us know what MD suggests. Instructed him to call PT next week as needed, and to go to ED over the holiday weekend as needed. No red flags, just increased pain/soreness. Patient will continue to benefit from skilled PT services to modify and progress therapeutic interventions, address functional mobility deficits, address ROM deficits, address strength deficits, analyze and address soft tissue restrictions, analyze and cue movement patterns, analyze and modify body mechanics/ergonomics, assess and modify postural abnormalities, address imbalance/dizziness and instruct in home and community integration to attain remaining goals. []  See Plan of Care  []  See progress note/recertification  []  See Discharge Summary         Progress towards goals / Updated goals:  Short Term Goals: To be accomplished in 1 weeks:  Goal: Patient will be independent and compliant with HEP in order to progress toward long term goals.   Status at last note/certification: issued and reviewed  [address] [employer] [address] be accomplished in 10 treatments:  Goal: Patient will improve FOTO assessment score to 53 pts in order to indicate improved functional abilities. Status at last note/certification: 47 pts  Goal: Patient will be able to stand/ambulate for at least 20 minutes before having to stop and sit due to increased back pain, in order to report no difficulty with attending social events and interacting with his grandchildren. Status at last note/certification: no more than 10 minutes  Goal: Patient will report worst overall back/leg pain as 5/10 or less in order to more easily perform functional ADLs. Status at last note/certification: 38/50  Goal: Patient will report no increased pain with supine bridging in order to indicate improved core and glute activation and reduced lumbar strain.   Status at last note/certification: painful    PLAN  []  Upgrade activities as tolerated     [x]  Continue plan of care  []  Update interventions per flow sheet       []  Discharge due to:_  []  Other:_      Humaira Liz, PT 11/27/2019  3:33 PM    Future Appointments   Date Time Provider Panchito Chau   12/2/2019  9:45 AM Crys Seo, PT [facility]   12/5/2019  9:45 AM [facility]   12/9/2019  9:45 AM [facility]   12/12/2019  9:45 AM [facility]rys Seo[facility]   12/16/2019  9:45 AM [facility]rys Seo, [facility]   1/2/2020  3:40 PM Nitza Davies MD [address] [place] [address]   7/31/2020  9:00 AM Prince Soria MD [facility]

## 2019-11-29 RX ORDER — GUANFACINE HYDROCHLORIDE 1 MG/1
TABLET ORAL
Qty: 30 TAB | Refills: 0 | Status: SHIPPED | OUTPATIENT
Start: 2019-11-29 | End: 2019-12-28

## 2019-11-29 RX ORDER — ROPINIROLE 0.5 MG/1
TABLET, FILM COATED ORAL
Qty: 60 TAB | Refills: 0 | Status: SHIPPED | OUTPATIENT
Start: 2019-11-29 | End: 2019-12-28

## 2019-11-29 RX ORDER — TRAZODONE HYDROCHLORIDE 50 MG/1
TABLET ORAL
Qty: 30 TAB | Refills: 0 | Status: SHIPPED | OUTPATIENT
Start: 2019-11-29 | End: 2019-12-28

## 2019-12-02 ENCOUNTER — APPOINTMENT (OUTPATIENT)
Dept: PHYSICAL THERAPY | Age: 69
End: 2019-12-02

## 2019-12-05 ENCOUNTER — APPOINTMENT (OUTPATIENT)
Dept: PHYSICAL THERAPY | Age: 69
End: 2019-12-05

## 2019-12-11 DIAGNOSIS — M54.2 CERVICALGIA: ICD-10-CM

## 2019-12-11 RX ORDER — LABETALOL 300 MG/1
TABLET, FILM COATED ORAL
Qty: 60 TAB | Refills: 0 | Status: SHIPPED | OUTPATIENT
Start: 2019-12-11 | End: 2020-01-21

## 2019-12-11 RX ORDER — HYDROCODONE BITARTRATE AND ACETAMINOPHEN 7.5; 325 MG/1; MG/1
1 TABLET ORAL
Qty: 150 TAB | Refills: 0 | Status: SHIPPED | OUTPATIENT
Start: 2019-12-11 | End: 2020-01-10

## 2019-12-12 ENCOUNTER — APPOINTMENT (OUTPATIENT)
Dept: PHYSICAL THERAPY | Age: 69
End: 2019-12-12

## 2019-12-15 RX ORDER — PANTOPRAZOLE SODIUM 40 MG/1
TABLET, DELAYED RELEASE ORAL
Qty: 90 TAB | Refills: 0 | Status: SHIPPED | OUTPATIENT
Start: 2019-12-15 | End: 2020-01-08 | Stop reason: SDUPTHER

## 2019-12-15 RX ORDER — FLUTICASONE PROPIONATE 50 MCG
SPRAY, SUSPENSION (ML) NASAL
Qty: 16 G | Refills: 0 | Status: SHIPPED | OUTPATIENT
Start: 2019-12-15 | End: 2020-06-05

## 2019-12-16 ENCOUNTER — APPOINTMENT (OUTPATIENT)
Dept: PHYSICAL THERAPY | Age: 69
End: 2019-12-16

## 2019-12-19 NOTE — PROGRESS NOTES
[facility] - [facility], [address] [place] [address]  [phone] fax    Discharge Summary    Patient name: Xochilt Trammell Start of Care: 2019   Referral source: Deena Weems NP : 1950   Medical/Treatment Diagnosis: Lower back pain [M54.5]  Payor: Rubin Bloch / Plan: VA MEDICARE PART A & B / Product Type: Medicare /  Onset Date:19     Prior Hospitalization: see medical history Provider#: [id number]   Medications: Verified on Patient Summary List    Comorbidities: history of multiple neck and back surgeries, Right humeral fracture and repair, HTN, arthritis, visual impairment   Prior Level of Function: Functionally independent, retired from [employer], lives with wife in single level home with steps to enter, active    Visits from [place] of Care: 3    Missed Visits: 1    Reporting Period : 19 to 19    Short Term Goals: To be accomplished in 1 weeks:  Goal: Patient will be independent and compliant with HEP in order to progress toward long term goals. Status at last note/certification: issued and reviewed  Status at discharge: unable to reassess, unplanned discharge  [address] [facility] be accomplished in 10 treatments:  Goal: Patient will improve FOTO assessment score to 53 pts in order to indicate improved functional abilities. Status at last note/certification: 47 pts  Status at discharge: unable to reassess, unplanned discharge  Goal: Patient will be able to stand/ambulate for at least 20 minutes before having to stop and sit due to increased back pain, in order to report no difficulty with attending social events and interacting with his grandchildren. Status at last note/certification: no more than 10 minutes  Status at discharge: unable to reassess, unplanned discharge  Goal: Patient will report worst overall back/leg pain as 5/10 or less in order to more easily perform functional ADLs.   Status at last note/certification: 10/10  Status at discharge: unable to reassess, unplanned discharge  Goal: Patient will report no increased pain with supine bridging in order to indicate improved core and glute activation and reduced lumbar strain. Status at last note/certification: painful  Status at discharge: unable to reassess, unplanned discharge    Assessment/ Summary of Care: Patient attended evaluation for low back pain, and underwent dry needling at his first session. He reported significant discomfort after needling, but states that this did eventually dissipate to baseline. At this time he does not wish to continue with further treatment. Thank you for the referral of this Patient.      RECOMMENDATIONS:  [x]Discontinue therapy: []Patient has reached or is progressing toward set goals      [x]Patient is non-compliant or has abdicated      []Due to lack of appreciable progress towards set [address] [facility],  12/19/2019 1:26 PM

## 2019-12-24 ENCOUNTER — DOCUMENTATION ONLY (OUTPATIENT)
Dept: FAMILY MEDICINE CLINIC | Facility: CLINIC | Age: 69
End: 2019-12-24

## 2019-12-24 NOTE — PROGRESS NOTES
Prior Aldo Oyster was submitted for patients Hydrocodone/acet and was approved for 11/13/19 to 6/10/2020. Copy was faxed to the patients pharmacy.

## 2019-12-24 NOTE — PROGRESS NOTES
Prior Kaylee President was done for Hydrocodone and approved for 11/13/19 to 6/10/2020. Copy was sent to the pharmacy.

## 2019-12-26 ENCOUNTER — LAB ONLY (OUTPATIENT)
Dept: FAMILY MEDICINE CLINIC | Facility: CLINIC | Age: 69
End: 2019-12-26

## 2019-12-26 ENCOUNTER — HOSPITAL ENCOUNTER (OUTPATIENT)
Dept: LAB | Age: 69
Discharge: HOME | End: 2019-12-26
Payer: MEDICARE

## 2019-12-26 DIAGNOSIS — D50.8 OTHER IRON DEFICIENCY ANEMIA: ICD-10-CM

## 2019-12-26 DIAGNOSIS — R60.0 BILATERAL LEG EDEMA: Primary | ICD-10-CM

## 2019-12-26 DIAGNOSIS — D50.8 OTHER IRON DEFICIENCY ANEMIA: Primary | ICD-10-CM

## 2019-12-26 DIAGNOSIS — R60.0 BILATERAL LEG EDEMA: ICD-10-CM

## 2019-12-26 LAB
ALBUMIN SERPL-MCNC: 3.6 G/DL (ref 3.4–5)
ALBUMIN/GLOB SERPL: 1.3 {RATIO} (ref 0.8–1.7)
ALP SERPL-CCNC: 61 U/L (ref 45–117)
ALT SERPL-CCNC: 35 U/L (ref 16–61)
ANION GAP SERPL CALC-SCNC: 5 MMOL/L (ref 3–18)
AST SERPL-CCNC: 33 U/L (ref 10–38)
BASOPHILS # BLD: 0 K/UL (ref 0–0.1)
BASOPHILS NFR BLD: 0 % (ref 0–2)
BILIRUB SERPL-MCNC: 0.7 MG/DL (ref 0.2–1)
BUN SERPL-MCNC: 31 MG/DL (ref 7–18)
BUN/CREAT SERPL: 22 (ref 12–20)
CALCIUM SERPL-MCNC: 8.6 MG/DL (ref 8.5–10.1)
CHLORIDE SERPL-SCNC: 113 MMOL/L (ref 100–111)
CO2 SERPL-SCNC: 24 MMOL/L (ref 21–32)
CREAT SERPL-MCNC: 1.42 MG/DL (ref 0.6–1.3)
DIFFERENTIAL METHOD BLD: ABNORMAL
EOSINOPHIL # BLD: 0.1 K/UL (ref 0–0.4)
EOSINOPHIL NFR BLD: 1 % (ref 0–5)
ERYTHROCYTE [DISTWIDTH] IN BLOOD BY AUTOMATED COUNT: 22.5 % (ref 11.6–14.5)
FERRITIN SERPL-MCNC: 26 NG/ML (ref 8–388)
GLOBULIN SER CALC-MCNC: 2.8 G/DL (ref 2–4)
GLUCOSE SERPL-MCNC: 135 MG/DL (ref 74–99)
HCT VFR BLD AUTO: 42.2 % (ref 36–48)
HGB BLD-MCNC: 14.1 G/DL (ref 13–16)
LYMPHOCYTES # BLD: 4.6 K/UL (ref 0.9–3.6)
LYMPHOCYTES NFR BLD: 43 % (ref 21–52)
MCH RBC QN AUTO: 28.6 PG (ref 24–34)
MCHC RBC AUTO-ENTMCNC: 33.4 G/DL (ref 31–37)
MCV RBC AUTO: 85.6 FL (ref 74–97)
MONOCYTES # BLD: 0.9 K/UL (ref 0.05–1.2)
MONOCYTES NFR BLD: 8 % (ref 3–10)
NEUTS SEG # BLD: 5.1 K/UL (ref 1.8–8)
NEUTS SEG NFR BLD: 48 % (ref 40–73)
PLATELET # BLD AUTO: 260 K/UL (ref 135–420)
PLATELET COMMENTS,PCOM: ABNORMAL
PMV BLD AUTO: 10.7 FL (ref 9.2–11.8)
POTASSIUM SERPL-SCNC: 4.3 MMOL/L (ref 3.5–5.5)
PROT SERPL-MCNC: 6.4 G/DL (ref 6.4–8.2)
RBC # BLD AUTO: 4.93 M/UL (ref 4.7–5.5)
RBC MORPH BLD: ABNORMAL
RBC MORPH BLD: ABNORMAL
SODIUM SERPL-SCNC: 142 MMOL/L (ref 136–145)
WBC # BLD AUTO: 10.7 K/UL (ref 4.6–13.2)

## 2019-12-26 PROCEDURE — 85025 COMPLETE CBC W/AUTO DIFF WBC: CPT

## 2019-12-26 PROCEDURE — 80053 COMPREHEN METABOLIC PANEL: CPT

## 2019-12-26 PROCEDURE — 82728 ASSAY OF FERRITIN: CPT

## 2019-12-26 RX ORDER — FUROSEMIDE 20 MG/1
TABLET ORAL
Qty: 30 TAB | Refills: 1 | Status: SHIPPED | OUTPATIENT
Start: 2019-12-26 | End: 2019-12-26

## 2019-12-26 RX ORDER — FUROSEMIDE 20 MG/1
TABLET ORAL
Qty: 90 TAB | Refills: 0 | Status: SHIPPED | OUTPATIENT
Start: 2019-12-26

## 2019-12-28 RX ORDER — ROPINIROLE 0.5 MG/1
TABLET, FILM COATED ORAL
Qty: 180 TAB | Refills: 1 | Status: SHIPPED | OUTPATIENT
Start: 2019-12-28 | End: 2020-04-03 | Stop reason: SDUPTHER

## 2019-12-28 RX ORDER — GUANFACINE HYDROCHLORIDE 1 MG/1
TABLET ORAL
Qty: 90 TAB | Refills: 1 | Status: SHIPPED | OUTPATIENT
Start: 2019-12-28 | End: 2021-01-06 | Stop reason: SDUPTHER

## 2019-12-28 RX ORDER — ROPINIROLE 0.5 MG/1
TABLET, FILM COATED ORAL
Qty: 60 TAB | Refills: 0 | Status: SHIPPED | OUTPATIENT
Start: 2019-12-28 | End: 2019-12-28

## 2019-12-28 RX ORDER — GUANFACINE HYDROCHLORIDE 1 MG/1
TABLET ORAL
Qty: 30 TAB | Refills: 0 | Status: SHIPPED | OUTPATIENT
Start: 2019-12-28 | End: 2019-12-28

## 2019-12-28 RX ORDER — TRAZODONE HYDROCHLORIDE 50 MG/1
TABLET ORAL
Qty: 30 TAB | Refills: 0 | Status: SHIPPED | OUTPATIENT
Start: 2019-12-28 | End: 2020-01-27 | Stop reason: SDUPTHER

## 2020-01-02 ENCOUNTER — OFFICE VISIT (OUTPATIENT)
Dept: CARDIOLOGY CLINIC | Age: 70
End: 2020-01-02

## 2020-01-02 VITALS
SYSTOLIC BLOOD PRESSURE: 130 MMHG | WEIGHT: 237 LBS | HEART RATE: 74 BPM | DIASTOLIC BLOOD PRESSURE: 80 MMHG | BODY MASS INDEX: 29.47 KG/M2 | OXYGEN SATURATION: 98 % | HEIGHT: 75 IN

## 2020-01-02 DIAGNOSIS — I49.3 PVCS (PREMATURE VENTRICULAR CONTRACTIONS): Primary | ICD-10-CM

## 2020-01-02 DIAGNOSIS — I48.91 ATRIAL FIBRILLATION, UNSPECIFIED TYPE (HCC): ICD-10-CM

## 2020-01-02 DIAGNOSIS — I10 ESSENTIAL HYPERTENSION: ICD-10-CM

## 2020-01-02 NOTE — PROGRESS NOTES
Braxton Lopez presents today for   Chief Complaint   Patient presents with    Irregular Heart Beat     1 year follow up        Braxton Lopez preferred language for health care discussion is english/other. Is someone accompanying this pt? no    Is the patient using any DME equipment during [address]? no    Depression Screening:  3 most recent PHQ Screens 10/1/2019   Little interest or pleasure in doing things Not at all   Feeling down, depressed, irritable, or hopeless Not at all   Total Score PHQ 2 0       Learning Assessment:  Learning Assessment 5/3/2018   PRIMARY LEARNER Patient   HIGHEST LEVEL OF EDUCATION - PRIMARY LEARNER  -   BARRIERS PRIMARY LEARNER -   [facility]    NEED -   LEARNER PREFERENCE PRIMARY DEMONSTRATION   ANSWERED BY patient   RELATIONSHIP SELF       Abuse Screening:  Abuse Screening Questionnaire 4/30/2019   Do you ever feel afraid of your partner? N   Are you in a relationship with someone who physically or mentally threatens you? N   Is it safe for you to go home? Y       Fall Risk  Fall Risk Assessment, last 12 mths 10/1/2019   Able to walk? Yes   Fall in past 12 months? No   Fall with injury? -   Number of falls in past 12 months -   Fall Risk Score -       Pt currently taking Anticoagulant therapy? Eliquis     Coordination of Care:  1. Have you been to the ER, urgent care clinic since your last visit? Hospitalized since your last visit? no    2. Have you seen or consulted any other health care providers outside of the [address] [place] [address] since your last visit? Include any pap smears or colon screening.  no

## 2020-01-02 NOTE — PROGRESS NOTES
HPI: A 75-year old male here for follow up. Overall doing well. He was initially referred by Dr. Shivam Cardozo for a new diagnosis of PAF during a preop evaluation for back surgery. He was asymptomatic. He had surgery at L-6 2019 and had surgery also in 2018. He is also getting some radiation ablation therapy to his spine which seems to be improving his lower extremity pain and weakness. He denies any new chest pain, dyspnea, PND, orthopnea, edema. Impression/Plan:  1. PAF without symptoms, rate control only. This is essentially an incidental finding during preop evaluation last year. 2. History of multiple back surgeries, stable. 3. Chronic Eliquis use without bleeding. 4. History of CML status post therapy, now in remission. 5. History of hypertension, reasonably controlled. 6. Chronic pain. At this point I recommended ongoing rate control and Eliquis as the chance is high that he could have asymptomatic recurrence. 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 All questions answered. I will see him back on an annual basis. Past Medical History:   Diagnosis Date    Abdominal pain, unspecified site     Acute reaction to stress     Allergic rhinitis due to pollen     Arrhythmia     afib    Arthritis     Back injury     BPH (benign prostatic hypertrophy)     Calculus of ureter     Cancer (HCC)     history of Leukemia, in remission    Carotid duplex 06/17/2015    Mild < 50% bilateral ICA stenosis.  Dizziness and giddiness     Esophageal reflux     Essential hypertension     GERD (gastroesophageal reflux disease)     Headache(784.0)     History of echocardiogram 08/13/2015    EF 55-60%. No WMA. Mild-mod LVH. Gr 1 DDfx. No evidence of intracardiac shunt.   Mild AI.      Hx: UTI (urinary tract infection)     Hypertension     Hypogonadism in male     Kidney stones     Migraine     Neck injury     Polycythemia     Polycythemia, secondary     Sciatica     Unspecified retinal detachment     Unspecified sleep apnea     resolved since gastric bypass    Vision decreased     Weight loss        Current Outpatient Medications   Medication Sig Dispense Refill    traZODone (DESYREL) 50 mg tablet TAKE 1 TABLET BY MOUTH EVERY NIGHT 30 Tab 0    guanFACINE IR (TENEX) 1 mg IR tablet TAKE 1 TABLET BY MOUTH AT BEDTIME 90 Tab 1    rOPINIRole (REQUIP) 0.5 mg tablet TAKE 1 TABLET BY MOUTH TWICE DAILY 180 Tab 1    furosemide (LASIX) 20 mg tablet TAKE 1 TABLET BY MOUTH EVERY MORNING AS NEEDED FOR SWELLING 90 Tab 0    pantoprazole (PROTONIX) 40 mg tablet TAKE 1 TABLET BY MOUTH EVERY DAY 90 Tab 0    fluticasone propionate (FLONASE) 50 mcg/actuation nasal spray SHAKE LIQUID AND USE 1 TO 2 SPRAYS IN EACH NOSTRIL DAILY 16 g 0    labetalol (NORMODYNE) 300 mg tablet TAKE 1 TABLET BY MOUTH TWICE DAILY 60 Tab 0    HYDROcodone-acetaminophen (NORCO) 7.5-325 mg per tablet Take 1 Tab by mouth every four (4) hours as needed for Pain for up to 30 days. Max Daily Amount: 5 Tabs. 150 Tab 0    sildenafil citrate (VIAGRA) 100 mg tablet 1 tab one hour before sexual activity 30 Tab 1    SUMAtriptan (IMITREX) 100 mg tablet TAKE 1 TABLET BY MOUTH AT ONSET OF HEADACHE, MAY REPEAT 2 HOURS LATER. (MAX 2 PILLS IN 24 HOURS) 12 Tab 1    DULoxetine (CYMBALTA) 30 mg capsule 1 cap daily 90 Cap 0    DULoxetine (CYMBALTA) 60 mg capsule Take 1 Cap by mouth daily.  Take one capsule by mouth daily along with 30 mg capsule daily for a total daily dose 90 mg. 90 Cap 1    ELIQUIS 5 mg tablet TAKE 1 TABLET BY MOUTH TWICE DAILY 60 Tab 0    testosterone cypionate (DEPOTESTOTERONE CYPIONATE) 200 mg/mL injection INJECT 1 ml IN THE MUSCLE EVERY 30 DAYS 1 mL 5    rOPINIRole (REQUIP) 1 mg tablet 1 tab twice per day 180 Tab 1    fluticasone propionate (FLONASE) 50 mcg/actuation nasal spray SHAKE LIQUID AND USE 1 TO 2 SPRAYS IN EACH NOSTRIL DAILY 1 Bottle 3    dutasteride (AVODART) 0.5 mg capsule TAKE 1 CAPSULE BY MOUTH DAILY 90 Cap 0    topiramate (TOPAMAX) 50 mg tablet TAKE 1 TABLET BY MOUTH TWICE DAILY FOR MIGRAINE PREVENTION OR HEADACHES 180 Tab 5    montelukast (SINGULAIR) 10 mg tablet TAKE 1 TABLET BY MOUTH ONCE DAILY 90 Tab 3    potassium citrate (UROCIT-K) 15 mEq TbER tablet TAKE 1 TABLET BY MOUTH DAILY WITH FOOD. SWALLOW TABLETS WHOLE WITH A FULL GLASS OF WATER 90 Tab 3    guanFACINE IR (TENEX) 1 mg IR tablet TAKE 1 TABLET BY MOUTH AT BEDTIME 90 Tab 2    tamsulosin (FLOMAX) 0.4 mg capsule 1 tab daily 90 Cap 1    labetalol (NORMODYNE) 300 mg tablet TAKE 1 TABLET BY MOUTH TWICE DAILY 180 Tab 2    amLODIPine (NORVASC) 5 mg tablet take 1 tablet by mouth once daily  0    multivitamin (ONE A DAY) tablet Take 1 Tab by mouth daily.  vitamin E (AQUA GEMS) 400 unit capsule Take 400 Units by mouth daily. Social History   reports that he quit smoking about 38 years ago. He has never used smokeless tobacco.   reports current alcohol use. Family History  family history includes Diabetes in his father and son; Headache in his sister; Heart Attack in his mother; Hypertension in his father. Review of Systems  Except as stated above include:  Constitutional: Negative for fever, chills and malaise/fatigue. HEENT: No congestion or recent URI. Gastrointestinal: No nausea, vomiting, abdominal pain, bloody stools. Pulmonary:  Negative except as stated above. Cardiac:  Negative except as stated above. Musculoskeletal: Negative except as stated above. Neurological:  No localized symptoms. Skin:  Negative except as stated above. Psych:  Negative except as stated above. Endocrine:  Negative except as stated above.     PHYSICAL EXAM  BP Readings from Last 3 Encounters:   01/02/20 130/80   01/02/20 136/82   11/26/19 130/83     Pulse Readings from Last 3 Encounters:   01/02/20 74   11/26/19 74   10/01/19 69     Wt Readings from Last 3 Encounters:   01/02/20 107.5 kg (237 lb)   01/02/20 106.1 kg (234 lb)   10/01/19 106.1 kg (234 lb)     General:   Well developed, well groomed. Head/Neck:   No jugular venous distention     No carotid bruits. No evidence of xanthelasma. Lungs:   No respiratory distress. Clear bilaterally. Heart:    Regular rate and rhythm. Normal S1/S2. Palpation of heart with normal point of maximum impulse. No significant murmurs, rubs or gallops. Abdomen:   Soft and nontender. No palpable abdominal mass or bruits. Extremities:   Intact peripheral pulses. No significant edema. Neurological:   Alert and oriented to person, place, time. No focal neurological deficit visually. Skin:   No obvious rash    Blood Pressure Metric:  Monitor recommended and adjustments stated if needed.

## 2020-01-06 PROBLEM — D50.9 IRON DEFICIENCY ANEMIA, UNSPECIFIED: Status: ACTIVE | Noted: 2020-01-06

## 2020-01-08 RX ORDER — HYDROCORTISONE SODIUM SUCCINATE 100 MG/2ML
100 INJECTION, POWDER, FOR SOLUTION INTRAMUSCULAR; INTRAVENOUS AS NEEDED
Status: CANCELLED | OUTPATIENT
Start: 2020-01-13

## 2020-01-08 RX ORDER — EPINEPHRINE 1 MG/ML
0.3 INJECTION, SOLUTION, CONCENTRATE INTRAVENOUS AS NEEDED
Status: CANCELLED | OUTPATIENT
Start: 2020-01-13

## 2020-01-08 RX ORDER — ALBUTEROL SULFATE 0.83 MG/ML
2.5 SOLUTION RESPIRATORY (INHALATION) AS NEEDED
Status: CANCELLED
Start: 2020-01-13

## 2020-01-08 RX ORDER — DIPHENHYDRAMINE HYDROCHLORIDE 50 MG/ML
50 INJECTION, SOLUTION INTRAMUSCULAR; INTRAVENOUS AS NEEDED
Status: CANCELLED
Start: 2020-01-13

## 2020-01-08 RX ORDER — ONDANSETRON 2 MG/ML
8 INJECTION INTRAMUSCULAR; INTRAVENOUS AS NEEDED
Status: CANCELLED | OUTPATIENT
Start: 2020-01-13

## 2020-01-08 RX ORDER — ACETAMINOPHEN 325 MG/1
650 TABLET ORAL AS NEEDED
Status: CANCELLED
Start: 2020-01-13

## 2020-01-08 RX ORDER — HEPARIN 100 UNIT/ML
300-500 SYRINGE INTRAVENOUS AS NEEDED
Status: CANCELLED
Start: 2020-01-13

## 2020-01-08 NOTE — TELEPHONE ENCOUNTER
Last seen 10/01/19  Next appt   02/12/20    Requested Prescriptions     Pending Prescriptions Disp Refills    pantoprazole (PROTONIX) 40 mg tablet 90 Tab 0

## 2020-01-09 RX ORDER — PANTOPRAZOLE SODIUM 40 MG/1
TABLET, DELAYED RELEASE ORAL
Qty: 90 TAB | Refills: 3 | Status: SHIPPED | OUTPATIENT
Start: 2020-01-09

## 2020-01-13 ENCOUNTER — HOSPITAL ENCOUNTER (OUTPATIENT)
Dept: INFUSION THERAPY | Age: 70
Discharge: HOME | End: 2020-01-13
Payer: MEDICARE

## 2020-01-13 VITALS
TEMPERATURE: 98.2 F | DIASTOLIC BLOOD PRESSURE: 67 MMHG | SYSTOLIC BLOOD PRESSURE: 109 MMHG | HEART RATE: 71 BPM | RESPIRATION RATE: 18 BRPM | OXYGEN SATURATION: 97 %

## 2020-01-13 DIAGNOSIS — D50.9 IRON DEFICIENCY ANEMIA, UNSPECIFIED IRON DEFICIENCY ANEMIA TYPE: ICD-10-CM

## 2020-01-13 DIAGNOSIS — D50.9 IRON DEFICIENCY ANEMIA, UNSPECIFIED IRON DEFICIENCY ANEMIA TYPE: Primary | ICD-10-CM

## 2020-01-13 PROCEDURE — 96365 THER/PROPH/DIAG IV INF INIT: CPT

## 2020-01-13 PROCEDURE — 74011000258 HC RX REV CODE- 258: Performed by: EMERGENCY MEDICINE

## 2020-01-13 PROCEDURE — 74011250637 HC RX REV CODE- 250/637: Performed by: EMERGENCY MEDICINE

## 2020-01-13 PROCEDURE — 74011250636 HC RX REV CODE- 250/636: Performed by: EMERGENCY MEDICINE

## 2020-01-13 PROCEDURE — 96366 THER/PROPH/DIAG IV INF ADDON: CPT

## 2020-01-13 RX ORDER — SODIUM CHLORIDE 9 MG/ML
25 INJECTION, SOLUTION INTRAVENOUS CONTINUOUS
Status: CANCELLED | OUTPATIENT
Start: 2020-01-13

## 2020-01-13 RX ORDER — DIPHENHYDRAMINE HCL 25 MG
25 CAPSULE ORAL ONCE
Status: CANCELLED
Start: 2020-01-13

## 2020-01-13 RX ORDER — ACETAMINOPHEN 325 MG/1
650 TABLET ORAL ONCE
Status: COMPLETED | OUTPATIENT
Start: 2020-01-13 | End: 2020-01-13

## 2020-01-13 RX ORDER — ACETAMINOPHEN 325 MG/1
650 TABLET ORAL ONCE
Status: CANCELLED
Start: 2020-01-13

## 2020-01-13 RX ORDER — ACETAMINOPHEN 325 MG/1
650 TABLET ORAL AS NEEDED
Status: CANCELLED
Start: 2020-01-13

## 2020-01-13 RX ORDER — DIPHENHYDRAMINE HCL 25 MG
25 CAPSULE ORAL ONCE
Status: COMPLETED | OUTPATIENT
Start: 2020-01-13 | End: 2020-01-13

## 2020-01-13 RX ORDER — SODIUM CHLORIDE 0.9 % (FLUSH) 0.9 %
10-40 SYRINGE (ML) INJECTION AS NEEDED
Status: CANCELLED
Start: 2020-01-13

## 2020-01-13 RX ORDER — HEPARIN 100 UNIT/ML
300-500 SYRINGE INTRAVENOUS AS NEEDED
Status: CANCELLED
Start: 2020-01-13

## 2020-01-13 RX ORDER — ALBUTEROL SULFATE 0.83 MG/ML
2.5 SOLUTION RESPIRATORY (INHALATION) AS NEEDED
Status: CANCELLED
Start: 2020-01-13

## 2020-01-13 RX ORDER — DIPHENHYDRAMINE HYDROCHLORIDE 50 MG/ML
50 INJECTION, SOLUTION INTRAMUSCULAR; INTRAVENOUS AS NEEDED
Status: CANCELLED
Start: 2020-01-13

## 2020-01-13 RX ORDER — HYDROCORTISONE SODIUM SUCCINATE 100 MG/2ML
100 INJECTION, POWDER, FOR SOLUTION INTRAMUSCULAR; INTRAVENOUS AS NEEDED
Status: CANCELLED | OUTPATIENT
Start: 2020-01-13

## 2020-01-13 RX ORDER — EPINEPHRINE 1 MG/ML
0.3 INJECTION, SOLUTION, CONCENTRATE INTRAVENOUS AS NEEDED
Status: CANCELLED | OUTPATIENT
Start: 2020-01-13

## 2020-01-13 RX ORDER — SODIUM CHLORIDE 0.9 % (FLUSH) 0.9 %
10-40 SYRINGE (ML) INJECTION AS NEEDED
Status: DISPENSED | OUTPATIENT
Start: 2020-01-13 | End: 2020-01-13

## 2020-01-13 RX ORDER — SODIUM CHLORIDE 9 MG/ML
25 INJECTION, SOLUTION INTRAVENOUS CONTINUOUS
Status: DISPENSED | OUTPATIENT
Start: 2020-01-13 | End: 2020-01-13

## 2020-01-13 RX ORDER — ONDANSETRON 2 MG/ML
8 INJECTION INTRAMUSCULAR; INTRAVENOUS AS NEEDED
Status: CANCELLED | OUTPATIENT
Start: 2020-01-13

## 2020-01-13 RX ADMIN — ACETAMINOPHEN 650 MG: 325 TABLET ORAL at 08:23

## 2020-01-13 RX ADMIN — Medication 10 ML: at 08:23

## 2020-01-13 RX ADMIN — SODIUM CHLORIDE 975 MG: 0.9 INJECTION, SOLUTION INTRAVENOUS at 09:59

## 2020-01-13 RX ADMIN — Medication 10 ML: at 14:16

## 2020-01-13 RX ADMIN — SODIUM CHLORIDE 25 MG: 900 INJECTION, SOLUTION INTRAVENOUS at 08:46

## 2020-01-13 RX ADMIN — DIPHENHYDRAMINE HYDROCHLORIDE 25 MG: 25 CAPSULE ORAL at 08:23

## 2020-01-13 NOTE — PROGRESS NOTES
[facility] Progress Note    Date: 2020    Name: Beverly Bond    MRN: [id number]         : 1950    Iron Dextran infusion    Mr. Pk Martinez to [facility], ambulatory, at 8810. Pt was assessed and education was provided. He states he has received this medication in the past and tolerated it well. Mr. Yan Parkinson vitals were reviewed and patient was observed for 5 minutes prior to treatment. Visit Vitals  /67 (BP 1 Location: Right arm, BP Patient Position: Sitting)   Pulse 71   Temp 98.2 °F (36.8 °C)   Resp 18   SpO2 97%         22 g PIV placed in the left forearm x 1 attempt. PIV flushed easily and had brisk blood return. Pre-medications (Tylenol 650 mg and Benadryl 25 mg) were administered as ordered. Iron Dextran 25 mg test dose was administered over 30 minutes as ordered. Patient was observed for 1 hour following the test dose. No reaction noted. Iron dextran 975 mg was infused over 4 hours as ordered and without complications. Mr. Pk Martinez tolerated the infusion, and had no complaints. VS remained stable. Patient Vitals for the past 12 hrs:   Temp Pulse Resp BP SpO2   20 1416 98.2 °F (36.8 °C) 71 18 109/67 97 %   20 0814 98.8 °F (37.1 °C) 74 18 149/82 96 %       PIV flushed with NS 10 ml and removed. No bleeding or hematoma noted at site. Guaze and coban applied. Reviewed discharge instructions with patient, including expected side effects (abdominal cramping, nausea, changes in color of urine or feces) and signs of allergic reaction requiring medical attention (itching/hives/rashes, SOB, chest pain, lip/tongue/facial swelling). Patient given printed copy to take home. Patient verbalized understanding of discharge instructions. Patient armband removed and shredded. Mr. Pk Martinez was discharged from [place] [address] in stable condition at 1420. He has no further appointments with [facility] at this time.      Maria Esther Hu, NAT  2020

## 2020-01-21 RX ORDER — LABETALOL 300 MG/1
TABLET, FILM COATED ORAL
Qty: 60 TAB | Refills: 0 | Status: SHIPPED | OUTPATIENT
Start: 2020-01-21 | End: 2020-01-22 | Stop reason: SDUPTHER

## 2020-01-22 RX ORDER — LABETALOL 300 MG/1
TABLET, FILM COATED ORAL
Qty: 90 TAB | Refills: 3 | Status: SHIPPED | OUTPATIENT
Start: 2020-01-22 | End: 2020-10-12

## 2020-01-22 NOTE — TELEPHONE ENCOUNTER
Requested Prescriptions     Pending Prescriptions Disp Refills    labetaloL (NORMODYNE) 300 mg tablet 60 Tab 0

## 2020-01-24 NOTE — TELEPHONE ENCOUNTER
Last OV  10/01/19  Next OV  20    Requested Prescriptions     Pending Prescriptions Disp Refills    DULoxetine (CYMBALTA) 60 mg capsule 90 Cap 1     Sig: Take 1 Cap by mouth daily.  along with 30 mg capsule daily for a total daily dose 90 mg.    DULoxetine (CYMBALTA) 30 mg capsule 90 Cap 0     Si cap daily

## 2020-01-25 RX ORDER — DULOXETIN HYDROCHLORIDE 30 MG/1
CAPSULE, DELAYED RELEASE ORAL
Qty: 90 CAP | Refills: 0 | OUTPATIENT
Start: 2020-01-25

## 2020-01-25 RX ORDER — DULOXETIN HYDROCHLORIDE 60 MG/1
60 CAPSULE, DELAYED RELEASE ORAL DAILY
Qty: 90 CAP | Refills: 1 | OUTPATIENT
Start: 2020-01-25

## 2020-01-25 NOTE — TELEPHONE ENCOUNTER
The patient needs at least a future appointment with us before we can refill his medications. If he has an appointment with another provider that person can give the refill.

## 2020-01-27 RX ORDER — TRAZODONE HYDROCHLORIDE 50 MG/1
TABLET ORAL
Qty: 30 TAB | Refills: 5 | Status: SHIPPED | OUTPATIENT
Start: 2020-01-27 | End: 2020-04-29 | Stop reason: ALTCHOICE

## 2020-01-27 NOTE — TELEPHONE ENCOUNTER
Requested Prescriptions     Pending Prescriptions Disp Refills    traZODone (DESYREL) 50 mg tablet 30 Tab 0      patient has appointment 03/16/20

## 2020-02-04 NOTE — TELEPHONE ENCOUNTER
Last OV 10/1/19  F/U           3/16/20    Requested Prescriptions     Pending Prescriptions Disp Refills    DULoxetine (CYMBALTA) 30 mg capsule 90 Cap 0     Si cap daily    DULoxetine (CYMBALTA) 60 mg capsule 90 Cap 1     Sig: Take 1 Cap by mouth daily. Take one capsule by mouth daily along with 30 mg capsule daily for a total daily dose 90 mg.

## 2020-02-09 RX ORDER — DULOXETIN HYDROCHLORIDE 30 MG/1
CAPSULE, DELAYED RELEASE ORAL
Qty: 90 CAP | Refills: 0 | Status: SHIPPED | OUTPATIENT
Start: 2020-02-09 | End: 2020-04-29 | Stop reason: ALTCHOICE

## 2020-02-09 RX ORDER — DULOXETIN HYDROCHLORIDE 60 MG/1
60 CAPSULE, DELAYED RELEASE ORAL DAILY
Qty: 90 CAP | Refills: 1 | Status: SHIPPED | OUTPATIENT
Start: 2020-02-09 | End: 2020-04-28 | Stop reason: SDUPTHER

## 2020-02-25 ENCOUNTER — DOCUMENTATION ONLY (OUTPATIENT)
Dept: FAMILY MEDICINE CLINIC | Facility: CLINIC | Age: 70
End: 2020-02-25

## 2020-02-25 NOTE — PROGRESS NOTES
Prior auth for the Testosterone has been approved for 1/26/2020 thru 2/24/2021 . Approval was faxed to the pharmacy and patient was notified.

## 2020-02-25 NOTE — PROGRESS NOTES
Prior auth.  For Testosterone cypionate 200mg/ml has been submitted to the pharmacy and waiting for the outcome

## 2020-03-02 ENCOUNTER — DOCUMENTATION ONLY (OUTPATIENT)
Dept: FAMILY MEDICINE CLINIC | Facility: CLINIC | Age: 70
End: 2020-03-02

## 2020-03-02 RX ORDER — DUTASTERIDE 0.5 MG/1
CAPSULE, LIQUID FILLED ORAL
Qty: 90 CAP | Refills: 0 | Status: SHIPPED | OUTPATIENT
Start: 2020-03-02 | End: 2020-06-02 | Stop reason: SDUPTHER

## 2020-03-02 NOTE — PROGRESS NOTES
Prior auth for Testosterone has been approved from 1/26/20 thru 2/24/21. Patient is aware and copy sent to the pharmacy.

## 2020-04-03 RX ORDER — ROPINIROLE 0.5 MG/1
TABLET, FILM COATED ORAL
Qty: 180 TAB | Refills: 3 | Status: SHIPPED | OUTPATIENT
Start: 2020-04-03 | End: 2021-09-15

## 2020-04-03 NOTE — TELEPHONE ENCOUNTER
Requested Prescriptions     Pending Prescriptions Disp Refills    rOPINIRole (REQUIP) 0.5 mg tablet 180 Tab 1

## 2020-04-13 NOTE — TELEPHONE ENCOUNTER
Requested Prescriptions     Pending Prescriptions Disp Refills    rOPINIRole (REQUIP) 0.5 mg tablet 180 Tab 3     Sig: TAKE 1 TABLET BY MOUTH TWICE DAILY

## 2020-04-14 RX ORDER — ROPINIROLE 0.5 MG/1
TABLET, FILM COATED ORAL
Qty: 180 TAB | Refills: 3 | OUTPATIENT
Start: 2020-04-14

## 2020-04-29 RX ORDER — DULOXETIN HYDROCHLORIDE 60 MG/1
60 CAPSULE, DELAYED RELEASE ORAL DAILY
Qty: 90 CAP | Refills: 3 | Status: SHIPPED | OUTPATIENT
Start: 2020-04-29 | End: 2021-07-04

## 2020-06-24 ENCOUNTER — VIRTUAL VISIT (OUTPATIENT)
Dept: NEUROLOGY | Age: 70
End: 2020-06-24

## 2020-06-24 DIAGNOSIS — I67.9 CEREBROVASCULAR DISEASE: ICD-10-CM

## 2020-06-24 DIAGNOSIS — G47.00 INSOMNIA, UNSPECIFIED TYPE: ICD-10-CM

## 2020-06-24 DIAGNOSIS — R41.89 COGNITIVE IMPAIRMENT: Primary | ICD-10-CM

## 2020-06-24 DIAGNOSIS — G47.33 OSA (OBSTRUCTIVE SLEEP APNEA): ICD-10-CM

## 2020-06-24 DIAGNOSIS — G92.9 TOXIC ENCEPHALOPATHY: ICD-10-CM

## 2020-06-24 DIAGNOSIS — R68.89 OTHER GENERAL SYMPTOMS AND SIGNS: ICD-10-CM

## 2020-06-24 NOTE — PROGRESS NOTES
David Cutler presents today for   Chief Complaint   Patient presents with    Memory Loss    New Patient       Is someone accompanying this pt? Virtual visit E-mail [email or web address] and if needed [phone]    Is the patient using any DME equipment during OV? no    Depression Screening:  3 most recent PHQ Screens 10/1/2019   Little interest or pleasure in doing things Not at all   Feeling down, depressed, irritable, or hopeless Not at all   Total Score PHQ 2 0       Learning Assessment:  Learning Assessment 5/3/2018   PRIMARY LEARNER Patient   HIGHEST LEVEL OF EDUCATION - PRIMARY LEARNER  -   BARRIERS PRIMARY LEARNER -   [facility]    NEED -   LEARNER PREFERENCE PRIMARY DEMONSTRATION   ANSWERED BY patient   RELATIONSHIP SELF       Abuse Screening:  Abuse Screening Questionnaire 4/30/2019   Do you ever feel afraid of your partner? N   Are you in a relationship with someone who physically or mentally threatens you? N   Is it safe for you to go home? Y       Fall Risk  Fall Risk Assessment, last 12 mths 10/1/2019   Able to walk? Yes   Fall in past 12 months? No   Fall with injury? -   Number of falls in past 12 months -   Fall Risk Score -         Coordination of Care:  1. Have you been to the ER, urgent care clinic since your last visit? Hospitalized since your last visit? no    2. Have you seen or consulted any other health care providers outside of the [address] [place] [address] since your last visit? Include any pap smears or colon screening.  no

## 2020-06-24 NOTE — PROGRESS NOTES
HPI:  70 y/o male referred by Dr. Yrn Maddox for evaluation of cognitive impairment. CT of the head from Aug 2018 personally reviewed, showing mild cortical atrophy. MMA Nov 2019 was 183, TSH April 2019 was 0.38. Another CT of the head on Feb 2020 done for \"intermittent confusion\" also showed mild cortical atrophy and mild nonspecific WM disease. A couple of weeks ago he went to see his pain specialist, as he has chronic pain including in his neck, back. HE was noted to be sleepy filling up his papers. He finished his visit and left, remembers he got a shot in his knee, went out to his truck and a couple of hrs later came back in confused, asking whether they were ready to see him, that he was to be seen at 1pm, although he was asking this question at 1o to 2pm.  Recently he had issues remembering to put a tag around a [place] puppy which was unusual. They go on that over the last 5 yrs he has had scattered instances where he would be transiently confused, he would forget how to get home, or he would forget where he was. More recently has had these instances where he is forgetful. According to patient and his wife, he seems to be ok. He has been on and off meds to manage her pain, including recently Gabapentin, soma, VIcodin, and fentanyl patch, these have been weaned for the most part, now he is taking Norco, gabapentin, trazodone prn for sleep, on Cymbalta for depression and pain. HE has insomnia, and is on ropinirole for RLS. Other than the above events he may be a bit forgetful here and there, but nothing noticeable, such as forgetting to get something on the store. He also is falling asleep quite readily, worse before when he was on a lot of medication. Now he does not snore as much. He had IGNACIA in the past, lost a lot of weight, and stopped using it 8-9 yrs ago. He is on Eliquis for atrial fibrillation.      Social History     Socioeconomic History    Marital status:      Spouse name: Not on file    Number of children: Not on file    Years of education: Not on file    Highest education level: Not on file   Occupational History    Not on file   Social Needs    Financial resource strain: Not on file    Food insecurity     Worry: Not on file     Inability: Not on file    Transportation needs     Medical: Not on file     Non-medical: Not on file   Tobacco Use    Smoking status: Former Smoker     Last attempt to quit: 1981     Years since quittin.3    Smokeless tobacco: Never Used   Substance and Sexual Activity    Alcohol use: Yes     Comment: occasionally     Drug use: No    Sexual activity: Yes     Partners: Female   Lifestyle    Physical activity     Days per week: Not on file     Minutes per session: Not on file    Stress: Not on file   Relationships    Social connections     Talks on phone: Not on file     Gets together: Not on file     Attends Jehovah's witness service: Not on file     Active member of club or organization: Not on file     Attends meetings of clubs or organizations: Not on file     Relationship status: Not on file    Intimate partner violence     Fear of current or ex partner: Not on file     Emotionally abused: Not on file     Physically abused: Not on file     Forced sexual activity: Not on file   Other Topics Concern    Not on file   Social History Narrative    Patient lives with his wife in [place], he had 3 children by unfortunately lost 2 of them. He enjoys taking care of his yard and plays with the grandchildren.        Family History   Problem Relation Age of Onset    Hypertension Father     Diabetes Father     Heart Attack Mother     Headache Sister     Diabetes Son        Current Outpatient Medications   Medication Sig Dispense Refill    cyclobenzaprine (FLEXERIL) 5 mg tablet TK 1  TO 2 TS  PO BID PRF MSP      ergocalciferol (ERGOCALCIFEROL) 1,250 mcg (50,000 unit) capsule TK ONE C PO TWICE A WEEK      gabapentin (NEURONTIN) 300 mg capsule TK 1 C PO TID      HYDROcodone-acetaminophen (NORCO) 7.5-325 mg per tablet TK 1 T PO Q 6 H PRF CHRONIC PAIN      tamsulosin (FLOMAX) 0.4 mg capsule TK 1 C PO D AFTER DINNER      traZODone (DESYREL) 50 mg tablet TK 1 T PO IN THE COLBY      dutasteride (AVODART) 0.5 mg capsule TAKE 1 CAPSULE BY MOUTH DAILY 90 Cap 2    DULoxetine (CYMBALTA) 60 mg capsule Take 1 Cap by mouth daily. Take one capsule by mouth daily along with 30 mg capsule daily for a total daily dose 90 mg. 90 Cap 3    rOPINIRole (REQUIP) 0.5 mg tablet TAKE 1 TABLET BY MOUTH TWICE DAILY 180 Tab 3    labetaloL (NORMODYNE) 300 mg tablet TAKE 1 TABLET BY MOUTH TWICE DAILY 90 Tab 3    pantoprazole (PROTONIX) 40 mg tablet TAKE 1 TABLET BY MOUTH EVERY DAY 90 Tab 3    furosemide (LASIX) 20 mg tablet TAKE 1 TABLET BY MOUTH EVERY MORNING AS NEEDED FOR SWELLING 90 Tab 0    sildenafil citrate (VIAGRA) 100 mg tablet 1 tab one hour before sexual activity 30 Tab 1    SUMAtriptan (IMITREX) 100 mg tablet TAKE 1 TABLET BY MOUTH AT ONSET OF HEADACHE, MAY REPEAT 2 HOURS LATER. (MAX 2 PILLS IN 24 HOURS) 12 Tab 1    ELIQUIS 5 mg tablet TAKE 1 TABLET BY MOUTH TWICE DAILY 60 Tab 0    testosterone cypionate (DEPOTESTOTERONE CYPIONATE) 200 mg/mL injection INJECT 1 ml IN THE MUSCLE EVERY 30 DAYS 1 mL 5    fluticasone propionate (FLONASE) 50 mcg/actuation nasal spray SHAKE LIQUID AND USE 1 TO 2 SPRAYS IN EACH NOSTRIL DAILY 1 Bottle 3    topiramate (TOPAMAX) 50 mg tablet TAKE 1 TABLET BY MOUTH TWICE DAILY FOR MIGRAINE PREVENTION OR HEADACHES 180 Tab 5    montelukast (SINGULAIR) 10 mg tablet TAKE 1 TABLET BY MOUTH ONCE DAILY 90 Tab 3    potassium citrate (UROCIT-K) 15 mEq TbER tablet TAKE 1 TABLET BY MOUTH DAILY WITH FOOD.  SWALLOW TABLETS WHOLE WITH A FULL GLASS OF WATER 90 Tab 3    guanFACINE IR (TENEX) 1 mg IR tablet TAKE 1 TABLET BY MOUTH AT BEDTIME 90 Tab 2    amLODIPine (NORVASC) 5 mg tablet take 1 tablet by mouth once daily  0    multivitamin (ONE A DAY) tablet Take 1 Tab by mouth daily.  vitamin E (AQUA GEMS) 400 unit capsule Take 400 Units by mouth daily.  guanFACINE IR (TENEX) 1 mg IR tablet TAKE 1 TABLET BY MOUTH AT BEDTIME 90 Tab 1       Past Medical History:   Diagnosis Date    Abdominal pain, unspecified site     Acute reaction to stress     Allergic rhinitis due to pollen     Arrhythmia     afib    Arthritis     Back injury     BPH (benign prostatic hypertrophy)     Calculus of ureter     Cancer (HCC)     history of Leukemia, in remission    Carotid duplex 06/17/2015    Mild < 50% bilateral ICA stenosis.  Dizziness and giddiness     Esophageal reflux     Essential hypertension     GERD (gastroesophageal reflux disease)     Headache(784.0)     History of echocardiogram 08/13/2015    EF 55-60%. No WMA. Mild-mod LVH. Gr 1 DDfx. No evidence of intracardiac shunt.   Mild AI.      Hx: UTI (urinary tract infection)     Hypertension     Hypogonadism in male     Kidney stones     Migraine     Neck injury     Polycythemia     Polycythemia, secondary     Sciatica     Unspecified retinal detachment     Unspecified sleep apnea     resolved since gastric bypass    Vision decreased     Weight loss        Past Surgical History:   Procedure Laterality Date    ABDOMEN SURGERY PROC UNLISTED  2012    Hernia    HX CATARACT REMOVAL Left     HX CERVICAL FUSION  4/4/2016    Cervical Spine Fusion    HX CHOLECYSTECTOMY      HX GASTRIC BYPASS  2006    HX KNEE ARTHROSCOPY      both knees (right knee twice, left once)    HX ORTHOPAEDIC  1981, 1995    lumbar laminectomy    HX OTHER SURGICAL      Two back surgeries    HX RETINAL DETACHMENT REPAIR Left        Allergies   Allergen Reactions    Ace Inhibitors Other (comments)     Renal Failure    Arb-Angiotensin Receptor Antagonist Other (comments)     Renal Failure    Levofloxacin Nausea Only     Severe nausea and dizziness  Other reaction(s): gi distress  Severe nausea and dizziness  Sulfa (Sulfonamide Antibiotics) Unknown (comments)     Nausea, aching all over  Other reaction(s): gi distress       Review of Systems:     Constitutional: no fever or chills, ++ EDS, fatigue  Skin denies rash or itching  HEENT:  Denies tinnitus, hearing loss, or visual changes  Respiratory: denies shortness of breath  Cardiovascular: denies chest pain, dyspnea on exertion  Gastrointestinal: does not report nausea or vomiting  Genitourinary: does not report dysuria or incontinence  Musculoskeletal: does not report joint pain or swelling  Endocrine: denies weight change  Hematology: denies easy bruising or bleeding   Neurological: as above in HPI      PHYSICAL EXAMINATION:        Vital signs:   HT-6'3   WT-247    HR-72      RR-18        GENERAL:                  Well developed, obese, in no apparent distress. HEART:                       NOT VIRTUALLY POSSIBLE  EXTREMITIES:           No edema  HEAD:                         Normocephalic, atraumatic. Mallampati III     NEUROLOGIC EXAMINATION       MENTAL STATUS:     Awake, alert, and oriented x 4. Attention and STM are grossly normal. There is no aphasia. Fund of knowledge is adequate. Mood and affect are appropriate   CRANIAL NERVES:   Visual fields are full to confrontation . Pupils are equal in size . Extraocular movements are intact and there is no nystagmus. Mild left lower facial droop   Hearing is present. SCM/TPZ 5/5  Tongue protrudes midline, palate elevates symmetrically.   REST OF CN'S NOT VIRTUALLY POSSIBLE                                                    MOTOR:          NO LATERALIZING WEAKNESS      CEREBELLAR:   No tremors, normal coordination       SENSORY:        NOT VIRTUALLY POSSIBLE                  DTR's:                         NOT VIRTUALLY POSSIBLE                 GAIT:                           deferred    IMAGING REVIEW, CT of head personally reviewed    LAB REVIEW multiple serology      Impression: Transient episodes of confusion, seems that to at least a degree if not predominantly it is secondary to polypharmacy, still currently on sedating meds that include GPN, prn trazodone, Ropinirole, and norco, but has some baseline forgetfulness, he is 69 with cerebrovascular risk factors, and has had CT's showing atrophy and microangiopathy which deserve further study to see if there is an underlying CNS issue predisposing him. Furthermore, we can't assume her IGNACIA is gone d/t less snoring and wt loss, particularly with his cognitive impairment and severe EDS. Plan: 1-MRI brain   2-B12, TSH, FT4   3-EEG  4-PSG   5-Expressed concern about his driving and recommended to refrain for now. 6-Counseled pt about sleep hygiene, including predictable bedtimes and rise times, avoidance of late meals near bedtime, no TV in bedroom, to get out of room if unable to fall asleep after 10-15 mins, and no napping. Advised it is best to use non pharmacologic approaches to dealing with insomnia.    7-F/U after tests. PLEASE NOTE:   Portions of this document may have been produced using voice recognition software. Unrecognized errors in transcription may be present. This note will not be viewable in [address] Chrys Goltz is a 69 y.o. male who was seen by synchronous (real-time) audio-video technology on 6/24/2020. Consent:  Pt and/or healthcare decision maker is aware that this patient-initiated Telehealth encounter is a billable service, with coverage as determined by insurance carrier. Pt is aware he/she may receive a bill and has provided verbal consent to proceed: Yes    I was in the office while conducting this encounter. Patient is location was at her home. I spent 60 minutes with this new patient reviewing records from PCP, ER visits, multiple neuroimaging studies, and extensive serology as well as in extensive history taking and counseling.      Pursuant to the emergency declaration under the [facility] Act and the [address] [facility] authority and the [employer] Act, this Virtual  Visit was conducted, with patient's consent, to reduce the patient's risk of exposure to COVID-19 and provide continuity of care for an established patient. Services were provided through a video synchronous discussion virtually to substitute for in-person clinic visit.     Mayda Monique MD

## 2020-07-08 ENCOUNTER — APPOINTMENT (OUTPATIENT)
Dept: NEUROLOGY | Age: 70
End: 2020-07-08
Attending: PSYCHIATRY & NEUROLOGY

## 2020-07-08 ENCOUNTER — HOSPITAL ENCOUNTER (OUTPATIENT)
Dept: MRI IMAGING | Age: 70
Discharge: HOME | End: 2020-07-08
Attending: PSYCHIATRY & NEUROLOGY

## 2020-07-08 DIAGNOSIS — R41.89 COGNITIVE IMPAIRMENT: ICD-10-CM

## 2020-07-22 ENCOUNTER — TELEPHONE (OUTPATIENT)
Dept: CARDIOLOGY CLINIC | Age: 70
End: 2020-07-22

## 2020-07-22 NOTE — TELEPHONE ENCOUNTER
Patient called stating that he is having a hip replacement on 7/27/20. The surgery is being done by Dr. Bacilio Martin in [facility]. They are requesting that he come off of his Eliquis prior to the surgery and told him to call Dr. Reji Jama for instructions. He is asking that someone call him. He prefers to be called @ [phone].

## 2020-10-12 RX ORDER — LABETALOL 300 MG/1
TABLET, FILM COATED ORAL
Qty: 180 TAB | Refills: 3 | Status: SHIPPED | OUTPATIENT
Start: 2020-10-12

## 2020-11-13 PROBLEM — E72.20 HYPERAMMONEMIA (HCC): Status: RESOLVED | Noted: 2018-08-19 | Resolved: 2020-11-13

## 2020-11-13 PROBLEM — Z85.6 H/O: CML (CHRONIC MYELOID LEUKEMIA): Status: ACTIVE | Noted: 2020-11-13

## 2020-11-16 ENCOUNTER — TELEPHONE (OUTPATIENT)
Dept: CARDIOLOGY CLINIC | Age: 70
End: 2020-11-16

## 2021-01-06 ENCOUNTER — OFFICE VISIT (OUTPATIENT)
Dept: CARDIOLOGY CLINIC | Age: 71
End: 2021-01-06
Payer: MEDICARE

## 2021-01-06 VITALS
WEIGHT: 263 LBS | DIASTOLIC BLOOD PRESSURE: 76 MMHG | HEART RATE: 77 BPM | SYSTOLIC BLOOD PRESSURE: 110 MMHG | BODY MASS INDEX: 32.7 KG/M2 | OXYGEN SATURATION: 96 % | HEIGHT: 75 IN

## 2021-01-06 DIAGNOSIS — Z85.6 H/O: CML (CHRONIC MYELOID LEUKEMIA): ICD-10-CM

## 2021-01-06 DIAGNOSIS — N18.2 CHRONIC RENAL IMPAIRMENT, STAGE 2 (MILD): ICD-10-CM

## 2021-01-06 DIAGNOSIS — Z79.01 ON APIXABAN THERAPY: ICD-10-CM

## 2021-01-06 DIAGNOSIS — I49.3 PVCS (PREMATURE VENTRICULAR CONTRACTIONS): ICD-10-CM

## 2021-01-06 DIAGNOSIS — I48.91 ATRIAL FIBRILLATION, UNSPECIFIED TYPE (HCC): Primary | ICD-10-CM

## 2021-01-06 DIAGNOSIS — I10 ESSENTIAL HYPERTENSION: ICD-10-CM

## 2021-01-06 PROCEDURE — G8752 SYS BP LESS 140: HCPCS | Performed by: INTERNAL MEDICINE

## 2021-01-06 PROCEDURE — G8427 DOCREV CUR MEDS BY ELIG CLIN: HCPCS | Performed by: INTERNAL MEDICINE

## 2021-01-06 PROCEDURE — G8754 DIAS BP LESS 90: HCPCS | Performed by: INTERNAL MEDICINE

## 2021-01-06 PROCEDURE — 93000 ELECTROCARDIOGRAM COMPLETE: CPT | Performed by: INTERNAL MEDICINE

## 2021-01-06 PROCEDURE — 1101F PT FALLS ASSESS-DOCD LE1/YR: CPT | Performed by: INTERNAL MEDICINE

## 2021-01-06 PROCEDURE — G8417 CALC BMI ABV UP PARAM F/U: HCPCS | Performed by: INTERNAL MEDICINE

## 2021-01-06 PROCEDURE — 3017F COLORECTAL CA SCREEN DOC REV: CPT | Performed by: INTERNAL MEDICINE

## 2021-01-06 PROCEDURE — G8510 SCR DEP NEG, NO PLAN REQD: HCPCS | Performed by: INTERNAL MEDICINE

## 2021-01-06 PROCEDURE — 99214 OFFICE O/P EST MOD 30 MIN: CPT | Performed by: INTERNAL MEDICINE

## 2021-01-06 PROCEDURE — G8536 NO DOC ELDER MAL SCRN: HCPCS | Performed by: INTERNAL MEDICINE

## 2021-01-06 NOTE — PROGRESS NOTES
David Cutler presents today for   Chief Complaint   Patient presents with    Follow-up     1 year follow up       David Cutler preferred language for health care discussion is english/other. Is someone accompanying this pt? no    Is the patient using any DME equipment during [address]? no    Depression Screening:  3 most recent PHQ Screens 1/6/2021   Little interest or pleasure in doing things Not at all   Feeling down, depressed, irritable, or hopeless Not at all   Total Score PHQ 2 0       Learning Assessment:  Learning Assessment 1/6/2021   PRIMARY LEARNER Patient   HIGHEST LEVEL OF EDUCATION - PRIMARY LEARNER  -   BARRIERS PRIMARY LEARNER -   [facility]    NEED -   LEARNER PREFERENCE PRIMARY DEMONSTRATION   ANSWERED BY patient   RELATIONSHIP SELF       Abuse Screening:  Abuse Screening Questionnaire 1/6/2021   Do you ever feel afraid of your partner? N   Are you in a relationship with someone who physically or mentally threatens you? N   Is it safe for you to go home? Y       Fall Risk  Fall Risk Assessment, last 12 mths 1/6/2021   Able to walk? Yes   Fall in past 12 months? 0   Do you feel unsteady? 0   Are you worried about falling 0   Number of falls in past 12 months -   Fall with injury? -       Pt currently taking Anticoagulant therapy? no    Coordination of Care:  1. Have you been to the ER, urgent care clinic since your last visit? Hospitalized since your last visit? no    2. Have you seen or consulted any other health care providers outside of the [address] [place] [address] since your last visit? Include any pap smears or colon screening.  no

## 2021-01-06 NOTE — PROGRESS NOTES
History of Present Illness:  69year old male here for follow up. Overall he is doing well over the past year, at least in regards to his atrial fibrillation. He had a right hip replacement September, 2020 at [place]. Denies chest pain, dyspnea, PND, orthopnea, edema. He is not as active as usual and has gained a few pounds. No obvious palpitations. His major concern is his back at this point. He is receiving radiation ablation therapy. Impression:  1. Paroxysmal atrial fibrillation without symptoms, rate controlled, indefinite Eliquis for stroke prevention due to lack okf symptoms with his episodes. 2. History of multiple back surgeries, now getting radiation therapy to spine, stable. 3. History of CML with remote therapy, now in remission. 4. CKD stage 2.  5. Hypertension, controlled today. 6. Chronic pain, previously requiring narcotics, not at this time. 7. 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 Plan:  Remains in sinus rhythm on Eliquis without any bleeding issues. Blood pressure is well controlled. We talked about mild weight loss, increasing activity levels. I would continue Eliquis indefinitely due to his asymptomatic atrial fibrillation history. I will see back in one year. Past Medical History:   Diagnosis Date    Abdominal pain, unspecified site     Acute reaction to stress     Allergic rhinitis due to pollen     Arrhythmia     afib    Arthritis     Back injury     BPH (benign prostatic hypertrophy)     Calculus of ureter     Cancer (HCC)     history of Leukemia, in remission    Carotid duplex 06/17/2015    Mild < 50% bilateral ICA stenosis.  CML in remission ([facility] [address] 4/30/2016    Dizziness and giddiness     Esophageal reflux     Essential hypertension     GERD (gastroesophageal reflux disease)     Headache(784.0)     History of echocardiogram 08/13/2015    EF 55-60%. No WMA. Mild-mod LVH. Gr 1 DDfx. No evidence of intracardiac shunt. Mild AI.      Hx: UTI (urinary tract infection)     Hyperammonemia ([address] 8/19/2018    Hypertension     Hypogonadism in male     Kidney stones     Migraine     Neck injury     Polycythemia     Polycythemia, secondary     Sciatica     Unspecified retinal detachment     Unspecified sleep apnea     resolved since gastric bypass    Vision decreased     Weight loss        Current Outpatient Medications   Medication Sig Dispense Refill    buprenorphine (BUTRANS) 10 mcg/hour buprenorphine 10 mcg/hour weekly transdermal patch      senna-docusate (PERICOLACE) 8.6-50 mg per tablet Take 2 Tabs by mouth.  labetaloL (NORMODYNE) 300 mg tablet TAKE 1 TABLET BY MOUTH TWICE DAILY 180 Tab 3    cyclobenzaprine (FLEXERIL) 5 mg tablet TK 1  TO 2 TS  PO BID PRF MSP      ergocalciferol (ERGOCALCIFEROL) 1,250 mcg (50,000 unit) capsule TK ONE C PO TWICE A WEEK      gabapentin (NEURONTIN) 300 mg capsule TK 1 C PO TID      tamsulosin (FLOMAX) 0.4 mg capsule TK 1 C PO D AFTER DINNER      traZODone (DESYREL) 50 mg tablet TK 1 T PO IN THE COLBY      dutasteride (AVODART) 0.5 mg capsule TAKE 1 CAPSULE BY MOUTH DAILY 90 Cap 2    DULoxetine (CYMBALTA) 60 mg capsule Take 1 Cap by mouth daily. Take one capsule by mouth daily along with 30 mg capsule daily for a total daily dose 90 mg. 90 Cap 3    rOPINIRole (REQUIP) 0.5 mg tablet TAKE 1 TABLET BY MOUTH TWICE DAILY 180 Tab 3    pantoprazole (PROTONIX) 40 mg tablet TAKE 1 TABLET BY MOUTH EVERY DAY 90 Tab 3    furosemide (LASIX) 20 mg tablet TAKE 1 TABLET BY MOUTH EVERY MORNING AS NEEDED FOR SWELLING 90 Tab 0    sildenafil citrate (VIAGRA) 100 mg tablet 1 tab one hour before sexual activity 30 Tab 1    SUMAtriptan (IMITREX) 100 mg tablet TAKE 1 TABLET BY MOUTH AT ONSET OF HEADACHE, MAY REPEAT 2 HOURS LATER. (MAX 2 PILLS IN 24 HOURS) 12 Tab 1    ELIQUIS 5 mg tablet TAKE 1 TABLET BY MOUTH TWICE DAILY 60 Tab 0    testosterone cypionate (DEPOTESTOTERONE CYPIONATE) 200 mg/mL injection INJECT 1 ml IN THE MUSCLE EVERY 30 DAYS 1 mL 5    fluticasone propionate (FLONASE) 50 mcg/actuation nasal spray SHAKE LIQUID AND USE 1 TO 2 SPRAYS IN EACH NOSTRIL DAILY 1 Bottle 3    topiramate (TOPAMAX) 50 mg tablet TAKE 1 TABLET BY MOUTH TWICE DAILY FOR MIGRAINE PREVENTION OR HEADACHES 180 Tab 5    montelukast (SINGULAIR) 10 mg tablet TAKE 1 TABLET BY MOUTH ONCE DAILY 90 Tab 3    potassium citrate (UROCIT-K) 15 mEq TbER tablet TAKE 1 TABLET BY MOUTH DAILY WITH FOOD. SWALLOW TABLETS WHOLE WITH A FULL GLASS OF WATER 90 Tab 3    guanFACINE IR (TENEX) 1 mg IR tablet TAKE 1 TABLET BY MOUTH AT BEDTIME 90 Tab 2    amLODIPine (NORVASC) 5 mg tablet take 1 tablet by mouth once daily  0    multivitamin (ONE A DAY) tablet Take 1 Tab by mouth daily.  vitamin E (AQUA GEMS) 400 unit capsule Take 400 Units by mouth daily. Social History   reports that he quit smoking about 39 years ago. He has never used smokeless tobacco.   reports current alcohol use. Family History  family history includes Diabetes in his father and son; Headache in his sister; Heart Attack in his mother; Hypertension in his father. Review of Systems  Except as stated above include:  Constitutional: Negative for fever, chills and malaise/fatigue. HEENT: No congestion or recent URI. Gastrointestinal: No nausea, vomiting, abdominal pain, bloody stools. Pulmonary:  Negative except as stated above. Cardiac:  Negative except as stated above. Musculoskeletal: Negative except as stated above. Neurological:  No localized symptoms. Skin:  Negative except as stated above. Psych:  Negative except as stated above. Endocrine:  Negative except as stated above.     PHYSICAL EXAM  BP Readings from Last 3 Encounters:   01/06/21 110/76   01/13/20 109/67   01/02/20 130/80     Pulse Readings from Last 3 Encounters:   01/13/20 71   01/02/20 74   11/26/19 74     Wt Readings from Last 3 Encounters:   01/06/21 119.3 kg (263 lb)   01/05/21 112 kg (247 lb)   06/05/20 112 kg (247 lb)     General:   Well developed, well groomed. Head/Neck:   No obvious jugular venous distention     No obvious carotid pulsations. No evidence of xanthelasma. Lungs:   No respiratory distress. Clear bilaterally. Heart:  Regular rate and rhythm. Normal S1/S2. Palpation grossly normal.    No significant murmurs, rubs or gallops. Abdomen:   Non-acute abdomen. No obvious pulsations. Extremities:   Intact peripheral pulses. No significant edema. Neurological:   Alert and oriented to person, place, time. No focal neurological deficit visually. Skin:   No obvious rash    Blood Pressure Metric:  Monitor recommended and adjustments stated if needed.

## 2021-04-04 PROBLEM — H25.011 CORTICAL AGE-RELATED CATARACT OF RIGHT EYE: Status: ACTIVE | Noted: 2021-04-04

## 2021-04-05 PROBLEM — H25.011 CORTICAL AGE-RELATED CATARACT OF RIGHT EYE: Status: RESOLVED | Noted: 2021-04-04 | Resolved: 2021-04-05

## 2021-07-04 RX ORDER — DULOXETIN HYDROCHLORIDE 60 MG/1
CAPSULE, DELAYED RELEASE ORAL
Qty: 90 CAPSULE | Refills: 3 | Status: SHIPPED | OUTPATIENT
Start: 2021-07-04 | End: 2022-03-21

## 2021-08-01 ENCOUNTER — APPOINTMENT (OUTPATIENT)
Dept: VASCULAR SURGERY | Age: 71
End: 2021-08-01
Attending: EMERGENCY MEDICINE
Payer: MEDICARE

## 2021-08-01 ENCOUNTER — APPOINTMENT (OUTPATIENT)
Dept: GENERAL RADIOLOGY | Age: 71
End: 2021-08-01
Attending: EMERGENCY MEDICINE
Payer: MEDICARE

## 2021-08-01 ENCOUNTER — HOSPITAL ENCOUNTER (EMERGENCY)
Age: 71
Discharge: HOME | End: 2021-08-01
Attending: EMERGENCY MEDICINE
Payer: MEDICARE

## 2021-08-01 VITALS
SYSTOLIC BLOOD PRESSURE: 148 MMHG | RESPIRATION RATE: 16 BRPM | TEMPERATURE: 98.8 F | HEART RATE: 78 BPM | DIASTOLIC BLOOD PRESSURE: 82 MMHG | OXYGEN SATURATION: 100 %

## 2021-08-01 DIAGNOSIS — M54.31 SCIATICA OF RIGHT SIDE: ICD-10-CM

## 2021-08-01 DIAGNOSIS — L03.90 CELLULITIS, UNSPECIFIED CELLULITIS SITE: Primary | ICD-10-CM

## 2021-08-01 LAB
ALBUMIN SERPL-MCNC: 3.5 G/DL (ref 3.4–5)
ALBUMIN/GLOB SERPL: 0.9 {RATIO} (ref 0.8–1.7)
ALP SERPL-CCNC: 87 U/L (ref 45–117)
ALT SERPL-CCNC: 26 U/L (ref 16–61)
ANION GAP SERPL CALC-SCNC: 6 MMOL/L (ref 3–18)
APPEARANCE UR: CLEAR
AST SERPL-CCNC: 25 U/L (ref 10–38)
BACTERIA URNS QL MICRO: NEGATIVE /HPF
BASOPHILS # BLD: 0 K/UL (ref 0–0.1)
BASOPHILS NFR BLD: 0 % (ref 0–2)
BILIRUB SERPL-MCNC: 0.6 MG/DL (ref 0.2–1)
BILIRUB UR QL: NEGATIVE
BNP SERPL-MCNC: 262 PG/ML (ref 0–900)
BUN SERPL-MCNC: 20 MG/DL (ref 7–18)
BUN/CREAT SERPL: 22 (ref 12–20)
CALCIUM SERPL-MCNC: 8.6 MG/DL (ref 8.5–10.1)
CHLORIDE SERPL-SCNC: 108 MMOL/L (ref 100–111)
CK MB CFR SERPL CALC: 5.9 % (ref 0–4)
CK MB SERPL-MCNC: 5.7 NG/ML (ref 5–25)
CK SERPL-CCNC: 96 U/L (ref 39–308)
CO2 SERPL-SCNC: 26 MMOL/L (ref 21–32)
COLOR UR: YELLOW
CREAT SERPL-MCNC: 0.89 MG/DL (ref 0.6–1.3)
DIFFERENTIAL METHOD BLD: ABNORMAL
EOSINOPHIL # BLD: 0.1 K/UL (ref 0–0.4)
EOSINOPHIL NFR BLD: 1 % (ref 0–5)
EPITH CASTS URNS QL MICRO: NORMAL /LPF (ref 0–5)
ERYTHROCYTE [DISTWIDTH] IN BLOOD BY AUTOMATED COUNT: 15.5 % (ref 11.6–14.5)
GLOBULIN SER CALC-MCNC: 3.9 G/DL (ref 2–4)
GLUCOSE SERPL-MCNC: 95 MG/DL (ref 74–99)
GLUCOSE UR STRIP.AUTO-MCNC: NEGATIVE MG/DL
HCT VFR BLD AUTO: 49.3 % (ref 36–48)
HGB BLD-MCNC: 17.5 G/DL (ref 13–16)
HGB UR QL STRIP: NEGATIVE
KETONES UR QL STRIP.AUTO: NEGATIVE MG/DL
LEUKOCYTE ESTERASE UR QL STRIP.AUTO: NEGATIVE
LYMPHOCYTES # BLD: 5.4 K/UL (ref 0.9–3.6)
LYMPHOCYTES NFR BLD: 37 % (ref 21–52)
MCH RBC QN AUTO: 30.2 PG (ref 24–34)
MCHC RBC AUTO-ENTMCNC: 35.5 G/DL (ref 31–37)
MCV RBC AUTO: 85.1 FL (ref 74–97)
MONOCYTES # BLD: 0.9 K/UL (ref 0.05–1.2)
MONOCYTES NFR BLD: 6 % (ref 3–10)
NEUTS SEG # BLD: 8.3 K/UL (ref 1.8–8)
NEUTS SEG NFR BLD: 56 % (ref 40–73)
NITRITE UR QL STRIP.AUTO: NEGATIVE
PH UR STRIP: 7 [PH] (ref 5–8)
PLATELET # BLD AUTO: 309 K/UL (ref 135–420)
PLATELET COMMENTS,PCOM: ABNORMAL
PMV BLD AUTO: 10.1 FL (ref 9.2–11.8)
POTASSIUM SERPL-SCNC: 4.1 MMOL/L (ref 3.5–5.5)
PROT SERPL-MCNC: 7.4 G/DL (ref 6.4–8.2)
PROT UR STRIP-MCNC: 30 MG/DL
RBC # BLD AUTO: 5.79 M/UL (ref 4.35–5.65)
RBC #/AREA URNS HPF: NORMAL /HPF (ref 0–5)
RBC MORPH BLD: ABNORMAL
SODIUM SERPL-SCNC: 140 MMOL/L (ref 136–145)
SP GR UR REFRACTOMETRY: 1.02 (ref 1–1.03)
TROPONIN I SERPL-MCNC: <0.02 NG/ML (ref 0–0.04)
UROBILINOGEN UR QL STRIP.AUTO: 1 EU/DL (ref 0.2–1)
WBC # BLD AUTO: 14.7 K/UL (ref 4.6–13.2)
WBC URNS QL MICRO: NORMAL /HPF (ref 0–4)

## 2021-08-01 PROCEDURE — 72100 X-RAY EXAM L-S SPINE 2/3 VWS: CPT

## 2021-08-01 PROCEDURE — 99284 EMERGENCY DEPT VISIT MOD MDM: CPT

## 2021-08-01 PROCEDURE — 93970 EXTREMITY STUDY: CPT

## 2021-08-01 PROCEDURE — 96374 THER/PROPH/DIAG INJ IV PUSH: CPT

## 2021-08-01 PROCEDURE — 85025 COMPLETE CBC W/AUTO DIFF WBC: CPT

## 2021-08-01 PROCEDURE — 82553 CREATINE MB FRACTION: CPT

## 2021-08-01 PROCEDURE — 96375 TX/PRO/DX INJ NEW DRUG ADDON: CPT

## 2021-08-01 PROCEDURE — 80053 COMPREHEN METABOLIC PANEL: CPT

## 2021-08-01 PROCEDURE — 74011250636 HC RX REV CODE- 250/636: Performed by: EMERGENCY MEDICINE

## 2021-08-01 PROCEDURE — 83880 ASSAY OF NATRIURETIC PEPTIDE: CPT

## 2021-08-01 PROCEDURE — 81001 URINALYSIS AUTO W/SCOPE: CPT

## 2021-08-01 PROCEDURE — 72170 X-RAY EXAM OF PELVIS: CPT

## 2021-08-01 RX ORDER — CLINDAMYCIN HYDROCHLORIDE 150 MG/1
300 CAPSULE ORAL 4 TIMES DAILY
Qty: 56 CAPSULE | Refills: 0 | Status: SHIPPED | OUTPATIENT
Start: 2021-08-01 | End: 2021-08-08

## 2021-08-01 RX ORDER — ONDANSETRON 2 MG/ML
4 INJECTION INTRAMUSCULAR; INTRAVENOUS
Status: COMPLETED | OUTPATIENT
Start: 2021-08-01 | End: 2021-08-01

## 2021-08-01 RX ORDER — DEXAMETHASONE SODIUM PHOSPHATE 4 MG/ML
4 INJECTION, SOLUTION INTRA-ARTICULAR; INTRALESIONAL; INTRAMUSCULAR; INTRAVENOUS; SOFT TISSUE ONCE
Status: COMPLETED | OUTPATIENT
Start: 2021-08-01 | End: 2021-08-01

## 2021-08-01 RX ORDER — MORPHINE SULFATE 4 MG/ML
4 INJECTION INTRAVENOUS
Status: COMPLETED | OUTPATIENT
Start: 2021-08-01 | End: 2021-08-01

## 2021-08-01 RX ORDER — HYDROCODONE BITARTRATE AND ACETAMINOPHEN 7.5; 325 MG/1; MG/1
1 TABLET ORAL
Qty: 10 TABLET | Refills: 0 | Status: SHIPPED | OUTPATIENT
Start: 2021-08-01 | End: 2021-08-04

## 2021-08-01 RX ADMIN — MORPHINE SULFATE 4 MG: 4 INJECTION, SOLUTION INTRAMUSCULAR; INTRAVENOUS at 15:44

## 2021-08-01 RX ADMIN — ONDANSETRON 4 MG: 2 INJECTION INTRAMUSCULAR; INTRAVENOUS at 15:44

## 2021-08-01 RX ADMIN — DEXAMETHASONE SODIUM PHOSPHATE 4 MG: 4 INJECTION, SOLUTION INTRAMUSCULAR; INTRAVENOUS at 17:17

## 2021-08-01 NOTE — ED PROVIDER NOTES
EMERGENCY DEPARTMENT HISTORY AND PHYSICAL EXAM    3:31 PM    Date: 8/1/2021  Patient Name: Mike Arriaga    History of Presenting Illness     Chief Complaint   Patient presents with    Leg Pain       History Provided By: Patient  Location/Duration/Severity/Modifying factors   Patient is a 78-year-old male with noted past medical history presents to the emergency department with a chief complaint of lower extremity pain and swelling. Primarily the right side, pain starting in the right lower leg rating to the right knee and the right hip and not across the low back. Is been going on for about a week, first he noticed some swelling in both legs, right greater than left as well as bilateral erythema. Nothing in particular seems to make it better or worse, he tried elevating but it causes considerable pain. No fevers or chills. Not having any chest pain or shortness of breath he has been seen by his primary care provider as well as an urgent care where he underwent testing with blood work as well as a chest x-ray, he advised me that all the testing was normal including the chest x-ray. He was placed on doxycycline 2 days ago reports he has been taking it as prescribed. Pain is achy in nature, had a mild trip and fall 3 or so days ago but no major trauma. NO saddle anesthesia, urinary incontinence or retention or midline backpain, or fever. Reports previous Rx for Norco 7.5, which worked better, but was only given Norco 5 at [facility]. Some of this has been more chronic and his PCP has placed him on Lasix 40mg every day. He is supposed to see PCP again tomorrow. PCP: Gibran Jaquez MD    Current Outpatient Medications   Medication Sig Dispense Refill    clindamycin (CLEOCIN) 150 mg capsule Take 2 Capsules by mouth four (4) times daily for 7 days.  56 Capsule 0    HYDROcodone-acetaminophen (Norco) 7.5-325 mg per tablet Take 1 Tablet by mouth every six (6) hours as needed for Pain for up to 3 days. Max Daily Amount: 4 Tablets. 10 Tablet 0    apixaban (Eliquis) 5 mg tablet TAKE 1 TABLET BY MOUTH TWICE DAILY 60 Tablet 0    DULoxetine (CYMBALTA) 60 mg capsule TAKE 1 CAPSULE BY MOUTH DAILY ALONG WITH 30 MG CAPSULE FOR A TOTAL DAILY DOSE OF 90 MG 90 Capsule 3    amLODIPine-Olmesartan (David) 5-40 mg tab 1 tablet      amoxicillin-clavulanate (AUGMENTIN) 875-125 mg per tablet amoxicillin 875 mg-potassium clavulanate 125 mg tablet   TAKE 1 TABLET BY MOUTH EVERY 12 HOURS      atorvastatin (LIPITOR) 20 mg tablet atorvastatin 20 mg tablet   TAKE 1 TABLET BY MOUTH EVERY DAY      carvediloL (COREG) 12.5 mg tablet 1 tablet with food      fentaNYL (DURAGESIC) 25 mcg/hr PATCH 1 patch to skin      hydroCHLOROthiazide (HYDRODIURIL) 25 mg tablet 1 tablet in the morning      ketorolac (ACULAR) 0.5 % ophthalmic solution ketorolac 0.5 % eye drops   INSTILL 1 DROP IN RIGHT EYE TWICE DAILY. BEGIN 3 DAYS BEFORE YOUR PROCEDURE      lisinopriL (PRINIVIL, ZESTRIL) 5 mg tablet lisinopril 5 mg tablet      moxifloxacin (VIGAMOX) 0.5 % ophthalmic solution moxifloxacin 0.5 % eye drops   INSTILL 1 DROP INTO RIGHT EYE FOUR TIMES DAILY. BEGIN 3 DAYS PRIOR TO YOUR PROCEDURE.  prednisoLONE acetate (PRED FORTE) 1 % ophthalmic suspension       predniSONE (DELTASONE) 10 mg tablet       tobramycin (TOBREX) 0.3 % ophthalmic solution INSTILL 1 DROP IN RIGHT EYE FOUR TIMES DAILY FOR 7 DAYS THEN STOP      Toviaz 4 mg SR tablet Take 1 Tablet by mouth daily. 90 Tablet 3    dutasteride (AVODART) 0.5 mg capsule TAKE 1 CAPSULE BY MOUTH DAILY 90 Cap 2    buprenorphine (BUTRANS) 10 mcg/hour buprenorphine 10 mcg/hour weekly transdermal patch      senna-docusate (PERICOLACE) 8.6-50 mg per tablet Take 2 Tabs by mouth.       labetaloL (NORMODYNE) 300 mg tablet TAKE 1 TABLET BY MOUTH TWICE DAILY 180 Tab 3    cyclobenzaprine (FLEXERIL) 5 mg tablet TK 1  TO 2 TS  PO BID PRF MSP      ergocalciferol (ERGOCALCIFEROL) 1,250 mcg (50,000 unit) capsule TK ONE C PO TWICE A WEEK      gabapentin (NEURONTIN) 300 mg capsule TK 1 C PO TID      tamsulosin (FLOMAX) 0.4 mg capsule TK 1 C PO D AFTER DINNER      traZODone (DESYREL) 50 mg tablet TK 1 T PO IN THE COLBY      rOPINIRole (REQUIP) 0.5 mg tablet TAKE 1 TABLET BY MOUTH TWICE DAILY 180 Tab 3    pantoprazole (PROTONIX) 40 mg tablet TAKE 1 TABLET BY MOUTH EVERY DAY 90 Tab 3    furosemide (LASIX) 20 mg tablet TAKE 1 TABLET BY MOUTH EVERY MORNING AS NEEDED FOR SWELLING 90 Tab 0    sildenafil citrate (VIAGRA) 100 mg tablet 1 tab one hour before sexual activity 30 Tab 1    SUMAtriptan (IMITREX) 100 mg tablet TAKE 1 TABLET BY MOUTH AT ONSET OF HEADACHE, MAY REPEAT 2 HOURS LATER. (MAX 2 PILLS IN 24 HOURS) 12 Tab 1    testosterone cypionate (DEPOTESTOTERONE CYPIONATE) 200 mg/mL injection INJECT 1 ml IN THE MUSCLE EVERY 30 DAYS 1 mL 5    fluticasone propionate (FLONASE) 50 mcg/actuation nasal spray SHAKE LIQUID AND USE 1 TO 2 SPRAYS IN EACH NOSTRIL DAILY 1 Bottle 3    topiramate (TOPAMAX) 50 mg tablet TAKE 1 TABLET BY MOUTH TWICE DAILY FOR MIGRAINE PREVENTION OR HEADACHES 180 Tab 5    montelukast (SINGULAIR) 10 mg tablet TAKE 1 TABLET BY MOUTH ONCE DAILY 90 Tab 3    potassium citrate (UROCIT-K) 15 mEq TbER tablet TAKE 1 TABLET BY MOUTH DAILY WITH FOOD. SWALLOW TABLETS WHOLE WITH A FULL GLASS OF WATER 90 Tab 3    guanFACINE IR (TENEX) 1 mg IR tablet TAKE 1 TABLET BY MOUTH AT BEDTIME 90 Tab 2    amLODIPine (NORVASC) 5 mg tablet take 1 tablet by mouth once daily  0    multivitamin (ONE A DAY) tablet Take 1 Tab by mouth daily.  vitamin E (AQUA GEMS) 400 unit capsule Take 400 Units by mouth daily.          Past History     Past Medical History:  Past Medical History:   Diagnosis Date    Abdominal pain, unspecified site     Acute reaction to stress     Allergic rhinitis due to pollen     Arrhythmia     afib    Arthritis     Back injury     BPH (benign prostatic hypertrophy)     Calculus of ureter  Cancer [facility])     history of Leukemia, in remission    Carotid duplex 2015    Mild < 50% bilateral ICA stenosis.  CML in remission ([facility] [address] 2016    Cortical age-related cataract of right eye 2021    Dizziness and giddiness     Esophageal reflux     Essential hypertension     GERD (gastroesophageal reflux disease)     Headache(784.0)     History of echocardiogram 2015    EF 55-60%. No WMA. Mild-mod LVH. Gr 1 DDfx. No evidence of intracardiac shunt. Mild AI.      Hx: UTI (urinary tract infection)     Hyperammonemia ([facility] [address] 2018    Hypertension     Hypogonadism in male     Kidney stones     Migraine     Neck injury     Polycythemia     Polycythemia, secondary     Sciatica     Unspecified retinal detachment     Unspecified sleep apnea     resolved since gastric bypass    Vision decreased     Weight loss        Past Surgical History:  Past Surgical History:   Procedure Laterality Date    HX CATARACT REMOVAL Left     HX CERVICAL FUSION  2016    Cervical Spine Fusion    HX CHOLECYSTECTOMY      HX GASTRIC BYPASS      HX KNEE ARTHROSCOPY      both knees (right knee twice, left once)    HX ORTHOPAEDIC  1995    lumbar laminectomy    HX OTHER SURGICAL      Two back surgeries    HX RETINAL DETACHMENT REPAIR Left     MS ABDOMEN SURGERY PROC UNLISTED  2012    Hernia       Family History:  Family History   Problem Relation Age of Onset    Hypertension Father     Diabetes Father     Heart Attack Mother     Headache Sister     Diabetes Son        Social History:  Social History     Tobacco Use    Smoking status: Former Smoker     Quit date: 1981     Years since quittin.4    Smokeless tobacco: Never Used   Substance Use Topics    Alcohol use: Yes     Comment: occasionally     Drug use: No       Allergies:   Allergies   Allergen Reactions    Ace Inhibitors Other (comments)     Renal Failure    Arb-Angiotensin Receptor Antagonist Other (comments)     Renal Failure    Levofloxacin Nausea Only     Severe nausea and dizziness  Other reaction(s): gi distress  Severe nausea and dizziness    Sulfa (Sulfonamide Antibiotics) Unknown (comments)     Nausea, aching all over  Other reaction(s): gi distress       I reviewed and confirmed the above information with patient and updated as necessary. Review of Systems     Review of Systems   Constitutional: Negative for fever. HENT: Negative for congestion and rhinorrhea. Eyes: Negative for visual disturbance. Respiratory: Negative for cough and shortness of breath. Cardiovascular: Positive for leg swelling. Negative for chest pain. Gastrointestinal: Negative for abdominal pain, diarrhea, nausea and vomiting. Genitourinary: Negative for dysuria and urgency. Musculoskeletal: Positive for back pain. Negative for myalgias. Skin: Negative for rash. Neurological: Negative for headaches. Physical Exam     Visit Vitals  BP (!) 148/82   Pulse 78   Temp 98.8 °F (37.1 °C)   Resp 16   SpO2 100%       Physical Exam  Constitutional:       General: He is not in acute distress. Appearance: Normal appearance. He is normal weight. He is not ill-appearing or toxic-appearing. HENT:      Head: Normocephalic and atraumatic. Right Ear: External ear normal.      Left Ear: External ear normal.      Nose: Nose normal. No congestion or rhinorrhea. Mouth/Throat:      Mouth: Mucous membranes are moist.      Pharynx: No oropharyngeal exudate or posterior oropharyngeal erythema. Eyes:      Conjunctiva/sclera: Conjunctivae normal.      Pupils: Pupils are equal, round, and reactive to light. Cardiovascular:      Rate and Rhythm: Normal rate and regular rhythm. Pulses: Normal pulses. Heart sounds: Normal heart sounds. No murmur heard. No friction rub. Pulmonary:      Effort: Pulmonary effort is normal.      Breath sounds: Normal breath sounds. No wheezing, rhonchi or rales. Abdominal:      General: Abdomen is flat. Tenderness: There is no abdominal tenderness. There is no guarding or rebound. Musculoskeletal:         General: Swelling present. No tenderness. Normal range of motion. Cervical back: Normal range of motion and neck supple. Right lower leg: Edema present. Left lower leg: Edema present. Comments: Trace to 1+ pitting edema both lower extremities, erythema present both legs, mildly indurated no crepitus. No calf tenderness, right side is more swollen than the left side. No midline spinal ttp, particularly at L2-5, healed remote surgical scar. Skin:     General: Skin is warm and dry. Capillary Refill: Capillary refill takes less than 2 seconds. Neurological:      General: No focal deficit present. Mental Status: He is alert. Motor: No weakness. Diagnostic Study Results     Labs -  Recent Results (from the past 24 hour(s))   CBC WITH AUTOMATED DIFF    Collection Time: 08/01/21  3:45 PM   Result Value Ref Range    WBC 14.7 (H) 4.6 - 13.2 K/uL    RBC 5.79 (H) 4.35 - 5.65 M/uL    HGB 17.5 (H) 13.0 - 16.0 g/dL    HCT 49.3 (H) 36.0 - 48.0 %    MCV 85.1 74.0 - 97.0 FL    MCH 30.2 24.0 - 34.0 PG    MCHC 35.5 31.0 - 37.0 g/dL    RDW 15.5 (H) 11.6 - 14.5 %    PLATELET 251 798 - 521 K/uL    MPV 10.1 9.2 - 11.8 FL    NEUTROPHILS 56 40 - 73 %    LYMPHOCYTES 37 21 - 52 %    MONOCYTES 6 3 - 10 %    EOSINOPHILS 1 0 - 5 %    BASOPHILS 0 0 - 2 %    ABS. NEUTROPHILS 8.3 (H) 1.8 - 8.0 K/UL    ABS. LYMPHOCYTES 5.4 (H) 0.9 - 3.6 K/UL    ABS. MONOCYTES 0.9 0.05 - 1.2 K/UL    ABS. EOSINOPHILS 0.1 0.0 - 0.4 K/UL    ABS.  BASOPHILS 0.0 0.0 - 0.1 K/UL    DF MANUAL      PLATELET COMMENTS ADEQUATE PLATELETS      RBC COMMENTS NORMOCYTIC, NORMOCHROMIC     NT-PRO BNP    Collection Time: 08/01/21  3:45 PM   Result Value Ref Range    NT pro- 0 - 814 PG/ML   METABOLIC PANEL, COMPREHENSIVE    Collection Time: 08/01/21  3:45 PM   Result Value Ref Range Sodium 140 136 - 145 mmol/L    Potassium 4.1 3.5 - 5.5 mmol/L    Chloride 108 100 - 111 mmol/L    CO2 26 21 - 32 mmol/L    Anion gap 6 3.0 - 18 mmol/L    Glucose 95 74 - 99 mg/dL    BUN 20 (H) 7.0 - 18 MG/DL    Creatinine 0.89 0.6 - 1.3 MG/DL    BUN/Creatinine ratio 22 (H) 12 - 20      GFR est AA >60 >60 ml/min/1.73m2    GFR est non-AA >60 >60 ml/min/1.73m2    Calcium 8.6 8.5 - 10.1 MG/DL    Bilirubin, total 0.6 0.2 - 1.0 MG/DL    ALT (SGPT) 26 16 - 61 U/L    AST (SGOT) 25 10 - 38 U/L    Alk. phosphatase 87 45 - 117 U/L    Protein, total 7.4 6.4 - 8.2 g/dL    Albumin 3.5 3.4 - 5.0 g/dL    Globulin 3.9 2.0 - 4.0 g/dL    A-G Ratio 0.9 0.8 - 1.7     CARDIAC PANEL,(CK, CKMB & TROPONIN)    Collection Time: 08/01/21  3:45 PM   Result Value Ref Range    CK - MB 5.7 (H) <3.6 ng/ml    CK-MB Index 5.9 (H) 0.0 - 4.0 %    CK 96 39 - 308 U/L    Troponin-I, QT <0.02 0.0 - 0.045 NG/ML   URINALYSIS W/ RFLX MICROSCOPIC    Collection Time: 08/01/21  3:45 PM   Result Value Ref Range    Color YELLOW      Appearance CLEAR      Specific gravity 1.016 1.005 - 1.030      pH (UA) 7.0 5.0 - 8.0      Protein 30 (A) NEG mg/dL    Glucose Negative NEG mg/dL    Ketone Negative NEG mg/dL    Bilirubin Negative NEG      Blood Negative NEG      Urobilinogen 1.0 0.2 - 1.0 EU/dL    Nitrites Negative NEG      Leukocyte Esterase Negative NEG     URINE MICROSCOPIC ONLY    Collection Time: 08/01/21  3:45 PM   Result Value Ref Range    WBC NONE 0 - 4 /hpf    RBC NONE 0 - 5 /hpf    Epithelial cells FEW 0 - 5 /lpf    Bacteria Negative NEG /hpf         Radiologic Studies -   XR SPINE LUMB 2 OR 3 V   Final Result   New anterior compression fractures L2-4 since 2018 but ages otherwise   indeterminate. Multifocal severe degenerative disc disease and facet hypertrophy. Significant atherosclerosis. XR PELV 1 OR 2 V   Final Result   No acute findings. Mild to moderate left hip osteoarthritis. Right hip arthroplasty.                   DUPLEX LOWER EXT VENOUS BILAT    (Results Pending)           Medical Decision Making   I am the first provider for this patient. I reviewed the vital signs, available nursing notes, past medical history, past surgical history, family history and social history. Vital Signs-Reviewed the patient's vital signs. EKG: N/A    Records Reviewed: Nursing Notes, Old Medical Records, Previous Radiology Studies and Previous Laboratory Studies (Time of Review: 3:31 PM)    ED Course: Progress Notes, Reevaluation, and Consults:         Provider Notes (Medical Decision Making):   MDM  Number of Diagnoses or Management Options  Cellulitis, unspecified cellulitis site  Sciatica of right side  Diagnosis management comments: 65-year-old male presenting with lower extremity swelling and low back pain. No extremity swelling and redness has been on off and on for several weeks. Reports that it is worsening in the past week and is also having redness. Patient back is not any tenderness in the mid lumbar region, the pain is very primarily on the right leg today. Differential diagnosis would include DVT, CHF, cellulitis, venous insufficiency, sciatica, patient exhibiting any signs of concerning for cord compression syndrome such as cauda equina syndrome, conus medullaris syndrome, spinal epidural abscess or other serious spinal process. Patient had good relief with morphine here. Detailed is negative. Will start clindamycin and he will follow with PCP tomorrow. Labs are overall at baseline. Recommended return if worse in the meantime.      At this time, patient is stable and appropriate for discharge home.  Patient demonstrates understanding of current diagnoses and is in agreement with the treatment plan. Ree Bile are advised that while the likelihood of serious underlying condition is low at this point given the evaluation performed today, we cannot fully rule it out. Ree Bile are advised to immediately return with any new symptoms or worsening of current condition.  All questions have been answered. Soraya Rose is given educational material regarding their diagnoses, including danger symptoms and when to return to the ED. This note was dictated utilizing Dragon voice recognition software. Unfortunately this leads to occasional typographical errors. I apologize in advance if the situation occurs. If questions occur please do not hesitate to contact me directly. Jazmine Cunningham, DO          Procedures    Critical Care Time: none    Diagnosis     Clinical Impression:   1. Cellulitis, unspecified cellulitis site    2. Sciatica of right side        Disposition: Discharge    Follow-up Information     Follow up With Specialties Details [email or web address] EM[facility]Y DEPT Emergency Medicine  As needed, If symptoms worsen; or [facility] Emergency Department [address] [facility] [address] Elayne Mares MD Family Medicine In 1 day  [address]  [address] [place] [address]  [phone]             Discharge Medication List as of 8/1/2021  5:12 PM      START taking these medications    Details   clindamycin (CLEOCIN) 150 mg capsule Take 2 Capsules by mouth four (4) times daily for 7 days. , Normal, Disp-56 Capsule, R-0      HYDROcodone-acetaminophen (Norco) 7.5-325 mg per tablet Take 1 Tablet by mouth every six (6) hours as needed for Pain for up to 3 days.  Max Daily Amount: 4 Tablets., Normal, Disp-10 Tablet, R-0         CONTINUE these medications which have NOT CHANGED    Details   apixaban (Eliquis) 5 mg tablet TAKE 1 TABLET BY MOUTH TWICE DAILY, Normal, Disp-60 Tablet, R-0      DULoxetine (CYMBALTA) 60 mg capsule TAKE 1 CAPSULE BY MOUTH DAILY ALONG WITH 30 MG CAPSULE FOR A TOTAL DAILY DOSE OF 90 MG, Normal, Disp-90 Capsule, R-3      amLODIPine-Olmesartan (David) 5-40 mg tab 1 tablet, Historical Med      amoxicillin-clavulanate (AUGMENTIN) 875-125 mg per tablet amoxicillin 875 mg-potassium clavulanate 125 mg tablet   TAKE 1 TABLET BY MOUTH EVERY 12 HOURS, Historical Med      atorvastatin (LIPITOR) 20 mg tablet atorvastatin 20 mg tablet   TAKE 1 TABLET BY MOUTH EVERY DAY, Historical Med      carvediloL (COREG) 12.5 mg tablet 1 tablet with food, Historical Med      fentaNYL (DURAGESIC) 25 mcg/hr PATCH 1 patch to skin, Historical Med      hydroCHLOROthiazide (HYDRODIURIL) 25 mg tablet 1 tablet in the morning, Historical Med      ketorolac (ACULAR) 0.5 % ophthalmic solution ketorolac 0.5 % eye drops   INSTILL 1 DROP IN RIGHT EYE TWICE DAILY. BEGIN 3 DAYS BEFORE YOUR PROCEDURE, Historical Med      lisinopriL (PRINIVIL, ZESTRIL) 5 mg tablet lisinopril 5 mg tablet, Historical Med      moxifloxacin (VIGAMOX) 0.5 % ophthalmic solution moxifloxacin 0.5 % eye drops   INSTILL 1 DROP INTO RIGHT EYE FOUR TIMES DAILY. BEGIN 3 DAYS PRIOR TO YOUR PROCEDURE., Historical Med      prednisoLONE acetate (PRED FORTE) 1 % ophthalmic suspension Historical Med      predniSONE (DELTASONE) 10 mg tablet Historical Med      tobramycin (TOBREX) 0.3 % ophthalmic solution INSTILL 1 DROP IN RIGHT EYE FOUR TIMES DAILY FOR 7 DAYS THEN STOP, Historical Med      Toviaz 4 mg SR tablet Take 1 Tablet by mouth daily. , Normal, Disp-90 Tablet, R-3, TK      dutasteride (AVODART) 0.5 mg capsule TAKE 1 CAPSULE BY MOUTH DAILY, Normal, Disp-90 Cap, R-2      buprenorphine (BUTRANS) 10 mcg/hour buprenorphine 10 mcg/hour weekly transdermal patch, Historical Med      senna-docusate (PERICOLACE) 8.6-50 mg per tablet Take 2 Tabs by mouth., Historical Med      labetaloL (NORMODYNE) 300 mg tablet TAKE 1 TABLET BY MOUTH TWICE DAILY, Normal, Disp-180 Tab, R-3**Patient requests 90 days supply**      cyclobenzaprine (FLEXERIL) 5 mg tablet TK 1  TO 2 TS  PO BID PRF MSP, Historical Med      ergocalciferol (ERGOCALCIFEROL) 1,250 mcg (50,000 unit) capsule TK ONE C PO TWICE A WEEK, Historical Med      gabapentin (NEURONTIN) 300 mg capsule TK 1 C PO TID, Historical Med      tamsulosin (FLOMAX) 0.4 mg capsule TK 1 C PO D AFTER DINNER, Historical Med      traZODone (DESYREL) 50 mg tablet TK 1 T PO IN THE COLBY, Historical Med      rOPINIRole (REQUIP) 0.5 mg tablet TAKE 1 TABLET BY MOUTH TWICE DAILY, Normal, Disp-180 Tab, R-3**Patient requests 90 days supply**      pantoprazole (PROTONIX) 40 mg tablet TAKE 1 TABLET BY MOUTH EVERY DAY, Normal, Disp-90 Tab, R-3      furosemide (LASIX) 20 mg tablet TAKE 1 TABLET BY MOUTH EVERY MORNING AS NEEDED FOR SWELLING, Normal, Disp-90 Tab, R-0**Patient requests 90 days supply**      sildenafil citrate (VIAGRA) 100 mg tablet 1 tab one hour before sexual activity, Print, Disp-30 Tab, R-1      SUMAtriptan (IMITREX) 100 mg tablet TAKE 1 TABLET BY MOUTH AT ONSET OF HEADACHE, MAY REPEAT 2 HOURS LATER. (MAX 2 PILLS IN 24 HOURS), Normal, Disp-12 Tab, R-1      testosterone cypionate (DEPOTESTOTERONE CYPIONATE) 200 mg/mL injection INJECT 1 ml IN THE MUSCLE EVERY 30 DAYS, NormalNote:  Change in directionsDisp-1 mL, R-5      fluticasone propionate (FLONASE) 50 mcg/actuation nasal spray SHAKE LIQUID AND USE 1 TO 2 SPRAYS IN EACH NOSTRIL DAILY, Normal**Patient requests 90 days supply**Disp-1 Bottle, R-3      topiramate (TOPAMAX) 50 mg tablet TAKE 1 TABLET BY MOUTH TWICE DAILY FOR MIGRAINE PREVENTION OR HEADACHES, Normal**Patient requests 90 days supply**Disp-180 Tab, R-5      montelukast (SINGULAIR) 10 mg tablet TAKE 1 TABLET BY MOUTH ONCE DAILY, Normal, Disp-90 Tab, R-3      potassium citrate (UROCIT-K) 15 mEq TbER tablet TAKE 1 TABLET BY MOUTH DAILY WITH FOOD. SWALLOW TABLETS WHOLE WITH A FULL GLASS OF WATER, Normal**Patient requests 90 days supply**Disp-90 Tab, R-3      guanFACINE IR (TENEX) 1 mg IR tablet TAKE 1 TABLET BY MOUTH AT BEDTIME, Normal**Patient requests 90 days supply**Disp-90 Tab, R-2      amLODIPine (NORVASC) 5 mg tablet take 1 tablet by mouth once daily, Historical Med, R-0      multivitamin (ONE A DAY) tablet Take 1 Tab by mouth daily. , Historical Med vitamin E (AQUA GEMS) 400 unit capsule Take 400 Units by mouth daily. , [address] noreen Gregoria DO   Emergency Medicine   August 1, 2021, 3:31 PM     This note is dictated utilizing Dragon voice recognition software. Unfortunately this leads to occasional typographical errors using the voice recognition. I apologize in advance if the situation occurs. If questions occur please do not hesitate to contact me directly.     Nissa Hines, DO

## 2021-08-01 NOTE — ROUTINE PROCESS
Noah Graves is a 66 y.o. male that was discharged in stable. Pt was accompanied by spouse. Pt is not driving. The patients diagnosis, condition and treatment were explained to  patient and aftercare instructions were given. The patient verbalized understanding. Patient armband removed and shredded.

## 2021-08-01 NOTE — ED TRIAGE NOTES
Patient c/o pain to right thigh x 3 weeks. He states she also started having redness and swelling to BLE. He took diuretics which seems to improve the swelling but it has gotten worse again. He has seen his doctor and they have him scheduled to see a neurosurgeon. He was recently seen at [facility] and has been given pain medication. Today, he also started having pain across his lower back.

## 2021-09-15 ENCOUNTER — OFFICE VISIT (OUTPATIENT)
Dept: CARDIOLOGY CLINIC | Age: 71
End: 2021-09-15
Payer: MEDICARE

## 2021-09-15 VITALS
HEART RATE: 76 BPM | BODY MASS INDEX: 32.58 KG/M2 | DIASTOLIC BLOOD PRESSURE: 66 MMHG | SYSTOLIC BLOOD PRESSURE: 114 MMHG | OXYGEN SATURATION: 98 % | WEIGHT: 262 LBS | HEIGHT: 75 IN

## 2021-09-15 DIAGNOSIS — Z85.6 H/O: CML (CHRONIC MYELOID LEUKEMIA): ICD-10-CM

## 2021-09-15 DIAGNOSIS — I48.91 ATRIAL FIBRILLATION, UNSPECIFIED TYPE (HCC): ICD-10-CM

## 2021-09-15 DIAGNOSIS — I49.3 PVCS (PREMATURE VENTRICULAR CONTRACTIONS): ICD-10-CM

## 2021-09-15 DIAGNOSIS — I10 ESSENTIAL HYPERTENSION: ICD-10-CM

## 2021-09-15 DIAGNOSIS — Z01.810 PREOP CARDIOVASCULAR EXAM: Primary | ICD-10-CM

## 2021-09-15 DIAGNOSIS — N18.2 CHRONIC RENAL IMPAIRMENT, STAGE 2 (MILD): ICD-10-CM

## 2021-09-15 DIAGNOSIS — Z79.01 ON APIXABAN THERAPY: ICD-10-CM

## 2021-09-15 PROCEDURE — 3017F COLORECTAL CA SCREEN DOC REV: CPT | Performed by: INTERNAL MEDICINE

## 2021-09-15 PROCEDURE — G8754 DIAS BP LESS 90: HCPCS | Performed by: INTERNAL MEDICINE

## 2021-09-15 PROCEDURE — G8417 CALC BMI ABV UP PARAM F/U: HCPCS | Performed by: INTERNAL MEDICINE

## 2021-09-15 PROCEDURE — G8752 SYS BP LESS 140: HCPCS | Performed by: INTERNAL MEDICINE

## 2021-09-15 PROCEDURE — 1101F PT FALLS ASSESS-DOCD LE1/YR: CPT | Performed by: INTERNAL MEDICINE

## 2021-09-15 PROCEDURE — G8536 NO DOC ELDER MAL SCRN: HCPCS | Performed by: INTERNAL MEDICINE

## 2021-09-15 PROCEDURE — G8427 DOCREV CUR MEDS BY ELIG CLIN: HCPCS | Performed by: INTERNAL MEDICINE

## 2021-09-15 PROCEDURE — 99214 OFFICE O/P EST MOD 30 MIN: CPT | Performed by: INTERNAL MEDICINE

## 2021-09-15 PROCEDURE — G8510 SCR DEP NEG, NO PLAN REQD: HCPCS | Performed by: INTERNAL MEDICINE

## 2021-09-15 RX ORDER — KETOCONAZOLE 20 MG/G
CREAM TOPICAL
COMMUNITY
Start: 2021-08-13

## 2021-09-15 RX ORDER — DULOXETIN HYDROCHLORIDE 30 MG/1
30 CAPSULE, DELAYED RELEASE ORAL DAILY
COMMUNITY
End: 2022-03-21 | Stop reason: ALTCHOICE

## 2021-09-15 NOTE — PROGRESS NOTES
History of Present Illness:  66year old male here for follow up, as well as preoperative cardiovascular exam for planned back surgery by Dr. King Peck September 20th at [facility].  Denies any chest pain, dyspnea. No new PND, orthopnea, edema. His functional status is somewhat limited due to his lower extremity paresthesias and pain from his lower back. His last surgery was a right hip replacement September, 2020. He did not have any issues. His last echo was in 2018 with normal function. He remains on Eliquis. Impression:  1. Preoperative cardiovascular exam for lower back surgery 09/20/21 by Dr. King Peck at [facility].  No further cardiac workup required. Okay to proceed. 2. Paroxysmal atrial fibrillation without symptoms, rate controlled historically, indefinite Eliquis use. No bleeding issues. 3. Hx of DJD and multiple back surgeries. 4. Hx of CML with remote therapy, in remission. 5. CKD, stage 2.  6. HTN. 7. DJD with right hip replacement September, 2020, no issues. 8. Echo 2018 with normal function. Plan:  He had proBNP in August, which was not significantly elevated. There was no evidence of decompensated heart failure. His EKG showed sinus rhythm with no new changes. Although his functional status is somewhat limited, his EF a few years ago was normal and there has been no other clinical change. Reasonable to proceed with surgery at this point and I recommended holding Eliquis at least three, but ideally four days prior given increased risk for bleeding. All questions answered and I will see back in six months.         Past Medical History:   Diagnosis Date    Abdominal pain, unspecified site     Acute reaction to stress     Allergic rhinitis due to pollen     Arrhythmia     afib    Arthritis     Back injury     BPH (benign prostatic hypertrophy)     Calculus of ureter     Cancer (HCC)     history of Leukemia, in remission    Carotid duplex 06/17/2015    Mild < 50% bilateral ICA stenosis.  CML in remission ([facility] [address] 4/30/2016    Cortical age-related cataract of right eye 4/4/2021    Dizziness and giddiness     Esophageal reflux     Essential hypertension     GERD (gastroesophageal reflux disease)     Headache(784.0)     History of echocardiogram 08/13/2015    EF 55-60%. No WMA. Mild-mod LVH. Gr 1 DDfx. No evidence of intracardiac shunt. Mild AI.      Hx: UTI (urinary tract infection)     Hyperammonemia ([facility] [address] 8/19/2018    Hypertension     Hypogonadism in male     Kidney stones     Migraine     Neck injury     Polycythemia     Polycythemia, secondary     Sciatica     Unspecified retinal detachment     Unspecified sleep apnea     resolved since gastric bypass    Vision decreased     Weight loss        Current Outpatient Medications   Medication Sig Dispense Refill    DULoxetine (Cymbalta) 30 mg capsule Take 30 mg by mouth daily.  ketoconazole (NIZORAL) 2 % topical cream APPLY EXTERNALLY TO DRY FLAKING AREAS ON FACE ONCE TO TWICE DAILY AS NEEDED      apixaban (Eliquis) 5 mg tablet TAKE 1 TABLET BY MOUTH TWICE DAILY 60 Tablet 0    DULoxetine (CYMBALTA) 60 mg capsule TAKE 1 CAPSULE BY MOUTH DAILY ALONG WITH 30 MG CAPSULE FOR A TOTAL DAILY DOSE OF 90 MG 90 Capsule 3    atorvastatin (LIPITOR) 20 mg tablet atorvastatin 20 mg tablet   TAKE 1 TABLET BY MOUTH EVERY DAY      hydroCHLOROthiazide (HYDRODIURIL) 25 mg tablet 1 tablet in the morning      ketorolac (ACULAR) 0.5 % ophthalmic solution ketorolac 0.5 % eye drops   INSTILL 1 DROP IN RIGHT EYE TWICE DAILY. BEGIN 3 DAYS BEFORE YOUR PROCEDURE      lisinopriL (PRINIVIL, ZESTRIL) 5 mg tablet Take 5 mg by mouth daily.       dutasteride (AVODART) 0.5 mg capsule TAKE 1 CAPSULE BY MOUTH DAILY 90 Cap 2    labetaloL (NORMODYNE) 300 mg tablet TAKE 1 TABLET BY MOUTH TWICE DAILY 180 Tab 3    cyclobenzaprine (FLEXERIL) 5 mg tablet TK 1  TO 2 TS  PO BID PRF MSP      tamsulosin (FLOMAX) 0.4 mg capsule TK 1 C PO D AFTER DINNER      traZODone (DESYREL) 50 mg tablet TK 1 T PO IN THE COLBY      pantoprazole (PROTONIX) 40 mg tablet TAKE 1 TABLET BY MOUTH EVERY DAY 90 Tab 3    furosemide (LASIX) 20 mg tablet TAKE 1 TABLET BY MOUTH EVERY MORNING AS NEEDED FOR SWELLING 90 Tab 0    sildenafil citrate (VIAGRA) 100 mg tablet 1 tab one hour before sexual activity 30 Tab 1    SUMAtriptan (IMITREX) 100 mg tablet TAKE 1 TABLET BY MOUTH AT ONSET OF HEADACHE, MAY REPEAT 2 HOURS LATER. (MAX 2 PILLS IN 24 HOURS) 12 Tab 1    testosterone cypionate (DEPOTESTOTERONE CYPIONATE) 200 mg/mL injection INJECT 1 ml IN THE MUSCLE EVERY 30 DAYS 1 mL 5    fluticasone propionate (FLONASE) 50 mcg/actuation nasal spray SHAKE LIQUID AND USE 1 TO 2 SPRAYS IN EACH NOSTRIL DAILY 1 Bottle 3    topiramate (TOPAMAX) 50 mg tablet TAKE 1 TABLET BY MOUTH TWICE DAILY FOR MIGRAINE PREVENTION OR HEADACHES 180 Tab 5    montelukast (SINGULAIR) 10 mg tablet TAKE 1 TABLET BY MOUTH ONCE DAILY 90 Tab 3    potassium citrate (UROCIT-K) 15 mEq TbER tablet TAKE 1 TABLET BY MOUTH DAILY WITH FOOD. SWALLOW TABLETS WHOLE WITH A FULL GLASS OF WATER 90 Tab 3    guanFACINE IR (TENEX) 1 mg IR tablet TAKE 1 TABLET BY MOUTH AT BEDTIME 90 Tab 2    amLODIPine (NORVASC) 5 mg tablet take 1 tablet by mouth once daily  0    multivitamin (ONE A DAY) tablet Take 1 Tab by mouth daily.  vitamin E (AQUA GEMS) 400 unit capsule Take 400 Units by mouth daily.  predniSONE (DELTASONE) 10 mg tablet  (Patient not taking: Reported on 9/15/2021)         Social History   reports that he quit smoking about 40 years ago. He has never used smokeless tobacco.   reports current alcohol use. Family History  family history includes Diabetes in his father and son; Headache in his sister; Heart Attack in his mother; Hypertension in his father. Review of Systems  Except as stated above include:  Constitutional: Negative for fever, chills and malaise/fatigue.    HEENT: No congestion or recent URI.  Gastrointestinal: No nausea, vomiting, abdominal pain, bloody stools. Pulmonary:  Negative except as stated above. Cardiac:  Negative except as stated above. Musculoskeletal: Negative except as stated above. Neurological:  No localized symptoms. Skin:  Negative except as stated above. Psych:  Negative except as stated above. Endocrine:  Negative except as stated above. PHYSICAL EXAM  BP Readings from Last 3 Encounters:   09/15/21 114/66   08/01/21 (!) 148/82   04/05/21 (!) 113/55     Pulse Readings from Last 3 Encounters:   09/15/21 76   08/01/21 78   04/05/21 70     Wt Readings from Last 3 Encounters:   09/15/21 118.8 kg (262 lb)   06/14/21 120.2 kg (265 lb)   03/31/21 113.4 kg (250 lb)     General:   Well developed, well groomed. Head/Neck:   No obvious jugular venous distention     No obvious carotid pulsations. No evidence of xanthelasma. Lungs:   No respiratory distress. Clear bilaterally. Heart:  Regular rate and rhythm. Normal S1/S2. Palpation grossly normal.    No significant murmurs, rubs or gallops. Abdomen:   Non-acute abdomen. No obvious pulsations. Extremities:   Intact peripheral pulses. No significant edema. Neurological:   Alert and oriented to person, place, time. No focal neurological deficit visually. Skin:   No obvious rash    Blood Pressure Metric:  Monitor recommended and adjustments stated if needed.

## 2021-09-15 NOTE — LETTER
9/15/2021 3:23 PM    Mr. Anabelle Nunes  [address] [place] [address]   1950      Anabelle Nunes was seen in our office on September 15, 2021 for cardiac evaluation. From a cardiac standpoint he is low risk to proceed with scheduled procedure with Dr. Moses Martinez, but should hold his Eliquis 4 days prior. .      Please feel free to contact our office if you have any questions regarding this patient.              Sincerely,                Aldo Fernandez MD

## 2021-09-15 NOTE — PROGRESS NOTES
Gauri Camposyoandy presents today for   Chief Complaint   Patient presents with    Surgical Clearance       Gauri Mar preferred language for health care discussion is english/other. Is someone accompanying this pt? no    Is the patient using any DME equipment during [address]? no    Depression Screening:  3 most recent PHQ Screens 9/15/2021   Little interest or pleasure in doing things Not at all   Feeling down, depressed, irritable, or hopeless Not at all   Total Score PHQ 2 0       Learning Assessment:  Learning Assessment 1/6/2021   PRIMARY LEARNER Patient   HIGHEST LEVEL OF EDUCATION - PRIMARY LEARNER  -   BARRIERS PRIMARY LEARNER -   [facility]    NEED -   LEARNER PREFERENCE PRIMARY DEMONSTRATION   ANSWERED BY patient   RELATIONSHIP SELF       Abuse Screening:  Abuse Screening Questionnaire 1/6/2021   Do you ever feel afraid of your partner? N   Are you in a relationship with someone who physically or mentally threatens you? N   Is it safe for you to go home? Y       Fall Risk  Fall Risk Assessment, last 12 mths 9/15/2021   Able to walk? Yes   Fall in past 12 months? 0   Do you feel unsteady? 0   Are you worried about falling 0   Number of falls in past 12 months -   Fall with injury? -       Pt currently taking Anticoagulant therapy? Eliquis     Coordination of Care:  1. Have you been to the ER, urgent care clinic since your last visit? Hospitalized since your last visit? no    2. Have you seen or consulted any other health care providers outside of the [address] [place] [address] since your last visit? Include any pap smears or colon screening.  no

## 2021-10-29 ENCOUNTER — HOSPITAL ENCOUNTER (OUTPATIENT)
Dept: LAB | Age: 71
Discharge: HOME | End: 2021-10-29
Payer: MEDICARE

## 2021-10-29 LAB
ALBUMIN SERPL-MCNC: 3.6 G/DL (ref 3.4–5)
ANION GAP SERPL CALC-SCNC: 3 MMOL/L (ref 3–18)
BUN SERPL-MCNC: 18 MG/DL (ref 7–18)
BUN/CREAT SERPL: 17 (ref 12–20)
CALCIUM SERPL-MCNC: 8.7 MG/DL (ref 8.5–10.1)
CHLORIDE SERPL-SCNC: 114 MMOL/L (ref 100–111)
CO2 SERPL-SCNC: 22 MMOL/L (ref 21–32)
CREAT SERPL-MCNC: 1.03 MG/DL (ref 0.6–1.3)
CREAT UR-MCNC: 241 MG/DL (ref 30–125)
ERYTHROCYTE [DISTWIDTH] IN BLOOD BY AUTOMATED COUNT: 15.7 % (ref 11.6–14.5)
GLUCOSE SERPL-MCNC: 92 MG/DL (ref 74–99)
HCT VFR BLD AUTO: 47.5 % (ref 36–48)
HGB BLD-MCNC: 15.6 G/DL (ref 13–16)
MCH RBC QN AUTO: 27.8 PG (ref 24–34)
MCHC RBC AUTO-ENTMCNC: 32.8 G/DL (ref 31–37)
MCV RBC AUTO: 84.5 FL (ref 78–100)
PHOSPHATE SERPL-MCNC: 3.4 MG/DL (ref 2.5–4.9)
PLATELET # BLD AUTO: 292 K/UL (ref 135–420)
PMV BLD AUTO: 10.9 FL (ref 9.2–11.8)
POTASSIUM SERPL-SCNC: 4.3 MMOL/L (ref 3.5–5.5)
PROT UR-MCNC: 86 MG/DL
RBC # BLD AUTO: 5.62 M/UL (ref 4.35–5.65)
SODIUM SERPL-SCNC: 139 MMOL/L (ref 136–145)
WBC # BLD AUTO: 10.2 K/UL (ref 4.6–13.2)

## 2021-10-29 PROCEDURE — 36415 COLL VENOUS BLD VENIPUNCTURE: CPT

## 2021-10-29 PROCEDURE — 84156 ASSAY OF PROTEIN URINE: CPT

## 2021-10-29 PROCEDURE — 85027 COMPLETE CBC AUTOMATED: CPT

## 2021-10-29 PROCEDURE — 80069 RENAL FUNCTION PANEL: CPT

## 2021-10-29 PROCEDURE — 82570 ASSAY OF URINE CREATININE: CPT

## 2022-03-02 ENCOUNTER — TRANSCRIBE ORDER (OUTPATIENT)
Dept: SCHEDULING | Age: 72
End: 2022-03-02

## 2022-03-02 DIAGNOSIS — N18.2 CHRONIC KIDNEY DISEASE, STAGE II (MILD): Primary | ICD-10-CM

## 2022-03-18 PROBLEM — Z85.6 H/O: CML (CHRONIC MYELOID LEUKEMIA): Status: ACTIVE | Noted: 2020-11-13

## 2022-03-18 PROBLEM — M48.062 LUMBAR STENOSIS WITH NEUROGENIC CLAUDICATION: Status: ACTIVE | Noted: 2018-06-04

## 2022-03-19 PROBLEM — N17.9 ARF (ACUTE RENAL FAILURE) (HCC): Status: ACTIVE | Noted: 2018-08-19

## 2022-03-19 PROBLEM — G93.41 ACUTE METABOLIC ENCEPHALOPATHY: Status: ACTIVE | Noted: 2018-08-19

## 2022-03-19 PROBLEM — D50.9 IRON DEFICIENCY ANEMIA, UNSPECIFIED: Status: ACTIVE | Noted: 2020-01-06

## 2022-03-19 PROBLEM — R25.2 CRAMPS, MUSCLE, GENERAL: Status: ACTIVE | Noted: 2017-06-27

## 2022-03-19 PROBLEM — E87.5 ACUTE HYPERKALEMIA: Status: ACTIVE | Noted: 2018-08-19

## 2022-03-19 PROBLEM — R25.1 TREMOR OF UNKNOWN ORIGIN: Status: ACTIVE | Noted: 2018-08-20

## 2022-03-19 PROBLEM — G62.9 NEUROPATHY: Status: ACTIVE | Noted: 2017-10-23

## 2022-03-19 PROBLEM — N25.89 UREMIC ACIDOSIS: Status: ACTIVE | Noted: 2018-08-19

## 2022-03-20 PROBLEM — R23.3 BRUISES EASILY: Status: ACTIVE | Noted: 2018-03-23

## 2022-03-21 RX ORDER — DULOXETIN HYDROCHLORIDE 60 MG/1
CAPSULE, DELAYED RELEASE ORAL
Qty: 90 CAPSULE | Refills: 3 | Status: SHIPPED | OUTPATIENT
Start: 2022-03-21

## 2022-03-30 ENCOUNTER — HOSPITAL ENCOUNTER (OUTPATIENT)
Dept: ULTRASOUND IMAGING | Age: 72
Discharge: HOME | End: 2022-03-30
Attending: INTERNAL MEDICINE
Payer: MEDICARE

## 2022-03-30 DIAGNOSIS — N18.2 CHRONIC KIDNEY DISEASE, STAGE II (MILD): ICD-10-CM

## 2022-03-30 PROCEDURE — 76770 US EXAM ABDO BACK WALL COMP: CPT

## 2022-05-05 ENCOUNTER — OFFICE VISIT (OUTPATIENT)
Dept: CARDIOLOGY CLINIC | Age: 72
End: 2022-05-05
Payer: MEDICARE

## 2022-05-05 VITALS
HEART RATE: 75 BPM | DIASTOLIC BLOOD PRESSURE: 84 MMHG | OXYGEN SATURATION: 98 % | BODY MASS INDEX: 31.08 KG/M2 | WEIGHT: 250 LBS | SYSTOLIC BLOOD PRESSURE: 124 MMHG | HEIGHT: 75 IN

## 2022-05-05 DIAGNOSIS — I10 ESSENTIAL HYPERTENSION: ICD-10-CM

## 2022-05-05 DIAGNOSIS — Z85.6 H/O: CML (CHRONIC MYELOID LEUKEMIA): ICD-10-CM

## 2022-05-05 DIAGNOSIS — I48.91 ATRIAL FIBRILLATION, UNSPECIFIED TYPE (HCC): Primary | ICD-10-CM

## 2022-05-05 DIAGNOSIS — I49.3 PVCS (PREMATURE VENTRICULAR CONTRACTIONS): ICD-10-CM

## 2022-05-05 DIAGNOSIS — Z79.01 ON APIXABAN THERAPY: ICD-10-CM

## 2022-05-05 DIAGNOSIS — I70.0 AORTIC CALCIFICATION (HCC): ICD-10-CM

## 2022-05-05 PROCEDURE — G8510 SCR DEP NEG, NO PLAN REQD: HCPCS | Performed by: INTERNAL MEDICINE

## 2022-05-05 PROCEDURE — G8752 SYS BP LESS 140: HCPCS | Performed by: INTERNAL MEDICINE

## 2022-05-05 PROCEDURE — 1101F PT FALLS ASSESS-DOCD LE1/YR: CPT | Performed by: INTERNAL MEDICINE

## 2022-05-05 PROCEDURE — G8427 DOCREV CUR MEDS BY ELIG CLIN: HCPCS | Performed by: INTERNAL MEDICINE

## 2022-05-05 PROCEDURE — G8417 CALC BMI ABV UP PARAM F/U: HCPCS | Performed by: INTERNAL MEDICINE

## 2022-05-05 PROCEDURE — 93000 ELECTROCARDIOGRAM COMPLETE: CPT | Performed by: INTERNAL MEDICINE

## 2022-05-05 PROCEDURE — G8754 DIAS BP LESS 90: HCPCS | Performed by: INTERNAL MEDICINE

## 2022-05-05 PROCEDURE — 3017F COLORECTAL CA SCREEN DOC REV: CPT | Performed by: INTERNAL MEDICINE

## 2022-05-05 PROCEDURE — G8536 NO DOC ELDER MAL SCRN: HCPCS | Performed by: INTERNAL MEDICINE

## 2022-05-05 PROCEDURE — 99214 OFFICE O/P EST MOD 30 MIN: CPT | Performed by: INTERNAL MEDICINE

## 2022-05-05 NOTE — PROGRESS NOTES
Tracie Kam presents today for   Chief Complaint   Patient presents with    Follow-up     overdue 6 month follow up     Shortness of Breath     with exertion        Tracie Kam preferred language for health care discussion is english/other. Is someone accompanying this pt? no    Is the patient using any DME equipment during [address]? no    Depression Screening:  3 most recent PHQ Screens 5/5/2022   Little interest or pleasure in doing things Not at all   Feeling down, depressed, irritable, or hopeless Not at all   Total Score PHQ 2 0       Learning Assessment:  Learning Assessment 1/6/2021   PRIMARY LEARNER Patient   HIGHEST LEVEL OF EDUCATION - PRIMARY LEARNER  -   BARRIERS PRIMARY LEARNER -   [facility]    NEED -   LEARNER PREFERENCE PRIMARY DEMONSTRATION   ANSWERED BY patient   RELATIONSHIP SELF       Abuse Screening:  Abuse Screening Questionnaire 5/5/2022   Do you ever feel afraid of your partner? N   Are you in a relationship with someone who physically or mentally threatens you? N   Is it safe for you to go home? Y       Fall Risk  Fall Risk Assessment, last 12 mths 5/5/2022   Able to walk? Yes   Fall in past 12 months? 0   Do you feel unsteady? 0   Are you worried about falling 0   Number of falls in past 12 months -   Fall with injury? -       Pt currently taking Anticoagulant therapy? no    Coordination of Care:  1. Have you been to the ER, urgent care clinic since your last visit? Hospitalized since your last visit? no    2. Have you seen or consulted any other health care providers outside of the [address] [place] [address] since your last visit? Include any pap smears or colon screening.  no

## 2022-05-05 NOTE — PROGRESS NOTES
History of Present Illness:  66 YOM here for follow up. Overall he is doing relatively well. No new chest pain, dyspnea, PND, orthopnea or edema. He had back surgery in the winter and still has issues. This is his fourth back surgery and he has seen a new surgeon. He is taking Eliquis without bleeding issues. Minimal to no palpitations. Impression:  1. Paroxysmal a-fib without symptoms, rate controlled historically, indefinite Eliquis use. 2. History of lower back pain and multiple surgeries, most recently last winter, including his fourth surgery. He has not received the relief that he was hoping for and continues to investigate options. 3. History of CML with remote therapy, in remission. 4. Chronic stage 2 kidney disease. 5. HTN, controlled. 6. DJD with right hip replacement September 2020.  7. Echo 2018 with normal function. Plan:  He is in sinus rhythm today. He has a history of atrial fibrillation, on Eliquis. He has had four back surgeries and hopefully he can find some relief. All questions answered and I will see back in six months. Past Medical History:   Diagnosis Date    Abdominal pain, unspecified site     Acute reaction to stress     Allergic rhinitis due to pollen     Anxiety     Arrhythmia     afib    Arthritis     Back injury     BPH (benign prostatic hypertrophy)     Calculus of ureter     Cancer (HCC)     history of Leukemia, in remission    Carotid duplex 06/17/2015    Mild < 50% bilateral ICA stenosis.  CML in remission ([facility] [address] 4/30/2016    Cortical age-related cataract of right eye 4/4/2021    Dizziness and giddiness     Esophageal reflux     Essential hypertension     GERD (gastroesophageal reflux disease)     Headache(784.0)     History of echocardiogram 08/13/2015    EF 55-60%. No WMA. Mild-mod LVH. Gr 1 DDfx. No evidence of intracardiac shunt.   Mild AI.      Hx: UTI (urinary tract infection)     Hyperammonemia ([address] 8/19/2018    Hypertension     Hypogonadism in male     Kidney stones     Lumbar herniated disc     Migraine     Neck injury     Polycythemia     Polycythemia, secondary     Sciatica     Unspecified retinal detachment     Unspecified sleep apnea     resolved since gastric bypass    Vision decreased     Weight loss        Current Outpatient Medications   Medication Sig Dispense Refill    apixaban (ELIQUIS) 5 mg tablet Take 1 Tablet by mouth two (2) times a day. 120 Tablet 5    DULoxetine (CYMBALTA) 60 mg capsule TAKE 1 CAPSULE BY MOUTH DAILY ALONG WITH 30 MG CAPSULE FOR A TOTAL DAILY DOSE OF 90 MG 90 Capsule 3    dutasteride (AVODART) 0.5 mg capsule TAKE 1 CAPSULE BY MOUTH DAILY 90 Capsule 1    ferrous sulfate (Iron) 325 mg (65 mg iron) tablet Take  by mouth Daily (before breakfast).  ketoconazole (NIZORAL) 2 % topical cream APPLY EXTERNALLY TO DRY FLAKING AREAS ON FACE ONCE TO TWICE DAILY AS NEEDED      atorvastatin (LIPITOR) 20 mg tablet am      hydroCHLOROthiazide (HYDRODIURIL) 25 mg tablet 1 tablet in the morning      lisinopriL (PRINIVIL, ZESTRIL) 5 mg tablet Take 5 mg by mouth daily.  labetaloL (NORMODYNE) 300 mg tablet TAKE 1 TABLET BY MOUTH TWICE DAILY 180 Tab 3    tamsulosin (FLOMAX) 0.4 mg capsule TK 1 C PO D AFTER DINNER      traZODone (DESYREL) 50 mg tablet TK 1 T PO IN THE COLBY      pantoprazole (PROTONIX) 40 mg tablet TAKE 1 TABLET BY MOUTH EVERY DAY (Patient taking differently: TAKE 1 TABLET BY MOUTH EVERY DAY am) 90 Tab 3    furosemide (LASIX) 20 mg tablet TAKE 1 TABLET BY MOUTH EVERY MORNING AS NEEDED FOR SWELLING 90 Tab 0    sildenafil citrate (VIAGRA) 100 mg tablet 1 tab one hour before sexual activity 30 Tab 1    SUMAtriptan (IMITREX) 100 mg tablet TAKE 1 TABLET BY MOUTH AT ONSET OF HEADACHE, MAY REPEAT 2 HOURS LATER. (MAX 2 PILLS IN 24 HOURS) 12 Tab 1    testosterone cypionate (DEPOTESTOTERONE CYPIONATE) 200 mg/mL injection INJECT 1 ml IN THE MUSCLE EVERY 30 DAYS 1 mL 5    fluticasone propionate (FLONASE) 50 mcg/actuation nasal spray SHAKE LIQUID AND USE 1 TO 2 SPRAYS IN EACH NOSTRIL DAILY (Patient taking differently: 2 Sprays by Both Nostrils route as needed.) 1 Bottle 3    topiramate (TOPAMAX) 50 mg tablet TAKE 1 TABLET BY MOUTH TWICE DAILY FOR MIGRAINE PREVENTION OR HEADACHES 180 Tab 5    montelukast (SINGULAIR) 10 mg tablet TAKE 1 TABLET BY MOUTH ONCE DAILY (Patient taking differently: am) 90 Tab 3    potassium citrate (UROCIT-K) 15 mEq TbER tablet TAKE 1 TABLET BY MOUTH DAILY WITH FOOD. SWALLOW TABLETS WHOLE WITH A FULL GLASS OF WATER 90 Tab 3    amLODIPine (NORVASC) 5 mg tablet 5 mg daily. am  0    multivitamin (ONE A DAY) tablet Take 1 Tab by mouth daily. Social History   reports that he quit smoking about 41 years ago. He has a 15.00 pack-year smoking history. He has never used smokeless tobacco.   reports current alcohol use. Family History  family history includes Diabetes in his father and son; Headache in his sister; Heart Attack in his mother; Hypertension in his father. Review of Systems  Except as stated above include:  Constitutional: Negative for fever, chills and malaise/fatigue. HEENT: No congestion or recent URI. Gastrointestinal: No nausea, vomiting, abdominal pain, bloody stools. Pulmonary:  Negative except as stated above. Cardiac:  Negative except as stated above. Musculoskeletal: Negative except as stated above. Neurological:  No localized symptoms. Skin:  Negative except as stated above. Psych:  Negative except as stated above. Endocrine:  Negative except as stated above.     PHYSICAL EXAM  BP Readings from Last 3 Encounters:   05/05/22 124/84   09/20/21 114/75   09/15/21 114/66     Pulse Readings from Last 3 Encounters:   05/05/22 75   09/20/21 62   09/15/21 76     Wt Readings from Last 3 Encounters:   05/05/22 113.4 kg (250 lb)   09/20/21 123.5 kg (272 lb 4.3 oz)   09/15/21 118.8 kg (262 lb)     General:   Well developed, well groomed. Head/Neck:   No obvious jugular venous distention     No obvious carotid pulsations. No evidence of xanthelasma. Lungs:   No respiratory distress. Clear bilaterally. Heart:  Regular rate and rhythm. Normal S1/S2. Palpation grossly normal.    No significant murmurs, rubs or gallops. Abdomen:   Non-acute abdomen. No obvious pulsations. Extremities:   Intact peripheral pulses. No significant edema. Neurological:   Alert and oriented to person, place, time. No focal neurological deficit visually. Skin:   No obvious rash    Blood Pressure Metric:  Monitor recommended and adjustments stated if needed.

## 2022-05-05 NOTE — PATIENT INSTRUCTIONS
If you have not heard from the central scheduler to schedule your testing in 48 hours, please call [phone].

## 2022-05-06 ENCOUNTER — HOSPITAL ENCOUNTER (OUTPATIENT)
Dept: LAB | Age: 72
Discharge: HOME | End: 2022-05-06
Payer: COMMERCIAL

## 2022-05-06 LAB
ALBUMIN SERPL-MCNC: 3.7 G/DL (ref 3.4–5)
ANION GAP SERPL CALC-SCNC: 13 MMOL/L (ref 3–18)
BUN SERPL-MCNC: 20 MG/DL (ref 7–18)
BUN/CREAT SERPL: 20 (ref 12–20)
CALCIUM SERPL-MCNC: 8.5 MG/DL (ref 8.5–10.1)
CALCIUM SERPL-MCNC: 8.6 MG/DL (ref 8.5–10.1)
CHLORIDE SERPL-SCNC: 113 MMOL/L (ref 100–111)
CO2 SERPL-SCNC: 21 MMOL/L (ref 21–32)
CREAT SERPL-MCNC: 1 MG/DL (ref 0.6–1.3)
CREAT UR-MCNC: 177 MG/DL (ref 30–125)
ERYTHROCYTE [DISTWIDTH] IN BLOOD BY AUTOMATED COUNT: 15.1 % (ref 11.6–14.5)
GLUCOSE SERPL-MCNC: 90 MG/DL (ref 74–99)
HCT VFR BLD AUTO: 39.2 % (ref 36–48)
HGB BLD-MCNC: 13 G/DL (ref 13–16)
MCH RBC QN AUTO: 29.6 PG (ref 24–34)
MCHC RBC AUTO-ENTMCNC: 33.2 G/DL (ref 31–37)
MCV RBC AUTO: 89.3 FL (ref 78–100)
NRBC # BLD: 0 K/UL (ref 0–0.01)
NRBC BLD-RTO: 0 PER 100 WBC
PHOSPHATE SERPL-MCNC: 3.2 MG/DL (ref 2.5–4.9)
PLATELET # BLD AUTO: 266 K/UL (ref 135–420)
PMV BLD AUTO: 11.3 FL (ref 9.2–11.8)
POTASSIUM SERPL-SCNC: 4.7 MMOL/L (ref 3.5–5.5)
PROT UR-MCNC: 55 MG/DL
PTH-INTACT SERPL-MCNC: 155.4 PG/ML (ref 18.4–88)
RBC # BLD AUTO: 4.39 M/UL (ref 4.35–5.65)
SODIUM SERPL-SCNC: 147 MMOL/L (ref 136–145)
WBC # BLD AUTO: 8.4 K/UL (ref 4.6–13.2)

## 2022-05-06 PROCEDURE — 85027 COMPLETE CBC AUTOMATED: CPT

## 2022-05-06 PROCEDURE — 84156 ASSAY OF PROTEIN URINE: CPT

## 2022-05-06 PROCEDURE — 80069 RENAL FUNCTION PANEL: CPT

## 2022-05-06 PROCEDURE — 82570 ASSAY OF URINE CREATININE: CPT

## 2022-05-06 PROCEDURE — 36415 COLL VENOUS BLD VENIPUNCTURE: CPT

## 2022-05-06 PROCEDURE — 83970 ASSAY OF PARATHORMONE: CPT

## 2022-05-18 ENCOUNTER — HOSPITAL ENCOUNTER (OUTPATIENT)
Dept: ULTRASOUND IMAGING | Age: 72
Discharge: HOME | End: 2022-05-18
Attending: INTERNAL MEDICINE
Payer: MEDICARE

## 2022-05-18 DIAGNOSIS — I70.0 AORTIC CALCIFICATION (HCC): ICD-10-CM

## 2022-05-18 PROCEDURE — 76775 US EXAM ABDO BACK WALL LIM: CPT

## 2022-09-28 ENCOUNTER — HOSPITAL ENCOUNTER (OUTPATIENT)
Dept: PHYSICAL THERAPY | Age: 72
Discharge: HOME | End: 2022-09-28
Payer: MEDICARE

## 2022-09-28 PROCEDURE — 97110 THERAPEUTIC EXERCISES: CPT

## 2022-09-28 PROCEDURE — 97161 PT EVAL LOW COMPLEX 20 MIN: CPT

## 2022-09-28 PROCEDURE — 97535 SELF CARE MNGMENT TRAINING: CPT

## 2022-09-28 NOTE — PROGRESS NOTES
PT DAILY TREATMENT NOTE/LUMBAR EVAL     Patient Name: Terri Quintana  Date:2022  : 1950  [x]  Patient  Verified  Payor: Samantha  / Plan: [place] MEDICARE PART A & B / Product Type: Medicare /    In time:1055  Out time:1133  Total Treatment Time (min): 38  Visit #: 1 of 12    Medicare/BCBS Only   Total Timed Codes (min):  23 1:1 Treatment Time:  23     Treatment Area: Other low back pain [M54.59]  SUBJECTIVE  Pain Level (0-10 scale): 6  [x]constant []intermittent []improving []worsening []no change since onset  BETTER lying down  WORSE with standing and walking  Any medication changes, allergies to medications, adverse drug reactions, diagnosis change, or new procedure performed?: [x] No    [] Yes (see summary sheet for update)  Subjective functional status/changes:     PLOF: prior to this most recent surgery, has had back involvement since , 15 minute tolerance to yard work due to back pain, no AD, tolerated household and community activities, I ADLS and activities  Limitations to PLOF: pain and recovery from this surgery, gait deviations  Mechanism of Injury: 22 back surgery due to ongoing issues with his back   Current symptoms/Complaints: 66 YO male diagnosed as above and with S/S consistent with above diagnosis presents to skilled outpatient PT [facility] balance impairment and LBP. He is S/P lumbar surgery 22 with noted left foot drop since shortly after first surgery in . He does have a left AFO. Pain today is 6/10.   Previous Treatment/Compliance: surgery, rehab at [facility] , medication   PMHx/Surgical Hx: longstanding history of LB involvement since , has had 5 back surgeries  and 2 neck surgeries, arthritis, HTN, right LUCIO late   Work Hx: retired  Living Situation: lives in a house, 1 story , 3 [address] with rails, not alone  Pt Goals: \"pain free or reduce pain, be able to tolerate getting out and being able to do what I need to do\"  Barriers: [x]pain []financial []time []transportation []other  Motivation: good  Substance use: []Alcohol []Tobacco []other:   FABQ Score: []low []elevate  Cognition: A & O x 3    Other:    OBJECTIVE/EXAMINATION  Domestic Life: household activities, drives, yard work  Activity/Recreational Limitations: P/O recovery and residual LBP  Mobility: SC use Right  Self Care: I           15 min [x]Eval                  []Re-Eval       15 min Therapeutic Exercise:  [x] See flow sheet :   Rationale: increase ROM, increase strength, and improve coordination to improve the patients ability to tolerate ADLs and activities    8 min  Self care:  []  See flow sheet :   Rationale: increase ROM, increase strength, improve coordination, and FOTO, HEP, GOALS, anatomy, POC    to improve the patients ability to tolerate ADLs and activities             With   [] TE   [] TA   [] neuro   [] other: Patient Education: [x] Review HEP    [] Progressed/Changed HEP based on:   [] positioning   [] body mechanics   [] transfers   [] heat/ice application    [] other:      Other Objective/Functional Measures:      Physical Therapy Evaluation - Lumbar Spine ([facility])    SUBJECTIVE  Chief Complaint: as above    Mechanism of injury:as above    Symptoms:as above  Aggravated by:   [] Bending [] Sitting [] Standing [] Walking   [] Moving [] Cough [] Sneeze [] Valsalva   [] AM  [] PM  Lying:  [] sup   [] pro   [] sidelying   [] Other:     Eased by:    [] Bending [] Sitting [] Standing [] Walking   [] Moving [] AM  [] PM  Lying: [] sup  [] pro  [] sidelying   [] Other:          Past History/Treatments: as above    Diagnostic Tests: [] Lab work [x] X-rays    [] CT [x] MRI     [] Other:  Results:    Functional Status  Prior level of function:as above  Present functional limitations:FOTO   What position do you sleep in?:SL    OBJECTIVE  Posture: mild FH and RS  Lateral Shift: [] R    [] L     [] +  [] -  Kyphosis: [] Increased [] Decreased   [x]  WNL  Lordosis:  [] Increased [x] Decreased   [] WNL  Pelvic symmetry: [] WNL    [] Other:    Gait:  [] Normal     [x] Abnormal: ataxic appearing, increase PRISCILA, decrease pace, decrease step length B, left foot drop with mild steppage gait, using right SC today. Reports steps and stairs are tolerated with reciprocal pattern. Active Movements: [] N/A   [] Too acute   [] Other:  ROM   AROM % PROM Comments:pain, area   Forward flexion 40-60      Extension 20-30      SB right 20-30      SB left 20-30      Rotation right 5-10      Rotation left 5-10           Neuro Screen [] WNL  Myotome/Dermatome/Reflexes:  Comments:         Palpation  [] Min  [] Mod  [] Severe    Location:  [] Min  [] Mod  [] Severe    Location:  [] Min  [] Mod  [] Severe    Location:    Strength   L(0-5) R (0-5) N/T   Hip Flexion (L1,2) 4 4+ []   Knee Extension (L3,4) 4+ 4+ []   Ankle Dorsiflexion (L4)   []   Great Toe Extension (L5)   []   Ankle Plantarflexion (S1)   []   Knee Flexion (S1,2) 5 5 []   Upper Abdominals   []   Lower Abdominals   []   Paraspinals   []   Back Rotators   []   Gluteus Khoa   []   Hip abd/add  4/4+ 4+/4+ []     Special Tests  Lumbar:  Lumb.  Compression: [] Pos  [] Neg               Lumbar Distraction:   [] Pos  [] Neg    Quadrant:  [] Pos  [] Neg   [] Flex  [] Ext    Sacroilliac:  Gaenslen's: [] R    [] L    [] +    [] -     Compression: [] +    [] -     Gapping:  [] +    [] -     Thigh Thrust: [] R    [] L    [] +    [] -     Leg Length: [] +    [] -   Position:    Crests:    ASIS:    PSIS:    Sacral Sulcus:    Mobility: Standing flex:     Sitting flex:     Supine to sit:     Prone knee bend:         Hip: Macdonald Severin:  [] R    [] L    [] +    [] -     Scour:  [] R    [] L    [] +    [] -     Piriformis: [] R    [] L    [] +    [] -          Deficits: Tatiana's: [] R    [] L    [] +    [] -     Syed: [] R    [] L    [] +    [] -     Hamstrings 90/90:    Gastrocsoleus (to neutral): Right: Left:       Global Muscular Weakness:  Abdominals:  Quadratus Lumborum:  Paraspinals: Other:    Other tests/comments:MAGDA LE pitting edema, notes it just started up in the last couple of days and that he has resumed taking his fluid pills. Sit to stands with UE use 5X from eval room chair no arm rests with CGA of PT 58 seconds. TUG with SBA PT and no AD 28 seconds       Pain Level (0-10 scale) post treatment: 6    ASSESSMENT/Changes in Function: Patient demonstrates the potential to make gains with improved ROM, strength, endurance/activity tolerance, functional FOTO survey score and all within a reasonable time frame so as to increase their functional independence with ADLs and activities for carryover to  improved quality of life, tolerance to yard work and household activities  and community activities. Patient requires skilled Physical Therapy so as to monitor their response to and modify their treatment plan accordingly. Patient appears to be an appropriate candidate for skilled outpatient Physical Therapy. Patient will continue to benefit from skilled PT services to modify and progress therapeutic interventions, address functional mobility deficits, address ROM deficits, address strength deficits, analyze and address soft tissue restrictions, analyze and cue movement patterns, analyze and modify body mechanics/ergonomics, assess and modify postural abnormalities, address imbalance/dizziness, and instruct in home and community integration to attain remaining goals.      [x]  See Plan of Care  []  See progress note/recertification  []  See Discharge Summary         Progress towards goals / Updated goals:       PLAN  [x]  Upgrade activities as tolerated     [x]  Continue plan of care  []  Update interventions per flow sheet       []  Discharge due to:_  []  Other:_      Bhavik Lopez, PT 9/28/2022  10:55 AM

## 2022-09-28 NOTE — PROGRESS NOTES
[facility] [address], [address] [place] [address], [address] [place], [place] [address]y [address]  [phone] fax      Plan of Care/ Statement of Necessity for Physical Therapy Services    Patient name: Rossy Clinton Start of Care: 2022   Referral source: Agueda Song MD : 1950    Medical Diagnosis: Other low back pain [M54.59]  Payor: Matthias Burnett / Plan: [place] MEDICARE PART A & B / Product Type: Medicare /  Onset Date:P/O 22    Treatment Diagnosis: LBP, gait abnormality, balance impairment   Prior Hospitalization: see medical history Provider#: [id number]   Medications: Verified on Patient summary List    Comorbidities:  longstanding history of LB involvement since , has had 5 back surgeries  and 2 neck surgeries, arthritis, HTN, right LUCIO late '   Prior Level of Function: prior to this most recent surgery, has had back involvement since , 15 minute tolerance to yard work due to back pain, no AD, tolerated household and community activities, I ADLS and activities      The Plan of Care and following information is based on the information from the initial evaluation. Assessment/ key information:  68 YO male diagnosed as above and with S/S consistent with above diagnosis presents to skilled outpatient PT [facility] balance impairment and LBP. He is S/P lumbar surgery 22 with noted left foot drop since shortly after first surgery in . He does have a left AFO. Pain today is 6/10. He has decrease B LE strength, gait abnormality and deviations, decrease tolerance to ADLs and activities, balance impairment, moderate difficulty with sit to stand transfers, 58 seconds for 5x sit to stand with UE use and 28 second TUG test. He is motivated for full recovery to his PLOF before this surgery.  Patient demonstrates the potential to make gains with improved ROM, strength, endurance/activity tolerance, functional FOTO survey score and all within a reasonable time frame so as to increase their functional independence with ADLs and activities for carryover to  improved quality of life, tolerance to yard work and household activities  and community activities. Patient requires skilled Physical Therapy so as to monitor their response to and modify their treatment plan accordingly. Patient appears to be an appropriate candidate for skilled outpatient Physical Therapy  Evaluation Complexity History HIGH Complexity :3+ comorbidities / personal factors will impact the outcome/ POC ; Examination HIGH Complexity : 4+ Standardized tests and measures addressing body structure, function, activity limitation and / or participation in recreation  ;Presentation LOW Complexity : Stable, uncomplicated  ;Clinical Decision Making   Overall Complexity Rating: LOW   Problem List: pain affecting function, decrease ROM, decrease strength, impaired gait/ balance, decrease ADL/ functional abilitiies, decrease activity tolerance, decrease flexibility/ joint mobility, decrease transfer abilities, and other FOTO TBA    Treatment Plan may include any combination of the following: Therapeutic exercise, Therapeutic activities, Neuromuscular re-education, Physical agent/modality, Gait/balance training, Manual therapy, Patient education, Self Care training, Functional mobility training, Home safety training, and Stair training  Patient / Family readiness to learn indicated by: asking questions, trying to perform skills, and interest  Persons(s) to be included in education: patient (P)  Barriers to Learning/Limitations: None  Patient Goal (s): \"pain free or reduce pain, be able to tolerate getting out and being able to do what I need to do\"  Patient Self Reported Health Status: good  Rehabilitation Potential: good    Short Term Goals:  To be accomplished in 6 treatments:   1 patient will have established and be I with HEP to aid with I and self management at discharge   EVAL HEP issued   2 patient will have FOTO completed by session 2 to assess ability to perform and tolerate ADLS and activities at home and in the community   EVAL TBA  Long Term Goals: To be accomplished in 12 treatments:   1 patient will have FOTO to projected gains to show increase tolerance to activities at home and in the community including his goal for yard work tolerance   EVAL FOTO TBA   2 patient will have pain 2/10 to aid with increase tolerance to standing 15 minutes for his personal activities   EVAL 6   3 patient will have improved sit to stand 5x with UE to <30 seconds for carryover to home and community locations   EVAL 58 seconds   4 patient will have improved TUG test to 20 seconds for carryover to home and community activities   EVAL 28 seconds       Frequency / Duration: Patient to be seen 2-3 times per week for 4 weeks. Patient/ Caregiver education and instruction: Diagnosis, prognosis, self care, activity modification, and brace/ splint application   [x]  Plan of care has been reviewed with PTA    Certification Period: 9/28/22 - 10/27/22  Lary Bowles, PT 9/28/2022 11:23 AM    ________________________________________________________________________    I certify that the above Therapy Services are being furnished while the patient is under my care. I agree with the treatment plan and certify that this therapy is necessary.     Physician's Signature:____________Date:_________TIME:________     Sela Primrose, MD  ** Signature, Date and Time must be completed for valid certification **    Please sign and return to In [address] [place] [address] [address]  [address] [place] [address], [address] [place] [address], [address] [place] [address] [address]  [phone] fax

## 2022-09-30 ENCOUNTER — HOSPITAL ENCOUNTER (OUTPATIENT)
Dept: PHYSICAL THERAPY | Age: 72
Discharge: HOME | End: 2022-09-30
Payer: MEDICARE

## 2022-09-30 PROCEDURE — 97112 NEUROMUSCULAR REEDUCATION: CPT | Performed by: PHYSICAL THERAPIST

## 2022-09-30 PROCEDURE — 97530 THERAPEUTIC ACTIVITIES: CPT | Performed by: PHYSICAL THERAPIST

## 2022-09-30 PROCEDURE — 97110 THERAPEUTIC EXERCISES: CPT | Performed by: PHYSICAL THERAPIST

## 2022-09-30 NOTE — PROGRESS NOTES
PT DAILY TREATMENT NOTE     Patient Name: Julia Maxwell  Date:2022  : 1950  [x]  Patient  Verified  Payor: Phuong Counter / Plan: [place] MEDICARE PART A & B / Product Type: Medicare /    In time:11:35A  Out time:12:25P  Total Treatment Time (min): 50min  Visit #: 2 of 12    Medicare/BCBS Only   Total Timed Codes (min):  40 1:1 Treatment Time:  38       Treatment Area: Other low back pain [M54.59]    SUBJECTIVE  Pain Level (0-10 scale): 7/10  Any medication changes, allergies to medications, adverse drug reactions, diagnosis change, or new procedure performed?: [x] No    [] Yes (see summary sheet for update)  Subjective functional status/changes:   [] No changes reported  Patient states he did a lot of walking yesterday shopping with his wife and is quit sore today. 15 min late for appointment today due to not being able to find car keys.      OBJECTIVE    Modality rationale: decrease inflammation, decrease pain, and increase tissue extensibility to improve the patients ability to decrease overall pain and improve function   Min Type Additional Details    [] Estim:  []Unatt       []IFC  []Premod                        []Other:  []w/ice   []w/heat  Position:  Location:    [] Estim: []Att    []TENS instruct  []NMES                    []Other:  []w/US   []w/ice   []w/heat  Position:  Location:    []  Traction: [] Cervical       []Lumbar                       [] Prone          []Supine                       []Intermittent   []Continuous Lbs:  [] before manual  [] after manual    []  Ultrasound: []Continuous   [] Pulsed                           []1MHz   []3MHz W/cm2:  Location:    []  Iontophoresis with dexamethasone         Location: [] Take home patch   [] In clinic   15 [x]  Ice     []  heat  []  Ice massage  []  Laser   []  Anodyne Position:  Location:    []  Laser with stim  []  Other:  Position:  Location:    []  Vasopneumatic Device    []  Right     []  Left  Pre-treatment girth:  Post-treatment girth: Measured at (location):  Pressure:       [] lo [] med [] hi   Temperature: [] lo [] med [] hi   [] Skin assessment post-treatment:  []intact []redness- no adverse reaction    []redness - adverse reaction:     15 min Therapeutic Exercise:  [x] See flow sheet :   Rationale: increase ROM and increase strength to improve the patients ability to Tolerate basic ADLs and job-related tasks without pain. 10 min Therapeutic Activity:  [x]  See flow sheet :   Rationale: increase strength and improve coordination  to improve the patients ability to perform functional tasks such as overhead reach. 15 min Neuromuscular Re-education:  [x]  See flow sheet :   Rationale: improve coordination, improve balance, and increase proprioception  to improve the patients ability to perform activities with good form and proprioception with tactile and verbal cuing appropriately. With   [x] TE   [x] TA   [x] neuro   [] other: Patient Education: [x] Review HEP    [x] Progressed/Changed HEP based on:   [x] positioning   [] body mechanics   [] transfers   [] heat/ice application    [] other:      Other Objective/Functional Measures: able to  narrow base of support on firm with eyes closed for 20 secs before loss of balance. Pain Level (0-10 scale) post treatment: 7/10    ASSESSMENT/Changes in Function: Patient is progressing towards goals of increased activity tolerance and decreased pain, though elevated level today compared to evaluation. Reports the need for and desire for challenging activities as long as he can have a standing rest break. Recommend initiating leg press machine exercise next visit for leg strengthening and stability.      Patient will continue to benefit from skilled PT services to modify and progress therapeutic interventions, address functional mobility deficits, address ROM deficits, address strength deficits, analyze and address soft tissue restrictions, analyze and cue movement patterns, analyze and modify body mechanics/ergonomics, assess and modify postural abnormalities, address imbalance/dizziness, and instruct in home and community integration to attain remaining goals. [x]  See Plan of Care  []  See progress note/recertification  []  See Discharge Summary         Progress towards goals / Updated goals:  Short Term Goals: To be accomplished in 6 treatments:              1 patient will have established and be I with HEP to aid with I and self management at discharge              EVAL HEP issued   Current: reports doing some 9/30/22              2 patient will have FOTO completed by session 2 to assess ability to perform and tolerate ADLS and activities at home and in the community              EVAL TBA  Long Term Goals:  To be accomplished in 12 treatments:              1 patient will have FOTO to projected gains to show increase tolerance to activities at home and in the community including his goal for yard work tolerance              EVAL FOTO TBA              2 patient will have pain 2/10 to aid with increase tolerance to standing 15 minutes for his personal activities              EVAL 6              3 patient will have improved sit to stand 5x with UE to <30 seconds for carryover to home and community locations              EVAL 58 seconds              4 patient will have improved TUG test to 20 seconds for carryover to home and community activities              EVAL 28 seconds    PLAN  [x]  Upgrade activities as tolerated     []  Continue plan of care  []  Update interventions per flow sheet       []  Discharge due to:_  []  Other:_      Josh Maravilla, PT 9/30/2022  1:44 PM    Future Appointments   Date Time Provider Panchito Chau   10/3/2022 12:00 PM Stephanie Galaviz, PT [facility]   10/5/2022 11:15 AM Savannah Ordonez PTA [facility]   10/7/2022 10:30 AM Francia Junior, PT [facility]   10/10/2022 11:15 AM Savannah Ordonez PTA [facility]   10/12/2022 11:15 AM Willie Hector, PT [facility]   10/14/2022  1:30 PM Willie Hector, PT [facility]   10/17/2022 11:15 AM Dhaval Moore, PTA [facility]   10/19/2022 12:00 PM Dhaval Moore, PTA [facility]   10/21/2022 10:30 AM Dhaval Moore, PTA [facility]   10/24/2022 10:30 AM Willie Hector, PT [facility]   10/26/2022 10:30 AM Dhaval Moore, PTA [facility]   10/28/2022 10:30 AM Lizbeth Brown, PT [facility]   11/9/2022 10:20 AM Savanna Myers MD [facility]

## 2022-10-03 ENCOUNTER — APPOINTMENT (OUTPATIENT)
Dept: PHYSICAL THERAPY | Age: 72
End: 2022-10-03
Payer: MEDICARE

## 2022-10-05 ENCOUNTER — HOSPITAL ENCOUNTER (OUTPATIENT)
Dept: PHYSICAL THERAPY | Age: 72
Discharge: HOME | End: 2022-10-05
Payer: MEDICARE

## 2022-10-05 PROCEDURE — 97112 NEUROMUSCULAR REEDUCATION: CPT

## 2022-10-05 PROCEDURE — 97530 THERAPEUTIC ACTIVITIES: CPT

## 2022-10-05 PROCEDURE — 97110 THERAPEUTIC EXERCISES: CPT

## 2022-10-05 NOTE — PROGRESS NOTES
PT DAILY TREATMENT NOTE     Patient Name: Federico Weber  Date:10/5/2022  : 1950  [x]  Patient  Verified  Payor: Azalea Robbin / Plan: [place] / Product Type: Medicare /    In time:1125 am   Out time:1215 pm   Total Treatment Time (min): 50  Visit #: 3 of 12    Medicare/BCBS Only   Total Timed Codes (min):  40 1:1 Treatment Time:  40       Treatment Area: Other low back pain [M54.59]    SUBJECTIVE  Pain Level (0-10 scale): -7/10   Any medication changes, allergies to medications, adverse drug reactions, diagnosis change, or new procedure performed?: [x] No    [] Yes (see summary sheet for update)  Subjective functional status/changes:   [] No changes reported  Patient reports that his legs are bothering from doing a lot of walking yesterday. Patient explains that he walked around [place] and [facility] yesterday. OBJECTIVE    Modality rationale: decrease inflammation, decrease pain, and increase tissue extensibility to improve the patients ability to assist with performing ADL's and functional tasks without limitations.     Min Type Additional Details    [] Estim:  []Unatt       []IFC  []Premod                        []Other:  []w/ice   []w/heat  Position:  Location:    [] Estim: []Att    []TENS instruct  []NMES                    []Other:  []w/US   []w/ice   []w/heat  Position:  Location:    []  Traction: [] Cervical       []Lumbar                       [] Prone          []Supine                       []Intermittent   []Continuous Lbs:  [] before manual  [] after manual    []  Ultrasound: []Continuous   [] Pulsed                           []1MHz   []3MHz W/cm2:  Location:    []  Iontophoresis with dexamethasone         Location: [] Take home patch   [] In clinic   10 []  Ice     [x]  heat  []  Ice massage  []  Laser   []  Anodyne Position:reclined  Location:L/S    []  Laser with stim  []  Other:  Position:  Location:    []  Vasopneumatic Device    []  Right     [] Left  Pre-treatment girth:  Post-treatment girth:  Measured at (location):  Pressure:       [] lo [] med [] hi   Temperature: [] lo [] med [] hi   [] Skin assessment post-treatment:  []intact []redness- no adverse reaction  []redness - adverse reaction:     10 min Therapeutic Exercise:  [] See flow sheet :   Rationale: increase ROM and increase strength to improve the patients overall muscle endurance and activity tolerance for ADL's and functional tasks. 15 min Therapeutic Activity:  []  See flow sheet :   Rationale: increase strength and improve coordination  to improve the patients ability to safely perform dynamic activities and to improve functional performance of  ADL's. 15 min Neuromuscular Re-education:  []  See flow sheet :   Rationale: increase strength, improve coordination, and improve balance  to improve the patients ability to perform activities with good form, stability and proprioception. With   [] TE   [] TA   [] neuro   [] other: Patient Education: [x] Review HEP    [] Progressed/Changed HEP based on:   [] positioning   [] body mechanics   [] transfers   [] heat/ice application    [] other:      Other Objective/Functional Measures: Added LAQ, TA brace, TA brace with UE raise and ext/row/pall of press with GTB      Pain Level (0-10 scale) post treatment: 5/10     ASSESSMENT/Changes in Function:   Patient is progressing as expected towards goals. Progressed exercises, as per flow sheet, to assist with increasing core strength and standing tolerance. Verbal cues were required for core activation with today's progressed exercises. No increase in low back pain was experienced during today's session. Patient responded well to today's session, as evident by, a decrease in low back pain and improved exercises tolerance.  Patient will continue to benefit from skilled PT services to modify and progress therapeutic interventions, address functional mobility deficits, address ROM deficits, address strength deficits, analyze and address soft tissue restrictions, analyze and cue movement patterns, analyze and modify body mechanics/ergonomics, assess and modify postural abnormalities, address imbalance/dizziness, and instruct in home and community integration to attain remaining goals. []  See Plan of Care  []  See progress note/recertification  []  See Discharge Summary         Progress towards goals / Updated goals:  Short Term Goals: To be accomplished in 6 treatments:              1 patient will have established and be I with HEP to aid with I and self management at discharge              EVAL HEP issued              Current: reports doing some 9/30/22              2 patient will have FOTO completed by session 2 to assess ability to perform and tolerate ADLS and activities at home and in the community              EVAL TBA  Long Term Goals:  To be accomplished in 12 treatments:              1 patient will have FOTO to projected gains to show increase tolerance to activities at home and in the community including his goal for yard work tolerance              EVAL FOTO TBA              2 patient will have pain 2/10 to aid with increase tolerance to standing 15 minutes for his personal activities              EVAL 6              3 patient will have improved sit to stand 5x with UE to <30 seconds for carryover to home and community locations              EVAL 58 seconds              4 patient will have improved TUG test to 20 seconds for carryover to home and community activities              EVAL 28 seconds    PLAN  [x]  Upgrade activities as tolerated     [x]  Continue plan of care  []  Update interventions per flow sheet       []  Discharge due to:_  []  Other:_      Almaz Campbell PTA 10/5/2022  12:37 PM    Future Appointments   Date Time Provider Panchito Chau   10/7/2022 10:30 AM Aria León, PT [facility]   10/10/2022 11:15 AM Arely Richards PTA [facility] 10/12/2022 11:15 AM Laura Mo, PT [facility]   10/14/2022  1:30 PM Juanito Paige, PT [facility]   10/17/2022 11:15 AM Juwan Marroquin, PTA [facility]   10/19/2022 12:00 PM Rosasrokrista Lopez, PTA [facility]   10/21/2022 10:30 AM Juwan Lopez, PTA [facility]   10/24/2022 10:30 AM Juanito Paige, PT [facility]   10/26/2022 10:30 AM Juwan Marroquin, PTA [facility]   10/28/2022 10:30 AM Laura Mo, PT [facility]   11/9/2022 10:20 AM Fan Myers MD [facility]

## 2022-10-07 ENCOUNTER — HOSPITAL ENCOUNTER (OUTPATIENT)
Dept: PHYSICAL THERAPY | Age: 72
Discharge: HOME | End: 2022-10-07
Payer: MEDICARE

## 2022-10-07 PROCEDURE — 97112 NEUROMUSCULAR REEDUCATION: CPT | Performed by: PHYSICAL THERAPIST

## 2022-10-07 PROCEDURE — 97110 THERAPEUTIC EXERCISES: CPT | Performed by: PHYSICAL THERAPIST

## 2022-10-07 PROCEDURE — 97530 THERAPEUTIC ACTIVITIES: CPT | Performed by: PHYSICAL THERAPIST

## 2022-10-07 NOTE — PROGRESS NOTES
PT DAILY TREATMENT NOTE     Patient Name: Gillian Barton  Date:10/7/2022  : 1950  [x]  Patient  Verified  Payor: Mansoor Apple / Plan: [place] MEDICARE PART A & B / Product Type: Medicare /    In time:10:30A  Out time:11:20A  Total Treatment Time (min): 50min  Visit #: 4 of 12    Medicare/BCBS Only   Total Timed Codes (min):  40 1:1 Treatment Time:  40       Treatment Area: Other low back pain [M54.59]    SUBJECTIVE  Pain Level (0-10 scale): 6/10  Any medication changes, allergies to medications, adverse drug reactions, diagnosis change, or new procedure performed?: [x] No    [] Yes (see summary sheet for update)  Subjective functional status/changes:   [] No changes reported  Patient states he was pretty sore this week from last Friday but is feeling better today and is ready to do things difficult again today. OBJECTIVE         Modality rationale: decrease inflammation, decrease pain, and increase tissue extensibility to improve the patients ability to assist with performing ADL's and functional tasks without limitations.      Min Type Additional Details     [] Estim:  []Unatt       []IFC  []Premod                        []Other:  []w/ice   []w/heat  Position:  Location:     [] Estim: []Att    []TENS instruct  []NMES                    []Other:  []w/US   []w/ice   []w/heat  Position:  Location:     []  Traction: [] Cervical       []Lumbar                       [] Prone          []Supine                       []Intermittent   []Continuous Lbs:  [] before manual  [] after manual     []  Ultrasound: []Continuous   [] Pulsed                           []1MHz   []3MHz W/cm2:  Location:     []  Iontophoresis with dexamethasone         Location: [] Take home patch   [] In clinic   10 []  Ice     [x]  heat  []  Ice massage  []  Laser   []  Anodyne Position:reclined  Location:L/S     []  Laser with stim  []  Other:  Position:  Location:     []  Vasopneumatic Device    []  Right     []  Left  Pre-treatment girth:  Post-treatment girth:  Measured at (location):  Pressure:       [] lo [] med [] hi   Temperature: [] lo [] med [] hi   [] Skin assessment post-treatment:  []intact []redness- no adverse reaction  []redness - adverse reaction:      15 min Therapeutic Exercise:  [x] See flow sheet :   Rationale: increase ROM and increase strength to improve the patients overall muscle endurance and activity tolerance for ADL's and functional tasks. 10 min Therapeutic Activity:  [x]  See flow sheet :   Rationale: increase strength and improve coordination  to improve the patients ability to safely perform dynamic activities and to improve functional performance of  ADL's. 15 min Neuromuscular Re-education:  [x]  See flow sheet :   Rationale: increase strength, improve coordination, and improve balance  to improve the patients ability to perform activities with good form, stability and proprioception. With   [x] TE   [x] TA   [x] neuro   [] other: Patient Education: [x] Review HEP    [x] Progressed/Changed HEP based on:   [x] positioning   [] body mechanics   [] transfers   [] heat/ice application    [] other:      Other Objective/Functional Measures: Able to increase resistance with leg press without increased pain     Pain Level (0-10 scale) post treatment: 5/10    ASSESSMENT/Changes in Function: Patient is progressing towards goals of increased activity tolerance. Discussed using the brace more often especially when when walking longer distances to help with his foot drop.      Patient will continue to benefit from skilled PT services to modify and progress therapeutic interventions, address functional mobility deficits, address ROM deficits, address strength deficits, analyze and address soft tissue restrictions, analyze and cue movement patterns, analyze and modify body mechanics/ergonomics, assess and modify postural abnormalities, address imbalance/dizziness, and instruct in home and community integration to attain remaining goals. [x]  See Plan of Care  []  See progress note/recertification  []  See Discharge Summary         Progress towards goals / Updated goals:  Short Term Goals: To be accomplished in 6 treatments:              1 patient will have established and be I with HEP to aid with I and self management at discharge              EVAL HEP issued              Current: reports doing some 10/7/22              2 patient will have Loraine Alex completed by session 2 to assess ability to perform and tolerate ADLS and activities at home and in the community              EVAL TBA   Current: Must be done next visit 10/7/22  Long Term Goals:  To be accomplished in 12 treatments:              1 patient will have FOTO to projected gains to show increase tolerance to activities at home and in the community including his goal for yard work tolerance              EVAL FOTO TBA              2 patient will have pain 2/10 to aid with increase tolerance to standing 15 minutes for his personal activities              EVAL 6   Current: progressing at 5/10              3 patient will have improved sit to stand 5x with UE to <30 seconds for carryover to home and community locations              EVAL 58 seconds              4 patient will have improved TUG test to 20 seconds for carryover to home and community activities              EVAL 28 seconds    PLAN  [x]  Upgrade activities as tolerated     [x]  Continue plan of care  []  Update interventions per flow sheet       []  Discharge due to:_  []  Other:_      Dilcia Solis, ABBI 10/7/2022  11:16 AM    Future Appointments   Date Time Provider Panchito Chau   10/10/2022 11:15 AM Lizett Yan, PTA [facility]   10/12/2022 11:15 AM Varun Doyle, PT [facility]   10/14/2022  1:30 PM Mil Crisostomo, PT [facility]   10/17/2022 11:15 AM Lizett Yan, PTA [facility]   10/19/2022 12:00 PM Lizett Se, PTA [facility]   10/21/2022 10:30 AM Laura Chang Apolonio Crigler [facility]   10/24/2022 10:30 AM Kwabena Quintero, PT [facility]   10/26/2022 10:30 AM Romeo Ormond, MASSIEL [facility]   10/28/2022 10:30 AM Wilmer Laughlin, PT [facility]   11/9/2022 10:20 AM Nathaniel Myers MD [facility]

## 2022-10-10 ENCOUNTER — HOSPITAL ENCOUNTER (OUTPATIENT)
Dept: PHYSICAL THERAPY | Age: 72
Discharge: HOME | End: 2022-10-10
Payer: MEDICARE

## 2022-10-10 PROCEDURE — 97110 THERAPEUTIC EXERCISES: CPT

## 2022-10-10 PROCEDURE — 97530 THERAPEUTIC ACTIVITIES: CPT

## 2022-10-12 ENCOUNTER — APPOINTMENT (OUTPATIENT)
Dept: PHYSICAL THERAPY | Age: 72
End: 2022-10-12
Payer: MEDICARE

## 2022-10-14 ENCOUNTER — APPOINTMENT (OUTPATIENT)
Dept: PHYSICAL THERAPY | Age: 72
End: 2022-10-14
Payer: MEDICARE

## 2022-10-17 ENCOUNTER — HOSPITAL ENCOUNTER (OUTPATIENT)
Dept: PHYSICAL THERAPY | Age: 72
Discharge: HOME | End: 2022-10-17
Payer: MEDICARE

## 2022-10-17 PROCEDURE — 97530 THERAPEUTIC ACTIVITIES: CPT

## 2022-10-17 PROCEDURE — 97112 NEUROMUSCULAR REEDUCATION: CPT

## 2022-10-17 PROCEDURE — 97110 THERAPEUTIC EXERCISES: CPT

## 2022-10-17 NOTE — PROGRESS NOTES
PT DAILY TREATMENT NOTE     Patient Name: Raúl Caballero  Date:10/17/2022  : 1950  [x]  Patient  Verified  Payor: Lucas Montano / Plan: [place] MEDICARE PART A & B / Product Type: Medicare /    In time: 8359 am   Out time: 1215 pm   Total Treatment Time (min): 50  Visit #: 6 of 12    Medicare/BCBS Only   Total Timed Codes (min):  40 1:1 Treatment Time:  40       Treatment Area: Other low back pain [M54.59]    SUBJECTIVE  Pain Level (0-10 scale): 7/10   Any medication changes, allergies to medications, adverse drug reactions, diagnosis change, or new procedure performed?: [x] No    [] Yes (see summary sheet for update)  Subjective functional status/changes:   [] No changes reported  Patient states that he is feeling about the same today. Patient explains some discomfort in his back but nothing that he can't deal with. OBJECTIVE    Modality rationale: decrease inflammation, decrease pain, and increase tissue extensibility to improve the patients ability to assist with performing ADL's and functional tasks without limitations.     Min Type Additional Details    [] Estim:  []Unatt       []IFC  []Premod                        []Other:  []w/ice   []w/heat  Position:  Location:    [] Estim: []Att    []TENS instruct  []NMES                    []Other:  []w/US   []w/ice   []w/heat  Position:  Location:    []  Traction: [] Cervical       []Lumbar                       [] Prone          []Supine                       []Intermittent   []Continuous Lbs:  [] before manual  [] after manual    []  Ultrasound: []Continuous   [] Pulsed                           []1MHz   []3MHz W/cm2:  Location:    []  Iontophoresis with dexamethasone         Location: [] Take home patch   [] In clinic   10 []  Ice     [x]  heat  []  Ice massage  []  Laser   []  Anodyne Position:reclined  Location:L/S    []  Laser with stim  []  Other:  Position:  Location:    []  Vasopneumatic Device    []  Right     []  Left  Pre-treatment girth:  Post-treatment girth:  Measured at (location):  Pressure:       [] lo [] med [] hi   Temperature: [] lo [] med [] hi   [] Skin assessment post-treatment:  []intact []redness- no adverse reaction  []redness - adverse reaction:     15 min Therapeutic Exercise:  [] See flow sheet :   Rationale: increase ROM and increase strength to improve the patients overall muscle endurance and activity tolerance for ADL's and functional tasks. 15  min Therapeutic Activity:  []  See flow sheet :   Rationale: increase strength and improve coordination  to improve the patients ability to safely perform dynamic activities and to improve functional performance of  ADL's. 10 min Neuromuscular Re-education:  []  See flow sheet :   Rationale: increase strength, improve coordination, and improve balance  to improve the patients ability to perform activities with good form, stability and proprioception. With   [] TE   [] TA   [] neuro   [] other: Patient Education: [x] Review HEP    [] Progressed/Changed HEP based on:   [] positioning   [] body mechanics   [] transfers   [] heat/ice application    [] other:      Other Objective/Functional Measures:       Pain Level (0-10 scale) post treatment: 5/10     ASSESSMENT/Changes in Function:   Patient is progressing as expected towards goals. Patient performed exercises, as per flow sheet, to assist with increasing core strength. Patient was challenged with NBOS with EC and with pall of press. Patient responded well to today's session, as evident by, no increase in low back pain.  Patient will continue to benefit from skilled PT services to modify and progress therapeutic interventions, address functional mobility deficits, address ROM deficits, address strength deficits, analyze and address soft tissue restrictions, analyze and cue movement patterns, analyze and modify body mechanics/ergonomics, assess and modify postural abnormalities, address imbalance/dizziness, and instruct in home and community integration to attain remaining goals. []  See Plan of Care  []  See progress note/recertification  []  See Discharge Summary         Progress towards goals / Updated goals:  Short Term Goals: To be accomplished in 6 treatments:              1 patient will have established and be I with HEP to aid with I and self management at discharge              EVAL HEP issued              Current: reports doing some 10/17/22              2 patient will have FOTO completed by session 2 to assess ability to perform and tolerate ADLS and activities at home and in the community              EVAL TBA              Current: 38 points, FOTO completed on 9/8. 10/17/2022  Long Term Goals: To be accomplished in 12 treatments:              1 patient will have FOTO to projected gains to show increase tolerance to activities at home and in the community including his goal for yard work tolerance              EVAL FOTO TBA     Current: 38 points, FOTO completed on 9/8. 10/17/2022              2 patient will have pain 2/10 to aid with increase tolerance to standing 15 minutes for his personal activities              EVAL 6              Current: Patient presents to PT with 8/10 pain.  10/10/2022              3 patient will have improved sit to stand 5x with UE to <30 seconds for carryover to home and community locations              EVAL 58 seconds              4 patient will have improved TUG test to 20 seconds for carryover to home and community activities              EVAL 28 seconds    PLAN  [x]  Upgrade activities as tolerated     [x]  Continue plan of care  []  Update interventions per flow sheet       []  Discharge due to:_  []  Other:_      Shin Duffy PTA 10/17/2022  12:37 PM    Future Appointments   Date Time Provider Panchito Chau   10/19/2022 12:00 PM Jeni Russell PTA [facility]   10/21/2022 10:30 AM Jeni Russell PTA [facility]   10/24/2022 10:30 AM Jerrell Hardy, PT [facility]   10/26/2022 10:30 AM Cristal Kaye, PTA [facility]   10/28/2022 10:30 AM Kathleen Damon, PT [facility]   11/9/2022 10:20 AM Magui Myers MD [facility]

## 2022-10-19 ENCOUNTER — HOSPITAL ENCOUNTER (OUTPATIENT)
Dept: PHYSICAL THERAPY | Age: 72
Discharge: HOME | End: 2022-10-19
Payer: MEDICARE

## 2022-10-19 PROCEDURE — 97110 THERAPEUTIC EXERCISES: CPT

## 2022-10-19 PROCEDURE — 97530 THERAPEUTIC ACTIVITIES: CPT

## 2022-10-19 PROCEDURE — 97112 NEUROMUSCULAR REEDUCATION: CPT

## 2022-10-19 NOTE — PROGRESS NOTES
PT DAILY TREATMENT NOTE     Patient Name: Federico Weber  Date:10/19/2022  : 1950  [x]  Patient  Verified  Payor: Azalea Siegel / Plan: [place] / Product Type: Medicare /    In time:9:14  Out time:10:06  Total Treatment Time (min): 50  Visit #: 7 of 12    Medicare/BCBS Only   Total Timed Codes (min):  40 1:1 Treatment Time:  40       Treatment Area: Other low back pain [M54.59]    SUBJECTIVE  Pain Level (0-10 scale): 4-5  Any medication changes, allergies to medications, adverse drug reactions, diagnosis change, or new procedure performed?: [x] No    [] Yes (see summary sheet for update)  Subjective functional status/changes:   [] No changes reported  \"Not as much pain today. \"    OBJECTIVE    Modality rationale: decrease inflammation, decrease pain, and increase tissue extensibility to improve the patients ability to perform ADL    Min Type Additional Details    [] Estim:  []Unatt       []IFC  []Premod                        []Other:  []w/ice   []w/heat  Position:  Location:    [] Estim: []Att    []TENS instruct  []NMES                    []Other:  []w/US   []w/ice   []w/heat  Position:  Location:    []  Traction: [] Cervical       []Lumbar                       [] Prone          []Supine                       []Intermittent   []Continuous Lbs:  [] before manual  [] after manual    []  Ultrasound: []Continuous   [] Pulsed                           []1MHz   []3MHz W/cm2:  Location:    []  Iontophoresis with dexamethasone         Location: [] Take home patch   [] In clinic   10 []  Ice     [x]  heat  []  Ice massage  []  Laser   []  Anodyne Position:long sit  Location:(B) LSP    []  Laser with stim  []  Other:  Position:  Location:    []  Vasopneumatic Device    []  Right     []  Left  Pre-treatment girth:  Post-treatment girth:  Measured at (location):  Pressure:       [] lo [] med [] hi   Temperature: [] lo [] med [] hi    Skin assessment post-treatment:  [x]intact []redness- no adverse reaction    []redness - adverse reaction:      min []Eval                  []Re-Eval       15 min Therapeutic Exercise:  [x] See flow sheet :   Rationale: increase ROM and increase strength to improve the patients ability to perform daily chores    15 min Therapeutic Activity:  [x]  See flowsheet :   Rationale: increase ROM, increase strength, and improve coordination  to improve the patients ability to ascend/descend stairs with no difficulty     10 min Neuromuscular Re-education:  [x]  See flow sheet :   Rationale: increase ROM, increase strength, improve coordination, improve balance, and increase proprioception  to improve the patients ability to ambulate with no LOB     min Manual Therapy: The manual therapy interventions were performed at a separate and distinct time from the therapeutic activities interventions. Rationale: decrease pain, increase ROM, increase tissue extensibility, decrease edema , decrease trigger points, and increase postural awareness to perform ADL      min Gait Training:  ___ feet with ___ device on level surfaces with ___ level of assist   Rationale:     min Self Care/Home Management:    Rationale: increase ROM, increase strength, and improve coordination  to improve the patients ability to perform household chores          With   [x] TE   [] TA   [] neuro   [] other: Patient Education: [x] Review HEP    [] Progressed/Changed HEP based on:   [] positioning   [] body mechanics   [] transfers   [] heat/ice application    [] other:      Other Objective/Functional Measures: Added BTB   hip abd  seated/increase rep per flow sheet  Pain Level (0-10 scale) post treatment: 4    ASSESSMENT/Changes in Function: Today's session focused on core and (B) LE strengthening. Pt has met LTG #3 and progressing towards remaining goals. Pt completed each strengthening there ex fairly well with minimal cues for correct form. Pt continued with residual pain but felt better.     Patient will continue to benefit from skilled PT services to modify and progress therapeutic interventions, address functional mobility deficits, address ROM deficits, address strength deficits, analyze and address soft tissue restrictions, analyze and cue movement patterns, analyze and modify body mechanics/ergonomics, assess and modify postural abnormalities, and instruct in home and community integration to attain remaining goals. [x]  See Plan of Care  []  See progress note/recertification  []  See Discharge Summary         Progress towards goals / Updated goals:  Short Term Goals: To be accomplished in 6 treatments:              1 patient will have established and be I with HEP to aid with I and self management at discharge              EVAL HEP issued              Current: reports doing some 10/17/22              2 patient will have FOTO completed by session 2 to assess ability to perform and tolerate ADLS and activities at home and in the community              EVAL TBA              Current: 38 points, FOTO completed on 9/8. 10/17/2022  Long Term Goals: To be accomplished in 12 treatments:              1 patient will have FOTO to projected gains to show increase tolerance to activities at home and in the community including his goal for yard work tolerance              EVAL FOTO TBA     Current: 38 points, FOTO completed on 9/8. 10/17/2022              2 patient will have pain 2/10 to aid with increase tolerance to standing 15 minutes for his personal activities              EVAL 6              Current: Patient presents to PT with 8/10 pain.  10/10/2022              3 patient will have improved sit to stand 5x with UE to <30 seconds for carryover to home and community locations              EVAL 58 seconds              Current   5 rep  25  sec     met   10/19/22              4 patient will have improved TUG test to 20 seconds for carryover to home and community activities              EVAL 28 seconds    PLAN  [] Upgrade activities as tolerated     [x]  Continue plan of care  []  Update interventions per flow sheet       []  Discharge due to:_  []  Other:_      Evelyn Bernard, MASSIEL 10/19/2022  9:19 AM    Future Appointments   Date Time Provider Panchito Chau   10/21/2022 10:30 AM Fabby Carrington PTA [facility]   10/24/2022 10:30 AM Gilson Strauss, PT [facility]   10/26/2022 10:30 AM Fabby Carrington PTA [facility]   10/28/2022 10:30 AM Rian Daly, PT [facility]   11/9/2022 10:20 AM Cara Myers MD [facility]

## 2022-10-21 ENCOUNTER — HOSPITAL ENCOUNTER (OUTPATIENT)
Dept: PHYSICAL THERAPY | Age: 72
Discharge: HOME | End: 2022-10-21
Payer: MEDICARE

## 2022-10-21 PROCEDURE — 97530 THERAPEUTIC ACTIVITIES: CPT

## 2022-10-21 PROCEDURE — 97112 NEUROMUSCULAR REEDUCATION: CPT

## 2022-10-21 PROCEDURE — 97110 THERAPEUTIC EXERCISES: CPT

## 2022-10-21 NOTE — PROGRESS NOTES
PT DAILY TREATMENT NOTE     Patient Name: Janice Getting  Date:10/21/2022  : 1950  [x]  Patient  Verified  Payor: Stephanie Arias / Plan: [place] MED[place] PART A & B / Product Type: Medicare /    In time: 3304 am   Out time: 5021 am  Total Treatment Time (min): 55  Visit #: 8 of 12    Medicare/BCBS Only   Total Timed Codes (min):  45 1:1 Treatment Time:  45       Treatment Area: Other low back pain [M54.59]    SUBJECTIVE  Pain Level (0-10 scale): 5/10   Any medication changes, allergies to medications, adverse drug reactions, diagnosis change, or new procedure performed?: [x] No    [] Yes (see summary sheet for update)  Subjective functional status/changes:   [] No changes reported  Patient reports having some aching in his back today. \"I'm ready for the pain to completely go away. \"     OBJECTIVE    Modality rationale: decrease inflammation, decrease pain, and increase tissue extensibility to improve the patients ability to assist with performing ADL's and functional tasks without limitations.     Min Type Additional Details    [] Estim:  []Unatt       []IFC  []Premod                        []Other:  []w/ice   []w/heat  Position:  Location:    [] Estim: []Att    []TENS instruct  []NMES                    []Other:  []w/US   []w/ice   []w/heat  Position:  Location:    []  Traction: [] Cervical       []Lumbar                       [] Prone          []Supine                       []Intermittent   []Continuous Lbs:  [] before manual  [] after manual    []  Ultrasound: []Continuous   [] Pulsed                           []1MHz   []3MHz W/cm2:  Location:    []  Iontophoresis with dexamethasone         Location: [] Take home patch   [] In clinic   10 []  Ice     [x]  heat  []  Ice massage  []  Laser   []  Anodyne Position:reclined  Location:L/S    []  Laser with stim  []  Other:  Position:  Location:    []  Vasopneumatic Device    []  Right     []  Left  Pre-treatment girth:  Post-treatment girth:  Measured at (location):  Pressure:       [] lo [] med [] hi   Temperature: [] lo [] med [] hi   [] Skin assessment post-treatment:  []intact []redness- no adverse reaction  []redness - adverse reaction:     15 min Therapeutic Exercise:  [] See flow sheet :   Rationale: increase ROM and increase strength to improve the patients overall muscle endurance and activity tolerance for ADL's and functional tasks. 15  min Therapeutic Activity:  []  See flow sheet :   Rationale: increase strength and improve coordination  to improve the patients ability to safely perform dynamic activities and to improve functional performance of  ADL's. 10 min Neuromuscular Re-education:  []  See flow sheet :   Rationale: increase strength, improve coordination, and improve balance  to improve the patients ability to perform activities with good form, stability and proprioception. With   [] TE   [] TA   [] neuro   [] other: Patient Education: [x] Review HEP    [] Progressed/Changed HEP based on:   [] positioning   [] body mechanics   [] transfers   [] heat/ice application    [] other:      Other Objective/Functional Measures:     Pain Level (0-10 scale) post treatment: 5/10     ASSESSMENT/Changes in Function:   Patient is progressing as expected towards goals. Patient performed exercises, as per flow sheet, to assist with increasing core and (B) LE strength. Increased muscle fatigue was experience throughout today's session and no reports of increased low back pain were reported. Will continue to progress patient as tolerated.  Patient will continue to benefit from skilled PT services to modify and progress therapeutic interventions, address functional mobility deficits, address ROM deficits, address strength deficits, analyze and address soft tissue restrictions, analyze and cue movement patterns, analyze and modify body mechanics/ergonomics, assess and modify postural abnormalities, address imbalance/dizziness, and instruct in home and community integration to attain remaining goals. []  See Plan of Care  []  See progress note/recertification  []  See Discharge Summary         Progress towards goals / Updated goals:  Short Term Goals: To be accomplished in 6 treatments:              1 patient will have established and be I with HEP to aid with I and self management at discharge              EVAL HEP issued              Current: reports doing some 10/21/22              2 patient will have Meliza Austin completed by session 2 to assess ability to perform and tolerate ADLS and activities at home and in the community              EVAL TBA              Current: 38 points, FOTO completed on 9/8. 10/21/2022  Long Term Goals: To be accomplished in 12 treatments:              1 patient will have FOTO to projected gains to show increase tolerance to activities at home and in the community including his goal for yard work tolerance              EVAL FOTO TBA     Current: 38 points, FOTO completed on 9/8. 10/21/2022              2 patient will have pain 2/10 to aid with increase tolerance to standing 15 minutes for his personal activities              EVAL 6              Current: Patient presents to PT with 5/10 pain.  10/21/2022              3 patient will have improved sit to stand 5x with UE to <30 seconds for carryover to home and community locations              EVAL 58 seconds              Current   5 rep  25  sec     met   10/21/22              4 patient will have improved TUG test to 20 seconds for carryover to home and community activities              EVAL 28 seconds    PLAN  [x]  Upgrade activities as tolerated     [x]  Continue plan of care  []  Update interventions per flow sheet       []  Discharge due to:_  []  Other:_      Sue Mix PTA 10/21/2022  12:37 PM    Future Appointments   Date Time Provider Panchito Chau   10/24/2022 10:30 AM Tai Esquivel, PT [facility]   10/26/2022 10:30 AM Debra Haynes PTA [facility]   10/28/2022 10:30 AM Christie Campos, PT [facility]   11/9/2022 10:20 AM Gina Myers MD [facility]

## 2022-10-24 ENCOUNTER — TELEPHONE (OUTPATIENT)
Dept: PHYSICAL THERAPY | Age: 72
End: 2022-10-24

## 2022-10-24 ENCOUNTER — HOSPITAL ENCOUNTER (OUTPATIENT)
Dept: PHYSICAL THERAPY | Age: 72
Discharge: HOME | End: 2022-10-24
Payer: MEDICARE

## 2022-10-24 PROCEDURE — 97530 THERAPEUTIC ACTIVITIES: CPT

## 2022-10-24 PROCEDURE — 97112 NEUROMUSCULAR REEDUCATION: CPT

## 2022-10-24 PROCEDURE — 97110 THERAPEUTIC EXERCISES: CPT

## 2022-10-24 NOTE — PROGRESS NOTES
PT DAILY TREATMENT NOTE     Patient Name: Janice Getting  Date:10/24/2022  : 1950  [x]  Patient  Verified  Payor: Stephanie Arias / Plan: [place] MEDICARE PART A & B / Product Type: Medicare /    In time:1040  Out time:1128  Total Treatment Time (min): 48  Visit #: 9 of 12    Medicare/BCBS Only   Total Timed Codes (min):  38 1:1 Treatment Time:  38       Treatment Area: Other low back pain [M54.59]    SUBJECTIVE  Pain Level (0-10 scale): 2-3  Any medication changes, allergies to medications, adverse drug reactions, diagnosis change, or new procedure performed?: [x] No    [] Yes (see summary sheet for update)  Subjective functional status/changes:   [] No changes reported  \"I am about a 2-3 today. \"    OBJECTIVE    Modality rationale: decrease pain and increase tissue extensibility to improve the patients ability to tolerate ADLs and activities   Min Type Additional Details    [] Estim:  []Unatt       []IFC  []Premod                        []Other:  []w/ice   []w/heat  Position:  Location:    [] Estim: []Att    []TENS instruct  []NMES                    []Other:  []w/US   []w/ice   []w/heat  Position:  Location:    []  Traction: [] Cervical       []Lumbar                       [] Prone          []Supine                       []Intermittent   []Continuous Lbs:  [] before manual  [] after manual    []  Ultrasound: []Continuous   [] Pulsed                           []1MHz   []3MHz W/cm2:  Location:    []  Iontophoresis with dexamethasone         Location: [] Take home patch   [] In clinic   10 []  Ice     [x]  Heat post   []  Ice massage  []  Laser   []  Anodyne Position:reclined with wedge  Location:LS    []  Laser with stim  []  Other:  Position:  Location:    []  Vasopneumatic Device    []  Right     []  Left  Pre-treatment girth:  Post-treatment girth:  Measured at (location):  Pressure:       [] lo [] med [] hi   Temperature: [] lo [] med [] hi   [] Skin assessment post-treatment:  []intact []redness- no adverse reaction    []redness - adverse reaction       14 min Therapeutic Exercise:  [x] See flow sheet :   Rationale: increase ROM, increase strength, improve coordination, improve balance, and increase proprioception to improve the patients ability to tolerate ADLs and activities    12 min Therapeutic Activity:  [x]  See flow sheet :   Rationale: increase ROM, increase strength, improve coordination, improve balance, and increase proprioception  to improve the patients ability to tolerate ADLS and activities     12 min Neuromuscular Re-education:  []  See flow sheet :   Rationale: increase ROM, increase strength, improve coordination, improve balance, and increase proprioception  to improve the patients ability to tolerate ADLs and activities     Rationale: With   [] TE   [] TA   [] neuro   [] other: Patient Education: [x] Review HEP    [] Progressed/Changed HEP based on:   [] positioning   [] body mechanics   [] transfers   [] heat/ice application    [] other:      Other Objective/Functional Measures: VC exercises and technique    Bike in use, initiated nustep 5' level 10  Leg press DL 2x15 75#    SL 55# 2x15  [place] rows increased to 21# 20x           Pain Level (0-10 scale) post treatment: 2-3    ASSESSMENT/Changes in Function: VC encouragement for core bracing and stabilization throughout session and with HEP. Reports benefit of leg press. Decrease pain and reports carryover with HEP compliance. Increase strength seen on leg press and initiation of stepper level 10    Patient will continue to benefit from skilled PT services to modify and progress therapeutic interventions, address ROM deficits, address strength deficits, analyze and address soft tissue restrictions, analyze and cue movement patterns, analyze and modify body mechanics/ergonomics, assess and modify postural abnormalities, address imbalance/dizziness, and instruct in home and community integration to attain remaining goals. [x]  See Plan of Care  []  See progress note/recertification  []  See Discharge Summary         Progress towards goals / Updated goals:  Short Term Goals: To be accomplished in 6 treatments:              1 patient will have established and be I with HEP to aid with I and self management at discharge              EVAL HEP issued              Current: reports doing some 10/24/22              2 patient will have FOTO completed by session 2 to assess ability to perform and tolerate ADLS and activities at home and in the community              EVAL TBA              Current: 38 points, FOTO completed on 9/8. Recheck next session 10/24/22  Long Term Goals: To be accomplished in 12 treatments:              1 patient will have FOTO to projected gains to show increase tolerance to activities at home and in the community including his goal for yard work tolerance              EVAL FOTO TBA     Current: 38 points, FOTO completed on 9/8.   Recheck next session 10/24/22              2 patient will have pain 2/10 to aid with increase tolerance to standing 15 minutes for his personal activities              EVAL 6              Current:2-3 10/24/22              3 patient will have improved sit to stand 5x with UE to <30 seconds for carryover to home and community locations              EVAL 58 seconds              Current   5 rep  25  sec     met   10/21/22    ongoing NA 10/24/22              4 patient will have improved TUG test to 20 seconds for carryover to home and community activities              EVAL 28 seconds   CURRENT ongoing NA  10/24/22    PLAN  [x]  Upgrade activities as tolerated     [x]  Continue plan of care  []  Update interventions per flow sheet       []  Discharge due to:_  [x]  Other:_recheck goals for MD shaka Smith PT 10/24/2022  10:45 AM    Future Appointments   Date Time Provider Panchito Chau   10/26/2022 10:30 AM Sandi Barbosa PTA [facility]   11/9/2022 10:20 AM Shirley Winn MD [facility]

## 2022-10-26 ENCOUNTER — HOSPITAL ENCOUNTER (OUTPATIENT)
Dept: PHYSICAL THERAPY | Age: 72
Discharge: HOME | End: 2022-10-26
Payer: MEDICARE

## 2022-10-26 PROCEDURE — 97110 THERAPEUTIC EXERCISES: CPT

## 2022-10-26 PROCEDURE — 97530 THERAPEUTIC ACTIVITIES: CPT

## 2022-10-26 NOTE — PROGRESS NOTES
PT DISCHARGE DAILY NOTE AND [id number]    Date:10/26/2022  Patient name: Raúl Caballero Start of Care: 2022   Referral source: Emiliano Steiner MD : 1950               Medical Diagnosis: Other low back pain [M54.59]  Payor: [place] / Plan: [place] MEDICARE PART A & B / Product Type: Medicare /  Onset Date:P/O 22               Treatment Diagnosis: LBP, gait abnormality, balance impairment   Prior Hospitalization: see medical history Provider#: [id number]   Medications: Verified on Patient summary List    Comorbidities:  longstanding history of LB involvement since , has had 5 back surgeries  and 2 neck surgeries, arthritis, HTN, right LUCIO late '   Prior Level of Function: prior to this most recent surgery, has had back involvement since , 15 minute tolerance to yard work due to back pain, no AD, tolerated household and community activities, I ADLS and activities                            Visits from [facility] Care: 10   Missed Visits: 2    Reporting Period : 2022 to 10/26/2022    [x]  Patient  Verified  Payor: VA MEDICARE / Plan: VA MEDICARE PART A & B / Product Type: Medicare /    In time:1037 am   Out time:1130 am   Total Treatment Time (min): 53  Visit #: 10 of 12    Medicare/BCBS Only   Total Timed Codes (min):  43 1:1 Treatment Time:  43       SUBJECTIVE  Pain Level (0-10 scale): 4/10   Any medication changes, allergies to medications, adverse drug reactions, diagnosis change, or new procedure performed?: [x] No    [] Yes (see summary sheet for update)  Subjective functional status/changes:   [] No changes reported  Patient reports having a little more pain than normal today. Patient states that he thinks it is from sleeping wrong. Patient explains that he say is doctor yesterday and he stated that he is doing good and can continue PT at home.      OBJECTIVE    Modality rationale: decrease inflammation, decrease pain, and increase tissue extensibility to improve the patients ability to assist with performing ADL's and functional tasks without limitations. Min Type Additional Details    [] Estim:  []Unatt       []IFC  []Premod                        []Other:  []w/ice   []w/heat  Position:  Location:    [] Estim: []Att    []TENS instruct  []NMES                    []Other:  []w/US   []w/ice   []w/heat  Position:  Location:    []  Traction: [] Cervical       []Lumbar                       [] Prone          []Supine                       []Intermittent   []Continuous Lbs:  [] before manual  [] after manual    []  Ultrasound: []Continuous   [] Pulsed                           []1MHz   []3MHz W/cm2:  Location:    []  Iontophoresis with dexamethasone         Location: [] Take home patch   [] In clinic   10 []  Ice     [x]  heat  []  Ice massage  []  Laser   []  Anodyne Position:reclined  Location:L/S    []  Laser with stim  []  Other:  Position:  Location:    []  Vasopneumatic Device    []  Right     []  Left  Pre-treatment girth:  Post-treatment girth:  Measured at (location):  Pressure:       [] lo [] med [] hi   Temperature: [] lo [] med [] hi   [] Skin assessment post-treatment:  []intact []redness- no adverse reaction  []redness - adverse reaction:       18 min Therapeutic Exercise:  [] See flow sheet :   Rationale: increase ROM and increase strength to improve the patients overall muscle endurance and activity tolerance for ADL's and functional tasks. 25 min Therapeutic Activity:  []  See flow sheet :   Rationale: increase strength and improve coordination  to improve the patients ability to safely perform dynamic activities and to improve functional performance of  ADL's.       min Neuromuscular Re-education:  []  See flow sheet :   Rationale: increase strength, improve coordination, and improve balance  to improve the patients ability to perform activities with good form, stability and proprioception.        With   [] TE   [] TA   [] neuro   [] other: Patient Education: [x] Review HEP    [] Progressed/Changed HEP based on:   [] positioning   [] body mechanics   [] transfers   [] heat/ice application    [] other:      Other Objective/Functional Measures:      Pain Level (0-10 scale) post treatment: 0/10     Summary of Care:  Oliverio Perez will have established and be I with HEP to aid with I and self management at discharge  Status at last note/certification:HEP issued  Status at discharge:Patient reports compliance with HEP on days that he is not in PT. MET     Goal:patient will have FOTO completed by session 2 to assess ability to perform and tolerate ADLS and activities at home and in the community  Status at last note/certification:38 points, FOTO completed on 9/8. Status at discharge: MET     Goal:patient will have FOTO to projected gains to show increase tolerance to activities at home and in the community including his goal for yard work tolerance  Status at last note/certification:38 points, FOTO completed on 9/8. Status at discharge: 61 points. MET     Goal: patient will have improved sit to stand 5x with UE to <30 seconds for carryover to home and community locations  Status at last note/certification:58 seconds  Status at discharge:Patient able to perform 5 sit to stands in 28 seconds from a standard chair with arms. MET     Goal:patient will have improved TUG test to 20 seconds for carryover to home and community activities  Status at last note/certification:28 seconds  Status at discharge: Patient performed TUG in 18 seconds. MET     Goal:patient will have pain 2/10 to aid with increase tolerance to standing 15 minutes for his personal activities  Status at last note/certification:EVAL 6  Status at discharge:Patient reports 4/10 pain today and 5/10 pain when standing for 15 minutes. Progressing    ASSESSMENT/Changes in Function:   Patient has progressed well with PT and towards goals.  Patient's overall low back pain and tolerance for ADL's and functional tasks have improved since the start of PT. Patient is compliant with HEP and will continue it at home. Patient to be discharged today.      Thank you for this referral!     PLAN  [x]Discontinue therapy: [x]Patient has reached or is progressing toward set goals      []Patient is non-compliant or has abdicated      []Due to lack of appreciable progress towards set goals    Bryson Deluna, MASSIEL 10/26/2022  10:41 AM

## 2022-10-26 NOTE — PROGRESS NOTES
Physical Therapy Discharge Instructions      [facility] [address], [address] [place] [address], [address] [place] [address],[facility] 300 [phone] fax    Patient: Belle Alvarado  : 1950      Continue Home Exercise Program 1-2times per day for 4 weeks, then decrease to 3 times per week      Continue with    [x] Ice  as needed 1-2 times per day     [x] Heat           Follow up with MD:     [] Upon completion of therapy     [x] As needed      Recommendations:     [x]   Return to activity with home program    []   Return to activity with the following modifications:       []Post Rehab Program    []Join Independent aquatic program     []Return to/join local gym      Additional Comments: Please call office with any questions.        Sirena Wang PTA 10/26/2022 11:09 AM

## 2022-10-28 ENCOUNTER — APPOINTMENT (OUTPATIENT)
Dept: PHYSICAL THERAPY | Age: 72
End: 2022-10-28
Payer: MEDICARE

## 2022-12-28 ENCOUNTER — OFFICE VISIT (OUTPATIENT)
Dept: CARDIOLOGY CLINIC | Age: 72
End: 2022-12-28
Payer: MEDICARE

## 2022-12-28 VITALS
DIASTOLIC BLOOD PRESSURE: 84 MMHG | HEIGHT: 75 IN | OXYGEN SATURATION: 95 % | BODY MASS INDEX: 31.33 KG/M2 | SYSTOLIC BLOOD PRESSURE: 124 MMHG | HEART RATE: 83 BPM | WEIGHT: 252 LBS

## 2022-12-28 DIAGNOSIS — I48.91 ATRIAL FIBRILLATION, UNSPECIFIED TYPE (HCC): ICD-10-CM

## 2022-12-28 DIAGNOSIS — I49.3 PVCS (PREMATURE VENTRICULAR CONTRACTIONS): Primary | ICD-10-CM

## 2022-12-28 DIAGNOSIS — N18.2 CHRONIC RENAL IMPAIRMENT, STAGE 2 (MILD): ICD-10-CM

## 2022-12-28 DIAGNOSIS — Z79.01 ON APIXABAN THERAPY: ICD-10-CM

## 2022-12-28 DIAGNOSIS — I70.0 AORTIC CALCIFICATION (HCC): ICD-10-CM

## 2022-12-28 DIAGNOSIS — M54.9 BACK PAIN, UNSPECIFIED BACK LOCATION, UNSPECIFIED BACK PAIN LATERALITY, UNSPECIFIED CHRONICITY: ICD-10-CM

## 2022-12-28 PROCEDURE — 3017F COLORECTAL CA SCREEN DOC REV: CPT | Performed by: INTERNAL MEDICINE

## 2022-12-28 PROCEDURE — 1101F PT FALLS ASSESS-DOCD LE1/YR: CPT | Performed by: INTERNAL MEDICINE

## 2022-12-28 PROCEDURE — G8510 SCR DEP NEG, NO PLAN REQD: HCPCS | Performed by: INTERNAL MEDICINE

## 2022-12-28 PROCEDURE — 99214 OFFICE O/P EST MOD 30 MIN: CPT | Performed by: INTERNAL MEDICINE

## 2022-12-28 PROCEDURE — G8536 NO DOC ELDER MAL SCRN: HCPCS | Performed by: INTERNAL MEDICINE

## 2022-12-28 PROCEDURE — 3078F DIAST BP <80 MM HG: CPT | Performed by: INTERNAL MEDICINE

## 2022-12-28 PROCEDURE — G8427 DOCREV CUR MEDS BY ELIG CLIN: HCPCS | Performed by: INTERNAL MEDICINE

## 2022-12-28 PROCEDURE — 3074F SYST BP LT 130 MM HG: CPT | Performed by: INTERNAL MEDICINE

## 2022-12-28 PROCEDURE — 93000 ELECTROCARDIOGRAM COMPLETE: CPT | Performed by: INTERNAL MEDICINE

## 2022-12-28 PROCEDURE — 1123F ACP DISCUSS/DSCN MKR DOCD: CPT | Performed by: INTERNAL MEDICINE

## 2022-12-28 PROCEDURE — G8417 CALC BMI ABV UP PARAM F/U: HCPCS | Performed by: INTERNAL MEDICINE

## 2022-12-28 RX ORDER — ASCORBIC ACID 500 MG
TABLET ORAL
COMMUNITY

## 2022-12-28 RX ORDER — MENTHOL
1000 GEL (GRAM) TOPICAL DAILY
COMMUNITY

## 2022-12-28 NOTE — PROGRESS NOTES
History of Present Illness:  67 YOM here for follow up. He had back surgery earlier this year, has been doing relatively well. No new chest pain, dyspnea, PND, orthopnea or edema. His major limitation is his back when he tries to walk longer distances. Impression:  Recent back surgery with gradual improvement. Four back surgeries prior to this. History of paroxysmal atrial fibrillation without symptoms, rate controlled with indefinite Eliquis use. History of CML with remote therapy, in remission. Chronic stage 2 kidney disease. HTN, reasonably controlled. Echo March 2018 with normal function. Plan:  Overall he is doing well and he has at least found some mild relief with his back surgery. He is on Eliquis for history of atrial fibrillation and appears to have a slower, more atypical flutter today. He will continue indefinitely. All questions answered and I will see back in six months. EKG:  Likely atypical atrial flutter with controlled ventricular rate. IVCD. Past Medical History:   Diagnosis Date    Abdominal pain, unspecified site     Acute reaction to stress     Allergic rhinitis due to pollen     Anxiety     Arrhythmia     afib    Arthritis     Back injury     BPH (benign prostatic hypertrophy)     Calculus of ureter     Cancer (HCC)     history of Leukemia, in remission    Carotid duplex 06/17/2015    Mild < 50% bilateral ICA stenosis. CML in remission ([facility] [address] 4/30/2016    Cortical age-related cataract of right eye 4/4/2021    Dizziness and giddiness     Esophageal reflux     Essential hypertension     GERD (gastroesophageal reflux disease)     Headache(784.0)     History of echocardiogram 08/13/2015    EF 55-60%. No WMA. Mild-mod LVH. Gr 1 DDfx. No evidence of intracardiac shunt. Mild AI.       Hx: UTI (urinary tract infection)     Hyperammonemia ([facility] [address] 8/19/2018    Hypertension     Hypogonadism in male     Kidney stones     Lumbar herniated disc     Migraine     Neck injury Polycythemia     Polycythemia, secondary     Sciatica     Unspecified retinal detachment     Unspecified sleep apnea     resolved since gastric bypass    Vision decreased     Weight loss        Current Outpatient Medications   Medication Sig Dispense Refill    vitamin e (E GEMS) 670 mg (1,000 unit) capsule Take 1,000 Units by mouth daily. ascorbic acid, vitamin C, (Vitamin C) 500 mg tablet Take  by mouth. tamsulosin (FLOMAX) 0.4 mg capsule Take 1 cap by mouth daily after dinner 90 Capsule 0    Toviaz 4 mg SR tablet TAKE 1 TABLET BY MOUTH DAILY 90 Tablet 3    apixaban (ELIQUIS) 5 mg tablet Take 1 Tablet by mouth two (2) times a day. 120 Tablet 5    DULoxetine (CYMBALTA) 60 mg capsule TAKE 1 CAPSULE BY MOUTH DAILY ALONG WITH 30 MG CAPSULE FOR A TOTAL DAILY DOSE OF 90 MG 90 Capsule 3    dutasteride (AVODART) 0.5 mg capsule TAKE 1 CAPSULE BY MOUTH DAILY 90 Capsule 1    ferrous sulfate 325 mg (65 mg iron) tablet Take  by mouth Daily (before breakfast). ketoconazole (NIZORAL) 2 % topical cream APPLY EXTERNALLY TO DRY FLAKING AREAS ON FACE ONCE TO TWICE DAILY AS NEEDED      atorvastatin (LIPITOR) 20 mg tablet am      hydroCHLOROthiazide (HYDRODIURIL) 25 mg tablet 1 tablet in the morning      lisinopriL (PRINIVIL, ZESTRIL) 5 mg tablet Take 5 mg by mouth daily. labetaloL (NORMODYNE) 300 mg tablet TAKE 1 TABLET BY MOUTH TWICE DAILY 180 Tab 3    traZODone (DESYREL) 50 mg tablet TK 1 T PO IN THE COLBY      pantoprazole (PROTONIX) 40 mg tablet TAKE 1 TABLET BY MOUTH EVERY DAY (Patient taking differently: TAKE 1 TABLET BY MOUTH EVERY DAY am) 90 Tab 3    furosemide (LASIX) 20 mg tablet TAKE 1 TABLET BY MOUTH EVERY MORNING AS NEEDED FOR SWELLING 90 Tab 0    sildenafil citrate (VIAGRA) 100 mg tablet 1 tab one hour before sexual activity 30 Tab 1    SUMAtriptan (IMITREX) 100 mg tablet TAKE 1 TABLET BY MOUTH AT ONSET OF HEADACHE, MAY REPEAT 2 HOURS LATER. (MAX 2 PILLS IN 24 HOURS) 12 Tab 1    testosterone cypionate (DEPOTESTOTERONE CYPIONATE) 200 mg/mL injection INJECT 1 ml IN THE MUSCLE EVERY 30 DAYS 1 mL 5    fluticasone propionate (FLONASE) 50 mcg/actuation nasal spray SHAKE LIQUID AND USE 1 TO 2 SPRAYS IN EACH NOSTRIL DAILY (Patient taking differently: 2 Sprays by Both Nostrils route as needed.) 1 Bottle 3    topiramate (TOPAMAX) 50 mg tablet TAKE 1 TABLET BY MOUTH TWICE DAILY FOR MIGRAINE PREVENTION OR HEADACHES 180 Tab 5    montelukast (SINGULAIR) 10 mg tablet TAKE 1 TABLET BY MOUTH ONCE DAILY (Patient taking differently: am) 90 Tab 3    potassium citrate (UROCIT-K) 15 mEq TbER tablet TAKE 1 TABLET BY MOUTH DAILY WITH FOOD. SWALLOW TABLETS WHOLE WITH A FULL GLASS OF WATER 90 Tab 3    amLODIPine (NORVASC) 5 mg tablet 5 mg daily. am  0    multivitamin (ONE A DAY) tablet Take 1 Tab by mouth daily. Social History   reports that he quit smoking about 41 years ago. His smoking use included cigarettes. He has a 15.00 pack-year smoking history. He has never used smokeless tobacco.   reports current alcohol use. Family History  family history includes Diabetes in his father and son; Headache in his sister; Heart Attack in his mother; Hypertension in his father. Review of Systems  Except as stated above include:  Constitutional: Negative for fever, chills and malaise/fatigue. HEENT: No congestion or recent URI. Gastrointestinal: No nausea, vomiting, abdominal pain, bloody stools. Pulmonary:  Negative except as stated above. Cardiac:  Negative except as stated above. Musculoskeletal: Negative except as stated above. Neurological:  No localized symptoms. Skin:  Negative except as stated above. Psych:  Negative except as stated above. Endocrine:  Negative except as stated above.     PHYSICAL EXAM  BP Readings from Last 3 Encounters:   12/28/22 124/84   05/05/22 124/84   09/20/21 114/75     Pulse Readings from Last 3 Encounters:   12/28/22 83   05/05/22 75   09/20/21 62     Wt Readings from Last 3 Encounters: 12/28/22 114.3 kg (252 lb)   05/05/22 113.4 kg (250 lb)   09/20/21 123.5 kg (272 lb 4.3 oz)     General:   Well developed, well groomed. Head/Neck:   No obvious jugular venous distention     No obvious carotid pulsations. No evidence of xanthelasma. Lungs:   No respiratory distress. Clear bilaterally. Heart:  Regular rate and rhythm. Normal S1/S2. Palpation grossly normal.    No significant murmurs, rubs or gallops. Abdomen:   Non-acute abdomen. No obvious pulsations. Extremities:   Intact peripheral pulses. No significant edema. Neurological:   Alert and oriented to person, place, time. No focal neurological deficit visually. Skin:   No obvious rash    Blood Pressure Metric:  Monitor recommended and adjustments stated if needed.

## 2022-12-28 NOTE — PROGRESS NOTES
Belle Alvarado presents today for   Chief Complaint   Patient presents with    Follow-up     6 months        Belle Alvarado preferred language for health care discussion is english/other. Is someone accompanying this pt? no    Is the patient using any DME equipment during [address]? no    Depression Screening:  3 most recent PHQ Screens 5/5/2022   Little interest or pleasure in doing things Not at all   Feeling down, depressed, irritable, or hopeless Not at all   Total Score PHQ 2 0       Learning Assessment:  Learning Assessment 1/6/2021   PRIMARY LEARNER Patient   HIGHEST LEVEL OF EDUCATION - PRIMARY LEARNER  -   BARRIERS PRIMARY LEARNER -   [facility]    NEED -   LEARNER PREFERENCE PRIMARY DEMONSTRATION   ANSWERED BY patient   RELATIONSHIP SELF       Abuse Screening:  Abuse Screening Questionnaire 5/5/2022   Do you ever feel afraid of your partner? N   Are you in a relationship with someone who physically or mentally threatens you? N   Is it safe for you to go home? Y       Fall Risk  Fall Risk Assessment, last 12 mths 5/5/2022   Able to walk? Yes   Fall in past 12 months? 0   Do you feel unsteady? 0   Are you worried about falling 0   Number of falls in past 12 months -   Fall with injury? -       Pt currently taking Anticoagulant therapy? Eliquis     Coordination of Care:  1. Have you been to the ER, urgent care clinic since your last visit? Hospitalized since your last visit? no    2. Have you seen or consulted any other health care providers outside of the [address] [place] [address] since your last visit? Include any pap smears or colon screening.  no

## 2023-01-29 NOTE — PROGRESS NOTES
The patient presents to the office today with a chief complain of weakness and for transition of care    HPI:    The patient was recently hospitalized at [facility] for [facility] renal failure. The patient was discharged on 8/23/18 and contacted by Narda Carolina on 8/24/18 for follow up. An appointment was made for the patient to see me today. The patient was admitted with lethargy. His creatinine was 8.6 with a creatinine of 75. An ammonia level was elevated at 76. The etiology of the renal failure was not entirely clear -- NSAID vs tumor lysis syndrome (patient on chemotherapy for CML) with an element of dehydration on an ARB on board and urinary obstruction. The patient was given IV hydration, meds were held, a catheter was placed, Allopurinol was started. The patient's creatinine returned to baseline. The patient has BPH. The churchill catheter was discontinued in the hospital but the patient was unable to void. The catheter was reinserted. The patient remains on Eliquis due to Atrial Fibrillation    Review of Systems   Constitutional: Positive for malaise/fatigue. Respiratory: Negative for shortness of breath. Cardiovascular: Negative for chest pain and leg swelling. Musculoskeletal: Positive for neck pain. Allergies   Allergen Reactions    Levofloxacin Nausea Only     Severe nausea and dizziness    Sulfa (Sulfonamide Antibiotics) Unknown (comments)     Nausea, aching all over       Current Outpatient Prescriptions   Medication Sig Dispense Refill    ELIQUIS 5 mg tablet take 1 tablet by mouth twice a day 60 Tab 1    rOPINIRole (REQUIP) 0.5 mg tablet take 1 tablet by mouth twice a day 60 Tab 0    cyanocobalamin (VITAMIN B-12) 1,000 mcg tablet Take 1,000 mcg by mouth daily.  cholecalciferol, vitamin D3, (VITAMIN D3) 2,000 unit tab Take  by mouth.  calcium carbonate/vitamin D3 (CALCIUM + D PO) Take  by mouth.       vitamin E (AQUA GEMS) 400 unit capsule Take  by mouth daily.  testosterone cypionate (DEPOTESTOTERONE CYPIONATE) 200 mg/mL injection by IntraMUSCular route every thirty (30) days.  HYDROcodone-acetaminophen (NORCO) 7.5-325 mg per tablet Take one tablet by mouth every four hours as needed for pain. Max five tablets daily 150 Tab 0    tamsulosin (FLOMAX) 0.4 mg capsule take 1 capsule by mouth once daily 90 Cap 0    guanFACINE IR (TENEX) 1 mg IR tablet take 1 tablet by mouth once daily 30 Tab 2    labetalol (NORMODYNE) 300 mg tablet take 1 tablet by mouth twice a day 60 Tab 2    rOPINIRole (REQUIP) 0.5 mg tablet take 1 tablet by mouth twice a day 60 Tab 0    topiramate (TOPAMAX) 50 mg tablet TAKE 1 TABLET BY MOUTH TWICE A DAY TO PREVENT MIGRAINE 60 Tab 5    prasterone, dhea, (DHEA) 50 mg tab Take  by mouth.  multivitamin (ONE A DAY) tablet Take 1 Tab by mouth daily.  ACETAMINOPHEN/DIPHENHYDRAMINE (TYLENOL PM PO) Take 2 Tabs by mouth nightly. Past Medical History:   Diagnosis Date    Abdominal pain, unspecified site     Acute reaction to stress     Allergic rhinitis due to pollen     Arrhythmia     afib    Arthritis     Back injury     BPH (benign prostatic hypertrophy)     Calculus of ureter     Cancer (HCC)     history of Leukemia, in remission    Carotid duplex 06/17/2015    Mild < 50% bilateral ICA stenosis.  Dizziness and giddiness     Esophageal reflux     Essential hypertension     GERD (gastroesophageal reflux disease)     Headache(784.0)     History of echocardiogram 08/13/2015    EF 55-60%. No WMA. Mild-mod LVH. Gr 1 DDfx. No evidence of intracardiac shunt.   Mild AI.      Hx: UTI (urinary tract infection)     Hypertension     Hypogonadism in male     Kidney stones     Migraine     Neck injury     Polycythemia     Polycythemia, secondary     Sciatica     Unspecified retinal detachment     Unspecified sleep apnea     resolved since gastric bypass    Vision decreased     Weight loss Past Surgical History:   Procedure Laterality Date    ABDOMEN SURGERY PROC UNLISTED  2012    Hernia    HX CATARACT REMOVAL Left     HX CERVICAL FUSION  4/4/2016    Cervical Spine Fusion    HX CHOLECYSTECTOMY      HX GASTRIC BYPASS  2006    HX KNEE ARTHROSCOPY      both knees (right knee twice, left once)    HX ORTHOPAEDIC  1981, 1995    lumbar laminectomy    HX OTHER SURGICAL      Two back surgeries    HX RETINAL DETACHMENT REPAIR Left        Social History     Social History    Marital status:      Spouse name: N/A    Number of children: N/A    Years of education: N/A     Occupational History    Not on file. Social History Main Topics    Smoking status: Former Smoker     Quit date: 2/22/1981    Smokeless tobacco: Never Used    Alcohol use Yes      Comment: occasionally     Drug use: No    Sexual activity: Yes     Partners: Female     Other Topics Concern    Not on file     Social History Narrative    Patient lives with his wife in [place], he had 3 children by unfortunately lost 2 of them. He enjoys taking care of his yard and plays with the grandchildren. Patient does have an advanced directive on file    Visit Vitals    /62 (BP 1 Location: Left arm, BP Patient Position: Sitting)    Pulse 76    Temp 97.4 °F (36.3 °C) (Tympanic)    Ht 6' 5\" (1.956 m)    Wt 241 lb (109.3 kg)    SpO2 98%    BMI 28.58 kg/m2       Physical Exam   No Cervical Lymphadenopathy  No Supraclavicular Lymphadenopathy  Thyroid is Normal  Lungs are normal to percussion. Clear to auscultation   Heart:  S1 S2 are normal, No gallops, No mummers  No Carotid Bruits  Abdomen:  Normal Bowel Sounds. No tenderness. No masses. No Hepatomegaly or Splenomegaly  LE:  Strong Pedal Pulses. No Edema      BMI:  OK    Admission on 08/19/2018, Discharged on 08/23/2018   No results displayed because visit has over 200 results.       Admission on 06/04/2018, Discharged on 06/05/2018   Component Date Value Ref Range Status    MRSA PCR SCREEN 06/04/2018 NEGATIVE  NEGATIVE   Final    ABO/Rh(D) 06/04/2018 AB Rh Positive    Final    Antibody screen 06/04/2018 NEG    Final    Glucose (POC) 06/04/2018 123* 65 - 105 mg/dL Final    Comment: Notified Nurse  : [id number]      Sodium 06/05/2018 142  136 - 145 mEq/L Final    Potassium 06/05/2018 4.5  3.5 - 5.1 mEq/L Final    Chloride 06/05/2018 112* 98 - 107 mEq/L Final    CO2 06/05/2018 22  21 - 32 mEq/L Final    Glucose 06/05/2018 132* 74 - 106 mg/dl Final    BUN 06/05/2018 29* 7 - 25 mg/dl Final    Creatinine 06/05/2018 1.3  0.6 - 1.3 mg/dl Final    GFR est AA 06/05/2018 >60    Final    Comment: THE [facility] STATES THAT THE MDRD STUDY EQUATION SHOULD ONLY BE USED ON  INDIVIDUALS 18 OR OLDER. THE REPORT ALSO NOTES THAT THE MDRD STUDY EQUATION HAS NOT BEEN  VALIDATED FOR USE WITH THE ELDERLY (OVER 79YEARS OF AGE), PREGNANT WOMEN, PATIENTS WITH SERIOUS  COMORBID CONDITIONS, OR PERSONS WITH EXTREMES OF BODY SIZE, MUSCLE MASS, OR NUTRITIONAL STATUS. APPLICATION OF THE EQUATION TO THESE PATIENT GROUPS MAY LEAD TO ERRORS IN GFR ESTIMATION. GFR  ESTIMATING EQUATIONS HAVE POORER AGREEMENT WITH MEASURED GFR FOR ILL HOSPITALIZED PATIENTS AND  FOR PEOPLE WITH NEAR NORMAL KIDNEY FUNCTION THAN FOR SUBJECTS IN THE MDRD STUDY. VALIDATION  STUDIES ARE IN PROGRESS TO EVALUATE THE MDRD STUDY EQUATION FOR ADDITIONAL ETHNIC GROUPS, THE  ELDERLY, VARIOUS DISEASE CONDITIONS, AND PEOPLE WITH NORMAL KIDNEY FUNCTION. GFRA----REFERS TO   GFRO---REFERS TO OTHER RACES  REFERENCES AVAILABLE UPON REQUEST.      GFR est non-AA 06/05/2018 58    Final    Calcium 06/05/2018 8.9  8.5 - 10.1 mg/dl Final    Anion gap 06/05/2018 8  5 - 15 mmol/L Final       .No results found for any visits on 08/27/18. Assessment / Plan:        ICD-10-CM ICD-9-CM    1. Hx of renal failure Z87.448 V13.09 CBC WITH AUTOMATED DIFF      METABOLIC PANEL, COMPREHENSIVE   2. CML in remission ([facility] [address] C92.11 205.11 CBC WITH AUTOMATED DIFF      METABOLIC PANEL, COMPREHENSIVE   3. Increased ammonia level R79.89 790.6 AMMONIA   4. Essential hypertension I10 401.9    5. Benign prostatic hyperplasia with urinary obstruction N40.1 600.01     N13.8 599.69    6. Paroxysmal atrial fibrillation (HCC) I48.0 427.31        he was advised to continue his maintenance medications   Medications were reconciled with the patient and the hospital record during this visit  To urology regarding urinary obstruction       Follow-up Disposition:  Return in about 4 weeks (around 9/24/2018). I asked Vern Perez if he has any questions and I answered the questions.   Vern Perez states that he understands the treatment plan and agrees with the treatment plan STG (3-5 sessions) Amb 150 feet w/ rolling walker supervision

## 2023-05-12 RX ORDER — APIXABAN 5 MG/1
TABLET, FILM COATED ORAL
Qty: 120 TABLET | Refills: 3 | Status: SHIPPED | OUTPATIENT
Start: 2023-05-12

## 2023-05-28 RX ORDER — DULOXETIN HYDROCHLORIDE 60 MG/1
CAPSULE, DELAYED RELEASE ORAL
Qty: 90 CAPSULE | OUTPATIENT
Start: 2023-05-28

## 2023-09-15 ENCOUNTER — TELEPHONE (OUTPATIENT)
Age: 73
End: 2023-09-15

## 2023-09-15 NOTE — TELEPHONE ENCOUNTER
Pt was last seen 12/2022, need surgical clearance for nerve stimulator sx on 9/21/2023. He is on Eliquis 5mg BID, Dr. Ana Yeboah wants him to hold 3 days before and 4 days after.

## 2023-09-26 ENCOUNTER — HOSPITAL ENCOUNTER (EMERGENCY)
Facility: HOSPITAL | Age: 73
Discharge: HOME | End: 2023-09-26
Attending: EMERGENCY MEDICINE
Payer: MEDICARE

## 2023-09-26 ENCOUNTER — APPOINTMENT (OUTPATIENT)
Facility: HOSPITAL | Age: 73
End: 2023-09-26
Payer: MEDICARE

## 2023-09-26 VITALS
HEIGHT: 76 IN | RESPIRATION RATE: 18 BRPM | DIASTOLIC BLOOD PRESSURE: 80 MMHG | BODY MASS INDEX: 29.22 KG/M2 | HEART RATE: 63 BPM | OXYGEN SATURATION: 94 % | SYSTOLIC BLOOD PRESSURE: 127 MMHG | TEMPERATURE: 97.9 F | WEIGHT: 240 LBS

## 2023-09-26 DIAGNOSIS — S20.212A RIB CONTUSION, LEFT, INITIAL ENCOUNTER: Primary | ICD-10-CM

## 2023-09-26 LAB
ALBUMIN SERPL-MCNC: 3.6 G/DL (ref 3.4–5)
ALBUMIN/GLOB SERPL: 1.2 (ref 0.8–1.7)
ALP SERPL-CCNC: 77 U/L (ref 45–117)
ALT SERPL-CCNC: 20 U/L (ref 16–61)
ANION GAP SERPL CALC-SCNC: 4 MMOL/L (ref 3–18)
AST SERPL-CCNC: 15 U/L (ref 10–38)
BASOPHILS # BLD: 0.1 K/UL (ref 0–0.1)
BASOPHILS NFR BLD: 1 % (ref 0–2)
BILIRUB SERPL-MCNC: 0.8 MG/DL (ref 0.2–1)
BUN SERPL-MCNC: 23 MG/DL (ref 7–18)
BUN/CREAT SERPL: 22 (ref 12–20)
CALCIUM SERPL-MCNC: 8.5 MG/DL (ref 8.5–10.1)
CHLORIDE SERPL-SCNC: 112 MMOL/L (ref 100–111)
CO2 SERPL-SCNC: 25 MMOL/L (ref 21–32)
CREAT SERPL-MCNC: 1.03 MG/DL (ref 0.6–1.3)
DIFFERENTIAL METHOD BLD: ABNORMAL
EKG ATRIAL RATE: 76 BPM
EKG DIAGNOSIS: NORMAL
EKG P AXIS: 26 DEGREES
EKG P-R INTERVAL: 196 MS
EKG Q-T INTERVAL: 382 MS
EKG QRS DURATION: 110 MS
EKG QTC CALCULATION (BAZETT): 429 MS
EKG R AXIS: 34 DEGREES
EKG T AXIS: 37 DEGREES
EKG VENTRICULAR RATE: 76 BPM
EOSINOPHIL # BLD: 0.2 K/UL (ref 0–0.4)
EOSINOPHIL NFR BLD: 2 % (ref 0–5)
ERYTHROCYTE [DISTWIDTH] IN BLOOD BY AUTOMATED COUNT: 15.1 % (ref 11.6–14.5)
GLOBULIN SER CALC-MCNC: 3.1 G/DL (ref 2–4)
GLUCOSE SERPL-MCNC: 160 MG/DL (ref 74–99)
HCT VFR BLD AUTO: 46.3 % (ref 36–48)
HGB BLD-MCNC: 15.8 G/DL (ref 13–16)
IMM GRANULOCYTES # BLD AUTO: 0.1 K/UL (ref 0–0.04)
IMM GRANULOCYTES NFR BLD AUTO: 1 % (ref 0–0.5)
LIPASE SERPL-CCNC: 199 U/L (ref 73–393)
LYMPHOCYTES # BLD: 4.9 K/UL (ref 0.9–3.6)
LYMPHOCYTES NFR BLD: 49 % (ref 21–52)
MCH RBC QN AUTO: 29.7 PG (ref 24–34)
MCHC RBC AUTO-ENTMCNC: 34.1 G/DL (ref 31–37)
MCV RBC AUTO: 87 FL (ref 78–100)
MONOCYTES # BLD: 0.8 K/UL (ref 0.05–1.2)
MONOCYTES NFR BLD: 8 % (ref 3–10)
NEUTS SEG # BLD: 3.9 K/UL (ref 1.8–8)
NEUTS SEG NFR BLD: 39 % (ref 40–73)
NRBC # BLD: 0 K/UL (ref 0–0.01)
NRBC BLD-RTO: 0 PER 100 WBC
PLATELET # BLD AUTO: 247 K/UL (ref 135–420)
PMV BLD AUTO: 11.6 FL (ref 9.2–11.8)
POTASSIUM SERPL-SCNC: 4.3 MMOL/L (ref 3.5–5.5)
PROT SERPL-MCNC: 6.7 G/DL (ref 6.4–8.2)
RBC # BLD AUTO: 5.32 M/UL (ref 4.35–5.65)
RBC MORPH BLD: ABNORMAL
SODIUM SERPL-SCNC: 141 MMOL/L (ref 136–145)
TROPONIN I SERPL HS-MCNC: 10 NG/L (ref 0–78)
WBC # BLD AUTO: 10 K/UL (ref 4.6–13.2)

## 2023-09-26 PROCEDURE — 99285 EMERGENCY DEPT VISIT HI MDM: CPT

## 2023-09-26 PROCEDURE — 93005 ELECTROCARDIOGRAM TRACING: CPT | Performed by: EMERGENCY MEDICINE

## 2023-09-26 PROCEDURE — 85025 COMPLETE CBC W/AUTO DIFF WBC: CPT

## 2023-09-26 PROCEDURE — 6370000000 HC RX 637 (ALT 250 FOR IP)

## 2023-09-26 PROCEDURE — 83690 ASSAY OF LIPASE: CPT

## 2023-09-26 PROCEDURE — 80053 COMPREHEN METABOLIC PANEL: CPT

## 2023-09-26 PROCEDURE — 71101 X-RAY EXAM UNILAT RIBS/CHEST: CPT

## 2023-09-26 PROCEDURE — 93010 ELECTROCARDIOGRAM REPORT: CPT | Performed by: INTERNAL MEDICINE

## 2023-09-26 PROCEDURE — 84484 ASSAY OF TROPONIN QUANT: CPT

## 2023-09-26 RX ORDER — HYDROCODONE BITARTRATE AND ACETAMINOPHEN 5; 325 MG/1; MG/1
1 TABLET ORAL EVERY 6 HOURS PRN
Qty: 5 TABLET | Refills: 0 | Status: SHIPPED | OUTPATIENT
Start: 2023-09-26 | End: 2023-09-29

## 2023-09-26 RX ORDER — ACETAMINOPHEN 325 MG/1
650 TABLET ORAL
Status: COMPLETED | OUTPATIENT
Start: 2023-09-26 | End: 2023-09-26

## 2023-09-26 RX ORDER — ACETAMINOPHEN 325 MG/1
TABLET ORAL
Status: COMPLETED
Start: 2023-09-26 | End: 2023-09-26

## 2023-09-26 RX ADMIN — ACETAMINOPHEN 325MG 650 MG: 325 TABLET ORAL at 17:17

## 2023-09-26 RX ADMIN — ACETAMINOPHEN 650 MG: 325 TABLET ORAL at 17:17

## 2023-09-26 ASSESSMENT — ENCOUNTER SYMPTOMS
CHEST TIGHTNESS: 0
SHORTNESS OF BREATH: 1
EYES NEGATIVE: 1
ABDOMINAL PAIN: 0

## 2023-09-26 ASSESSMENT — PAIN - FUNCTIONAL ASSESSMENT: PAIN_FUNCTIONAL_ASSESSMENT: 0-10

## 2023-09-26 ASSESSMENT — PAIN SCALES - GENERAL: PAINLEVEL_OUTOF10: 1

## 2023-09-26 NOTE — DISCHARGE INSTRUCTIONS
I suspect he may have a crack in your rib versus a bruise to your rib. The treatment is the same the pain medication and using that incentive spirometer every hour while awake to prevent pneumonia. We will give you a short course of hydrocodone but do not take the tramadol while taking the hydrocodone. Hydrocodone is intended to be for a short course until your pain improves. Make sure to follow-up with your primary doctor and please return if you are at all worsened or concerned.

## 2023-09-26 NOTE — ED PROVIDER NOTES
Albumin 3.6 3.4 - 5.0 g/dL    Globulin 3.1 2.0 - 4.0 g/dL    Albumin/Globulin Ratio 1.2 0.8 - 1.7     Lipase    Collection Time: 09/26/23  3:03 PM   Result Value Ref Range    Lipase 199 73 - 393 U/L   Troponin    Collection Time: 09/26/23  3:03 PM   Result Value Ref Range    Troponin, High Sensitivity 10 0 - 78 ng/L       Radiologic Studies -   XR RIBS LEFT INCLUDE CHEST (MIN 3 VIEWS)   Final Result    IMPRESSION:   1. No acute cardiopulmonary process. .               Medical Decision Making   I am the first provider for this patient. I reviewed the vital signs, available nursing notes, past medical history, past surgical history, family history and social history. Vital Signs-Reviewed the patient's vital signs. EKG: Normal sinus rhythm at 76, no STEMI, interpreted by me      ED Course: Progress Notes, Reevaluation, and Consults:    Provider Notes (Medical Decision Making):   MDM  Number of Diagnoses or Management Options  Rib contusion, left, initial encounter  Diagnosis management comments: Patient is a 77-year-old male with a history of chronic back pain, requiring multiple spinal surgeries and has a foot drop as well as atrial fibrillation on anticoagulation, presents with left-sided rib pain after feeling a pop while he was laying prone during his procedure to remove his spinal stimulator. Suspect the patient has rib injury and likely fractured a rib and said similar thing happened 5 months prior and may just be a reinjury. We will do a rib series on the left, follow cardiac labs however presentation is less likely cardiac, and then reevaluate. The patient has had a negative troponin, chest x-ray with rib series shows no obvious fracture or pneumothorax.   I do suspect the patient has a contusion versus a small fracture of his ribs and will start the patient on hydrocodone as he takes tramadol regularly we will give him a short course of hydrocodone, encouraged him to use his incentive

## 2023-09-26 NOTE — ED NOTES
Pt performed incentive spir well, states has used in recent past and will continue to use at home. Discharge instructions reviewed with patient. Patient verbalized understanding. Patient advised to follow up as directed on discharge instructions. Patient denies questions, needs or concerns at this time. Patient verbalized understanding. No s/sx of distress noted.         Marquis Humphrey RN  09/26/23 2628

## 2023-09-26 NOTE — ED TRIAGE NOTES
Left lateral chest wall pain, onset yesterday while laying on stomach to have a pain stim device placed for chronic back pain. Sharp pain increases with movement and inspiration.

## 2023-11-01 ENCOUNTER — OFFICE VISIT (OUTPATIENT)
Age: 73
End: 2023-11-01
Payer: MEDICARE

## 2023-11-01 VITALS
DIASTOLIC BLOOD PRESSURE: 82 MMHG | WEIGHT: 255 LBS | SYSTOLIC BLOOD PRESSURE: 150 MMHG | BODY MASS INDEX: 31.04 KG/M2 | OXYGEN SATURATION: 95 % | HEART RATE: 72 BPM

## 2023-11-01 DIAGNOSIS — I48.0 PAROXYSMAL ATRIAL FIBRILLATION (HCC): Primary | ICD-10-CM

## 2023-11-01 PROCEDURE — 1123F ACP DISCUSS/DSCN MKR DOCD: CPT | Performed by: INTERNAL MEDICINE

## 2023-11-01 PROCEDURE — G8428 CUR MEDS NOT DOCUMENT: HCPCS | Performed by: INTERNAL MEDICINE

## 2023-11-01 PROCEDURE — 99214 OFFICE O/P EST MOD 30 MIN: CPT | Performed by: INTERNAL MEDICINE

## 2023-11-01 PROCEDURE — 3017F COLORECTAL CA SCREEN DOC REV: CPT | Performed by: INTERNAL MEDICINE

## 2023-11-01 PROCEDURE — 1036F TOBACCO NON-USER: CPT | Performed by: INTERNAL MEDICINE

## 2023-11-01 PROCEDURE — G8484 FLU IMMUNIZE NO ADMIN: HCPCS | Performed by: INTERNAL MEDICINE

## 2023-11-01 PROCEDURE — G8417 CALC BMI ABV UP PARAM F/U: HCPCS | Performed by: INTERNAL MEDICINE

## 2023-11-01 PROCEDURE — 3079F DIAST BP 80-89 MM HG: CPT | Performed by: INTERNAL MEDICINE

## 2023-11-01 PROCEDURE — 3077F SYST BP >= 140 MM HG: CPT | Performed by: INTERNAL MEDICINE

## 2023-11-01 NOTE — PROGRESS NOTES
mouth as needed for Erectile Dysfunction 30 tablet 3    ELIQUIS 5 MG TABS tablet TAKE 1 TABLET BY MOUTH TWICE DAILY 120 tablet 3    dutasteride (AVODART) 0.5 MG capsule TAKE 1 CAPSULE BY MOUTH DAILY 90 capsule 2    fesoterodine (TOVIAZ) 4 MG TB24 ER tablet Take 1 tablet by mouth daily 90 tablet 2    tamsulosin (FLOMAX) 0.4 MG capsule Take 1 cap by mouth daily after dinner 90 capsule 2    amLODIPine (NORVASC) 5 MG tablet 1 tablet daily      atorvastatin (LIPITOR) 20 MG tablet am      DULoxetine (CYMBALTA) 60 MG extended release capsule TAKE 1 CAPSULE BY MOUTH DAILY ALONG WITH 30 MG CAPSULE FOR A TOTAL DAILY DOSE OF 90 MG      ferrous sulfate (IRON 325) 325 (65 Fe) MG tablet Take by mouth every morning (before breakfast)      fluticasone (FLONASE) 50 MCG/ACT nasal spray SHAKE LIQUID AND USE 1 TO 2 SPRAYS IN EACH NOSTRIL DAILY      furosemide (LASIX) 20 MG tablet TAKE 1 TABLET BY MOUTH EVERY MORNING AS NEEDED FOR SWELLING      hydroCHLOROthiazide (HYDRODIURIL) 25 MG tablet 1 tablet in the morning      ketoconazole (NIZORAL) 2 % cream APPLY EXTERNALLY TO DRY FLAKING AREAS ON FACE ONCE TO TWICE DAILY AS NEEDED      labetalol (NORMODYNE) 300 MG tablet TAKE 1 TABLET BY MOUTH TWICE DAILY      lisinopril (PRINIVIL;ZESTRIL) 5 MG tablet Take 1 tablet by mouth daily      montelukast (SINGULAIR) 10 MG tablet TAKE 1 TABLET BY MOUTH ONCE DAILY      pantoprazole (PROTONIX) 40 MG tablet TAKE 1 TABLET BY MOUTH EVERY DAY      Potassium Citrate ER (UROCIT-K) 15 MEQ (1620 MG) TBCR extended release tablet TAKE 1 TABLET BY MOUTH DAILY WITH FOOD. SWALLOW TABLETS WHOLE WITH A FULL GLASS OF WATER      SUMAtriptan (IMITREX) 100 MG tablet TAKE 1 TABLET BY MOUTH AT ONSET OF HEADACHE, MAY REPEAT 2 HOURS LATER. (MAX 2 PILLS IN 24 HOURS)      testosterone cypionate (DEPOTESTOTERONE CYPIONATE) 200 MG/ML injection INJECT 1 ml IN THE MUSCLE EVERY 30 DAYS      topiramate (TOPAMAX) 50 MG tablet TAKE 1 TABLET BY MOUTH TWICE DAILY FOR MIGRAINE PREVENTION

## 2023-11-18 ENCOUNTER — HOSPITAL ENCOUNTER (EMERGENCY)
Facility: HOSPITAL | Age: 73
Discharge: HOME | End: 2023-11-18
Attending: EMERGENCY MEDICINE
Payer: MEDICARE

## 2023-11-18 ENCOUNTER — APPOINTMENT (OUTPATIENT)
Facility: HOSPITAL | Age: 73
End: 2023-11-18
Payer: MEDICARE

## 2023-11-18 VITALS
TEMPERATURE: 97 F | OXYGEN SATURATION: 96 % | WEIGHT: 240 LBS | RESPIRATION RATE: 18 BRPM | DIASTOLIC BLOOD PRESSURE: 69 MMHG | BODY MASS INDEX: 29.22 KG/M2 | SYSTOLIC BLOOD PRESSURE: 119 MMHG | HEART RATE: 72 BPM | HEIGHT: 76 IN

## 2023-11-18 DIAGNOSIS — M54.32 BILATERAL SCIATICA: Primary | ICD-10-CM

## 2023-11-18 DIAGNOSIS — S30.0XXA CONTUSION OF BUTTOCK, INITIAL ENCOUNTER: ICD-10-CM

## 2023-11-18 DIAGNOSIS — M54.31 BILATERAL SCIATICA: Primary | ICD-10-CM

## 2023-11-18 PROCEDURE — 2500000003 HC RX 250 WO HCPCS: Performed by: EMERGENCY MEDICINE

## 2023-11-18 PROCEDURE — 99284 EMERGENCY DEPT VISIT MOD MDM: CPT

## 2023-11-18 PROCEDURE — 96372 THER/PROPH/DIAG INJ SC/IM: CPT

## 2023-11-18 PROCEDURE — 72170 X-RAY EXAM OF PELVIS: CPT

## 2023-11-18 PROCEDURE — 72100 X-RAY EXAM L-S SPINE 2/3 VWS: CPT

## 2023-11-18 RX ORDER — OXYCODONE HYDROCHLORIDE AND ACETAMINOPHEN 5; 325 MG/1; MG/1
1 TABLET ORAL EVERY 6 HOURS PRN
Qty: 12 TABLET | Refills: 0 | Status: SHIPPED | OUTPATIENT
Start: 2023-11-18 | End: 2023-11-21

## 2023-11-18 RX ORDER — MORPHINE SULFATE 2 MG/ML
2 INJECTION, SOLUTION INTRAMUSCULAR; INTRAVENOUS
Status: COMPLETED | OUTPATIENT
Start: 2023-11-18 | End: 2023-11-18

## 2023-11-18 RX ORDER — LIDOCAINE 50 MG/G
1 PATCH TOPICAL DAILY
Qty: 10 PATCH | Refills: 0 | Status: SHIPPED | OUTPATIENT
Start: 2023-11-18 | End: 2023-11-28

## 2023-11-18 RX ADMIN — MORPHINE SULFATE 2 MG: 2 INJECTION, SOLUTION INTRAMUSCULAR; INTRAVENOUS at 05:38

## 2023-11-18 RX ADMIN — MORPHINE SULFATE 2 MG: 2 INJECTION, SOLUTION INTRAMUSCULAR; INTRAVENOUS at 06:36

## 2023-11-18 ASSESSMENT — PAIN DESCRIPTION - ORIENTATION: ORIENTATION: LEFT

## 2023-11-18 ASSESSMENT — PAIN DESCRIPTION - LOCATION: LOCATION: BACK;BUTTOCKS

## 2023-11-18 ASSESSMENT — ENCOUNTER SYMPTOMS
GASTROINTESTINAL NEGATIVE: 1
BACK PAIN: 1
RESPIRATORY NEGATIVE: 1

## 2023-11-18 ASSESSMENT — PAIN - FUNCTIONAL ASSESSMENT: PAIN_FUNCTIONAL_ASSESSMENT: 0-10

## 2023-11-18 ASSESSMENT — PAIN SCALES - GENERAL
PAINLEVEL_OUTOF10: 7
PAINLEVEL_OUTOF10: 8
PAINLEVEL_OUTOF10: 8

## 2023-11-18 NOTE — ED NOTES
Patient up for discharge. Discharge instructions reviewed with patient who verbalized understanding. Patient discharged to home. Patient discharged ambulatory with can and spouse at side. Valuables with patient. Current Discharge Medication List        START taking these medications    Details   oxyCODONE-acetaminophen (PERCOCET) 5-325 MG per tablet Take 1 tablet by mouth every 6 hours as needed for Pain for up to 3 days. Intended supply: 3 days. Take lowest dose possible to manage pain Max Daily Amount: 4 tablets  Qty: 12 tablet, Refills: 0    Associated Diagnoses: Contusion of buttock, initial encounter; Bilateral sciatica      lidocaine (LIDODERM) 5 % Place 1 patch onto the skin daily for 10 days 12 hours on, 12 hours off.   Qty: 10 patch, Refills: 0                Evelyn Esquivel RN  11/18/23 6042

## 2023-11-18 NOTE — ED TRIAGE NOTES
A&O male with c/o lower back and left buttock pain after falling. Patient stated his dog ran between his legs causing him to fall backward landing on buttocks and hitting his back against the couch. Has hx of multiple back surgeries.

## 2024-04-30 NOTE — PROGRESS NOTES
Instructed Ana Jerez RN to send message to Dr. Cardona concerning insulin dosage due to blood sugar being 389. Patient has 10 units of Humalog standing and due to blood sugar reading being 389 he is to get 16 units of Humalog based off of his sliding scale. Dr. Cardona initially ordered 5 units of regular insulin,then this nurse asked Ana to message the physician once more and clarify what was going to be given to the patient. Dr. Cardona stated he would discontinue the 5 units he was ordering and he will wait until the next insulin dosage for his blood sugar to be checked and then if he needs the 5 units of regular insulin at that time, he would order it then. This nurse gave 26 units of Humalog to his left arm. Patient tolerated. No issues noted at this time.    ROOM # 6    Suzanne Gallagher presents today for   Chief Complaint   Patient presents with    Benign Prostatic Hypertrophy       Suzanne Gallagher preferred language for health care discussion is english/other. Is someone accompanying this pt? no    Is the patient using any DME equipment during OV? no    Depression Screening:  3 most recent [facility] 36 Screens 8/5/2019 4/12/2019 4/1/2019 12/17/2018 11/12/2018 8/27/2018 8/3/2018   Little interest or pleasure in doing things Not at all Not at all Not at all Not at all Not at all Not at all Not at all   Feeling down, depressed, irritable, or hopeless Not at all Not at all Not at all Not at all Not at all Not at all Not at all   Total Score PHQ 2 0 0 0 0 0 0 0       Learning Assessment:  Learning Assessment 5/3/2018 3/29/2016   PRIMARY LEARNER Patient Patient   HIGHEST LEVEL OF EDUCATION - PRIMARY LEARNER  - SOME COLLEGE   BARRIERS PRIMARY LEARNER - [address] CAREGIVER - No   PRIMARY [address]    NEED - No   LEARNER PREFERENCE PRIMARY DEMONSTRATION DEMONSTRATION   ANSWERED BY patient patient   RELATIONSHIP SELF SELF       Abuse Screening:  Abuse Screening Questionnaire 4/30/2019 11/2/2017 3/29/2016   Do you ever feel afraid of your partner? N N N   Are you in a relationship with someone who physically or mentally threatens you? N N N   Is it safe for you to go home? Moe Thomas       Fall Risk  Fall Risk Assessment, last 12 mths 8/5/2019 4/12/2019 4/1/2019 12/17/2018 11/12/2018 8/27/2018 8/24/2018   Able to walk? Yes Yes Yes Yes Yes Yes Yes   Fall in past 12 months? No No Yes No No Yes Yes   Fall with injury? - - No - - - No   Number of falls in past 12 months - - 2 - - 1 1   Fall Risk Score - - 2 - - - 1       Health Maintenance reviewed and discussed per provider.  Yes    Suzanne Gallagher is due for   Health Maintenance Due   Topic Date Due    Hepatitis C Screening  1950    Shingrix Vaccine Age 50> (1 of 2) 06/22/1999    GLAUCOMA SCREENING Q2Y  06/22/2014    Influenza Age 5 to Adult  08/01/2019    MEDICARE YEARLY EXAM  08/04/2019         Please order/place referral if appropriate. Advance Directive:  1. Do you have an advance directive in place? Patient Reply: yes    2. If not, would you like material regarding how to put one in place? Patient Reply: no    Coordination of Care:  1. Have you been to the ER, urgent care clinic since your last visit? Hospitalized since your last visit? no    2. Have you seen or consulted any other health care providers outside of the [address] [place] [address] since your last visit? Include any pap smears or colon screening.  no

## 2024-06-06 ENCOUNTER — PROCEDURE VISIT (OUTPATIENT)
Age: 74
End: 2024-06-06
Payer: MEDICARE

## 2024-06-06 VITALS
DIASTOLIC BLOOD PRESSURE: 86 MMHG | SYSTOLIC BLOOD PRESSURE: 131 MMHG | TEMPERATURE: 97.7 F | HEART RATE: 84 BPM | WEIGHT: 255.8 LBS | HEIGHT: 76 IN | RESPIRATION RATE: 18 BRPM | BODY MASS INDEX: 31.15 KG/M2

## 2024-06-06 DIAGNOSIS — G56.03 BILATERAL CARPAL TUNNEL SYNDROME: ICD-10-CM

## 2024-06-06 DIAGNOSIS — R20.0 NUMBNESS AND TINGLING IN BOTH HANDS: Primary | ICD-10-CM

## 2024-06-06 DIAGNOSIS — R94.131 ABNORMAL EMG: ICD-10-CM

## 2024-06-06 DIAGNOSIS — R20.2 NUMBNESS AND TINGLING IN BOTH HANDS: Primary | ICD-10-CM

## 2024-06-06 PROBLEM — C92.11 CML IN REMISSION (HCC): Status: ACTIVE | Noted: 2021-01-11

## 2024-06-06 PROBLEM — M81.0 AGE RELATED OSTEOPOROSIS: Status: ACTIVE | Noted: 2020-06-11

## 2024-06-06 PROBLEM — J39.9 DISORDER OF UPPER RESPIRATORY SYSTEM: Status: ACTIVE | Noted: 2024-06-06

## 2024-06-06 PROBLEM — M54.9 BACK PAIN: Status: ACTIVE | Noted: 2024-06-06

## 2024-06-06 PROBLEM — M43.9 ACQUIRED SPINAL DEFORMITY: Status: ACTIVE | Noted: 2022-08-24

## 2024-06-06 PROBLEM — G47.30 SLEEP APNEA: Status: ACTIVE | Noted: 2020-06-11

## 2024-06-06 PROBLEM — I48.0 PAROXYSMAL ATRIAL FIBRILLATION (HCC): Status: ACTIVE | Noted: 2020-07-23

## 2024-06-06 PROBLEM — F33.40 RECURRENT MAJOR DEPRESSIVE DISORDER, IN REMISSION (HCC): Status: ACTIVE | Noted: 2021-01-11

## 2024-06-06 PROBLEM — M47.817 LUMBOSACRAL SPONDYLOSIS WITHOUT MYELOPATHY: Status: ACTIVE | Noted: 2020-06-11

## 2024-06-06 PROBLEM — R51.9 HEADACHE: Status: ACTIVE | Noted: 2024-06-06

## 2024-06-06 PROBLEM — I10 HYPERTENSION: Status: ACTIVE | Noted: 2024-06-06

## 2024-06-06 PROBLEM — D50.9 IRON DEFICIENCY ANEMIA: Status: ACTIVE | Noted: 2024-06-06

## 2024-06-06 PROBLEM — J32.9 CHRONIC SINUSITIS: Status: ACTIVE | Noted: 2024-06-06

## 2024-06-06 PROBLEM — E72.20 HYPERAMMONEMIA (HCC): Status: ACTIVE | Noted: 2018-08-19

## 2024-06-06 PROBLEM — E78.2 MIXED HYPERLIPIDEMIA: Status: ACTIVE | Noted: 2021-05-11

## 2024-06-06 PROBLEM — N18.9 CHRONIC KIDNEY DISEASE: Status: ACTIVE | Noted: 2023-07-20

## 2024-06-06 PROBLEM — R53.83 FATIGUE: Status: ACTIVE | Noted: 2024-06-06

## 2024-06-06 PROBLEM — H33.20 RETINAL DETACHMENT: Status: ACTIVE | Noted: 2024-06-06

## 2024-06-06 PROBLEM — J34.89 POSTERIOR RHINORRHEA: Status: ACTIVE | Noted: 2024-06-06

## 2024-06-06 PROBLEM — M96.1 LUMBAR POST-LAMINECTOMY SYNDROME: Status: ACTIVE | Noted: 2023-07-20

## 2024-06-06 PROBLEM — M41.26 OTHER IDIOPATHIC SCOLIOSIS, LUMBAR REGION: Status: ACTIVE | Noted: 2022-08-29

## 2024-06-06 PROBLEM — M96.1 CERVICAL POST-LAMINECTOMY SYNDROME: Status: ACTIVE | Noted: 2023-07-20

## 2024-06-06 PROBLEM — I10 ESSENTIAL HYPERTENSION, BENIGN: Status: ACTIVE | Noted: 2020-07-23

## 2024-06-06 PROBLEM — K21.9 GASTROESOPHAGEAL REFLUX DISEASE WITHOUT ESOPHAGITIS: Status: ACTIVE | Noted: 2020-07-27

## 2024-06-06 PROBLEM — J30.9 ALLERGIC RHINITIS: Status: ACTIVE | Noted: 2024-06-06

## 2024-06-06 PROBLEM — I48.91 A-FIB (HCC): Status: ACTIVE | Noted: 2020-06-11

## 2024-06-06 PROBLEM — M51.36 DEGENERATION OF LUMBAR INTERVERTEBRAL DISC: Status: ACTIVE | Noted: 2023-07-20

## 2024-06-06 PROBLEM — C92.10 CHRONIC MYELOID LEUKEMIA (HCC): Status: ACTIVE | Noted: 2020-06-11

## 2024-06-06 PROBLEM — M54.6 THORACIC BACK PAIN: Status: ACTIVE | Noted: 2023-07-20

## 2024-06-06 PROBLEM — M54.2 NECK PAIN: Status: ACTIVE | Noted: 2023-07-20

## 2024-06-06 PROBLEM — M16.11 PRIMARY OSTEOARTHRITIS OF RIGHT HIP: Status: ACTIVE | Noted: 2020-07-24

## 2024-06-06 PROBLEM — E55.9 VITAMIN D DEFICIENCY: Status: ACTIVE | Noted: 2020-06-11

## 2024-06-06 PROBLEM — M51.369 DEGENERATION OF LUMBAR INTERVERTEBRAL DISC: Status: ACTIVE | Noted: 2023-07-20

## 2024-06-06 PROBLEM — G89.4 CHRONIC PAIN DISORDER: Status: ACTIVE | Noted: 2023-07-20

## 2024-06-06 PROBLEM — N40.0 BENIGN PROSTATIC HYPERPLASIA WITHOUT LOWER URINARY TRACT SYMPTOMS: Status: ACTIVE | Noted: 2020-07-27

## 2024-06-06 PROBLEM — M54.50 ACUTE LEFT-SIDED LOW BACK PAIN WITHOUT SCIATICA: Status: ACTIVE | Noted: 2023-11-22

## 2024-06-06 PROBLEM — M47.816 ARTHROPATHY OF LUMBAR FACET JOINT: Status: ACTIVE | Noted: 2023-07-20

## 2024-06-06 PROBLEM — M47.814 THORACIC SPONDYLOSIS: Status: ACTIVE | Noted: 2023-07-20

## 2024-06-06 PROBLEM — M47.816 LUMBAR SPONDYLOSIS: Status: ACTIVE | Noted: 2023-07-20

## 2024-06-06 PROBLEM — M96.1 FAILED BACK SURGICAL SYNDROME: Status: ACTIVE | Noted: 2023-10-25

## 2024-06-06 PROCEDURE — 95886 MUSC TEST DONE W/N TEST COMP: CPT | Performed by: PHYSICAL MEDICINE & REHABILITATION

## 2024-06-06 PROCEDURE — 95912 NRV CNDJ TEST 11-12 STUDIES: CPT | Performed by: PHYSICAL MEDICINE & REHABILITATION

## 2024-06-06 RX ORDER — RIZATRIPTAN BENZOATE 5 MG/1
5 TABLET ORAL DAILY PRN
COMMUNITY
Start: 2024-05-10

## 2024-06-06 NOTE — PROGRESS NOTES
[facility]  [facility], [address]  [place] [address]  Phone: [phone]  Fax: [phone]    Connor Dove  : 1950  PCP: Jovani Walton MD  2024    ELECTROMYOGRAPHY AND NERVE CONDUCTION STUDIES    Connor Dove was referred by Mamadou Wallace MD for electrodiagnostic evaluation of numbness/tingling of both hands.    NCV & EMG Findings:  Evaluation of the left median (APB) motor nerve showed prolonged distal onset latency (5.3 ms), reduced amplitude (2.4 mV), and decreased conduction velocity (42 m/s).  The right median (APB) motor nerve showed prolonged distal onset latency (4.8 ms) and decreased conduction velocity (44 m/s).  The left ulnar (ADM) motor and the right ulnar (ADM) motor nerves showed decreased conduction velocity ([place]-Wrist, L49, R45 m/s).  The left median sensory nerve showed no response.  The right median sensory and the left ulnar sensory nerves showed prolonged distal onset latency (R4.0, L4.8 ms), prolonged distal peak latency (R4.7, L5.4 ms), and reduced amplitude (R1, L6 µV).  The right ulnar sensory nerve showed reduced amplitude (7 µV).  All remaining nerves (as indicated in the following tables) were within normal limits.      INTERPRETATION  This is an abnormal electrodiagnostic examination. 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   Unclear chronic right cervical radiculopathy (C6,C7,C8) - no active denervation evident on needle examination, no other correlation in the relevant myotomes    There are no electrodiagnostic findings consistent with cervical

## 2024-10-02 RX ORDER — APIXABAN 5 MG/1
TABLET, FILM COATED ORAL
Qty: 120 TABLET | Refills: 3 | Status: SHIPPED | OUTPATIENT
Start: 2024-10-02

## 2024-10-14 NOTE — PROGRESS NOTES
PT DAILY TREATMENT NOTE     Patient Name: Wayne Ochoa  Date:10/10/2022  : 1950  [x]  Patient  Verified  Payor: Idalia Falk / Plan[place] / Product Type: Medicare /    In time: 9552 am   Out time: 1215 pm   Total Treatment Time (min): 50  Visit #: 5 of 12    Medicare/BCBS Only   Total Timed Codes (min):  30 1:1 Treatment Time:  30       Treatment Area: Other low back pain [M54.59]    SUBJECTIVE  Pain Level (0-10 scale): -7/10   Any medication changes, allergies to medications, adverse drug reactions, diagnosis change, or new procedure performed?: [x] No    [] Yes (see summary sheet for update)  Subjective functional status/changes:   [] No changes reported  Patient reports that he tripped and fell while on his deck yesterday. Patient explains that he does not think he messed anything in his back. Patient states that his entire body just feels tight. Patient has not called his doctor to let him know about the fall due to having an appointment soon. Patient stated that he took his pain meds before coming to PT.     OBJECTIVE    Modality rationale: decrease inflammation, decrease pain, and increase tissue extensibility to improve the patients ability to assist with performing ADL's and functional tasks without limitations.     Min Type Additional Details    [] Estim:  []Unatt       []IFC  []Premod                        []Other:  []w/ice   []w/heat  Position:  Location:    [] Estim: []Att    []TENS instruct  []NMES                    []Other:  []w/US   []w/ice   []w/heat  Position:  Location:    []  Traction: [] Cervical       []Lumbar                       [] Prone          []Supine                       []Intermittent   []Continuous Lbs:  [] before manual  [] after manual    []  Ultrasound: []Continuous   [] Pulsed                           []1MHz   []3MHz W/cm2:  Location:    []  Iontophoresis with dexamethasone         Location: [] Take home patch   [] In clinic   10/10 [] Ice     [x]  heat  []  Ice massage  []  Laser   []  Anodyne Position:reclined  Location:L/S    []  Laser with stim  []  Other:  Position:  Location:    []  Vasopneumatic Device    []  Right     []  Left  Pre-treatment girth:  Post-treatment girth:  Measured at (location):  Pressure:       [] lo [] med [] hi   Temperature: [] lo [] med [] hi   [] Skin assessment post-treatment:  []intact []redness- no adverse reaction  []redness - adverse reaction:     15 min Therapeutic Exercise:  [] See flow sheet :   Rationale: increase ROM and increase strength to improve the patients overall muscle endurance and activity tolerance for ADL's and functional tasks. min Therapeutic Activity:  []  See flow sheet :   Rationale: increase strength and improve coordination  to improve the patients ability to safely perform dynamic activities and to improve functional performance of  ADL's. 15 min Neuromuscular Re-education:  []  See flow sheet :   Rationale: increase strength, improve coordination, and improve balance  to improve the patients ability to perform activities with good form, stability and proprioception. With   [] TE   [] TA   [] neuro   [] other: Patient Education: [x] Review HEP    [] Progressed/Changed HEP based on:   [] positioning   [] body mechanics   [] transfers   [] heat/ice application    [] other:      Other Objective/Functional Measures: Added band and ball with GTB     Pain Level (0-10 scale) post treatment: 5/10     ASSESSMENT/Changes in Function:   Patient presents to PT with increased low back pain from a fall yesterday. Patient started and ended today's session with MHP. Patient requested to hold on some exercises due to the increase in low back pain. Patient performed supine exercises, as per flow sheet, to assist with increasing core strength. Patient tolerated today's exercises well with no increase in in low back pain. Will progress exercises next visit as long as patient is able. Patient appeared to be slightly disoriented at the end of today's session and required assistance with donning brace. Patient reports that he drove himself to PT due to his wife having a MD appointment. Patient insisted that he would be fine to drive the 4 minutes home. Therapist informed patient that she would call to make sure he made it home. Patient will continue to benefit from skilled PT services to modify and progress therapeutic interventions, address functional mobility deficits, address ROM deficits, address strength deficits, analyze and address soft tissue restrictions, analyze and cue movement patterns, analyze and modify body mechanics/ergonomics, assess and modify postural abnormalities, address imbalance/dizziness, and instruct in home and community integration to attain remaining goals. []  See Plan of Care  []  See progress note/recertification  []  See Discharge Summary         Progress towards goals / Updated goals:  Short Term Goals: To be accomplished in 6 treatments:              1 patient will have established and be I with HEP to aid with I and self management at discharge              EVAL HEP issued              Current: reports doing some 10/7/22              2 patient will have [address] completed by session 2 to assess ability to perform and tolerate ADLS and activities at home and in the community              EVAL TBA              Current: 38 points, FOTO completed on 9/8. 10/10/2022  Long Term Goals:  To be accomplished in 12 treatments:              1 patient will have FOTO to projected gains to show increase tolerance to activities at home and in the community including his goal for yard work tolerance              EVAL FOTO TBA     Current: 38 points, FOTO completed on 9/8. 10/10/2022              2 patient will have pain 2/10 to aid with increase tolerance to standing 15 minutes for his personal activities              EVAL 6              Current: Patient presents to PT with 8/10 pain. 10/10/2022              3 patient will have improved sit to stand 5x with UE to <30 seconds for carryover to home and community locations              EVAL 58 seconds              4 patient will have improved TUG test to 20 seconds for carryover to home and community activities              EVAL 28 seconds    PLAN  [x]  Upgrade activities as tolerated     [x]  Continue plan of care  []  Update interventions per flow sheet       []  Discharge due to:_  []  Other:_      Mindy Sauceda, MASSIEL 10/10/2022  12:37 PM    Future Appointments   Date Time Provider Panchito Chau   10/10/2022 11:15 AM Woodroe Devoid, PTA [facility]   10/12/2022 11:15 AM Laura Mo, PT [facility]   10/14/2022  1:30 PM Juanito Durhamg, PT [facility]   10/17/2022 11:15 AM Woodroe Devoid, PTA [facility]   10/19/2022 12:00 PM Woodroe Devoid, PTA [facility]   10/21/2022 10:30 AM Woodroe Devoid, PTA [facility]   10/24/2022 10:30 AM Therisa Fang, PT [facility]   10/26/2022 10:30 AM Woodroe Devoid, PTA [facility]   10/28/2022 10:30 AM Lyndee Rolls, PT [facility]   11/9/2022 10:20 AM Fan Myers MD [facility] BS AMB at home:

## 2024-11-12 ENCOUNTER — HOSPITAL ENCOUNTER (OUTPATIENT)
Facility: HOSPITAL | Age: 74
Discharge: HOME | End: 2024-11-15
Payer: MEDICARE

## 2024-11-12 PROCEDURE — 84403 ASSAY OF TOTAL TESTOSTERONE: CPT

## 2024-11-12 PROCEDURE — 36415 COLL VENOUS BLD VENIPUNCTURE: CPT

## 2024-11-13 LAB — TESTOST SERPL-MCNC: 482 NG/DL (ref 264–916)

## 2025-04-08 NOTE — PROGRESS NOTES
[facility]  [address] [place] [address], [address]  [place] [address]  Phone: [phone]  Fax: [phone]        Connor Dove  : 1950  PCP: Jovani Walton MD  2025    NEW PATIENT    HISTORY OF PRESENT ILLNESS  Connor Dove is a 74 y.o. male c/o neck and back pain. Pt notes his main complaint is his neck pain. Pt notes he had SCS implanted through Dr. Tapia. Pt notes benefit to back pain with SCS but continues with neck pain. Pt recalls one prior session of dry needling. Pt also c/o difficulty swallowing including regular swallowing. Pt recalls     Cervical XR dated 25 was reviewed. Upon my read, Intact hardware C2-T2. Significant forward head carriage. Occipital bone spur. No acute changes or signs of hardware failure.    Pain Score:   8/10     PmHx: CML, AFib (Eliquis), BPH, GERD, HTN, SCS, gastric bypass, C3-7 ACDF, C2-3 interbody fusion, posterior C2-T2 fusion, L2-S1 fusion     reviewed.    REVIEW OF SYSTEMS  Review of Systems   Constitutional:  Negative for fever and unexpected weight change.   HENT:  Positive for trouble swallowing.    Eyes:  Negative for visual disturbance.   Respiratory:  Positive for shortness of breath.    Cardiovascular:  Negative for chest pain and palpitations.   Gastrointestinal:  Negative for diarrhea, nausea and vomiting.   Genitourinary:  Positive for urgency. Negative for enuresis.   Musculoskeletal:  Positive for back pain and neck pain.   Skin:  Negative for rash.   Neurological:  Positive for numbness and headaches. Negative for dizziness and weakness.   Psychiatric/Behavioral:  Positive for sleep disturbance. The patient is not nervous/anxious.      SPINE/MUSCULOSKELETAL EXAM  Back Exam     Tenderness   The patient is experiencing tenderness in the cervical (scalene, upper trap, mastoid).    Range of Motion   Extension:  normal   Flexion:  normal   Rotation right:  normal   Rotation left:  normal

## 2025-04-09 ENCOUNTER — OFFICE VISIT (OUTPATIENT)
Age: 75
End: 2025-04-09
Payer: MEDICARE

## 2025-04-09 VITALS
TEMPERATURE: 97.3 F | SYSTOLIC BLOOD PRESSURE: 129 MMHG | RESPIRATION RATE: 16 BRPM | BODY MASS INDEX: 26.11 KG/M2 | HEART RATE: 66 BPM | WEIGHT: 214.4 LBS | DIASTOLIC BLOOD PRESSURE: 83 MMHG | HEIGHT: 76 IN

## 2025-04-09 DIAGNOSIS — Z98.1 S/P LUMBAR FUSION: ICD-10-CM

## 2025-04-09 DIAGNOSIS — M43.6 TORTICOLLIS: ICD-10-CM

## 2025-04-09 DIAGNOSIS — G89.29 CHRONIC PRIMARY MUSCULOSKELETAL PAIN: ICD-10-CM

## 2025-04-09 DIAGNOSIS — G89.29 CHRONIC RIGHT SHOULDER PAIN: ICD-10-CM

## 2025-04-09 DIAGNOSIS — Z98.1 S/P CERVICAL SPINAL FUSION: ICD-10-CM

## 2025-04-09 DIAGNOSIS — M25.511 CHRONIC RIGHT SHOULDER PAIN: ICD-10-CM

## 2025-04-09 DIAGNOSIS — G24.9 DYSTONIA: ICD-10-CM

## 2025-04-09 DIAGNOSIS — M54.2 NECK PAIN: Primary | ICD-10-CM

## 2025-04-09 DIAGNOSIS — M79.18 CHRONIC PRIMARY MUSCULOSKELETAL PAIN: ICD-10-CM

## 2025-04-09 PROCEDURE — 72040 X-RAY EXAM NECK SPINE 2-3 VW: CPT | Performed by: PHYSICAL MEDICINE & REHABILITATION

## 2025-04-09 PROCEDURE — 3079F DIAST BP 80-89 MM HG: CPT | Performed by: PHYSICAL MEDICINE & REHABILITATION

## 2025-04-09 PROCEDURE — 1123F ACP DISCUSS/DSCN MKR DOCD: CPT | Performed by: PHYSICAL MEDICINE & REHABILITATION

## 2025-04-09 PROCEDURE — 1036F TOBACCO NON-USER: CPT | Performed by: PHYSICAL MEDICINE & REHABILITATION

## 2025-04-09 PROCEDURE — 1160F RVW MEDS BY RX/DR IN RCRD: CPT | Performed by: PHYSICAL MEDICINE & REHABILITATION

## 2025-04-09 PROCEDURE — 99204 OFFICE O/P NEW MOD 45 MIN: CPT | Performed by: PHYSICAL MEDICINE & REHABILITATION

## 2025-04-09 PROCEDURE — 3074F SYST BP LT 130 MM HG: CPT | Performed by: PHYSICAL MEDICINE & REHABILITATION

## 2025-04-09 PROCEDURE — 1159F MED LIST DOCD IN RCRD: CPT | Performed by: PHYSICAL MEDICINE & REHABILITATION

## 2025-04-09 PROCEDURE — G8417 CALC BMI ABV UP PARAM F/U: HCPCS | Performed by: PHYSICAL MEDICINE & REHABILITATION

## 2025-04-09 PROCEDURE — 3017F COLORECTAL CA SCREEN DOC REV: CPT | Performed by: PHYSICAL MEDICINE & REHABILITATION

## 2025-04-09 PROCEDURE — 1125F AMNT PAIN NOTED PAIN PRSNT: CPT | Performed by: PHYSICAL MEDICINE & REHABILITATION

## 2025-04-09 PROCEDURE — G8427 DOCREV CUR MEDS BY ELIG CLIN: HCPCS | Performed by: PHYSICAL MEDICINE & REHABILITATION

## 2025-04-09 ASSESSMENT — ENCOUNTER SYMPTOMS
NAUSEA: 0
DIARRHEA: 0
SHORTNESS OF BREATH: 1
VOMITING: 0
TROUBLE SWALLOWING: 1
BACK PAIN: 1

## 2025-04-18 ENCOUNTER — TELEPHONE (OUTPATIENT)
Age: 75
End: 2025-04-18

## 2025-04-18 DIAGNOSIS — M43.6 TORTICOLLIS: Primary | ICD-10-CM

## 2025-04-18 DIAGNOSIS — G24.9 DYSTONIA: ICD-10-CM

## 2025-04-18 NOTE — TELEPHONE ENCOUNTER
Pt wife called to get another referral for botox in pt's neck for the provider Dr. Sanchez told them was located in [place]. The neurologist that pt was referred to, is unable to see pt until the end of July and they are trying to get a sooner appt.    Please contact pt's wife and advise of contact information for new referral via [employer] is fine.    callback# [phone]

## 2025-04-22 ENCOUNTER — HOSPITAL ENCOUNTER (OUTPATIENT)
Facility: HOSPITAL | Age: 75
Setting detail: RECURRING SERIES
Discharge: HOME | End: 2025-04-25

## 2025-04-22 ENCOUNTER — OFFICE VISIT (OUTPATIENT)
Age: 75
End: 2025-04-22
Payer: MEDICARE

## 2025-04-22 VITALS — BODY MASS INDEX: 27.02 KG/M2 | WEIGHT: 222 LBS

## 2025-04-22 DIAGNOSIS — M19.011 GLENOHUMERAL ARTHRITIS, RIGHT: ICD-10-CM

## 2025-04-22 DIAGNOSIS — M54.2 NECK PAIN: ICD-10-CM

## 2025-04-22 DIAGNOSIS — M25.511 RIGHT SHOULDER PAIN, UNSPECIFIED CHRONICITY: Primary | ICD-10-CM

## 2025-04-22 PROCEDURE — G8417 CALC BMI ABV UP PARAM F/U: HCPCS | Performed by: ORTHOPAEDIC SURGERY

## 2025-04-22 PROCEDURE — 1125F AMNT PAIN NOTED PAIN PRSNT: CPT | Performed by: ORTHOPAEDIC SURGERY

## 2025-04-22 PROCEDURE — 3017F COLORECTAL CA SCREEN DOC REV: CPT | Performed by: ORTHOPAEDIC SURGERY

## 2025-04-22 PROCEDURE — 1160F RVW MEDS BY RX/DR IN RCRD: CPT | Performed by: ORTHOPAEDIC SURGERY

## 2025-04-22 PROCEDURE — 73030 X-RAY EXAM OF SHOULDER: CPT | Performed by: ORTHOPAEDIC SURGERY

## 2025-04-22 PROCEDURE — 1123F ACP DISCUSS/DSCN MKR DOCD: CPT | Performed by: ORTHOPAEDIC SURGERY

## 2025-04-22 PROCEDURE — 1159F MED LIST DOCD IN RCRD: CPT | Performed by: ORTHOPAEDIC SURGERY

## 2025-04-22 PROCEDURE — 1036F TOBACCO NON-USER: CPT | Performed by: ORTHOPAEDIC SURGERY

## 2025-04-22 PROCEDURE — G8427 DOCREV CUR MEDS BY ELIG CLIN: HCPCS | Performed by: ORTHOPAEDIC SURGERY

## 2025-04-22 PROCEDURE — 99202 OFFICE O/P NEW SF 15 MIN: CPT | Performed by: ORTHOPAEDIC SURGERY

## 2025-04-22 NOTE — TELEPHONE ENCOUNTER
Spoke to Dr. Sanchez in regards to this message. HE stated we can refer the patient to [facility] but he wants the patient to be aware that the July appointment is still a good time frame for appointments with neurology. Message has been sent via My Chart. No further action needed at this time.

## 2025-04-22 NOTE — PROGRESS NOTES
Connor Dove  1950   Chief Complaint   Patient presents with    Shoulder Pain     Right shoulder pain and neck pain        HISTORY OF PRESENT ILLNESS  Connor Dove is a 74 y.o. male who presents today for evaluation of right shoulder pain.  Pain is a 3/10. Pain has been present for awhile. He was referred by . Notes he fractured his shoulder a few years ago where he underwent surgery. He has decreased ROM. Having some pain with movement. Also notes pain in neck.     Has tried following treatments: Injections:No; Brace:No; Therapy:No; Cane/Crutch:No      Allergies   Allergen Reactions    Ace Inhibitors Other (See Comments)     Renal Failure    Angiotensin Receptor Blockers Other (See Comments)     Renal Failure    Levofloxacin Nausea Only     Severe nausea and dizziness  Other reaction(s): gi distress  Severe nausea and dizziness    Sulfa Antibiotics Myalgia and Nausea Only     Other reaction(s): Unknown (comments)    Nausea, aching all over    Other reaction(s): gi distress    Other Reaction(s): arthralgia (joint pain), Not available        Past Medical History:   Diagnosis Date    Abdominal pain, unspecified site     Acute reaction to stress     Allergic rhinitis due to pollen     Anxiety     Arrhythmia     afib    Arthritis     Back injury     BPH (benign prostatic hypertrophy)     Calculus of ureter     Cancer (HCC)     history of Leukemia, in remission    Carotid stenosis 06/17/2015    Mild < 50% bilateral ICA stenosis.      CML in remission ([facility]) 4/30/2016    Cortical age-related cataract of right eye 4/4/2021    Dizziness and giddiness     Esophageal reflux     Essential hypertension     GERD (gastroesophageal reflux disease)     Headache(784.0)     History of echocardiogram 08/13/2015    EF 55-60%.  No WMA.  Mild-mod LVH.  Gr 1 DDfx.  No evidence of intracardiac shunt.  Mild AI.      Hx: UTI (urinary tract infection)     Hyperammonemia 8/19/2018    Hypertension

## 2025-04-25 ENCOUNTER — TELEPHONE (OUTPATIENT)
Facility: HOSPITAL | Age: 75
End: 2025-04-25

## 2025-04-25 NOTE — TELEPHONE ENCOUNTER
Patient walked out at 10:54am, we called him and he said he waited too long for his appt. even he though he arrived at 10:30am, patient was advised to not come so early due to his ppw being done already